# Patient Record
Sex: FEMALE | Race: BLACK OR AFRICAN AMERICAN | Employment: FULL TIME | URBAN - METROPOLITAN AREA
[De-identification: names, ages, dates, MRNs, and addresses within clinical notes are randomized per-mention and may not be internally consistent; named-entity substitution may affect disease eponyms.]

---

## 2017-01-12 RX ORDER — CODEINE PHOSPHATE AND GUAIFENESIN 10; 100 MG/5ML; MG/5ML
5 SOLUTION ORAL
Qty: 180 ML | Refills: 0 | Status: SHIPPED | OUTPATIENT
Start: 2017-01-12 | End: 2017-02-14 | Stop reason: SDUPTHER

## 2017-02-14 ENCOUNTER — OFFICE VISIT (OUTPATIENT)
Dept: INTERNAL MEDICINE CLINIC | Age: 67
End: 2017-02-14

## 2017-02-14 VITALS
HEIGHT: 59 IN | DIASTOLIC BLOOD PRESSURE: 69 MMHG | HEART RATE: 68 BPM | TEMPERATURE: 97.7 F | OXYGEN SATURATION: 95 % | RESPIRATION RATE: 16 BRPM | SYSTOLIC BLOOD PRESSURE: 155 MMHG | WEIGHT: 162.4 LBS | BODY MASS INDEX: 32.74 KG/M2

## 2017-02-14 DIAGNOSIS — E11.9 TYPE 2 DIABETES MELLITUS WITHOUT COMPLICATION, WITHOUT LONG-TERM CURRENT USE OF INSULIN (HCC): Primary | ICD-10-CM

## 2017-02-14 DIAGNOSIS — I10 ESSENTIAL HYPERTENSION: ICD-10-CM

## 2017-02-14 DIAGNOSIS — E78.00 HYPERCHOLESTEROLEMIA: ICD-10-CM

## 2017-02-14 PROBLEM — R05.9 COUGH: Status: ACTIVE | Noted: 2017-02-14

## 2017-02-14 RX ORDER — MOMETASONE FUROATE 50 UG/1
2 SPRAY, METERED NASAL DAILY
Qty: 1 CONTAINER | Refills: 11 | Status: SHIPPED | OUTPATIENT
Start: 2017-02-14 | End: 2019-01-08 | Stop reason: ALTCHOICE

## 2017-02-14 RX ORDER — CETIRIZINE HCL 10 MG
10 TABLET ORAL DAILY
Qty: 30 TAB | Refills: 11 | Status: SHIPPED | OUTPATIENT
Start: 2017-02-14 | End: 2019-01-08 | Stop reason: ALTCHOICE

## 2017-02-14 RX ORDER — CODEINE PHOSPHATE AND GUAIFENESIN 10; 100 MG/5ML; MG/5ML
5 SOLUTION ORAL
Qty: 180 ML | Refills: 2 | Status: SHIPPED | OUTPATIENT
Start: 2017-02-14 | End: 2017-03-28 | Stop reason: ALTCHOICE

## 2017-02-14 NOTE — PROGRESS NOTES
SPORTS MEDICINE AND PRIMARY CARE  Kraig Zamora MD, [address] [address]  Phone:  [phone]  Fax: [phone]      Chief Complaint   Patient presents with    Follow-up     Cough          SUBECTIVE:    Gaurav Mondragon is a 74 y.o. female Patient returns today ambulatory, alert and appropriate and has the capacity to give an accurate history. She has a known history of diabetes mellitus type II, dyslipidemia, primary hypertension, and obesity. Patient returns today with a persistent cough since October. Sometimes it is productive of a white coffee material.  It started back in October. She woke up and had a clump in her throat. She could not get it to come out. I told her not to go to [facility] so she came over to see [facility]. She also had an earache and a sore throat. He told her to take some Tylenol. She returned home and still has the cough now. She is currently not taking anything for the cough and is seen for evaluation. Current Outpatient Prescriptions   Medication Sig Dispense Refill    Diphth, Pertus,Acell,, Tetanus (BOOSTRIX TDAP) 2.5-8-5 Lf-mcg-Lf/0.5mL syrg 0.5 mL by IntraMUSCular route once for 1 dose. 0.5 mL 0    guaiFENesin-codeine (ROBITUSSIN AC) 100-10 mg/5 mL solution Take 5 mL by mouth four (4) times daily as needed for Cough. Max Daily Amount: 20 mL. 180 mL 2    cetirizine (ZYRTEC) 10 mg tablet Take 1 Tab by mouth daily. 30 Tab 11    mometasone (NASONEX) 50 mcg/actuation nasal spray 2 Sprays by Both Nostrils route daily. 1 Container 11    amLODIPine (NORVASC) 5 mg tablet Take 1 Tab by mouth daily. 30 Tab 11    carvedilol (COREG) 12.5 mg tablet Take 1 Tab by mouth two (2) times daily (with meals).  60 Tab 11    ASCENSIA CONTOUR strip USE TO TEST BLOOD GLUCOSE ONCE DAILY AS DIRECTED 50 strip 11    glucose blood VI test strips (CONTOUR NEXT STRIPS) strip Test blood sugar once daily 50 strip 11    metFORMIN ER (GLUCOPHAGE XR) 500 mg tablet TAKE ONE TABLET BY MOUTH ONCE DAILY BEFORE SUPPER 30 Tab 11     Past Medical History   Diagnosis Date    Baker's cyst 3-3-16     r    Bereavement 11/15     61yo  sister     Contusion of knee, right     Cough 2017    Depression with anxiety     Diabetes ([facility]     Diverticulosis 2-6-15     colonoscopy    DM2 (diabetes mellitus, type 2) (HCC)     Edema     Fatigue     Hypercholesterolemia     Hypertension     Liver disease      hepatitis 30 years ago    Obesity     Onychomycosis     S/P LD (total abdominal hysterectomy)      Past Surgical History   Procedure Laterality Date    Hx gyn       partial hysterectomy     Allergies   Allergen Reactions    Aspirin Nausea and Vomiting    Lipitor [Atorvastatin] Myalgia    Metformin Cough    Pravastatin Myalgia       REVIEW OF SYSTEMS:   No hemoptysis or epistaxis. Social History     Social History    Marital status:      Spouse name: N/A    Number of children: N/A    Years of education: N/A     Social History Main Topics    Smoking status: Never Smoker    Smokeless tobacco: Never Used    Alcohol use No    Drug use: No    Sexual activity: Not Asked     Other Topics Concern    None     Social History Narrative   r  Family History   Problem Relation Age of Onset    No Known Problems Father        OBJECTIVE:  Visit Vitals    /69 (BP 1 Location: Left arm, BP Patient Position: Sitting)    Pulse 68    Temp 97.7 °F (36.5 °C) (Oral)    Resp 16    Ht 4' 11\" (1.499 m)    Wt 162 lb 6.4 oz (73.7 kg)    SpO2 95%    BMI 32.8 kg/m2     ENT: perrla,  eom intact  NECK: supple. Thyroid normal  CHEST: clear to ascultation and percussion   HEART: regular rate and rhythm  ABD: soft, bowel sounds active  EXTREMITIES: no edema, pulse 1+     No visits with results within 3 Month(s) from this visit.   Latest known visit with results is:    Office Visit on 2016   Component Date Value Ref Range Status    Glucose POC 11/14/2016 177  mg/dL Final          ASSESSMENT:  1. Type 2 diabetes mellitus without complication, without long-term current use of insulin ([address]    2. Essential hypertension    3. Hypercholesterolemia      Patient has a persistent cough. She stopped the Metformin because it seemed to be aggravated and her throat was closing up. When she started it up again she had the same symptoms again and therefore she thought she was allergic to it and did not take it anymore. She has posterior cobblestoning which would suggest that sinus is the origin of the cough. We will give her Robitussin AC but in addition we will give her a nasal spray and Zyrtec and see if we can resolve the cough. We will check a hemoglobin A1C to determine if she needs something for the diabetes. If the hemoglobin A1C is around 6.5 or 6.6 we will ask her to stick with the diet. If it is higher than that we will place her on a DDP4 inhibiter to control her diabetes. She will return to the office in about four months, sooner if she has any problems. For the discomfort in the knee we will use Naproxen. PLAN:  .  Orders Placed This Encounter    MICROALBUMIN, UR, RAND W/ MICROALBUMIN/CREA RATIO    LIPID PANEL    HEMOGLOBIN A1C WITH EAG    CBC WITH AUTOMATED DIFF    METABOLIC PANEL, COMPREHENSIVE    TSH 3RD GENERATION    URINALYSIS W/ RFLX MICROSCOPIC    REFERRAL TO OPHTHALMOLOGY    Diphth, Pertus,Acell,, Tetanus (BOOSTRIX TDAP) 2.5-8-5 Lf-mcg-Lf/0.5mL syrg    guaiFENesin-codeine (ROBITUSSIN AC) 100-10 mg/5 mL solution    cetirizine (ZYRTEC) 10 mg tablet    mometasone (NASONEX) 50 mcg/actuation nasal spray       Follow-up Disposition:  Return in about 6 weeks (around 3/28/2017). ATTENTION:   This medical record was transcribed using an electronic medical records system. Although proofread, it may and can contain electronic and spelling errors. Other human spelling and other errors may be present.   Corrections may be executed at a later time. Please feel free to contact us for any clarifications as needed.

## 2017-02-14 NOTE — MR AVS SNAPSHOT
Visit Information Date & Time Provider Department Dept. Phone Encounter #  
 2/14/2017 10:30 AM [facility] [place] [address] [id number] Follow-up Instructions Return in about 6 weeks (around 3/28/2017). Follow-up and Disposition History Upcoming Health Maintenance Date Due DTaP/Tdap/Td series (1 - Tdap) 9/24/1969 EYE EXAM RETINAL OR DILATED Q1 10/14/2015 MEDICARE YEARLY EXAM 3/13/2016 GLAUCOMA SCREENING Q2Y 8/13/2016 MICROALBUMIN Q1 10/1/2016 HEMOGLOBIN A1C Q6M 12/7/2016 BREAST CANCER SCRN MAMMOGRAM 1/6/2017 FOOT EXAM Q1 1/7/2017 LIPID PANEL Q1 1/7/2017 FOBT Q 1 YEAR AGE 50-75 3/2/2017 Pneumococcal 65+ Low/Medium Risk (2 of 2 - PPSV23) 11/14/2017 Allergies as of 2/14/2017  Review Complete On: 2/14/2017 By: Karen Spicer MD  
  
 Severity Noted Reaction Type Reactions Aspirin  12/12/2014    Nausea and Vomiting Lipitor [Atorvastatin]  10/01/2015    Myalgia Metformin  02/14/2017    Cough Pravastatin  06/19/2015    Myalgia Current Immunizations  Never Reviewed No immunizations on file. Not reviewed this visit You Were Diagnosed With   
  
 Codes Comments Type 2 diabetes mellitus without complication, without long-term current use of insulin (HCC)    -  Primary ICD-10-CM: E11.9 ICD-9-CM: 250.00 Essential hypertension     ICD-10-CM: I10 
ICD-9-CM: 401.9 Hypercholesterolemia     ICD-10-CM: E78.00 ICD-9-CM: 272.0 Vitals BP Pulse Temp Resp Height(growth percentile) Weight(growth percentile) 155/69 (BP 1 Location: Left arm, BP Patient Position: Sitting) 68 97.7 °F (36.5 °C) (Oral) 16 4' 11\" (1.499 m) 162 lb 6.4 oz (73.7 kg) SpO2 BMI OB Status Smoking Status 95% 32.8 kg/m2 Unknown Never Smoker Vitals History BMI and BSA Data Body Mass Index Body Surface Area  
 32.8 kg/m 2 1.75 m 2 Preferred Pharmacy Pharmacy Name Phone [id number] [facility] [phone] Your Updated Medication List  
  
   
This list is accurate as of: 2/14/17 12:24 PM.  Always use your most recent med list. amLODIPine 5 mg tablet Commonly known as:  Parvez Brigitte Take 1 Tab by mouth daily. * Ascensia CONTOUR strip Generic drug:  glucose blood VI test strips USE TO TEST BLOOD GLUCOSE ONCE DAILY AS DIRECTED * glucose blood VI test strips strip Commonly known as:  CONTOUR NEXT STRIPS Test blood sugar once daily  
  
 carvedilol 12.5 mg tablet Commonly known as:  Wileen Smalls Take 1 Tab by mouth two (2) times daily (with meals). cetirizine 10 mg tablet Commonly known as:  ZYRTEC Take 1 Tab by mouth daily. Diphth, Pertus(Acell), Tetanus 2.5-8-5 Lf-mcg-Lf/0.5mL Syrg Commonly known as:  BOOSTRIX TDAP  
0.5 mL by IntraMUSCular route once for 1 dose. guaiFENesin-codeine 100-10 mg/5 mL solution Commonly known as:  ROBITUSSIN AC Take 5 mL by mouth four (4) times daily as needed for Cough. Max Daily Amount: 20 mL. metFORMIN  mg tablet Commonly known as:  GLUCOPHAGE XR  
TAKE ONE TABLET BY MOUTH ONCE DAILY BEFORE SUPPER  
  
 mometasone 50 mcg/actuation nasal spray Commonly known as:  NASONEX  
2 Sprays by Both Nostrils route daily. * Notice: This list has 2 medication(s) that are the same as other medications prescribed for you. Read the directions carefully, and ask your doctor or other care provider to review them with you. Prescriptions Printed Refills  
 guaiFENesin-codeine (ROBITUSSIN AC) 100-10 mg/5 mL solution 2 Sig: Take 5 mL by mouth four (4) times daily as needed for Cough. Max Daily Amount: 20 mL. Class: Print Route: Oral  
  
Prescriptions Sent to Pharmacy Refills  Jessica Carbajal,, Tetanus (BOOSTRIX TDAP) 2.5-8-5 Lf-mcg-Lf/0.5mL syrg 0  
 Si.5 mL by IntraMUSCular route once for 1 dose. Class: Normal  
 Pharmacy: [facility]/pharmacy #[id number] Ph #: [phone] Route: IntraMUSCular  
 cetirizine (ZYRTEC) 10 mg tablet 11 Sig: Take 1 Tab by mouth daily. Class: Normal  
 Pharmacy: [facility]/pharmacy #[id number] Ph #: [phone] Route: Oral  
 mometasone (NASONEX) 50 mcg/actuation nasal spray 11 Si Sprays by Both Nostrils route daily. Class: Normal  
 Pharmacy: [facility]/pharmacy #[id number] Ph #: [phone] Route: Both Nostrils We Performed the Following CBC WITH AUTOMATED DIFF [70107 CPT(R)] HEMOGLOBIN A1C WITH EAG [38638 CPT(R)]  DIABETES FOOT EXAM [HM7 Custom] LIPID PANEL [52808 CPT(R)] METABOLIC PANEL, COMPREHENSIVE [06195 CPT(R)] MICROALBUMIN, UR, RAND W/ MICROALBUMIN/CREA RATIO C2400172 CPT(R)] REFERRAL TO OPHTHALMOLOGY [REF57 Custom] TSH 3RD GENERATION [08817 CPT(R)] URINALYSIS W/ RFLX MICROSCOPIC [60684 CPT(R)] Follow-up Instructions Return in about 6 weeks (around 3/28/2017). Referral Information Referral ID Referred By Referred To  
  
 [id number] Lochelio Salazar TESSA Not Available Visits Status Start Date End Date 1 New Request 17 If your referral has a status of pending review or denied, additional information will be sent to support the outcome of this decision. Introducing \[facility] [place] [facility]\"" & HEALTH SERVICES! Rachel Goldman introduces [employer] patient portal. Now you can access parts of your medical record, email your doctor's office, and request medication refills online. 1. In your internet browser, go to [email or web address] 2. Click on the First Time User? Click Here link in the Sign In box. You will see the New Member Sign Up page. 3. Enter your [employer] Access Code exactly as it appears below. You will not need to use this code after youve completed the sign-up process. If you do not sign up before the expiration date, you must request a new code. · [employer] Access Code: [email or web address] Expires: 3/4/2017  2:12 PM 
 
4. Enter the last four digits of your Social Security Number (xxxx) and Date of Birth (mm/dd/yyyy) as indicated and click Submit. You will be taken to the next sign-up page. 5. Create a [employer] ID. This will be your [employer] login ID and cannot be changed, so think of one that is secure and easy to remember. 6. Create a [employer] password. You can change your password at any time. 7. Enter your Password Reset Question and Answer. This can be used at a later time if you forget your password. 8. Enter your e-mail address. You will receive e-mail notification when new information is available in [address] 9. Click Sign Up. You can now view and download portions of your medical record. 10. Click the Download Summary menu link to download a portable copy of your medical information. If you have questions, please visit the Frequently Asked Questions section of the [employer] website. Remember, [employer] is NOT to be used for urgent needs. For medical emergencies, dial 911. Now available from your iPhone and Android! Please provide this summary of care documentation to your next provider. Your primary care clinician is listed as Evangelina Velasco. If you have any questions after today's visit, please call [phone].

## 2017-03-28 ENCOUNTER — OFFICE VISIT (OUTPATIENT)
Dept: INTERNAL MEDICINE CLINIC | Age: 67
End: 2017-03-28

## 2017-03-28 VITALS
SYSTOLIC BLOOD PRESSURE: 133 MMHG | BODY MASS INDEX: 33.02 KG/M2 | DIASTOLIC BLOOD PRESSURE: 76 MMHG | HEIGHT: 59 IN | OXYGEN SATURATION: 98 % | HEART RATE: 64 BPM | WEIGHT: 163.8 LBS | TEMPERATURE: 97.1 F | RESPIRATION RATE: 17 BRPM

## 2017-03-28 DIAGNOSIS — Z00.00 ROUTINE GENERAL MEDICAL EXAMINATION AT A HEALTH CARE FACILITY: ICD-10-CM

## 2017-03-28 DIAGNOSIS — E78.00 HYPERCHOLESTEROLEMIA: ICD-10-CM

## 2017-03-28 DIAGNOSIS — Z13.39 SCREENING FOR ALCOHOLISM: ICD-10-CM

## 2017-03-28 DIAGNOSIS — E11.9 DIABETES MELLITUS TYPE 2, DIET-CONTROLLED (HCC): Primary | ICD-10-CM

## 2017-03-28 DIAGNOSIS — G56.03 BILATERAL CARPAL TUNNEL SYNDROME: ICD-10-CM

## 2017-03-28 DIAGNOSIS — I10 ESSENTIAL HYPERTENSION: ICD-10-CM

## 2017-03-28 LAB — GLUCOSE POC: 123 MG/DL

## 2017-03-28 NOTE — MR AVS SNAPSHOT
Visit Information Date & Time Provider Department Dept. Phone Encounter #  
 3/28/2017  4:15 PM Tricia Fagan MD SPORTS MED AND PRIMARY CARE - [facility] [phone] [id number] Follow-up Instructions Return in about 6 months (around 9/28/2017). Follow-up and Disposition History Your Appointments 9/26/2017  3:00 PM  
Any with Tricia Fagan MD  
[address] [facility] [address] ([address]) Appt Note: 6 month f/u  
 [address], [address] [facility]  
  
   
 [address], [place] [address] [place] [address] Upcoming Health Maintenance Date Due  
 EYE EXAM RETINAL OR DILATED Q1 10/14/2015 MEDICARE YEARLY EXAM 3/13/2016 GLAUCOMA SCREENING Q2Y 8/13/2016 MICROALBUMIN Q1 10/1/2016 HEMOGLOBIN A1C Q6M 12/7/2016 BREAST CANCER SCRN MAMMOGRAM 1/6/2017 LIPID PANEL Q1 1/7/2017 FOBT Q 1 YEAR AGE 50-75 3/2/2017 Pneumococcal 65+ Low/Medium Risk (2 of 2 - PPSV23) 11/14/2017 FOOT EXAM Q1 2/14/2018 DTaP/Tdap/Td series (2 - Td) 3/28/2027 Allergies as of 3/28/2017  Review Complete On: 3/28/2017 By: Tricia Fagan MD  
  
 Severity Noted Reaction Type Reactions Aspirin  12/12/2014    Nausea and Vomiting Lipitor [Atorvastatin]  10/01/2015    Myalgia Metformin  02/14/2017    Cough Pravastatin  06/19/2015    Myalgia Current Immunizations  Never Reviewed No immunizations on file. Not reviewed this visit You Were Diagnosed With   
  
 Codes Comments Diabetes mellitus type 2, diet-controlled ([facility]    -  Primary ICD-10-CM: E11.9 ICD-9-CM: 250.00 Essential hypertension     ICD-10-CM: I10 
ICD-9-CM: 401.9 Hypercholesterolemia     ICD-10-CM: E78.00 ICD-9-CM: 272.0 Routine general medical examination at a health care facility     ICD-10-CM: Z00.00 ICD-9-CM: V70.0 Screening for alcoholism     ICD-10-CM: Z13.89 ICD-9-CM: V79.1 Bilateral carpal tunnel syndrome     ICD-10-CM: G56.03 
ICD-9-CM: 354.0 Vitals BP Pulse Temp Resp Height(growth percentile) Weight(growth percentile) 133/76 (BP 1 Location: Right arm, BP Patient Position: Sitting) 64 97.1 °F (36.2 °C) (Oral) 17 4' 11\" (1.499 m) 163 lb 12.8 oz (74.3 kg) SpO2 BMI OB Status Smoking Status 98% 33.08 kg/m2 Unknown Never Smoker Vitals History BMI and BSA Data Body Mass Index Body Surface Area 33.08 kg/m 2 1.76 m 2 Preferred Pharmacy Pharmacy Name Phone [id number] [phone] [facility] [phone] Your Updated Medication List  
  
   
This list is accurate as of: 3/28/17  5:35 PM.  Always use your most recent med list. amLODIPine 5 mg tablet Commonly known as:  Ascencio Cater Take 1 Tab by mouth daily. * Ascensia CONTOUR strip Generic drug:  glucose blood VI test strips USE TO TEST BLOOD GLUCOSE ONCE DAILY AS DIRECTED * glucose blood VI test strips strip Commonly known as:  CONTOUR NEXT STRIPS Test blood sugar once daily  
  
 carvedilol 12.5 mg tablet Commonly known as:  Sumanth Pintos Take 1 Tab by mouth two (2) times daily (with meals). cetirizine 10 mg tablet Commonly known as:  ZYRTEC Take 1 Tab by mouth daily. metFORMIN  mg tablet Commonly known as:  GLUCOPHAGE XR  
TAKE ONE TABLET BY MOUTH ONCE DAILY BEFORE SUPPER  
  
 mometasone 50 mcg/actuation nasal spray Commonly known as:  NASONEX  
2 Sprays by Both Nostrils route daily. * Notice: This list has 2 medication(s) that are the same as other medications prescribed for you. Read the directions carefully, and ask your doctor or other care provider to review them with you. We Performed the Following AMB POC GLUCOSE BLOOD, BY GLUCOSE MONITORING DEVICE [96218 CPT(R)] CBC WITH AUTOMATED DIFF [97785 CPT(R)] HEMOGLOBIN A1C WITH EAG [16653 CPT(R)] LIPID PANEL [79740 CPT(R)] METABOLIC PANEL, COMPREHENSIVE [60692 CPT(R)] MN COLLECTION VENOUS BLOOD,VENIPUNCTURE U2669999 CPT(R)] TSH 3RD GENERATION [03877 CPT(R)] URINALYSIS W/ RFLX MICROSCOPIC [14646 CPT(R)] Follow-up Instructions Return in about 6 months (around 9/28/2017). To-Do List   
 03/28/2017 Neurology:  [facility] TWO EXTREMITIES UPPER   
  
 03/28/2017 Imaging:  [facility] BI SCREENING INCL CAD Introducing [facility] [place] & HEALTH SERVICES! [facility] introduces [employer] patient portal. Now you can access parts of your medical record, email your doctor's office, and request medication refills online. 1. In your internet browser, go to [email or web address] 2. Click on the First Time User? Click Here link in the Sign In box. You will see the New Member Sign Up page. 3. Enter your [employer] Access Code exactly as it appears below. You will not need to use this code after youve completed the sign-up process. If you do not sign up before the expiration date, you must request a new code. · [employer] Access Code: [email or web address] Expires: 6/26/2017  5:06 PM 
 
4. Enter the last four digits of your Social Security Number (xxxx) and Date of Birth (mm/dd/yyyy) as indicated and click Submit. You will be taken to the next sign-up page. 5. Create a [employer] ID. This will be your [employer] login ID and cannot be changed, so think of one that is secure and easy to remember. 6. Create a [employer] password. You can change your password at any time. 7. Enter your Password Reset Question and Answer. This can be used at a later time if you forget your password. 8. Enter your e-mail address. You will receive e-mail notification when new information is available in [address] 9. Click Sign Up. You can now view and download portions of your medical record.  
10. Click the Download Summary menu link to download a portable copy of your medical information. If you have questions, please visit the Frequently Asked Questions section of the [employer] website. Remember, [employer] is NOT to be used for urgent needs. For medical emergencies, dial 911. Now available from your iPhone and Android! Please provide this summary of care documentation to your next provider. Your primary care clinician is listed as Teofilo Collet. If you have any questions after today's visit, please call [phone].

## 2017-03-28 NOTE — PROGRESS NOTES
This is a Subsequent Medicare Annual Wellness Visit providing Personalized Prevention Plan Services (PPPS) (Performed 12 months after initial AWV and PPPS )    I have reviewed the patient's medical history in detail and updated the computerized patient record. HistoryPatient returns today ambulatory, alert and appropriate and has the capacity to give an accurate history. She is seen for evaluation. Patient complains of tingling in her forearms and fingers going down to the median distrubution on both hands. The left being worse than the right. Patient denies other specific complaints and is seen for evaluation. Past Medical History:   Diagnosis Date    Baker's cyst 3-3-16    r    Bereavement 11/15    61yo  sister     Contusion of knee, right     Cough 2017    Depression with anxiety     Diabetes ([facility] [facility]     Diverticulosis 2-6-15    colonoscopy    DM2 (diabetes mellitus, type 2) (HCC)     Edema     Fatigue     Hypercholesterolemia     Hypertension     Liver disease     hepatitis 30 years ago    Obesity     Onychomycosis     S/P LD (total abdominal hysterectomy)       Past Surgical History:   Procedure Laterality Date    HX GYN      partial hysterectomy     Current Outpatient Prescriptions   Medication Sig Dispense Refill    cetirizine (ZYRTEC) 10 mg tablet Take 1 Tab by mouth daily. 30 Tab 11    mometasone (NASONEX) 50 mcg/actuation nasal spray 2 Sprays by Both Nostrils route daily. 1 Container 11    amLODIPine (NORVASC) 5 mg tablet Take 1 Tab by mouth daily. 30 Tab 11    carvedilol (COREG) 12.5 mg tablet Take 1 Tab by mouth two (2) times daily (with meals).  60 Tab 11    ASCENSIA CONTOUR strip USE TO TEST BLOOD GLUCOSE ONCE DAILY AS DIRECTED 50 strip 11    glucose blood VI test strips (CONTOUR NEXT STRIPS) strip Test blood sugar once daily 50 strip 11    guaiFENesin-codeine (ROBITUSSIN AC) 100-10 mg/5 mL solution Take 5 mL by mouth four (4) times daily as needed for Cough. Max Daily Amount: 20 mL. 180 mL 2    metFORMIN ER (GLUCOPHAGE XR) 500 mg tablet TAKE ONE TABLET BY MOUTH ONCE DAILY BEFORE SUPPER 30 Tab 11     Allergies   Allergen Reactions    Aspirin Nausea and Vomiting    Lipitor [Atorvastatin] Myalgia    Metformin Cough    Pravastatin Myalgia     Family History   Problem Relation Age of Onset    No Known Problems Father      Social History   Substance Use Topics    Smoking status: Never Smoker    Smokeless tobacco: Never Used    Alcohol use No     Patient Active Problem List   Diagnosis Code    Hypertension I10    Diabetes ([facility] [address] E11.9    Hypercholesterolemia E78.00    Obesity E66.9    Onychomycosis B35.1    Depression with anxiety F41.8    Diverticulosis K57.90    S/P LD (total abdominal hysterectomy) Z90.710    Fatigue R53.83    Bereavement Z63.4    Edema R60.9    Contusion of knee, right S80. 01XA    ACP (advance care planning) Z71.89    Baker's cyst M71.20    DM2 (diabetes mellitus, type 2) ([facility] [address] E11.9    Cough R05       Depression Risk Factor Screening:     PHQ 2 / 9, over the last two weeks 12/12/2014   Little interest or pleasure in doing things Not at all   Feeling down, depressed or hopeless Not at all   Total Score PHQ 2 0     Alcohol Risk Factor Screening: On any occasion during the past 3 months, have you had more than 3 drinks containing alcohol? No    Do you average more than 7 drinks per week? No      Functional Ability and Level of Safety:     Hearing Loss   normal-to-mild    Activities of Daily Living   Self-care. Requires assistance with: no ADLs    Fall Risk     Fall Risk Assessment, last 12 mths 3/28/2017   Able to walk? No   Fall in past 12 months? -     Abuse Screen   Patient is not abused    Review of Systems   A comprehensive review of systems was negative except for that written in the HPI.     Physical Examination     Evaluation of Cognitive Function:  Mood/affect:  neutral  Appearance: age appropriate  Family member/caregiver input:     Visit Vitals    /76 (BP 1 Location: Right arm, BP Patient Position: Sitting)    Pulse 64    Temp 97.1 °F (36.2 °C) (Oral)    Resp 17    Ht 4' 11\" (1.499 m)    Wt 163 lb 12.8 oz (74.3 kg)    SpO2 98%    BMI 33.08 kg/m2     General:  Alert, cooperative, no distress, appears stated age. Head:  Normocephalic, without obvious abnormality, atraumatic. Eyes:  Conjunctivae/corneas clear. PERRL, EOMs intact. Fundi benign. Ears:  Normal TMs and external ear canals both ears. Nose: Nares normal. Septum midline. Mucosa normal. No drainage or sinus tenderness. Throat: Lips, mucosa, and tongue normal. Teeth and gums normal.   Neck: Supple, symmetrical, trachea midline, no adenopathy, thyroid: no enlargement/tenderness/nodules, no carotid bruit and no JVD. Back:   Symmetric, no curvature. ROM normal. No CVA tenderness. Lungs:   Clear to auscultation bilaterally. Chest wall:  No tenderness or deformity. Heart:  Regular rate and rhythm, S1, S2 normal, no murmur, click, rub or gallop. Breast Exam:  Na   Abdomen:   Soft, non-tender. Bowel sounds normal. No masses,  No organomegaly. Genitalia:  na   Rectal:     Extremities: Extremities normal, atraumatic, no cyanosis or edema. Pulses: 2+ and symmetric all extremities. Skin: Skin color, texture, turgor normal. No rashes or lesions. Lymph nodes: Cervical, supraclavicular, and axillary nodes normal.   Neurologic: CNII-XII intact. Normal strength, sensation and reflexes throughout. Patient Care Team:  Karen Spicer MD as PCP - General (Internal Medicine)    Advice/Referrals/Counseling   Education and counseling provided:  Are appropriate based on today's review and evaluation      Assessment/Plan       ICD-10-CM ICD-9-CM    1.  Diabetes mellitus type 2, diet-controlled (HCC) E11.9 250.00 AMB POC GLUCOSE BLOOD, BY GLUCOSE MONITORING DEVICE      URINALYSIS W/ RFLX MICROSCOPIC      CBC WITH AUTOMATED DIFF      METABOLIC PANEL, COMPREHENSIVE      LIPID PANEL      TSH 3RD GENERATION      [place] COLLECTION VENOUS BLOOD,VENIPUNCTURE      HEMOGLOBIN A1C WITH EAG   2. Essential hypertension I10 401.9    3. Hypercholesterolemia E78.00 272.0    . The patient's symptoms are compatible with carpal tunnel syndrome. We suggest beginning with splints to try to make her more comfortable. We will go ahead and request EMG and nerve conduction studies to confirm the diagnosis. Blood pressure control is excellent. BMI remains in the obese category unfortunately. Appropriate yearly studies are requested. She will return to the office in six months, sooner if she has any problems. We will send her the results as we get the reports.

## 2017-03-29 LAB
ALBUMIN SERPL-MCNC: 4.4 G/DL (ref 3.6–4.8)
ALBUMIN/GLOB SERPL: 1.3 {RATIO} (ref 1.2–2.2)
ALP SERPL-CCNC: 93 IU/L (ref 39–117)
ALT SERPL-CCNC: 8 IU/L (ref 0–32)
APPEARANCE UR: ABNORMAL
AST SERPL-CCNC: 16 IU/L (ref 0–40)
BASOPHILS # BLD AUTO: 0 X10E3/UL (ref 0–0.2)
BASOPHILS NFR BLD AUTO: 1 %
BILIRUB SERPL-MCNC: 0.5 MG/DL (ref 0–1.2)
BILIRUB UR QL STRIP: NEGATIVE
BUN SERPL-MCNC: 14 MG/DL (ref 8–27)
BUN/CREAT SERPL: 19 (ref 11–26)
CALCIUM SERPL-MCNC: 9 MG/DL (ref 8.7–10.3)
CHLORIDE SERPL-SCNC: 101 MMOL/L (ref 96–106)
CHOLEST SERPL-MCNC: 219 MG/DL (ref 100–199)
CO2 SERPL-SCNC: 24 MMOL/L (ref 18–29)
COLOR UR: YELLOW
CREAT SERPL-MCNC: 0.73 MG/DL (ref 0.57–1)
EOSINOPHIL # BLD AUTO: 0.3 X10E3/UL (ref 0–0.4)
EOSINOPHIL NFR BLD AUTO: 4 %
ERYTHROCYTE [DISTWIDTH] IN BLOOD BY AUTOMATED COUNT: 14.8 % (ref 12.3–15.4)
EST. AVERAGE GLUCOSE BLD GHB EST-MCNC: 146 MG/DL
GLOBULIN SER CALC-MCNC: 3.4 G/DL (ref 1.5–4.5)
GLUCOSE SERPL-MCNC: 109 MG/DL (ref 65–99)
GLUCOSE UR QL: NEGATIVE
HBA1C MFR BLD: 6.7 % (ref 4.8–5.6)
HCT VFR BLD AUTO: 40.5 % (ref 34–46.6)
HDLC SERPL-MCNC: 66 MG/DL
HGB BLD-MCNC: 13.3 G/DL (ref 11.1–15.9)
HGB UR QL STRIP: NEGATIVE
IMM GRANULOCYTES # BLD: 0 X10E3/UL (ref 0–0.1)
IMM GRANULOCYTES NFR BLD: 0 %
KETONES UR QL STRIP: NEGATIVE
LDLC SERPL CALC-MCNC: 139 MG/DL (ref 0–99)
LEUKOCYTE ESTERASE UR QL STRIP: NEGATIVE
LYMPHOCYTES # BLD AUTO: 2.3 X10E3/UL (ref 0.7–3.1)
LYMPHOCYTES NFR BLD AUTO: 34 %
MCH RBC QN AUTO: 28.9 PG (ref 26.6–33)
MCHC RBC AUTO-ENTMCNC: 32.8 G/DL (ref 31.5–35.7)
MCV RBC AUTO: 88 FL (ref 79–97)
MICRO URNS: ABNORMAL
MONOCYTES # BLD AUTO: 0.3 X10E3/UL (ref 0.1–0.9)
MONOCYTES NFR BLD AUTO: 5 %
NEUTROPHILS # BLD AUTO: 3.7 X10E3/UL (ref 1.4–7)
NEUTROPHILS NFR BLD AUTO: 56 %
NITRITE UR QL STRIP: NEGATIVE
PH UR STRIP: 6 [PH] (ref 5–7.5)
PLATELET # BLD AUTO: 130 X10E3/UL (ref 150–379)
POTASSIUM SERPL-SCNC: 4.1 MMOL/L (ref 3.5–5.2)
PROT SERPL-MCNC: 7.8 G/DL (ref 6–8.5)
PROT UR QL STRIP: NEGATIVE
RBC # BLD AUTO: 4.6 X10E6/UL (ref 3.77–5.28)
SODIUM SERPL-SCNC: 142 MMOL/L (ref 134–144)
SP GR UR: 1.02 (ref 1–1.03)
TRIGL SERPL-MCNC: 69 MG/DL (ref 0–149)
TSH SERPL DL<=0.005 MIU/L-ACNC: 1.38 UIU/ML (ref 0.45–4.5)
UROBILINOGEN UR STRIP-MCNC: 0.2 MG/DL (ref 0.2–1)
VLDLC SERPL CALC-MCNC: 14 MG/DL (ref 5–40)
WBC # BLD AUTO: 6.6 X10E3/UL (ref 3.4–10.8)

## 2017-04-07 ENCOUNTER — HOSPITAL ENCOUNTER (OUTPATIENT)
Dept: MAMMOGRAPHY | Age: 67
Discharge: HOME | End: 2017-04-07
Attending: INTERNAL MEDICINE
Payer: COMMERCIAL

## 2017-04-07 DIAGNOSIS — E11.9 DIABETES MELLITUS TYPE 2, DIET-CONTROLLED (HCC): ICD-10-CM

## 2017-04-07 PROCEDURE — 77067 SCR MAMMO BI INCL CAD: CPT

## 2017-04-28 DIAGNOSIS — G56.03 BILATERAL CARPAL TUNNEL SYNDROME: ICD-10-CM

## 2017-04-28 DIAGNOSIS — E11.9 DIABETES MELLITUS TYPE 2, DIET-CONTROLLED (HCC): ICD-10-CM

## 2017-08-01 ENCOUNTER — OFFICE VISIT (OUTPATIENT)
Dept: INTERNAL MEDICINE CLINIC | Age: 67
End: 2017-08-01

## 2017-08-01 VITALS
BODY MASS INDEX: 32.66 KG/M2 | HEIGHT: 59 IN | WEIGHT: 162 LBS | SYSTOLIC BLOOD PRESSURE: 146 MMHG | HEART RATE: 71 BPM | RESPIRATION RATE: 16 BRPM | DIASTOLIC BLOOD PRESSURE: 82 MMHG | TEMPERATURE: 97.4 F | OXYGEN SATURATION: 99 %

## 2017-08-01 DIAGNOSIS — D69.6 THROMBOCYTOPENIA (HCC): ICD-10-CM

## 2017-08-01 DIAGNOSIS — I10 ESSENTIAL HYPERTENSION: ICD-10-CM

## 2017-08-01 DIAGNOSIS — E11.9 TYPE 2 DIABETES MELLITUS WITHOUT COMPLICATION, WITHOUT LONG-TERM CURRENT USE OF INSULIN (HCC): Primary | ICD-10-CM

## 2017-08-01 RX ORDER — CLONIDINE HYDROCHLORIDE 0.2 MG/1
0.2 TABLET ORAL 2 TIMES DAILY
Qty: 60 TAB | Refills: 11 | Status: SHIPPED | OUTPATIENT
Start: 2017-08-01 | End: 2017-08-29 | Stop reason: SDUPTHER

## 2017-08-01 RX ORDER — FLUTICASONE PROPIONATE AND SALMETEROL 250; 50 UG/1; UG/1
1 POWDER RESPIRATORY (INHALATION) 2 TIMES DAILY
Qty: 1 INHALER | Refills: 11 | Status: SHIPPED | OUTPATIENT
Start: 2017-08-01 | End: 2019-01-08 | Stop reason: ALTCHOICE

## 2017-08-01 RX ORDER — ALBUTEROL SULFATE 90 UG/1
2 AEROSOL, METERED RESPIRATORY (INHALATION)
Qty: 1 INHALER | Refills: 11 | Status: SHIPPED | OUTPATIENT
Start: 2017-08-01 | End: 2019-01-08 | Stop reason: ALTCHOICE

## 2017-08-01 NOTE — PROGRESS NOTES
SPORTS MEDICINE AND PRIMARY CARE  Danton Koyanagi, MD, [address] [address] [facility],[address] [address]  Phone:  [phone]  Fax: [phone]       Chief Complaint   Patient presents with    Cough     Patient states that she belives that she is allergic to blood pressure medicine because she begins to cough more when she takes it   . SUBJECTIVE:    Bernice Morgan is a 74 y.o. female Patient returns today alert, appropriate, ambulatory and has the capacity to give an accurate history. She has a known history of diabetes with good blood sugar control on Metformin, depression and anxiety, fatigue, , thrombocytopenia, and is seen for evaluation. Patient returns today complaining of a cough. When we last saw her the cough was productive of mucopurulent sputum and we prescribed cough syrup, as well as antibiotic. She took her cough syrup and antibiotic, but the cough has never stopped. She continues to cough currently and actually came back today because \"the cough has gotten on my nerves\". Patient is seen for evaluation. Current Outpatient Prescriptions   Medication Sig Dispense Refill    cloNIDine HCl (CATAPRES) 0.2 mg tablet Take 1 Tab by mouth two (2) times a day. 60 Tab 11    albuterol (PROVENTIL HFA, VENTOLIN HFA, PROAIR HFA) 90 mcg/actuation inhaler Take 2 Puffs by inhalation every four (4) hours as needed for Wheezing. 1 Inhaler 11    fluticasone-salmeterol (ADVAIR) 250-50 mcg/dose diskus inhaler Take 1 Puff by inhalation two (2) times a day. 1 Inhaler 11    amLODIPine (NORVASC) 5 mg tablet Take 1 Tab by mouth daily. 30 Tab 11    ASCENSIA CONTOUR strip USE TO TEST BLOOD GLUCOSE ONCE DAILY AS DIRECTED 50 strip 11    cetirizine (ZYRTEC) 10 mg tablet Take 1 Tab by mouth daily. 30 Tab 11    mometasone (NASONEX) 50 mcg/actuation nasal spray 2 Sprays by Both Nostrils route daily.  1 Container 11    metFORMIN ER (GLUCOPHAGE XR) 500 mg tablet TAKE ONE TABLET BY MOUTH ONCE DAILY BEFORE SUPPER 30 Tab 11    glucose blood VI test strips (CONTOUR NEXT STRIPS) strip Test blood sugar once daily 50 strip 11     Past Medical History:   Diagnosis Date    Baker's cyst 3-3-16    r    Bereavement 11/15    61yo  sister     Contusion of knee, right     Cough 2017    Depression with anxiety     Diabetes ([facility]     Diverticulosis 15    colonoscopy    DM2 (diabetes mellitus, type 2) ([facility]     Edema     Fatigue     Hypercholesterolemia     Hypertension     Liver disease     hepatitis 30 years ago    Obesity     Onychomycosis     S/P LD (total abdominal hysterectomy) 1985    Thrombocytopenia ([facility] [address] 2014     Past Surgical History:   Procedure Laterality Date    HX GYN      partial hysterectomy     Allergies   Allergen Reactions    Aspirin Nausea and Vomiting    Lipitor [Atorvastatin] Myalgia    Metformin Cough    Pravastatin Myalgia         REVIEW OF SYSTEMS:  General: negative for - chills or fever  ENT: negative for - headaches, nasal congestion or tinnitus  Respiratory: negative for - cough, hemoptysis, shortness of breath or wheezing  Cardiovascular : negative for - chest pain, edema, palpitations or shortness of breath  Gastrointestinal: negative for - abdominal pain, blood in stools, heartburn or nausea/vomiting  Genito-Urinary: no dysuria, trouble voiding, or hematuria  Musculoskeletal: negative for - gait disturbance, joint pain, joint stiffness or joint swelling  Neurological: no TIA or stroke symptoms  Hematologic: no bruises, no bleeding, no swollen glands  Integument: no lumps, mole changes, nail changes or rash  Endocrine: no malaise/lethargy or unexpected weight changes      Social History     Social History    Marital status:      Spouse name: N/A    Number of children: N/A    Years of education: N/A     Social History Main Topics    Smoking status: Never Smoker    Smokeless tobacco: Never Used    Alcohol use No    Drug use: No    Sexual activity: Not Asked     Other Topics Concern    None     Social History Narrative    Medical History: HTNTMJ dysfunctionHAsCecal lipoma 01/04negative head CT 03/04August 14, 2013 CT head w ith contrast unremarkable    except progressive splenoid sinusitisAugust 14, 2013 CT chest unremarkable    Gyn History: Last mammogram date 01/13/2012. Surgical History: LD 1985colonoscopy 01/04    Hospitalization/Major Diagnostic Procedure: Denies Past Hospitalization    Family History: Mother: alive 71 yrs, HTNFather: unknow nSister(s): alive, DMBrother(s): aliveDaughter(s): aliveSon(s): aliveAunt: alive2    brother(s) , 2 sister(s) . 1 son(s) , 1 daughter(s) . Social History: Alcohol Use Patient does not use alcohol. Smoking Status Patient is a never smoker. Marital Status: . Lives w ith:    spouse. no Children. 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 Education/School: has highschool diploma. Yazdanism: Jehovah    W itness - no blood transfusions. Family History   Problem Relation Age of Onset    No Known Problems Father        OBJECTIVE:    Visit Vitals    /82 (BP 1 Location: Left arm, BP Patient Position: Sitting)    Pulse 71    Temp 97.4 °F (36.3 °C) (Oral)    Resp 16    Ht 4' 11\" (1.499 m)    Wt 162 lb (73.5 kg)    SpO2 99%    BMI 32.72 kg/m2     CONSTITUTIONAL: well , well nourished, appears age appropriate  EYES: perrla, eom intact  ENMT:moist mucous membranes, pharynx clear  NECK: supple. Thyroid normal  RESPIRATORY: Chest: clear bilaterally   CARDIOVASCULAR: Heart: regular rate and rhythm  GASTROINTESTINAL: Abdomen: soft, bowel sounds active  HEMATOLOGIC: no pathological lymph nodes palpated  MUSCULOSKELETAL: Extremities: no edema, pulse 1+   INTEGUMENT: No unusual rashes or suspicious skin lesions noted. Nails appear normal.  NEUROLOGIC: non-focal exam   MENTAL STATUS: alert and oriented, appropriate affect           ASSESSMENT:  1.  Type 2 diabetes mellitus without complication, without long-term current use of insulin ([address]    2. Thrombocytopenia ([address]    3. Essential hypertension      The cough is almost a continuous cough. There is certainly a question of cough variant asthma raised. On auscultation today there is no bronchospasm. We will therefore discontinue the Carvedilol and replace it with Clonidine that she will take twice a day. She will monitor her blood pressure. In addition, we will give her Advair Diskus if her insurance allows and Albuterol inhaler to see if we can calm the cough. She will come back in short term in about two weeks for a follow up appointment to determine if we were successful. Her blood pressure acceptable. Her BMI is reflective of a 1 pound weight loss. PLAN:  .  Orders Placed This Encounter    CBC WITH AUTOMATED DIFF    HEMOGLOBIN A1C WITH EAG    cloNIDine HCl (CATAPRES) 0.2 mg tablet    albuterol (PROVENTIL HFA, VENTOLIN HFA, PROAIR HFA) 90 mcg/actuation inhaler    fluticasone-salmeterol (ADVAIR) 250-50 mcg/dose diskus inhaler       Follow-up Disposition:  Return in about 2 weeks (around 8/15/2017). ATTENTION:   This medical record was transcribed using an electronic medical records system. Although proofread, it may and can contain electronic and spelling errors. Other human spelling and other errors may be present. Corrections may be executed at a later time. Please feel free to contact us for any clarifications as needed.

## 2017-08-01 NOTE — MR AVS SNAPSHOT
Visit Information Date & Time Provider Department Dept. Phone Encounter #  
 8/1/2017  3:45 PM Chad Stevenson MD SPORTS MED AND PRIMARY CARE - [facility] [phone] [id number] Follow-up Instructions Return in about 2 weeks (around 8/15/2017). Follow-up and Disposition History Your Appointments 9/26/2017  3:00 PM  
Any with Chad Stevenson MD  
[facility] ([facility]) Appt Note: 6 month f/u  
 [address], [place] [address] [facility] 98  
  
   
 [address], [place] [address] [facility] [place] [address] Upcoming Health Maintenance Date Due  
 EYE EXAM RETINAL OR DILATED Q1 10/14/2015 MEDICARE YEARLY EXAM 3/13/2016 GLAUCOMA SCREENING Q2Y 8/13/2016 MICROALBUMIN Q1 10/1/2016 FOBT Q 1 YEAR AGE 50-75 3/2/2017 INFLUENZA AGE 9 TO ADULT 8/1/2017 HEMOGLOBIN A1C Q6M 9/28/2017 Pneumococcal 65+ Low/Medium Risk (2 of 2 - PPSV23) 11/14/2017 FOOT EXAM Q1 2/14/2018 LIPID PANEL Q1 3/28/2018 BREAST CANCER SCRN MAMMOGRAM 4/7/2019 DTaP/Tdap/Td series (2 - Td) 3/28/2027 Allergies as of 8/1/2017  Review Complete On: 8/1/2017 By: Chad Stevenson MD  
  
 Severity Noted Reaction Type Reactions Aspirin  12/12/2014    Nausea and Vomiting Lipitor [Atorvastatin]  10/01/2015    Myalgia Metformin  02/14/2017    Cough Pravastatin  06/19/2015    Myalgia Current Immunizations  Never Reviewed No immunizations on file. Not reviewed this visit You Were Diagnosed With   
  
 Codes Comments Type 2 diabetes mellitus without complication, without long-term current use of insulin (HCC)    -  Primary ICD-10-CM: E11.9 ICD-9-CM: 250.00 Thrombocytopenia ([address]     ICD-10-CM: D69.6 ICD-9-CM: 287.5 Essential hypertension     ICD-10-CM: I10 
ICD-9-CM: 401.9 Vitals BP Pulse Temp Resp Height(growth percentile) Weight(growth percentile) 146/82 (BP 1 Location: Left arm, BP Patient Position: Sitting) 71 97.4 °F (36.3 °C) (Oral) 16 4' 11\" (1.499 m) 162 lb (73.5 kg) SpO2 BMI OB Status Smoking Status 99% 32.72 kg/m2 Unknown Never Smoker BMI and BSA Data Body Mass Index Body Surface Area 32.72 kg/m 2 1.75 m 2 Preferred Pharmacy Pharmacy Name Phone [facility][id number] [facility] [phone] Your Updated Medication List  
  
   
This list is accurate as of: 8/1/17  4:47 PM.  Always use your most recent med list.  
  
  
  
  
 albuterol 90 mcg/actuation inhaler Commonly known as:  PROVENTIL HFA, VENTOLIN HFA, PROAIR HFA Take 2 Puffs by inhalation every four (4) hours as needed for Wheezing. amLODIPine 5 mg tablet Commonly known as:  Tiffanie Di Take 1 Tab by mouth daily. * Ascensia CONTOUR strip Generic drug:  glucose blood VI test strips USE TO TEST BLOOD GLUCOSE ONCE DAILY AS DIRECTED * glucose blood VI test strips strip Commonly known as:  CONTOUR NEXT STRIPS Test blood sugar once daily  
  
 cetirizine 10 mg tablet Commonly known as:  ZYRTEC Take 1 Tab by mouth daily. cloNIDine HCl 0.2 mg tablet Commonly known as:  CATAPRES Take 1 Tab by mouth two (2) times a day. fluticasone-salmeterol 250-50 mcg/dose diskus inhaler Commonly known as:  ADVAIR Take 1 Puff by inhalation two (2) times a day. metFORMIN  mg tablet Commonly known as:  GLUCOPHAGE XR  
TAKE ONE TABLET BY MOUTH ONCE DAILY BEFORE SUPPER  
  
 mometasone 50 mcg/actuation nasal spray Commonly known as:  NASONEX  
2 Sprays by Both Nostrils route daily. * Notice: This list has 2 medication(s) that are the same as other medications prescribed for you. Read the directions carefully, and ask your doctor or other care provider to review them with you. Prescriptions Sent to Pharmacy Refills cloNIDine HCl (CATAPRES) 0.2 mg tablet 11 Sig: Take 1 Tab by mouth two (2) times a day. Class: Normal  
 Pharmacy: [facility]/pharmacy #[id number] Ph #: [phone] Route: Oral  
 albuterol (PROVENTIL HFA, VENTOLIN HFA, PROAIR HFA) 90 mcg/actuation inhaler 11 Sig: Take 2 Puffs by inhalation every four (4) hours as needed for Wheezing. Class: Normal  
 Pharmacy: [facility]/pharmacy #[id number] Ph #: [phone] Route: Inhalation  
 fluticasone-salmeterol (ADVAIR) 250-50 mcg/dose diskus inhaler 11 Sig: Take 1 Puff by inhalation two (2) times a day. Class: Normal  
 Pharmacy: [facility]/pharmacy #[id number] Ph #: [phone] Route: Inhalation We Performed the Following CBC WITH AUTOMATED DIFF [50044 CPT(R)] HEMOGLOBIN A1C WITH EAG [00437 CPT(R)] [place] COLLECTION VENOUS BLOOD,VENIPUNCTURE [id number] CPT(R)] Follow-up Instructions Return in about 2 weeks (around 8/15/2017). Introducing [facility]! Cara Neville introduces [email or web address] patient portal. Now you can access parts of your medical record, email your doctor's office, and request medication refills online. 1. In your internet browser, go to [email or web address] 2. Click on the First Time User? Click Here link in the Sign In box. You will see the New Member Sign Up page. 3. Enter your [email or web address] Access Code exactly as it appears below. You will not need to use this code after youve completed the sign-up process. If you do not sign up before the expiration date, you must request a new code. · [email or web address] Access Code: [email or web address] Expires: 10/30/2017  4:47 PM 
 
4. Enter the last four digits of your Social Security Number (xxxx) and Date of Birth (mm/dd/yyyy) as indicated and click Submit. You will be taken to the next sign-up page. 5. Create a [employer] ID. This will be your [employer] login ID and cannot be changed, so think of one that is secure and easy to remember. 6. Create a [employer] password. You can change your password at any time. 7. Enter your Password Reset Question and Answer. This can be used at a later time if you forget your password. 8. Enter your e-mail address. You will receive e-mail notification when new information is available in [address] 9. Click Sign Up. You can now view and download portions of your medical record. 10. Click the Download Summary menu link to download a portable copy of your medical information. If you have questions, please visit the Frequently Asked Questions section of the [employer] website. Remember, [employer] is NOT to be used for urgent needs. For medical emergencies, dial 911. Now available from your iPhone and Android! Please provide this summary of care documentation to your next provider. Your primary care clinician is listed as London Wylie. If you have any questions after today's visit, please call [phone].

## 2017-08-01 NOTE — PROGRESS NOTES
Chief Complaint   Patient presents with    Cough     Patient states that she belives that she is allergic to blood pressure medicine because she begins to cough more when she takes it     1. Have you been to the ER, urgent care clinic since your last visit? Hospitalized since your last visit? No    2. Have you seen or consulted any other health care providers outside of the [address] [place] [address] since your last visit? Include any pap smears or colon screening.  No

## 2017-08-02 LAB
BASOPHILS # BLD AUTO: 0 X10E3/UL (ref 0–0.2)
BASOPHILS NFR BLD AUTO: 1 %
EOSINOPHIL # BLD AUTO: 0.5 X10E3/UL (ref 0–0.4)
EOSINOPHIL NFR BLD AUTO: 7 %
ERYTHROCYTE [DISTWIDTH] IN BLOOD BY AUTOMATED COUNT: 14.5 % (ref 12.3–15.4)
EST. AVERAGE GLUCOSE BLD GHB EST-MCNC: 131 MG/DL
HBA1C MFR BLD: 6.2 % (ref 4.8–5.6)
HCT VFR BLD AUTO: 38.3 % (ref 34–46.6)
HGB BLD-MCNC: 12.5 G/DL (ref 11.1–15.9)
IMM GRANULOCYTES # BLD: 0 X10E3/UL (ref 0–0.1)
IMM GRANULOCYTES NFR BLD: 0 %
LYMPHOCYTES # BLD AUTO: 2.2 X10E3/UL (ref 0.7–3.1)
LYMPHOCYTES NFR BLD AUTO: 35 %
MCH RBC QN AUTO: 28.8 PG (ref 26.6–33)
MCHC RBC AUTO-ENTMCNC: 32.6 G/DL (ref 31.5–35.7)
MCV RBC AUTO: 88 FL (ref 79–97)
MONOCYTES # BLD AUTO: 0.3 X10E3/UL (ref 0.1–0.9)
MONOCYTES NFR BLD AUTO: 5 %
NEUTROPHILS # BLD AUTO: 3.3 X10E3/UL (ref 1.4–7)
NEUTROPHILS NFR BLD AUTO: 52 %
PLATELET # BLD AUTO: 126 X10E3/UL (ref 150–379)
RBC # BLD AUTO: 4.34 X10E6/UL (ref 3.77–5.28)
WBC # BLD AUTO: 6.4 X10E3/UL (ref 3.4–10.8)

## 2017-08-29 RX ORDER — CLONIDINE HYDROCHLORIDE 0.2 MG/1
0.2 TABLET ORAL 2 TIMES DAILY
Qty: 180 TAB | Refills: 3 | Status: SHIPPED | OUTPATIENT
Start: 2017-08-29 | End: 2018-09-15 | Stop reason: SDUPTHER

## 2017-09-07 ENCOUNTER — OFFICE VISIT (OUTPATIENT)
Dept: INTERNAL MEDICINE CLINIC | Age: 67
End: 2017-09-07

## 2017-09-07 VITALS
HEART RATE: 60 BPM | WEIGHT: 163 LBS | OXYGEN SATURATION: 97 % | SYSTOLIC BLOOD PRESSURE: 132 MMHG | BODY MASS INDEX: 32.86 KG/M2 | HEIGHT: 59 IN | TEMPERATURE: 97.5 F | DIASTOLIC BLOOD PRESSURE: 71 MMHG | RESPIRATION RATE: 20 BRPM

## 2017-09-07 DIAGNOSIS — D22.9 ATYPICAL MOLE: ICD-10-CM

## 2017-09-07 DIAGNOSIS — E66.09 NON MORBID OBESITY DUE TO EXCESS CALORIES: ICD-10-CM

## 2017-09-07 DIAGNOSIS — R21 RASH OF UNKNOWN CAUSE: ICD-10-CM

## 2017-09-07 DIAGNOSIS — R05.9 COUGH: ICD-10-CM

## 2017-09-07 DIAGNOSIS — E11.9 TYPE 2 DIABETES MELLITUS WITHOUT COMPLICATION, WITHOUT LONG-TERM CURRENT USE OF INSULIN (HCC): Primary | ICD-10-CM

## 2017-09-07 NOTE — PROGRESS NOTES
.1. Have you been to the ER, urgent care clinic since your last visit? Hospitalized since your last visit? No    2. Have you seen or consulted any other health care providers outside of the [address] [place] [address] since your last visit? Include any pap smears or colon screening.  No

## 2017-09-07 NOTE — MR AVS SNAPSHOT
Visit Information Date & Time Provider Department Dept. Phone Encounter #  
 9/7/2017  4:00 PM Rebeka Reynoso MD SPORTS MED [facility] PRIMARY CARE  [facility] [place] [phone] [id number] Follow-up Instructions Return in about 3 months (around 12/7/2017). Follow-up and Disposition History Upcoming Health Maintenance Date Due  
 EYE EXAM RETINAL OR DILATED Q1 10/14/2015 FOBT Q 1 YEAR AGE 50-75 3/2/2017 Pneumococcal 65+ Low/Medium Risk (2 of 2 - PPSV23) 11/14/2017 HEMOGLOBIN A1C Q6M 2/1/2018 FOOT EXAM Q1 2/14/2018 LIPID PANEL Q1 3/28/2018 MICROALBUMIN Q1 9/7/2018 MEDICARE YEARLY EXAM 9/8/2018 BREAST CANCER SCRN MAMMOGRAM 4/7/2019 GLAUCOMA SCREENING Q2Y 9/7/2019 DTaP/Tdap/Td series (2 - Td) 3/28/2027 Allergies as of 9/7/2017  Review Complete On: 9/7/2017 By: Rebeka Reynoso MD  
  
 Severity Noted Reaction Type Reactions Aspirin  12/12/2014    Nausea and Vomiting Lipitor [Atorvastatin]  10/01/2015    Myalgia Metformin  02/14/2017    Cough Pravastatin  06/19/2015    Myalgia Current Immunizations  Never Reviewed No immunizations on file. Not reviewed this visit You Were Diagnosed With   
  
 Codes Comments Type 2 diabetes mellitus without complication, without long-term current use of insulin (HCC)    -  Primary ICD-10-CM: E11.9 ICD-9-CM: 250.00 Cough     ICD-10-CM: R05 ICD-9-CM: 786.2 Non morbid obesity due to excess calories     ICD-10-CM: E66.09 
ICD-9-CM: 278.00 Atypical mole     ICD-10-CM: D22.9 ICD-9-CM: 216.9 Rash of unknown cause     ICD-10-CM: R21 
ICD-9-CM: 782.1 Vitals BP Pulse Temp Resp Height(growth percentile) Weight(growth percentile) 132/71 (BP 1 Location: Right arm, BP Patient Position: Sitting) 60 97.5 °F (36.4 °C) (Oral) 20 4' 11\" (1.499 m) 163 lb (73.9 kg) SpO2 BMI OB Status Smoking Status 97% 32.92 kg/m2 Unknown Never Smoker BMI and BSA Data Body Mass Index Body Surface Area  
 32.92 kg/m 2 1.75 m 2 Preferred Pharmacy Pharmacy Name Phone [id number] [phone] Your Updated Medication List  
  
   
This list is accurate as of: 9/7/17  4:26 PM.  Always use your most recent med list.  
  
  
  
  
 albuterol 90 mcg/actuation inhaler Commonly known as:  PROVENTIL HFA, VENTOLIN HFA, PROAIR HFA Take 2 Puffs by inhalation every four (4) hours as needed for Wheezing. amLODIPine 5 mg tablet Commonly known as:  Rishi Imelda Take 1 Tab by mouth daily. * [employer] CONTOUR strip Generic drug:  glucose blood VI test strips USE TO TEST BLOOD GLUCOSE ONCE DAILY AS DIRECTED * glucose blood VI test strips strip Commonly known as:  CONTOUR NEXT STRIPS Test blood sugar once daily  
  
 cetirizine 10 mg tablet Commonly known as:  ZYRTEC Take 1 Tab by mouth daily. cloNIDine HCl 0.2 mg tablet Commonly known as:  CATAPRES Take 1 Tab by mouth two (2) times a day. fluticasone-salmeterol 250-50 mcg/dose diskus inhaler Commonly known as:  ADVAIR Take 1 Puff by inhalation two (2) times a day. metFORMIN  mg tablet Commonly known as:  GLUCOPHAGE XR  
TAKE ONE TABLET BY MOUTH ONCE DAILY BEFORE SUPPER  
  
 mometasone 50 mcg/actuation nasal spray Commonly known as:  NASONEX  
2 Sprays by Both Nostrils route daily. * Notice: This list has 2 medication(s) that are the same as other medications prescribed for you. Read the directions carefully, and ask your doctor or other care provider to review them with you. We Performed the Following REFERRAL TO DERMATOLOGY [REF19 Custom] Comments:  
 Please evaluate patient for mole,rash. Follow-up Instructions Return in about 3 months (around 12/7/2017). Referral Information Referral ID Referred By Referred To [id number] MD Fabian Severino [address] [facility], [address] Phone: [phone] Fax: [phone] Visits Status Start Date End Date 1 New Request 9/7/17 9/7/18 If your referral has a status of pending review or denied, additional information will be sent to support the outcome of this decision. Introducing [facility]! Gia Herrera introduces [employer] patient portal. Now you can access parts of your medical record, email your doctor's office, and request medication refills online. 1. In your internet browser, go to [email or web address] 2. Click on the First Time User? Click Here link in the Sign In box. You will see the New Member Sign Up page. 3. Enter your [employer] Access Code exactly as it appears below. You will not need to use this code after youve completed the sign-up process. If you do not sign up before the expiration date, you must request a new code. · [employer] Access Code: [email or web address] Expires: 10/30/2017  4:47 PM 
 
4. Enter the last four digits of your Social Security Number (xxxx) and Date of Birth (mm/dd/yyyy) as indicated and click Submit. You will be taken to the next sign-up page. 5. Create a [employer] ID. This will be your [employer] login ID and cannot be changed, so think of one that is secure and easy to remember. 6. Create a [employer] password. You can change your password at any time. 7. Enter your Password Reset Question and Answer. This can be used at a later time if you forget your password. 8. Enter your e-mail address. You will receive e-mail notification when new information is available in [facility]. 9. Click Sign Up. You can now view and download portions of your medical record. 10. Click the Download Summary menu link to download a portable copy of your medical information.  
 
If you have questions, please visit the Frequently Asked Questions section of the [employer]. Remember, [employer] is NOT to be used for urgent needs. For medical emergencies, dial 911. Now available from your iPhone and Android! Please provide this summary of care documentation to your next provider. Your primary care clinician is listed as Dawson Melendez. If you have any questions after today's visit, please call [phone].

## 2017-09-07 NOTE — PROGRESS NOTES
SPORTS MEDICINE AND PRIMARY CARE  Kulwinder Capps MD, [address] [address]  Phone:  [phone]  Fax: [phone]      Chief Complaint   Patient presents with    Medication Evaluation         SUBECTIVE:    Adriel Neville is a 74 y.o. female Patient returns today alert, ambulatory and has the capacity to give an accurate history. She has a known history of cough, diabetes, dyslipidemia, primary hypertension. The cough has subsided with the medication treatment on the last visit, however she was disgusted because she thought she had to eat more to take the medication to avoid nausea and vomiting. She also complains of a mole on her right thigh laterally that she keeps irritating when she pulls her pants up. She also complains of loss of pigment of the axillae bilaterally and attributes it to her deodorant. Patient is seen for evaluation. Current Outpatient Prescriptions   Medication Sig Dispense Refill    cloNIDine HCl (CATAPRES) 0.2 mg tablet Take 1 Tab by mouth two (2) times a day. 180 Tab 3    albuterol (PROVENTIL HFA, VENTOLIN HFA, PROAIR HFA) 90 mcg/actuation inhaler Take 2 Puffs by inhalation every four (4) hours as needed for Wheezing. 1 Inhaler 11    fluticasone-salmeterol (ADVAIR) 250-50 mcg/dose diskus inhaler Take 1 Puff by inhalation two (2) times a day. 1 Inhaler 11    mometasone (NASONEX) 50 mcg/actuation nasal spray 2 Sprays by Both Nostrils route daily. 1 Container 11    amLODIPine (NORVASC) 5 mg tablet Take 1 Tab by mouth daily. 30 Tab 11    ASCENSIA CONTOUR strip USE TO TEST BLOOD GLUCOSE ONCE DAILY AS DIRECTED 50 strip 11    glucose blood VI test strips (CONTOUR NEXT STRIPS) strip Test blood sugar once daily 50 strip 11    cetirizine (ZYRTEC) 10 mg tablet Take 1 Tab by mouth daily.  30 Tab 11    metFORMIN ER (GLUCOPHAGE XR) 500 mg tablet TAKE ONE TABLET BY MOUTH ONCE DAILY BEFORE SUPPER 30 Tab 11     Past Medical History:   Diagnosis Date    Baker's cyst 3-3-16    r    Bereavement 11/15    61yo  sister     Contusion of knee, right     Cough 2017    Depression with anxiety     Diabetes ([facility]     Diverticulosis 2-6-15    colonoscopy    DM2 (diabetes mellitus, type 2) ([facility]     Edema     Fatigue     Hypercholesterolemia     Hypertension     Liver disease     hepatitis 30 years ago    Obesity     Onychomycosis     S/P LD (total abdominal hysterectomy) 1985    Thrombocytopenia ([facility] 2014     Past Surgical History:   Procedure Laterality Date    HX GYN      partial hysterectomy     Allergies   Allergen Reactions    Aspirin Nausea and Vomiting    Lipitor [Atorvastatin] Myalgia    Metformin Cough    Pravastatin Myalgia       REVIEW OF SYSTEMS:   No chest pain, no shortness of breath. Social History     Social History    Marital status:      Spouse name: N/A    Number of children: N/A    Years of education: N/A     Social History Main Topics    Smoking status: Never Smoker    Smokeless tobacco: Never Used    Alcohol use No    Drug use: No    Sexual activity: Not Asked     Other Topics Concern    None     Social History Narrative    Medical History: HTNTMJ dysfunctionHAsCecal lipoma negative head CT August 2013 CT head w ith contrast unremarkable    except progressive splenoid sinusitisAugust 2013 CT chest unremarkable    Gyn History: Last mammogram date 2012. Surgical History: LD colonoscopy     Hospitalization/Major Diagnostic Procedure: Denies Past Hospitalization    Family History: Mother: alive 71 yrs, HTNFather: unknow nSister(s): alive, DMBrother(s): aliveDaughter(s): aliveSon(s): aliveAunt: alive2    brother(s) , 2 sister(s) . 1 son(s) , 1 daughter(s) . Social History: Alcohol Use Patient does not use alcohol. Smoking Status Patient is a never smoker. Marital Status: . Lives w ith:    spouse. no Children.  Occupation/W ork: employed full time - [employer]. Education/School: has highschool diploma. Sikh: Jehovah    W itness - no blood transfusions. r  Family History   Problem Relation Age of Onset    No Known Problems Father        OBJECTIVE:  Visit Vitals    /71 (BP 1 Location: Right arm, BP Patient Position: Sitting)    Pulse 60    Temp 97.5 °F (36.4 °C) (Oral)    Resp 20    Ht 4' 11\" (1.499 m)    Wt 163 lb (73.9 kg)    SpO2 97%    BMI 32.92 kg/m2     ENT: perrla,  eom intact  NECK: supple. Thyroid normal  CHEST: clear to ascultation and percussion   HEART: regular rate and rhythm  ABD: soft, bowel sounds active  EXTREMITIES: no edema, pulse 1+     Office Visit on 08/01/2017   Component Date Value Ref Range Status    WBC 08/01/2017 6.4  3.4 - 10.8 x10E3/uL Final    RBC 08/01/2017 4.34  3.77 - 5.28 x10E6/uL Final    HGB 08/01/2017 12.5  11.1 - 15.9 g/dL Final    HCT 08/01/2017 38.3  34.0 - 46.6 % Final    MCV 08/01/2017 88  79 - 97 fL Final    MCH 08/01/2017 28.8  26.6 - 33.0 pg Final    MCHC 08/01/2017 32.6  31.5 - 35.7 g/dL Final    RDW 08/01/2017 14.5  12.3 - 15.4 % Final    PLATELET 56/17/4197 019* 150 - 379 x10E3/uL Final    NEUTROPHILS 08/01/2017 52  % Final    Lymphocytes 08/01/2017 35  % Final    MONOCYTES 08/01/2017 5  % Final    EOSINOPHILS 08/01/2017 7  % Final    BASOPHILS 08/01/2017 1  % Final    ABS. NEUTROPHILS 08/01/2017 3.3  1.4 - 7.0 x10E3/uL Final    Abs Lymphocytes 08/01/2017 2.2  0.7 - 3.1 x10E3/uL Final    ABS. MONOCYTES 08/01/2017 0.3  0.1 - 0.9 x10E3/uL Final    ABS. EOSINOPHILS 08/01/2017 0.5* 0.0 - 0.4 x10E3/uL Final    ABS. BASOPHILS 08/01/2017 0.0  0.0 - 0.2 x10E3/uL Final    IMMATURE GRANULOCYTES 08/01/2017 0  % Final    ABS. IMM.  GRANS. 08/01/2017 0.0  0.0 - 0.1 x10E3/uL Final    Hemoglobin A1c 08/01/2017 6.2* 4.8 - 5.6 % Final    Comment:          Pre-diabetes: 5.7 - 6.4           Diabetes: >6.4           Glycemic control for adults with diabetes: <7.0      Estimated average glucose 08/01/2017 131  mg/dL Final          ASSESSMENT:  1. Type 2 diabetes mellitus without complication, without long-term current use of insulin ([address]    2. Cough    3. Non morbid obesity due to excess calories    4. Atypical mole    5. Rash of unknown cause      The bronchospastic flare has subsided. I am concerned about the mole and I think we should probably have a dermatologist take a look at it. She is very concerned about the loss of pigment in her axillae and the dermatologist may help us in that area likewise. Blood pressure control is adequate. BMI reflects a weight gain reflected by the fact that she thought she had to eat more. She will return to the office in 3-4 months, sooner if she needs to. PLAN:  .  Orders Placed This Encounter    REFERRAL TO DERMATOLOGY       Follow-up Disposition:  Return in about 3 months (around 12/7/2017). ATTENTION:   This medical record was transcribed using an electronic medical records system. Although proofread, it may and can contain electronic and spelling errors. Other human spelling and other errors may be present. Corrections may be executed at a later time. Please feel free to contact us for any clarifications as needed.

## 2017-11-14 ENCOUNTER — OFFICE VISIT (OUTPATIENT)
Dept: INTERNAL MEDICINE CLINIC | Age: 67
End: 2017-11-14

## 2017-11-14 VITALS
HEART RATE: 64 BPM | TEMPERATURE: 97.5 F | OXYGEN SATURATION: 99 % | BODY MASS INDEX: 33.02 KG/M2 | RESPIRATION RATE: 18 BRPM | DIASTOLIC BLOOD PRESSURE: 75 MMHG | HEIGHT: 59 IN | WEIGHT: 163.8 LBS | SYSTOLIC BLOOD PRESSURE: 143 MMHG

## 2017-11-14 DIAGNOSIS — S80.11XD CONTUSION OF KNEE AND LOWER LEG, RIGHT, SUBSEQUENT ENCOUNTER: Primary | ICD-10-CM

## 2017-11-14 DIAGNOSIS — I10 ESSENTIAL HYPERTENSION: ICD-10-CM

## 2017-11-14 DIAGNOSIS — S80.01XD CONTUSION OF KNEE AND LOWER LEG, RIGHT, SUBSEQUENT ENCOUNTER: Primary | ICD-10-CM

## 2017-11-14 DIAGNOSIS — Z02.6 ENCOUNTER RELATED TO WORKER'S COMPENSATION CLAIM: ICD-10-CM

## 2017-11-14 RX ORDER — NAPROXEN 500 MG/1
500 TABLET ORAL 2 TIMES DAILY WITH MEALS
Qty: 60 TAB | Refills: 3 | Status: SHIPPED | OUTPATIENT
Start: 2017-11-14 | End: 2019-01-08 | Stop reason: ALTCHOICE

## 2017-11-14 NOTE — PROGRESS NOTES
1. Have you been to the ER, urgent care clinic since your last visit? Hospitalized since your last visit? No    2. Have you seen or consulted any other health care providers outside of the [address] [place] [address] since your last visit? Include any pap smears or colon screening.  No

## 2017-11-14 NOTE — PROGRESS NOTES
SPORTS MEDICINE AND PRIMARY CARE  Yarelis Calero MD, [address] [address]  Phone:  [phone]  Fax: [phone]      Chief Complaint   Patient presents with    Knee Pain     right knee         SUBECTIVE:    Jazmin Cuba is a 69 y.o. female Patient returns today with a history of primary hypertension, diabetes mellitus type 2, dyslipidemia and is seen for evaluation. Since we last saw her, on November 10th at 6:49 in the morning she was at work going to get a cart and tripped and fell on the cement floor, striking her knee. She noted immediate onset of pain, but continued to try to work. She reported the incident to her supervisor on Friday when the occurrence happened. On Monday she called in to work and said she just couldn't work because of the severity of the pain. They referred her to their company doctor, who stated she should be in a sit down job or not work, however he did not give her a written note to that effect. Patient comes in today because she continues to have severe pain, wanted to know if she could have an injection for the discomfort, and just needed some assistance in helping her with the severity of the pain. Patient is seen for evaluation. Current Outpatient Prescriptions   Medication Sig Dispense Refill    naproxen (NAPROSYN) 500 mg tablet Take 1 Tab by mouth two (2) times daily (with meals). 60 Tab 3    amLODIPine (NORVASC) 5 mg tablet TAKE 1 TABLET BY MOUTH EVERY DAY 30 Tab 11    cloNIDine HCl (CATAPRES) 0.2 mg tablet Take 1 Tab by mouth two (2) times a day. 180 Tab 3    ASCENSIA CONTOUR strip USE TO TEST BLOOD GLUCOSE ONCE DAILY AS DIRECTED 50 strip 11    glucose blood VI test strips (CONTOUR NEXT STRIPS) strip Test blood sugar once daily 50 strip 11    albuterol (PROVENTIL HFA, VENTOLIN HFA, PROAIR HFA) 90 mcg/actuation inhaler Take 2 Puffs by inhalation every four (4) hours as needed for Wheezing.  1 Inhaler 11    fluticasone-salmeterol (ADVAIR) 250-50 mcg/dose diskus inhaler Take 1 Puff by inhalation two (2) times a day. 1 Inhaler 11    cetirizine (ZYRTEC) 10 mg tablet Take 1 Tab by mouth daily. 30 Tab 11    mometasone (NASONEX) 50 mcg/actuation nasal spray 2 Sprays by Both Nostrils route daily. 1 Container 11    metFORMIN ER (GLUCOPHAGE XR) 500 mg tablet TAKE ONE TABLET BY MOUTH ONCE DAILY BEFORE SUPPER 30 Tab 11     Past Medical History:   Diagnosis Date    Baker's cyst 3-3-16    r    Bereavement 11/15    59yo  sister     Contusion of knee and lower leg, right, subsequent encounter 11/10/2017    Contusion of knee, right     Cough 2017    Depression with anxiety     [facility]     Diverticulosis 2-6-15    colonoscopy    DM2 (diabetes mellitus, type 2) [facility]     Edema     Encounter related to worker's compensation claim 11/10/2017    fall t work    Fatigue     Hypercholesterolemia     Hypertension     Liver disease     hepatitis 30 years ago    Obesity     Onychomycosis     S/P LD (total abdominal hysterectomy) 1985    Thrombocytopenia ([address] 2014     Past Surgical History:   Procedure Laterality Date    HX GYN      partial hysterectomy     Allergies   Allergen Reactions    Aspirin Nausea and Vomiting    Lipitor [Atorvastatin] Myalgia    Metformin Cough    Pravastatin Myalgia       REVIEW OF SYSTEMS:   No syncope and she's following protocol per her company.         Social History     Social History    Marital status:      Spouse name: N/A    Number of children: N/A    Years of education: N/A     Social History Main Topics    Smoking status: Never Smoker    Smokeless tobacco: Never Used    Alcohol use No    Drug use: No    Sexual activity: Not Asked     Other Topics Concern    None     Social History Narrative    Medical History: HTNTMJ dysfunctionHAsCecal lipoma negative head CT August 2013 CT head w ith contrast unremarkable    except progressive splenoid sinusitisAugust 14, 2013 CT chest unremarkable    Gyn History: Last mammogram date 01/13/2012. Surgical History: LD 1985colonoscopy 01/04    Hospitalization/Major Diagnostic Procedure: Denies Past Hospitalization    Family History: Mother: alive 71 yrs, HTNFather: unknow nSister(s): alive, DMBrother(s): aliveDaughter(s): aliveSon(s): aliveAunt: alive2    brother(s) , 2 sister(s) . 1 son(s) , 1 daughter(s) . Social History: Alcohol Use Patient does not use alcohol. Smoking Status Patient is a never smoker. Marital Status: . Lives w ith:    spouse. no Children. 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 Education/School: has highschool diploma. Christianity: Jehovah    W itness - no blood transfusions. r  Family History   Problem Relation Age of Onset    No Known Problems Father        OBJECTIVE:  Visit Vitals    /75 (BP 1 Location: Right arm, BP Patient Position: Sitting)    Pulse 64    Temp 97.5 °F (36.4 °C) (Oral)    Resp 18    Ht 4' 11\" (1.499 m)    Wt 163 lb 12.8 oz (74.3 kg)    SpO2 99%    BMI 33.08 kg/m2     ENT: perrla,  eom intact  NECK: supple. Thyroid normal  CHEST: clear to ascultation and percussion   HEART: regular rate and rhythm  ABD: soft, bowel sounds active  EXTREMITIES: no edema, pulse 1+     No visits with results within 3 Month(s) from this visit.   Latest known visit with results is:    Office Visit on 08/01/2017   Component Date Value Ref Range Status    WBC 08/01/2017 6.4  3.4 - 10.8 x10E3/uL Final    RBC 08/01/2017 4.34  3.77 - 5.28 x10E6/uL Final    HGB 08/01/2017 12.5  11.1 - 15.9 g/dL Final    HCT 08/01/2017 38.3  34.0 - 46.6 % Final    MCV 08/01/2017 88  79 - 97 fL Final    MCH 08/01/2017 28.8  26.6 - 33.0 pg Final    MCHC 08/01/2017 32.6  31.5 - 35.7 g/dL Final    RDW 08/01/2017 14.5  12.3 - 15.4 % Final    PLATELET 81/48/5476 893* 150 - 379 x10E3/uL Final    NEUTROPHILS 08/01/2017 52  % Final    Lymphocytes 08/01/2017 35  % Final    MONOCYTES 08/01/2017 5  % Final    EOSINOPHILS 08/01/2017 7  % Final    BASOPHILS 08/01/2017 1  % Final    ABS. NEUTROPHILS 08/01/2017 3.3  1.4 - 7.0 x10E3/uL Final    Abs Lymphocytes 08/01/2017 2.2  0.7 - 3.1 x10E3/uL Final    ABS. MONOCYTES 08/01/2017 0.3  0.1 - 0.9 x10E3/uL Final    ABS. EOSINOPHILS 08/01/2017 0.5* 0.0 - 0.4 x10E3/uL Final    ABS. BASOPHILS 08/01/2017 0.0  0.0 - 0.2 x10E3/uL Final    IMMATURE GRANULOCYTES 08/01/2017 0  % Final    ABS. IMM. GRANS. 08/01/2017 0.0  0.0 - 0.1 x10E3/uL Final    Hemoglobin A1c 08/01/2017 6.2* 4.8 - 5.6 % Final    Comment:          Pre-diabetes: 5.7 - 6.4           Diabetes: >6.4           Glycemic control for adults with diabetes: <7.0      Estimated average glucose 08/01/2017 131  mg/dL Final          ASSESSMENT:  1. Contusion of knee and lower leg, right, subsequent encounter    2. Encounter related to worker's compensation claim    3. Essential hypertension      Stress of the knee does not confirm internal derangement. She has a contusion to the knee, for which I suggest either a sit down job or not working at all. She prefers to return to work with a sit down job. To make her more comfortable we will use Naproxen twice a day. Reminded her she needs to take it after a meal and not on an empty stomach. Blood pressure, as would be expected because of pain, is at the upper limits of normal.    BMI has changed little. She'll return to our office in two weeks if her workman's compensation physician has not attended to the needs necessary. PLAN:  .  Orders Placed This Encounter    naproxen (NAPROSYN) 500 mg tablet       Follow-up Disposition:  Return in about 2 weeks (around 11/28/2017). ATTENTION:   This medical record was transcribed using an electronic medical records system. Although proofread, it may and can contain electronic and spelling errors. Other human spelling and other errors may be present. Corrections may be executed at a later time. Please feel free to contact us for any clarifications as needed.

## 2017-11-14 NOTE — MR AVS SNAPSHOT
Visit Information Date & Time Provider Department Dept. Phone Encounter #  
 11/14/2017  4:00 PM Mars Meier MD SPORTS MED AND PRIMARY CARE - [facility] [phone] [id number] Follow-up Instructions Return in about 2 weeks (around 11/28/2017). Follow-up and Disposition History Your Appointments 1/4/2018  2:30 PM  
Any with Mars Meier MD  
[facility] Appt Note: 4 month f/u DM  
 [address], [place] [address] [facility] 98  
  
   
 [address], [place] [address] [place] [address] Upcoming Health Maintenance Date Due  
 EYE EXAM RETINAL OR DILATED Q1 10/14/2015 FOBT Q 1 YEAR AGE 50-75 3/2/2017 HEMOGLOBIN A1C Q6M 2/1/2018 FOOT EXAM Q1 2/14/2018 LIPID PANEL Q1 3/28/2018 MICROALBUMIN Q1 9/7/2018 MEDICARE YEARLY EXAM 9/8/2018 BREAST CANCER SCRN MAMMOGRAM 4/7/2019 GLAUCOMA SCREENING Q2Y 9/7/2019 DTaP/Tdap/Td series (2 - Td) 3/28/2027 Allergies as of 11/14/2017  Review Complete On: 11/14/2017 By: Mars Meier MD  
  
 Severity Noted Reaction Type Reactions Aspirin  12/12/2014    Nausea and Vomiting Lipitor [Atorvastatin]  10/01/2015    Myalgia Metformin  02/14/2017    Cough Pravastatin  06/19/2015    Myalgia Current Immunizations  Never Reviewed No immunizations on file. Not reviewed this visit You Were Diagnosed With   
  
 Codes Comments Contusion of knee and lower leg, right, subsequent encounter    -  Primary ICD-10-CM: S80.01XD, T68.55YL ICD-9-CM: V58.89, 924.10 Encounter related to worker's compensation claim     ICD-10-CM: Z02.6 ICD-9-CM: V70.3 Essential hypertension     ICD-10-CM: I10 
ICD-9-CM: 401.9 Vitals BP Pulse Temp Resp Height(growth percentile) Weight(growth percentile)  143/75 (BP 1 Location: Right arm, BP Patient Position: Sitting) 64 97.5 °F (36.4 °C) (Oral) 18 4' 11\" (1.499 m) 163 lb 12.8 oz (74.3 kg) SpO2 BMI OB Status Smoking Status 99% 33.08 kg/m2 Unknown Never Smoker BMI and BSA Data Body Mass Index Body Surface Area 33.08 kg/m 2 1.76 m 2 Preferred Pharmacy Pharmacy Name Phone [phone] [phone] Your Updated Medication List  
  
   
This list is accurate as of: 11/14/17  4:58 PM.  Always use your most recent med list.  
  
  
  
  
 albuterol 90 mcg/actuation inhaler Commonly known as:  PROVENTIL HFA, VENTOLIN HFA, PROAIR HFA Take 2 Puffs by inhalation every four (4) hours as needed for Wheezing. amLODIPine 5 mg tablet Commonly known as:  Parvez Brigitte TAKE 1 TABLET BY MOUTH EVERY DAY  
  
 * [employer] CONTOUR strip Generic drug:  glucose blood VI test strips USE TO TEST BLOOD GLUCOSE ONCE DAILY AS DIRECTED * glucose blood VI test strips strip Commonly known as:  CONTOUR NEXT STRIPS Test blood sugar once daily  
  
 cetirizine 10 mg tablet Commonly known as:  ZYRTEC Take 1 Tab by mouth daily. cloNIDine HCl 0.2 mg tablet Commonly known as:  CATAPRES Take 1 Tab by mouth two (2) times a day. fluticasone-salmeterol 250-50 mcg/dose diskus inhaler Commonly known as:  ADVAIR Take 1 Puff by inhalation two (2) times a day. metFORMIN  mg tablet Commonly known as:  GLUCOPHAGE XR  
TAKE ONE TABLET BY MOUTH ONCE DAILY BEFORE SUPPER  
  
 mometasone 50 mcg/actuation nasal spray Commonly known as:  NASONEX  
2 Sprays by Both Nostrils route daily. naproxen 500 mg tablet Commonly known as:  NAPROSYN Take 1 Tab by mouth two (2) times daily (with meals). * Notice: This list has 2 medication(s) that are the same as other medications prescribed for you. Read the directions carefully, and ask your doctor or other care provider to review them with you. Prescriptions Sent to Pharmacy Refills  
 naproxen (NAPROSYN) 500 mg tablet 3 Sig: Take 1 Tab by mouth two (2) times daily (with meals). Class: Normal  
 Pharmacy: [facility]/pharmacy #[id number] [place] [address] #: [phone] Route: Oral  
  
Follow-up Instructions Return in about 2 weeks (around 11/28/2017). Introducing [address]! [facility] introduces [employer] patient portal. Now you can access parts of your medical record, email your doctor's office, and request medication refills online. 1. In your internet browser, go to [email or web address] 2. Click on the First Time User? Click Here link in the Sign In box. You will see the New Member Sign Up page. 3. Enter your [employer] Access Code exactly as it appears below. You will not need to use this code after youve completed the sign-up process. If you do not sign up before the expiration date, you must request a new code. · [employer] Access Code: [email or web address] Expires: 2/12/2018  4:58 PM 
 
4. Enter the last four digits of your Social Security Number (xxxx) and Date of Birth (mm/dd/yyyy) as indicated and click Submit. You will be taken to the next sign-up page. 5. Create a [employer] ID. This will be your [employer] login ID and cannot be changed, so think of one that is secure and easy to remember. 6. Create a [employer] password. You can change your password at any time. 7. Enter your Password Reset Question and Answer. This can be used at a later time if you forget your password. 8. Enter your e-mail address. You will receive e-mail notification when new information is available in [address] 9. Click Sign Up. You can now view and download portions of your medical record. 10. Click the Download Summary menu link to download a portable copy of your medical information.  
 
If you have questions, please visit the Frequently Asked Questions section of the [employer]. Remember, [employer] is NOT to be used for urgent needs. For medical emergencies, dial 911. Now available from your iPhone and Android! Please provide this summary of care documentation to your next provider. Your primary care clinician is listed as Sonya Fregoso. If you have any questions after today's visit, please call [phone].

## 2017-11-28 ENCOUNTER — OFFICE VISIT (OUTPATIENT)
Dept: INTERNAL MEDICINE CLINIC | Age: 67
End: 2017-11-28

## 2017-11-28 VITALS
HEIGHT: 59 IN | RESPIRATION RATE: 18 BRPM | WEIGHT: 164.2 LBS | HEART RATE: 75 BPM | OXYGEN SATURATION: 99 % | SYSTOLIC BLOOD PRESSURE: 125 MMHG | DIASTOLIC BLOOD PRESSURE: 64 MMHG | BODY MASS INDEX: 33.1 KG/M2 | TEMPERATURE: 96.5 F

## 2017-11-28 DIAGNOSIS — I10 ESSENTIAL HYPERTENSION: Primary | ICD-10-CM

## 2017-11-28 DIAGNOSIS — E78.00 HYPERCHOLESTEROLEMIA: ICD-10-CM

## 2017-11-28 DIAGNOSIS — E11.9 TYPE 2 DIABETES MELLITUS WITHOUT COMPLICATION, WITHOUT LONG-TERM CURRENT USE OF INSULIN (HCC): ICD-10-CM

## 2017-11-28 RX ORDER — AMLODIPINE BESYLATE 5 MG/1
5 TABLET ORAL DAILY
Qty: 90 TAB | Refills: 11 | Status: SHIPPED | OUTPATIENT
Start: 2017-11-28 | End: 2018-11-23 | Stop reason: SDUPTHER

## 2017-11-28 NOTE — MR AVS SNAPSHOT
Visit Information Date & Time Provider Department Dept. Phone Encounter #  
 11/28/2017  2:30 PM Sol Bond MD SPORTS MED AND PRIMARY CARE - [facility] [phone] [id number] Follow-up Instructions Return in about 3 months (around 2/28/2018). Follow-up and Disposition History Your Appointments 1/4/2018  2:30 PM  
Any with Sol Bond MD  
[address] [facility] Appt Note: 4 month f/u DM  
 [address], [place] [address] [facility]  
  
   
 [address], [place] [address] [place] [address] Upcoming Health Maintenance Date Due  
 EYE EXAM RETINAL OR DILATED Q1 10/14/2015 FOBT Q 1 YEAR AGE 50-75 3/2/2017 HEMOGLOBIN A1C Q6M 2/1/2018 FOOT EXAM Q1 2/14/2018 LIPID PANEL Q1 3/28/2018 MICROALBUMIN Q1 9/7/2018 MEDICARE YEARLY EXAM 9/8/2018 BREAST CANCER SCRN MAMMOGRAM 4/7/2019 GLAUCOMA SCREENING Q2Y 9/7/2019 DTaP/Tdap/Td series (2 - Td) 3/28/2027 Allergies as of 11/28/2017  Review Complete On: 11/28/2017 By: Sol Bond MD  
  
 Severity Noted Reaction Type Reactions Aspirin  12/12/2014    Nausea and Vomiting Lipitor [Atorvastatin]  10/01/2015    Myalgia Metformin  02/14/2017    Cough Pravastatin  06/19/2015    Myalgia Current Immunizations  Never Reviewed No immunizations on file. Not reviewed this visit You Were Diagnosed With   
  
 Codes Comments Essential hypertension    -  Primary ICD-10-CM: I10 
ICD-9-CM: 401.9 Type 2 diabetes mellitus without complication, without long-term current use of insulin (HCC)     ICD-10-CM: E11.9 ICD-9-CM: 250.00 Hypercholesterolemia     ICD-10-CM: E78.00 ICD-9-CM: 272.0 Vitals BP Pulse Temp Resp Height(growth percentile) Weight(growth percentile)  125/64 (BP 1 Location: Right arm, BP Patient Position: Sitting) 75 96.5 °F (35.8 °C) (Oral) 18 4' 11\" (1.499 m) 164 lb 3.2 oz (74.5 kg) SpO2 BMI OB Status Smoking Status 99% 33.16 kg/m2 Unknown Never Smoker BMI and BSA Data Body Mass Index Body Surface Area  
 33.16 kg/m 2 1.76 m 2 Preferred Pharmacy Pharmacy Name Phone [phone] [phone] Your Updated Medication List  
  
   
This list is accurate as of: 11/28/17  3:52 PM.  Always use your most recent med list.  
  
  
  
  
 albuterol 90 mcg/actuation inhaler Commonly known as:  PROVENTIL HFA, VENTOLIN HFA, PROAIR HFA Take 2 Puffs by inhalation every four (4) hours as needed for Wheezing. amLODIPine 5 mg tablet Commonly known as:  Lennis Eagles Take 1 Tab by mouth daily. * [employer] CONTOUR strip Generic drug:  glucose blood VI test strips USE TO TEST BLOOD GLUCOSE ONCE DAILY AS DIRECTED * glucose blood VI test strips strip Commonly known as:  CONTOUR NEXT STRIPS Test blood sugar once daily  
  
 cetirizine 10 mg tablet Commonly known as:  ZYRTEC Take 1 Tab by mouth daily. cloNIDine HCl 0.2 mg tablet Commonly known as:  CATAPRES Take 1 Tab by mouth two (2) times a day. fluticasone-salmeterol 250-50 mcg/dose diskus inhaler Commonly known as:  ADVAIR Take 1 Puff by inhalation two (2) times a day. metFORMIN  mg tablet Commonly known as:  GLUCOPHAGE XR  
TAKE ONE TABLET BY MOUTH ONCE DAILY BEFORE SUPPER  
  
 mometasone 50 mcg/actuation nasal spray Commonly known as:  NASONEX  
2 Sprays by Both Nostrils route daily. naproxen 500 mg tablet Commonly known as:  NAPROSYN Take 1 Tab by mouth two (2) times daily (with meals). * Notice: This list has 2 medication(s) that are the same as other medications prescribed for you. Read the directions carefully, and ask your doctor or other care provider to review them with you. Prescriptions Sent to Pharmacy Refills  
 amLODIPine (NORVASC) 5 mg tablet 11 Sig: Take 1 Tab by mouth daily. Class: Normal  
 Pharmacy: [facility]/pharmacy #[address] [place] [address] #: [phone] Route: Oral  
  
We Performed the Following OCCULT BLOOD, IMMUNOASSAY (FIT) [id number] CPT(R)] Follow-up Instructions Return in about 3 months (around 2/28/2018). Introducing [facility]! Kareem Abdalla introduces [employer] patient portal. Now you can access parts of your medical record, email your doctor's office, and request medication refills online. 1. In your internet browser, go to [email or web address] 2. Click on the First Time User? Click Here link in the Sign In box. You will see the New Member Sign Up page. 3. Enter your [employer] Access Code exactly as it appears below. You will not need to use this code after youve completed the sign-up process. If you do not sign up before the expiration date, you must request a new code. · [employer] Access Code: [email or web address] Expires: 2/12/2018  4:58 PM 
 
4. Enter the last four digits of your Social Security Number (xxxx) and Date of Birth (mm/dd/yyyy) as indicated and click Submit. You will be taken to the next sign-up page. 5. Create a [employer] ID. This will be your [employer] login ID and cannot be changed, so think of one that is secure and easy to remember. 6. Create a [employer] password. You can change your password at any time. 7. Enter your Password Reset Question and Answer. This can be used at a later time if you forget your password. 8. Enter your e-mail address. You will receive e-mail notification when new information is available in [address] 9. Click Sign Up. You can now view and download portions of your medical record. 10. Click the Download Summary menu link to download a portable copy of your medical information. If you have questions, please visit the Frequently Asked Questions section of the [employer] website. Remember, [employer] is NOT to be used for urgent needs. For medical emergencies, dial 911. Now available from your iPhone and Android! Please provide this summary of care documentation to your next provider. Your primary care clinician is listed as Sonia Painting. If you have any questions after today's visit, please call [phone].

## 2017-11-28 NOTE — PROGRESS NOTES
1. Have you been to the ER, urgent care clinic since your last visit? Hospitalized since your last visit? No    2. Have you seen or consulted any other health care providers outside of the [address] [place] [address] since your last visit? Include any pap smears or colon screening.  No Home

## 2017-11-28 NOTE — PROGRESS NOTES
SPORTS MEDICINE AND PRIMARY CARE  Ulises Mcdonough MD, [address] [address]  Phone:  [phone]  Fax: [phone]      Chief Complaint   Patient presents with    Hypertension    Diabetes         SUBECTIVE:    Manuel Mendoza is a 69 y.o. female     Patient returns today with known history of primary hypertension, , obesity and fall at work with resulting contusion to the knee . Since we last saw her her workman's comp physician released her to return to regular duty, which she is now currently duty. She is working with some discomfort and is using Biofreeze to get by throughout the day. She's having problems getting Amlodipine refilled. We will refill that today. The patient is seen for evaluation. Current Outpatient Prescriptions   Medication Sig Dispense Refill    amLODIPine (NORVASC) 5 mg tablet Take 1 Tab by mouth daily. 90 Tab 11    cloNIDine HCl (CATAPRES) 0.2 mg tablet Take 1 Tab by mouth two (2) times a day. 180 Tab 3    ASCENSIA CONTOUR strip USE TO TEST BLOOD GLUCOSE ONCE DAILY AS DIRECTED 50 strip 11    glucose blood VI test strips (CONTOUR NEXT STRIPS) strip Test blood sugar once daily 50 strip 11    naproxen (NAPROSYN) 500 mg tablet Take 1 Tab by mouth two (2) times daily (with meals). 60 Tab 3    albuterol (PROVENTIL HFA, VENTOLIN HFA, PROAIR HFA) 90 mcg/actuation inhaler Take 2 Puffs by inhalation every four (4) hours as needed for Wheezing. 1 Inhaler 11    fluticasone-salmeterol (ADVAIR) 250-50 mcg/dose diskus inhaler Take 1 Puff by inhalation two (2) times a day. 1 Inhaler 11    cetirizine (ZYRTEC) 10 mg tablet Take 1 Tab by mouth daily. 30 Tab 11    mometasone (NASONEX) 50 mcg/actuation nasal spray 2 Sprays by Both Nostrils route daily.  1 Container 11    metFORMIN ER (GLUCOPHAGE XR) 500 mg tablet TAKE ONE TABLET BY MOUTH ONCE DAILY BEFORE SUPPER 30 Tab 11     Past Medical History:   Diagnosis Date    Baker's cyst 3-3-16    r    Bereavement 11/15    61yo  sister     Contusion of knee and lower leg, right, subsequent encounter 11/10/2017    Contusion of knee, right     Cough 2017    Depression with anxiety     Diabetes ([facility]     Diverticulosis 2-15    colonoscopy    DM2 (diabetes mellitus, type 2) ([facility]     Edema     Encounter related to worker's compensation claim 11/10/2017    fall t work    Fatigue     Hypercholesterolemia     Hypertension     Liver disease     hepatitis 30 years ago    Obesity     Onychomycosis     S/P DL (total abdominal hysterectomy) 1985    Thrombocytopenia ([facility] 2014     Past Surgical History:   Procedure Laterality Date    HX GYN      partial hysterectomy     Allergies   Allergen Reactions    Aspirin Nausea and Vomiting    Lipitor [Atorvastatin] Myalgia    Metformin Cough    Pravastatin Myalgia       REVIEW OF SYSTEMS:   No further falls. Social History     Social History    Marital status:      Spouse name: N/A    Number of children: N/A    Years of education: N/A     Social History Main Topics    Smoking status: Never Smoker    Smokeless tobacco: Never Used    Alcohol use No    Drug use: No    Sexual activity: Not Asked     Other Topics Concern    None     Social History Narrative    Medical History: HTNTMJ dysfunctionHAsCecal lipoma negative head CT August 2013 CT head w ith contrast unremarkable    except progressive splenoid sinusitisAugust 2013 CT chest unremarkable    Gyn History: Last mammogram date 2012. Surgical History: LD 1985colonoscopy     Hospitalization/Major Diagnostic Procedure: Denies Past Hospitalization    Family History: Mother: alive 71 yrs, HTNFather: unknow nSister(s): alive, DMBrother(s): aliveDaughter(s): aliveSon(s): aliveAunt: alive2    brother(s) , 2 sister(s) . 1 son(s) , 1 daughter(s) . Social History: Alcohol Use Patient does not use alcohol. Smoking Status Patient is a never smoker.  Marital Status: . Lives w ith:    spouse. no Children. 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 Education/School: has highschool diploma. Mandaeism: Jehovah    W itness - no blood transfusions. r  Family History   Problem Relation Age of Onset    No Known Problems Father        OBJECTIVE:  Visit Vitals    /64 (BP 1 Location: Right arm, BP Patient Position: Sitting)    Pulse 75    Temp 96.5 °F (35.8 °C) (Oral)    Resp 18    Ht 4' 11\" (1.499 m)    Wt 164 lb 3.2 oz (74.5 kg)    SpO2 99%    BMI 33.16 kg/m2     ENT: perrla,  eom intact  NECK: supple. Thyroid normal  CHEST: clear to ascultation and percussion   HEART: regular rate and rhythm  ABD: soft, bowel sounds active  EXTREMITIES: no edema, pulse 1+     No visits with results within 3 Month(s) from this visit. Latest known visit with results is:    Office Visit on 08/01/2017   Component Date Value Ref Range Status    WBC 08/01/2017 6.4  3.4 - 10.8 x10E3/uL Final    RBC 08/01/2017 4.34  3.77 - 5.28 x10E6/uL Final    HGB 08/01/2017 12.5  11.1 - 15.9 g/dL Final    HCT 08/01/2017 38.3  34.0 - 46.6 % Final    MCV 08/01/2017 88  79 - 97 fL Final    MCH 08/01/2017 28.8  26.6 - 33.0 pg Final    MCHC 08/01/2017 32.6  31.5 - 35.7 g/dL Final    RDW 08/01/2017 14.5  12.3 - 15.4 % Final    PLATELET 14/12/8704 083* 150 - 379 x10E3/uL Final    NEUTROPHILS 08/01/2017 52  % Final    Lymphocytes 08/01/2017 35  % Final    MONOCYTES 08/01/2017 5  % Final    EOSINOPHILS 08/01/2017 7  % Final    BASOPHILS 08/01/2017 1  % Final    ABS. NEUTROPHILS 08/01/2017 3.3  1.4 - 7.0 x10E3/uL Final    Abs Lymphocytes 08/01/2017 2.2  0.7 - 3.1 x10E3/uL Final    ABS. MONOCYTES 08/01/2017 0.3  0.1 - 0.9 x10E3/uL Final    ABS. EOSINOPHILS 08/01/2017 0.5* 0.0 - 0.4 x10E3/uL Final    ABS. BASOPHILS 08/01/2017 0.0  0.0 - 0.2 x10E3/uL Final    IMMATURE GRANULOCYTES 08/01/2017 0  % Final    ABS. IMM.  GRANS. 08/01/2017 0.0  0.0 - 0.1 x10E3/uL Final    Hemoglobin A1c 08/01/2017 6.2* 4.8 - 5.6 % Final    Comment:          Pre-diabetes: 5.7 - 6.4           Diabetes: >6.4           Glycemic control for adults with diabetes: <7.0      Estimated average glucose 08/01/2017 131  mg/dL Final          ASSESSMENT:  1. Essential hypertension    2. Type 2 diabetes mellitus without complication, without long-term current use of insulin ([facility]    3. Hypercholesterolemia      Patient has a known history of asymptomatic Baker's cyst. We don't suggest removal.    With the Biofreeze she is at least able to work . We offer days off, which she declines at this time. Her blood pressure control is at goal.    BMI has changed little. She'll return to the office in 3-4 months, sooner if she needs to. Medications are refilled. PLAN:  .  Orders Placed This Encounter    OCCULT BLOOD, IMMUNOASSAY (FIT)    amLODIPine (NORVASC) 5 mg tablet       Follow-up Disposition:  Return in about 3 months (around 2/28/2018). ATTENTION:   This medical record was transcribed using an electronic medical records system. Although proofread, it may and can contain electronic and spelling errors. Other human spelling and other errors may be present. Corrections may be executed at a later time. Please feel free to contact us for any clarifications as needed.

## 2018-04-10 ENCOUNTER — OFFICE VISIT (OUTPATIENT)
Dept: INTERNAL MEDICINE CLINIC | Age: 68
End: 2018-04-10

## 2018-04-10 VITALS
BODY MASS INDEX: 33.26 KG/M2 | WEIGHT: 165 LBS | RESPIRATION RATE: 16 BRPM | HEART RATE: 60 BPM | SYSTOLIC BLOOD PRESSURE: 121 MMHG | DIASTOLIC BLOOD PRESSURE: 70 MMHG | HEIGHT: 59 IN | TEMPERATURE: 97.3 F | OXYGEN SATURATION: 98 %

## 2018-04-10 DIAGNOSIS — E78.00 HYPERCHOLESTEROLEMIA: ICD-10-CM

## 2018-04-10 DIAGNOSIS — E11.9 TYPE 2 DIABETES MELLITUS WITHOUT COMPLICATION, WITHOUT LONG-TERM CURRENT USE OF INSULIN (HCC): Primary | ICD-10-CM

## 2018-04-10 DIAGNOSIS — M71.21 SYNOVIAL CYST OF RIGHT POPLITEAL SPACE: ICD-10-CM

## 2018-04-10 DIAGNOSIS — D69.6 THROMBOCYTOPENIA (HCC): ICD-10-CM

## 2018-04-10 DIAGNOSIS — I10 ESSENTIAL HYPERTENSION: ICD-10-CM

## 2018-04-10 NOTE — PROGRESS NOTES
1. Have you been to the ER, urgent care clinic since your last visit? Hospitalized since your last visit? No    2. Have you seen or consulted any other health care providers outside of the [address] [place] [address] since your last visit? Include any pap smears or colon screening.  No

## 2018-04-10 NOTE — ASSESSMENT & PLAN NOTE
Stable, based on history, physical exam and review of pertinent labs, studies and medications; meds reconciled; continue current treatment plan., This condition is managed by Specialist.  Lab Results   Component Value Date/Time    WBC 6.4 08/01/2017 04:44 PM    HGB 12.5 08/01/2017 04:44 PM    HCT 38.3 08/01/2017 04:44 PM    PLATELET 537 65/37/8537 04:44 PM    Creatinine 0.73 03/28/2017 05:27 PM    BUN 14 03/28/2017 05:27 PM    Potassium 4.1 03/28/2017 05:27 PM

## 2018-04-10 NOTE — ASSESSMENT & PLAN NOTE
Stable, based on history, physical exam and review of pertinent labs, studies and medications; meds reconciled; continue current treatment plan. Key Antihyperglycemic Medications             metFORMIN ER (GLUCOPHAGE XR) 500 mg tablet  (Taking) TAKE ONE TABLET BY MOUTH ONCE DAILY BEFORE SUPPER        Other Key Diabetic Medications     Patient is on no other key diabetic meds.         Lab Results   Component Value Date/Time    Hemoglobin A1c 6.2 08/01/2017 04:44 PM    Glucose 109 03/28/2017 05:27 PM    Creatinine 0.73 03/28/2017 05:27 PM    Cholesterol, total 219 03/28/2017 05:27 PM    HDL Cholesterol 66 03/28/2017 05:27 PM    LDL, calculated 139 03/28/2017 05:27 PM    Triglyceride 69 03/28/2017 05:27 PM     Diabetic Foot and Eye Exam HM Status   Topic Date Due    Eye Exam  10/14/2015    Diabetic Foot Care  04/10/2019

## 2018-04-10 NOTE — PATIENT INSTRUCTIONS
Body Mass Index: Care Instructions  Your Care Instructions    Body mass index (BMI) can help you see if your weight is raising your risk for health problems. It uses a formula to compare how much you weigh with how tall you are. · A BMI lower than 18.5 is considered underweight. · A BMI between 18.5 and 24.9 is considered healthy. · A BMI between 25 and 29.9 is considered overweight. A BMI of 30 or higher is considered obese. If your BMI is in the normal range, it means that you have a lower risk for weight-related health problems. If your BMI is in the overweight or obese range, you may be at increased risk for weight-related health problems, such as high blood pressure, heart disease, stroke, arthritis or joint pain, and diabetes. If your BMI is in the underweight range, you may be at increased risk for health problems such as fatigue, lower protection (immunity) against illness, muscle loss, bone loss, hair loss, and hormone problems. BMI is just one measure of your risk for weight-related health problems. You may be at higher risk for health problems if you are not active, you eat an unhealthy diet, or you drink too much alcohol or use tobacco products. Follow-up care is a key part of your treatment and safety. Be sure to make and go to all appointments, and call your doctor if you are having problems. It's also a good idea to know your test results and keep a list of the medicines you take. How can you care for yourself at home? · Practice healthy eating habits. This includes eating plenty of fruits, vegetables, whole grains, lean protein, and low-fat dairy. · If your doctor recommends it, get more exercise. Walking is a good choice. Bit by bit, increase the amount you walk every day. Try for at least 30 minutes on most days of the week. · Do not smoke. Smoking can increase your risk for health problems. If you need help quitting, talk to your doctor about stop-smoking programs and medicines. These can increase your chances of quitting for good. · Limit alcohol to 2 drinks a day for men and 1 drink a day for women. Too much alcohol can cause health problems. If you have a BMI higher than 25  · Your doctor may do other tests to check your risk for weight-related health problems. This may include measuring the distance around your waist. A waist measurement of more than 40 inches in men or 35 inches in women can increase the risk of weight-related health problems. · Talk with your doctor about steps you can take to stay healthy or improve your health. You may need to make lifestyle changes to lose weight and stay healthy, such as changing your diet and getting regular exercise. If you have a BMI lower than 18.5  · Your doctor may do other tests to check your risk for health problems. · Talk with your doctor about steps you can take to stay healthy or improve your health. You may need to make lifestyle changes to gain or maintain weight and stay healthy, such as getting more healthy foods in your diet and doing exercises to build muscle. Where can you learn more? Go to http://jaret-brittany.info/. Enter S176 in the search box to learn more about \"Body Mass Index: Care Instructions. \"  Current as of: October 13, 2016  Content Version: 11.4  © 0581-0540 Healthwise, Incorporated. Care instructions adapted under license by [employer] (which disclaims liability or warranty for this information). If you have questions about a medical condition or this instruction, always ask your healthcare professional. [address] any warranty or liability for your use of this information.

## 2018-04-10 NOTE — PROGRESS NOTES
Diagnoses and all orders for this visit:    1. Type 2 diabetes mellitus without complication, without long-term current use of insulin ([facility])  -     LIPID PANEL  -     HEMOGLOBIN A1C WITH EAG  -     REFERRAL TO OPHTHALMOLOGY  -      DIABETES FOOT EXAM  -     URINALYSIS W/ RFLX MICROSCOPIC  -     CBC WITH AUTOMATED DIFF  -     METABOLIC PANEL, COMPREHENSIVE  -     TSH 3RD GENERATION  -     COLLECTION VENOUS BLOOD,VENIPUNCTURE    2. Thrombocytopenia ([address]  Assessment & Plan:  Stable, based on history, physical exam and review of pertinent labs, studies and medications; meds reconciled; continue current treatment plan., This condition is managed by Specialist.  Lab Results   Component Value Date/Time    WBC 6.4 08/01/2017 04:44 PM    HGB 12.5 08/01/2017 04:44 PM    HCT 38.3 08/01/2017 04:44 PM    PLATELET 313 87/75/0869 04:44 PM    Creatinine 0.73 03/28/2017 05:27 PM    BUN 14 03/28/2017 05:27 PM    Potassium 4.1 03/28/2017 05:27 PM         3. Essential hypertension    4. Hypercholesterolemia    5. Synovial cyst of right popliteal space    [facility]ARE  Deshaun Rojo MD, [address], [address] [id number]  Phone:  [phone]  Fax: [phone]       Chief Complaint   Patient presents with    Hypertension     f/u   . SUBJECTIVE:    Jus Perkins is a 69 y.o. female Patient returns today with a known history of primary hypertension, obesity, diabetes, edema, and thrombocytopenia. She complains of vulvar swelling, no particular reason, and is otherwise asymptomatic. Other new complaints denied and patient is seen for evaluation. Current Outpatient Prescriptions   Medication Sig Dispense Refill    amLODIPine (NORVASC) 5 mg tablet Take 1 Tab by mouth daily. 90 Tab 11    naproxen (NAPROSYN) 500 mg tablet Take 1 Tab by mouth two (2) times daily (with meals). 60 Tab 3    cloNIDine HCl (CATAPRES) 0.2 mg tablet Take 1 Tab by mouth two (2) times a day.  180 Tab 3    albuterol (PROVENTIL HFA, VENTOLIN HFA, PROAIR HFA) 90 mcg/actuation inhaler Take 2 Puffs by inhalation every four (4) hours as needed for Wheezing. 1 Inhaler 11    fluticasone-salmeterol (ADVAIR) 250-50 mcg/dose diskus inhaler Take 1 Puff by inhalation two (2) times a day. 1 Inhaler 11    cetirizine (ZYRTEC) 10 mg tablet Take 1 Tab by mouth daily. 30 Tab 11    mometasone (NASONEX) 50 mcg/actuation nasal spray 2 Sprays by Both Nostrils route daily.  1 Container 11    metFORMIN ER (GLUCOPHAGE XR) 500 mg tablet TAKE ONE TABLET BY MOUTH ONCE DAILY BEFORE SUPPER 30 Tab 11    ASCENSIA CONTOUR strip USE TO TEST BLOOD GLUCOSE ONCE DAILY AS DIRECTED 50 strip 11    glucose blood VI test strips (CONTOUR NEXT STRIPS) strip Test blood sugar once daily 50 strip 11     Past Medical History:   Diagnosis Date    Baker's cyst 3-3-16    r    Bereavement 11/15    61yo  sister     Contusion of knee and lower leg, right, subsequent encounter 11/10/2017    Contusion of knee, right     Cough 2017    Depression with anxiety     Diabetes ([address]     Diverticulosis 26-15    colonoscopy    DM2 (diabetes mellitus, type 2) ([address]     Edema     Encounter related to worker's compensation claim 11/10/2017    fall t work    Fatigue     Hypercholesterolemia     Hypertension     Liver disease     hepatitis 30 years ago    Obesity     Onychomycosis     S/P LD (total abdominal hysterectomy) 1985    Thrombocytopenia ([address] 2014     Past Surgical History:   Procedure Laterality Date    HX GYN      partial hysterectomy     Allergies   Allergen Reactions    Aspirin Nausea and Vomiting    Lipitor [Atorvastatin] Myalgia    Metformin Cough    Pravastatin Myalgia         REVIEW OF SYSTEMS:  General: negative for - chills or fever  ENT: negative for - headaches, nasal congestion or tinnitus  Respiratory: negative for - cough, hemoptysis, shortness of breath or wheezing  Cardiovascular : negative for - chest pain, edema, palpitations or shortness of breath  Gastrointestinal: negative for - abdominal pain, blood in stools, heartburn or nausea/vomiting  Genito-Urinary: no dysuria, trouble voiding, or hematuria  Musculoskeletal: negative for - gait disturbance, joint pain, joint stiffness or joint swelling  Neurological: no TIA or stroke symptoms  Hematologic: no bruises, no bleeding, no swollen glands  Integument: no lumps, mole changes, nail changes or rash  Endocrine: no malaise/lethargy or unexpected weight changes      Social History     Social History    Marital status:      Spouse name: N/A    Number of children: N/A    Years of education: N/A     Social History Main Topics    Smoking status: Never Smoker    Smokeless tobacco: Never Used    Alcohol use No    Drug use: No    Sexual activity: Not Asked     Other Topics Concern    None     Social History Narrative    Medical History: HTNTMJ dysfunctionHAsCecal lipoma 01/04negative head CT 03/04August 14, 2013 CT head w ith contrast unremarkable    except progressive splenoid sinusitisAugust 14, 2013 CT chest unremarkable    Gyn History: Last mammogram date 01/13/2012. Surgical History: [facility] 1985colonoscopy 01/04    Hospitalization/Major Diagnostic Procedure: Denies Past Hospitalization    Family History: Mother: alive 71 yrs, HTNFather: unknow nSister(s): alive, DMBrother(s): aliveDaughter(s): aliveSon(s): aliveAunt: alive2    brother(s) , 2 sister(s) . 1 son(s) , 1 daughter(s) . Social History: Alcohol Use Patient does not use alcohol. Smoking Status Patient is a never smoker. Marital Status: . Lives w ith:    spouse. no Children. 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 Education/School: has highschool diploma. Pentecostalism: Je[employer]ah    W itness - no blood transfusions.      Family History   Problem Relation Age of Onset    No Known Problems Father        OBJECTIVE:    Visit Vitals    /70    Pulse 60    Temp 97.3 °F (36.3 °C) (Oral)    Resp 16    Ht 4' 11\" (1.499 m)    Wt 165 lb (74.8 kg)    SpO2 98%    BMI 33.33 kg/m2     CONSTITUTIONAL: well , well nourished, appears age appropriate  EYES: perrla, eom intact  ENMT:moist mucous membranes, pharynx clear  NECK: supple. Thyroid normal  RESPIRATORY: Chest: clear bilaterally   CARDIOVASCULAR: Heart: regular rate and rhythm  GASTROINTESTINAL: Abdomen: soft, bowel sounds active  HEMATOLOGIC: no pathological lymph nodes palpated  MUSCULOSKELETAL: Extremities: no edema, pulse 1+   INTEGUMENT: No unusual rashes or suspicious skin lesions noted. Nails appear normal.  NEUROLOGIC: non-focal exam   MENTAL STATUS: alert and oriented, appropriate affect           ASSESSMENT:  1. Type 2 diabetes mellitus without complication, without long-term current use of insulin ([address]    2. Thrombocytopenia ([address]    3. Essential hypertension    4. Hypercholesterolemia    5. Synovial cyst of right popliteal space      She feels her vagina is swelling. We will refer her to GYN for evaluation. She has chronic thrombocytopenia with last platelet count in August of 126. We will check it again today. We have been following her over the years. She is asymptomatic. If it further declines hematology consult will be obtained. Will assess dyslipidemia with a lipid panel. We encourage exercise as noted below. She'll return to the office in four months. Discussed the patient's BMI with her.   The BMI follow up plan is as follows:     dietary management education, guidance, and counseling  encourage exercise  monitor weight  prescribed dietary intake    An After Visit Summary was printed and given to the patient    PLAN:  .  Orders Placed This Encounter    LIPID PANEL    HEMOGLOBIN A1C WITH EAG    URINALYSIS W/ RFLX MICROSCOPIC    CBC WITH AUTOMATED DIFF    METABOLIC PANEL, COMPREHENSIVE    TSH 3RD GENERATION    REFERRAL TO OPHTHALMOLOGY       Follow-up Disposition:  Return in about 4 months (around 8/10/2018). ATTENTION:   This medical record was transcribed using an electronic medical records system. Although proofread, it may and can contain electronic and spelling errors. Other human spelling and other errors may be present. Corrections may be executed at a later time. Please feel free to contact us for any clarifications as needed. Annamary Ripa

## 2018-04-10 NOTE — ACP (ADVANCE CARE PLANNING)

## 2018-04-10 NOTE — MR AVS SNAPSHOT
[address] [place] [address] 
[phone] Patient: sEpinoza Art MRN: [id number] CKT:[id number] Visit Information Date & Time Provider Department Dept. Phone Encounter #  
 4/10/2018  2:45 PM Iker Andre MD [facility] - [facility] [phone] [id number] Follow-up Instructions Return in about 4 months (around 8/10/2018). Follow-up and Disposition History Upcoming Health Maintenance Date Due  
 EYE EXAM RETINAL OR DILATED Q1 10/14/2015 MICROALBUMIN Q1 9/7/2018 HEMOGLOBIN A1C Q6M 10/10/2018 BREAST CANCER SCRN MAMMOGRAM 4/7/2019 FOOT EXAM Q1 4/10/2019 LIPID PANEL Q1 4/10/2019 GLAUCOMA SCREENING Q2Y 9/7/2019 COLONOSCOPY 2/6/2025 DTaP/Tdap/Td series (2 - Td) 3/28/2027 Allergies as of 4/10/2018  Review Complete On: 4/10/2018 By: Iker Andre MD  
  
 Severity Noted Reaction Type Reactions Aspirin  12/12/2014    Nausea and Vomiting Lipitor [Atorvastatin]  10/01/2015    Myalgia Metformin  02/14/2017    Cough Pravastatin  06/19/2015    Myalgia Current Immunizations  Never Reviewed No immunizations on file. Not reviewed this visit You Were Diagnosed With   
  
 Codes Comments Type 2 diabetes mellitus without complication, without long-term current use of insulin ([facility])    -  Primary ICD-10-CM: E11.9 ICD-9-CM: 250.00 Thrombocytopenia ([facility]     ICD-10-CM: D69.6 ICD-9-CM: 287.5 Essential hypertension     ICD-10-CM: I10 
ICD-9-CM: 401.9 Hypercholesterolemia     ICD-10-CM: E78.00 ICD-9-CM: 272.0 Synovial cyst of right popliteal space     ICD-10-CM: M71.21 
ICD-9-CM: 727.51 Vitals BP Pulse Temp Resp Height(growth percentile) Weight(growth percentile) 121/70 60 97.3 °F (36.3 °C) (Oral) 16 4' 11\" (1.499 m) 165 lb (74.8 kg) SpO2 BMI OB Status Smoking Status 98% 33.33 kg/m2 Unknown Never Smoker BMI and BSA Data Body Mass Index Body Surface Area  
 33.33 kg/m 2 1.76 m 2 Preferred Pharmacy Pharmacy Name Phone [id number] [facility] [phone] Your Updated Medication List  
  
   
This list is accurate as of 4/10/18  6:05 PM.  Always use your most recent med list.  
  
  
  
  
 albuterol 90 mcg/actuation inhaler Commonly known as:  PROVENTIL HFA, VENTOLIN HFA, PROAIR HFA Take 2 Puffs by inhalation every four (4) hours as needed for Wheezing. amLODIPine 5 mg tablet Commonly known as:  Chebanse Oralia Take 1 Tab by mouth daily. * Ascensia CONTOUR strip Generic drug:  glucose blood VI test strips USE TO TEST BLOOD GLUCOSE ONCE DAILY AS DIRECTED * glucose blood VI test strips strip Commonly known as:  CONTOUR NEXT STRIPS Test blood sugar once daily  
  
 cetirizine 10 mg tablet Commonly known as:  ZYRTEC Take 1 Tab by mouth daily. cloNIDine HCl 0.2 mg tablet Commonly known as:  CATAPRES Take 1 Tab by mouth two (2) times a day. fluticasone-salmeterol 250-50 mcg/dose diskus inhaler Commonly known as:  ADVAIR Take 1 Puff by inhalation two (2) times a day. metFORMIN  mg tablet Commonly known as:  GLUCOPHAGE XR  
TAKE ONE TABLET BY MOUTH ONCE DAILY BEFORE SUPPER  
  
 mometasone 50 mcg/actuation nasal spray Commonly known as:  NASONEX  
2 Sprays by Both Nostrils route daily. naproxen 500 mg tablet Commonly known as:  NAPROSYN Take 1 Tab by mouth two (2) times daily (with meals). * Notice: This list has 2 medication(s) that are the same as other medications prescribed for you. Read the directions carefully, and ask your doctor or other care provider to review them with you. We Performed the Following CBC WITH AUTOMATED DIFF [85478 CPT(R)] COLLECTION VENOUS BLOOD,VENIPUNCTURE [id number] CPT(R)] HEMOGLOBIN A1C WITH EAG [87078 CPT(R)]  DIABETES FOOT EXAM [HM7 Custom] LIPID PANEL [62743 CPT(R)] METABOLIC PANEL, COMPREHENSIVE [65459 CPT(R)] REFERRAL TO OPHTHALMOLOGY [REF57 Custom] TSH 3RD GENERATION [18113 CPT(R)] URINALYSIS W/ RFLX MICROSCOPIC [59914 CPT(R)] Follow-up Instructions Return in about 4 months (around 8/10/2018). Referral Information Referral ID Referred By Referred To  
  
 [id number] Radha Lentz JUSTINO Not Available Visits Status Start Date End Date 1 New Request 4/10/18 4/10/19 If your referral has a status of pending review or denied, additional information will be sent to support the outcome of this decision. Patient Instructions Body Mass Index: Care Instructions Your Care Instructions Body mass index (BMI) can help you see if your weight is raising your risk for health problems. It uses a formula to compare how much you weigh with how tall you are. · A BMI lower than 18.5 is considered underweight. · A BMI between 18.5 and 24.9 is considered healthy. · A BMI between 25 and 29.9 is considered overweight. A BMI of 30 or higher is considered obese. If your BMI is in the normal range, it means that you have a lower risk for weight-related health problems. If your BMI is in the overweight or obese range, you may be at increased risk for weight-related health problems, such as high blood pressure, heart disease, stroke, arthritis or joint pain, and diabetes. If your BMI is in the underweight range, you may be at increased risk for health problems such as fatigue, lower protection (immunity) against illness, muscle loss, bone loss, hair loss, and hormone problems. BMI is just one measure of your risk for weight-related health problems. You may be at higher risk for health problems if you are not active, you eat an unhealthy diet, or you drink too much alcohol or use tobacco products. Follow-up care is a key part of your treatment and safety. Be sure to make and go to all appointments, and call your doctor if you are having problems. It's also a good idea to know your test results and keep a list of the medicines you take. How can you care for yourself at home? · Practice healthy eating habits. This includes eating plenty of fruits, vegetables, whole grains, lean protein, and low-fat dairy. · If your doctor recommends it, get more exercise. Walking is a good choice. Bit by bit, increase the amount you walk every day. Try for at least 30 minutes on most days of the week. · Do not smoke. Smoking can increase your risk for health problems. If you need help quitting, talk to your doctor about stop-smoking programs and medicines. These can increase your chances of quitting for good. · Limit alcohol to 2 drinks a day for men and 1 drink a day for women. Too much alcohol can cause health problems. If you have a BMI higher than 25 · Your doctor may do other tests to check your risk for weight-related health problems. This may include measuring the distance around your waist. A waist measurement of more than 40 inches in men or 35 inches in women can increase the risk of weight-related health problems. · Talk with your doctor about steps you can take to stay healthy or improve your health. You may need to make lifestyle changes to lose weight and stay healthy, such as changing your diet and getting regular exercise. If you have a BMI lower than 18.5 · Your doctor may do other tests to check your risk for health problems. · Talk with your doctor about steps you can take to stay healthy or improve your health. You may need to make lifestyle changes to gain or maintain weight and stay healthy, such as getting more healthy foods in your diet and doing exercises to build muscle. Where can you learn more? Go to http://jaret-brittany.info/. Enter S176 in the search box to learn more about \"Body Mass Index: Care Instructions. \" Current as of: October 13, 2016 Content Version: 11.4 © 1368-1448 Healthwise, [employer]. Care instructions adapted under license by [employer] (which disclaims liability or warranty for this information). If you have questions about a medical condition or this instruction, always ask your healthcare professional. [address] any warranty or liability for your use of this information. Introducing [facility]! Pilar Atkins introduces Zibby patient portal. Now you can access parts of your medical record, email your doctor's office, and request medication refills online. 1. In your internet browser, go to [email or web address] 2. Click on the First Time User? Click Here link in the Sign In box. You will see the New Member Sign Up page. 3. Enter your Zibby Access Code exactly as it appears below. You will not need to use this code after youve completed the sign-up process. If you do not sign up before the expiration date, you must request a new code. · Zibby Access Code: [email or web address] Expires: 7/9/2018  6:04 PM 
 
4. Enter the last four digits of your Social Security Number (xxxx) and Date of Birth (mm/dd/yyyy) as indicated and click Submit. You will be taken to the next sign-up page. 5. Create a Zibby ID. This will be your Zibby login ID and cannot be changed, so think of one that is secure and easy to remember. 6. Create a Zibby password. You can change your password at any time. 7. Enter your Password Reset Question and Answer. This can be used at a later time if you forget your password. 8. Enter your e-mail address. You will receive e-mail notification when new information is available in [address] 9. Click Sign Up. You can now view and download portions of your medical record. 10. Click the Download Summary menu link to download a portable copy of your medical information. If you have questions, please visit the Frequently Asked Questions section of the [employer] website. Remember, [employer] App is NOT to be used for urgent needs. For medical emergencies, dial 911. Now available from your iPhone and Android! Please provide this summary of care documentation to your next provider. Your primary care clinician is listed as Ivan Boston. If you have any questions after today's visit, please call [phone].

## 2018-04-11 DIAGNOSIS — D69.6 THROMBOCYTOPENIA (HCC): Primary | ICD-10-CM

## 2018-04-11 LAB
ALBUMIN SERPL-MCNC: 3.7 G/DL (ref 3.6–4.8)
ALBUMIN/GLOB SERPL: 1.2 {RATIO} (ref 1.2–2.2)
ALP SERPL-CCNC: 80 IU/L (ref 39–117)
ALT SERPL-CCNC: 12 IU/L (ref 0–32)
APPEARANCE UR: ABNORMAL
AST SERPL-CCNC: 16 IU/L (ref 0–40)
BACTERIA #/AREA URNS HPF: ABNORMAL /[HPF]
BASOPHILS # BLD AUTO: 0 X10E3/UL (ref 0–0.2)
BASOPHILS NFR BLD AUTO: 1 %
BILIRUB SERPL-MCNC: 0.5 MG/DL (ref 0–1.2)
BILIRUB UR QL STRIP: NEGATIVE
BUN SERPL-MCNC: 14 MG/DL (ref 8–27)
BUN/CREAT SERPL: 16 (ref 12–28)
CALCIUM SERPL-MCNC: 8.9 MG/DL (ref 8.7–10.3)
CASTS URNS QL MICRO: ABNORMAL /LPF
CHLORIDE SERPL-SCNC: 103 MMOL/L (ref 96–106)
CHOLEST SERPL-MCNC: 196 MG/DL (ref 100–199)
CO2 SERPL-SCNC: 28 MMOL/L (ref 18–29)
COLOR UR: YELLOW
CREAT SERPL-MCNC: 0.85 MG/DL (ref 0.57–1)
EOSINOPHIL # BLD AUTO: 0.2 X10E3/UL (ref 0–0.4)
EOSINOPHIL NFR BLD AUTO: 3 %
EPI CELLS #/AREA URNS HPF: >10 /HPF
ERYTHROCYTE [DISTWIDTH] IN BLOOD BY AUTOMATED COUNT: 14.4 % (ref 12.3–15.4)
EST. AVERAGE GLUCOSE BLD GHB EST-MCNC: 146 MG/DL
GFR SERPLBLD CREATININE-BSD FMLA CKD-EPI: 70 ML/MIN/1.73
GFR SERPLBLD CREATININE-BSD FMLA CKD-EPI: 81 ML/MIN/1.73
GLOBULIN SER CALC-MCNC: 3.2 G/DL (ref 1.5–4.5)
GLUCOSE SERPL-MCNC: 117 MG/DL (ref 65–99)
GLUCOSE UR QL: NEGATIVE
HBA1C MFR BLD: 6.7 % (ref 4.8–5.6)
HCT VFR BLD AUTO: 37.7 % (ref 34–46.6)
HDLC SERPL-MCNC: 57 MG/DL
HGB BLD-MCNC: 12.2 G/DL (ref 11.1–15.9)
HGB UR QL STRIP: NEGATIVE
IMM GRANULOCYTES # BLD: 0 X10E3/UL (ref 0–0.1)
IMM GRANULOCYTES NFR BLD: 0 %
KETONES UR QL STRIP: NEGATIVE
LDLC SERPL CALC-MCNC: 128 MG/DL (ref 0–99)
LEUKOCYTE ESTERASE UR QL STRIP: ABNORMAL
LYMPHOCYTES # BLD AUTO: 2.5 X10E3/UL (ref 0.7–3.1)
LYMPHOCYTES NFR BLD AUTO: 34 %
MCH RBC QN AUTO: 28.6 PG (ref 26.6–33)
MCHC RBC AUTO-ENTMCNC: 32.4 G/DL (ref 31.5–35.7)
MCV RBC AUTO: 88 FL (ref 79–97)
MICRO URNS: ABNORMAL
MONOCYTES # BLD AUTO: 0.4 X10E3/UL (ref 0.1–0.9)
MONOCYTES NFR BLD AUTO: 5 %
MUCOUS THREADS URNS QL MICRO: PRESENT
NEUTROPHILS # BLD AUTO: 4.2 X10E3/UL (ref 1.4–7)
NEUTROPHILS NFR BLD AUTO: 57 %
NITRITE UR QL STRIP: NEGATIVE
PH UR STRIP: 5 [PH] (ref 5–7.5)
PLATELET # BLD AUTO: 114 X10E3/UL (ref 150–379)
POTASSIUM SERPL-SCNC: 4.4 MMOL/L (ref 3.5–5.2)
PROT SERPL-MCNC: 6.9 G/DL (ref 6–8.5)
PROT UR QL STRIP: NEGATIVE
RBC # BLD AUTO: 4.27 X10E6/UL (ref 3.77–5.28)
RBC #/AREA URNS HPF: ABNORMAL /HPF
SODIUM SERPL-SCNC: 141 MMOL/L (ref 134–144)
SP GR UR: 1.02 (ref 1–1.03)
TRIGL SERPL-MCNC: 56 MG/DL (ref 0–149)
TSH SERPL DL<=0.005 MIU/L-ACNC: 1.22 UIU/ML (ref 0.45–4.5)
UROBILINOGEN UR STRIP-MCNC: 0.2 MG/DL (ref 0.2–1)
VLDLC SERPL CALC-MCNC: 11 MG/DL (ref 5–40)
WBC # BLD AUTO: 7.3 X10E3/UL (ref 3.4–10.8)
WBC #/AREA URNS HPF: ABNORMAL /HPF

## 2018-05-16 ENCOUNTER — HOSPITAL ENCOUNTER (OUTPATIENT)
Dept: MAMMOGRAPHY | Age: 68
Discharge: HOME | End: 2018-05-16
Attending: INTERNAL MEDICINE
Payer: COMMERCIAL

## 2018-05-16 ENCOUNTER — OFFICE VISIT (OUTPATIENT)
Dept: ONCOLOGY | Age: 68
End: 2018-05-16

## 2018-05-16 VITALS
WEIGHT: 161.6 LBS | DIASTOLIC BLOOD PRESSURE: 77 MMHG | SYSTOLIC BLOOD PRESSURE: 117 MMHG | HEART RATE: 76 BPM | OXYGEN SATURATION: 98 % | TEMPERATURE: 97.8 F | BODY MASS INDEX: 32.58 KG/M2 | HEIGHT: 59 IN | RESPIRATION RATE: 16 BRPM

## 2018-05-16 DIAGNOSIS — E11.9 TYPE 2 DIABETES MELLITUS WITHOUT COMPLICATION, WITHOUT LONG-TERM CURRENT USE OF INSULIN (HCC): ICD-10-CM

## 2018-05-16 DIAGNOSIS — Z12.31 VISIT FOR SCREENING MAMMOGRAM: ICD-10-CM

## 2018-05-16 DIAGNOSIS — D69.6 THROMBOCYTOPENIA (HCC): Primary | ICD-10-CM

## 2018-05-16 DIAGNOSIS — D69.6 THROMBOCYTOPENIA (HCC): ICD-10-CM

## 2018-05-16 DIAGNOSIS — E66.09 CLASS 1 OBESITY DUE TO EXCESS CALORIES WITH SERIOUS COMORBIDITY AND BODY MASS INDEX (BMI) OF 32.0 TO 32.9 IN ADULT: ICD-10-CM

## 2018-05-16 PROCEDURE — 77067 SCR MAMMO BI INCL CAD: CPT

## 2018-05-16 NOTE — MR AVS SNAPSHOT
[address] [facility] 209 1400 [address] [place] [address] 
[phone] Patient: Adriel Neville MRN: [id number] Visit Information Date & Time Provider Department Dept. Phone Encounter #  
 5/16/2018  9:00 AM King Maria MD [facility] Oncology at [facility] [phone] Your Appointments 8/14/2018  3:30 PM  
New Patient with Lacy Tillman MD  
SPORTS MED [facility] ([address]) Appt Note: 4 month follow up [address], [place] [address] [facility] 98  
  
   
 [address], [place] [address] [place] 57 Upcoming Health Maintenance Date Due  
 EYE EXAM RETINAL OR DILATED Q1 10/14/2015 Influenza Age 5 to Adult 8/1/2018 MICROALBUMIN Q1 9/7/2018 MEDICARE YEARLY EXAM 9/8/2018 HEMOGLOBIN A1C Q6M 10/10/2018 BREAST CANCER SCRN MAMMOGRAM 4/7/2019 FOOT EXAM Q1 4/10/2019 LIPID PANEL Q1 4/10/2019 GLAUCOMA SCREENING Q2Y 9/7/2019 COLONOSCOPY 2/6/2025 DTaP/Tdap/Td series (2 - Td) 3/28/2027 Allergies as of 5/16/2018  Review Complete On: 5/16/2018 By: Shelley Abraham Severity Noted Reaction Type Reactions Aspirin  12/12/2014    Nausea and Vomiting Lipitor [Atorvastatin]  10/01/2015    Myalgia Metformin  02/14/2017    Cough Pravastatin  06/19/2015    Myalgia Current Immunizations  Never Reviewed No immunizations on file. Not reviewed this visit You Were Diagnosed With   
  
 Codes Comments Thrombocytopenia ([facility]    -  Primary ICD-10-CM: D69.6 ICD-9-CM: 287.5 Vitals BP Pulse Temp Resp Height(growth percentile) Weight(growth percentile) 117/77 76 97.8 °F (36.6 °C) 16 4' 11\" (1.499 m) 161 lb 9.6 oz (73.3 kg) SpO2 BMI OB Status Smoking Status 98% 32.64 kg/m2 Unknown Never Smoker Vitals History BMI and BSA Data  Body Mass Index Body Surface Area  
 32.64 kg/m 2 1.75 m 2  
  
 Preferred Pharmacy Pharmacy Name Phone [facility][id number] On license of [facility] [phone] Your Updated Medication List  
  
   
This list is accurate as of 5/16/18  9:41 AM.  Always use your most recent med list.  
  
  
  
  
 albuterol 90 mcg/actuation inhaler Commonly known as:  PROVENTIL HFA, VENTOLIN HFA, PROAIR HFA Take 2 Puffs by inhalation every four (4) hours as needed for Wheezing. amLODIPine 5 mg tablet Commonly known as:  Benson Lute Take 1 Tab by mouth daily. * Ascensia CONTOUR strip Generic drug:  glucose blood VI test strips USE TO TEST BLOOD GLUCOSE ONCE DAILY AS DIRECTED * glucose blood VI test strips strip Commonly known as:  CONTOUR NEXT TEST STRIPS Test blood sugar once daily  
  
 cetirizine 10 mg tablet Commonly known as:  ZYRTEC Take 1 Tab by mouth daily. cloNIDine HCl 0.2 mg tablet Commonly known as:  CATAPRES Take 1 Tab by mouth two (2) times a day. fluticasone-salmeterol 250-50 mcg/dose diskus inhaler Commonly known as:  ADVAIR Take 1 Puff by inhalation two (2) times a day. metFORMIN  mg tablet Commonly known as:  GLUCOPHAGE XR  
TAKE ONE TABLET BY MOUTH ONCE DAILY BEFORE SUPPER  
  
 mometasone 50 mcg/actuation nasal spray Commonly known as:  NASONEX  
2 Sprays by Both Nostrils route daily. naproxen 500 mg tablet Commonly known as:  NAPROSYN Take 1 Tab by mouth two (2) times daily (with meals). * Notice: This list has 2 medication(s) that are the same as other medications prescribed for you. Read the directions carefully, and ask your doctor or other care provider to review them with you. To-Do List   
 05/16/2018 Lab:  CBC WITH AUTOMATED DIFF   
  
 05/16/2018 Lab:  FERRITIN   
  
 05/16/2018 Lab:  GAMMOPATHY EVAL, SPEP/DEEP, IG QT/FLC   
  
 05/16/2018 Lab:  HEP B SURFACE AG   
  
 05/16/2018 Lab:  HEPATITIS C AB   
  
 05/16/2018 Lab:  HIV 1/2 AG/AB, 4TH GENERATION,W RFLX CONFIRM   
  
 05/16/2018 Lab:  IRON PROFILE   
  
 05/16/2018 Lab:  LD   
  
 05/16/2018 Lab:  METABOLIC PANEL, COMPREHENSIVE   
  
 05/16/2018 Pathology:  PATHOLOGIST REVIEW SMEARS   
  
 05/16/2018 Lab:  TSH 3RD GENERATION   
  
 05/16/2018 Imaging:  US ABD COMP   
  
 05/16/2018 Lab:  VITAMIN B12   
  
 05/16/2018 11:00 AM  
  Appointment with [facility] 6 at [address] [place] [address] ([phone]) Shower or bathe using soap and water. Do not use deodorant, powder, perfumes, or lotion the day of your exam.  If your prior mammograms were not performed at [place] [address] please bring films with you or forward prior images 2 days before your procedure. Check in at registration 15min before your appointment time unless you were instructed to do otherwise. A script is not necessary, but if you have one, please bring it on the day of the mammogram or have it faxed to the department. [facility] [phone] [facility]  [phone] [facility] [address] [place]  [phone] [phone] [place] [address]-[facility]9 Introducing [facility] & HE[facility]! [employer] introduces [employer] patient portal. Now you can access parts of your medical record, email your doctor's office, and request medication refills online. 1. In your internet browser, go to [email or web address] 2. Click on the First Time User? Click Here link in the Sign In box. You will see the New Member Sign Up page. 3. Enter your [employer] Access Code exactly as it appears below. You will not need to use this code after youve completed the sign-up process. If you do not sign up before the expiration date, you must request a new code. · PR Slides Access Code: [email or web address] Expires: 7/9/2018  6:04 PM 
 
4.  Enter the last four digits of your Social Security Number (xxxx) and Date of Birth (mm/dd/yyyy) as indicated and click Submit. You will be taken to the next sign-up page. 5. Create a [employer] ID. This will be your [employer] login ID and cannot be changed, so think of one that is secure and easy to remember. 6. Create a [employer] password. You can change your password at any time. 7. Enter your Password Reset Question and Answer. This can be used at a later time if you forget your password. 8. Enter your e-mail address. You will receive e-mail notification when new information is available in [address] 9. Click Sign Up. You can now view and download portions of your medical record. 10. Click the Download Summary menu link to download a portable copy of your medical information. If you have questions, please visit the Frequently Asked Questions section of the [employer] website. Remember, [employer] is NOT to be used for urgent needs. For medical emergencies, dial 911. Now available from your iPhone and Android! Please provide this summary of care documentation to your next provider. Your primary care clinician is listed as Eric Evans. If you have any questions after today's visit, please call [phone].

## 2018-05-16 NOTE — PROGRESS NOTES
Hematology/Oncology Consult    REASON FOR CONSULT: Thrombocytopenia  REQUESTED BY: Dr. Omar Paez: Ms. Vipin Scherer is a 69 y.o. female with hypertension, hypercholesterolemia, obesity who presents for evaluation of thrombocytopenia    Upon review of EMR she is noted to have  fluctuating levels of mild thrombocytopenia since . Her platelets have ranged from 114 K2 150 9K. Most recent platelet count in  was 114 K. No other CBC abnormalities were noted. No cough. No SOB. No palpitations. No itching. No rash. No numbness or tingling. No swelling. No headache. No urinary pain. No urinary frequency. No incontinence. No rectal bleeding. Normal appetite. No bleeding. No constipation. No diarrhea. No fatigue. No hair loss. No fever. No chills. No nausea. No vomiting. No hot flashes. No insomnia. No anxiety, agitation, or depression. No pain. No mouth sores.       Oncologic history reviewed and summarized below:    Past Medical History:   Diagnosis Date    Baker's cyst 3-3-16    r    Bereavement 11/15    59yo  sister     Contusion of knee and lower leg, right, subsequent encounter 11/10/2017    Contusion of knee, right     Cough 2017    Depression with anxiety     Diabetes ([address]     Diverticulosis 15    colonoscopy    DM2 (diabetes mellitus, type 2) ([address]     Edema     Encounter related to worker's compensation claim 11/10/2017    fall t work    Fatigue     Hypercholesterolemia     Hypertension     Liver disease     hepatitis 30 years ago    Obesity     Onychomycosis     S/P LD (total abdominal hysterectomy) 1985    Thrombocytopenia ([address] 2014       Past Surgical History:   Procedure Laterality Date    HX GYN      partial hysterectomy       Allergies   Allergen Reactions    Aspirin Nausea and Vomiting    Lipitor [Atorvastatin] Myalgia    Metformin Cough    Pravastatin Myalgia       Current Outpatient Prescriptions Medication Sig Dispense Refill    amLODIPine (NORVASC) 5 mg tablet Take 1 Tab by mouth daily. 90 Tab 11    cloNIDine HCl (CATAPRES) 0.2 mg tablet Take 1 Tab by mouth two (2) times a day. 180 Tab 3    ASCENSIA CONTOUR strip USE TO TEST BLOOD GLUCOSE ONCE DAILY AS DIRECTED 50 strip 11    glucose blood VI test strips (CONTOUR NEXT STRIPS) strip Test blood sugar once daily 50 strip 11    naproxen (NAPROSYN) 500 mg tablet Take 1 Tab by mouth two (2) times daily (with meals). 60 Tab 3    albuterol (PROVENTIL HFA, VENTOLIN HFA, PROAIR HFA) 90 mcg/actuation inhaler Take 2 Puffs by inhalation every four (4) hours as needed for Wheezing. 1 Inhaler 11    fluticasone-salmeterol (ADVAIR) 250-50 mcg/dose diskus inhaler Take 1 Puff by inhalation two (2) times a day. 1 Inhaler 11    cetirizine (ZYRTEC) 10 mg tablet Take 1 Tab by mouth daily. 30 Tab 11    mometasone (NASONEX) 50 mcg/actuation nasal spray 2 Sprays by Both Nostrils route daily. 1 Container 11    metFORMIN ER (GLUCOPHAGE XR) 500 mg tablet TAKE ONE TABLET BY MOUTH ONCE DAILY BEFORE SUPPER 30 Tab 11       Social History     Social History    Marital status:      Spouse name: N/A    Number of children: N/A    Years of education: N/A     Social History Main Topics    Smoking status: Never Smoker    Smokeless tobacco: Never Used    Alcohol use No    Drug use: No    Sexual activity: Not Asked     Other Topics Concern    None     Social History Narrative    Medical History: HTNTMJ dysfunctionHAsCecal lipoma 01/04negative head CT 03/04August 14, 2013 CT head w ith contrast unremarkable    except progressive splenoid sinusitisAugust 14, 2013 CT chest unremarkable    Gyn History: Last mammogram date 01/13/2012. Surgical History: LD 1985colonoscopy 01/04    Hospitalization/Major Diagnostic Procedure: Denies Past Hospitalization    Family History:  Mother: alive 71 yrs, HTNFather: unknow nSister(s): alive, DMBrother(s): aliveDaughter(s): aliveSon(s): aliveAunt: alive2    brother(s) , 2 sister(s) . 1 son(s) , 1 daughter(s) . Social History: Alcohol Use Patient does not use alcohol. Smoking Status Patient is a never smoker. Marital Status: . Lives w ith:    spouse. no Children. 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 Education/School: has highschool diploma. Yazidism: Jehovah    W itness - no blood transfusions. Family History   Problem Relation Age of Onset    No Known Problems Father        ROS  A 12 point review of systems was obtained and is negative except as listed in HPI.   ECOG PS is 1  Emotional well being addressed and patient is coping well     Physical Examination:   Visit Vitals    /77    Pulse 76    Temp 97.8 °F (36.6 °C)    Resp 16    Ht 4' 11\" (1.499 m)    Wt 161 lb 9.6 oz (73.3 kg)    SpO2 98%    BMI 32.64 kg/m2     General appearance - alert, well appearing, and in no distress  Mental status - oriented to person, place, and time  Mouth - mucous membranes moist, pharynx normal without lesions  Neck - supple, no significant adenopathy  Lymphatics - no palpable lymphadenopathy, no hepatosplenomegaly  Chest - clear to auscultation, no wheezes, rales or rhonchi, symmetric air entry  Heart - normal rate, regular rhythm, normal S1, S2, no murmurs, rubs, clicks or gallops  Abdomen - soft, nontender, nondistended, no masses or organomegaly, bowel sounds present  Back exam - full range of motion, no tenderness, palpable spasm or pain on motion  Neurological - normal speech, no focal findings or movement disorder noted  Musculoskeletal - no joint tenderness, deformity or swelling  Extremities - peripheral pulses normal, no pedal edema, no clubbing or cyanosis  Skin - normal coloration and turgor, no rashes, no suspicious skin lesions noted    LABS  Lab Results   Component Value Date/Time    WBC 7.3 04/10/2018 05:57 PM    HGB 12.2 04/10/2018 05:57 PM    HCT 37.7 04/10/2018 05:57 PM    PLATELET 441 (L) 69/39/1776 05:57 PM    MCV 88 04/10/2018 05:57 PM    ABS. NEUTROPHILS 4.2 04/10/2018 05:57 PM     Lab Results   Component Value Date/Time    Sodium 141 04/10/2018 05:57 PM    Potassium 4.4 04/10/2018 05:57 PM    Chloride 103 04/10/2018 05:57 PM    CO2 28 04/10/2018 05:57 PM    Glucose 117 (H) 04/10/2018 05:57 PM    BUN 14 04/10/2018 05:57 PM    Creatinine 0.85 04/10/2018 05:57 PM    GFR est AA 81 04/10/2018 05:57 PM    GFR est non-AA 70 04/10/2018 05:57 PM    Calcium 8.9 04/10/2018 05:57 PM     Lab Results   Component Value Date/Time    AST (SGOT) 16 04/10/2018 05:57 PM    Alk. phosphatase 80 04/10/2018 05:57 PM    Protein, total 6.9 04/10/2018 05:57 PM    Albumin 3.7 04/10/2018 05:57 PM    A-G Ratio 1.2 04/10/2018 05:57 PM       Lab Results   Component Value Date/Time    Sed rate (ESR) 15 06/19/2015 12:36 PM    C-Reactive Protein, Cardiac 3.16 (H) 12/12/2014 02:44 PM    TSH 1.220 04/10/2018 05:57 PM     No results found for: INR, APTT, DDIMSQ, DDIME, [id number], Bonnie Pinna, FDPLT, Roanne Baton, [address] [facility], [address], [id number], [id number], [id number], [id number], [id number], [id number]    ASSESSMENT   Ms. Keeley Driscoll is a 69 y.o. female with mild chronic thrombocytopenia as an isolated CBC abnormality    PLAN    Thrombocytopenia  Mild  No other cbc abnormalities, no B symptoms, medications are non contributory  Discussed possible etiologies    Will obtain a cbc, smear, LD, Hep Panel, HIV, TSH, B12, Gammopathy eval and splenic ultrasound    Obesity  USG to r/o LABOY/ Cirrhosis    DM  Diet controlled    HTN  Managed by Dr. Juan Pablo Reinoso    RTC 3 weeks    King Maria MD    Ms. Keeley Driscoll has a reminder for a \"due or due soon\" health maintenance. I have asked that she contact her primary care provider for follow-up on this health maintenance.

## 2018-05-27 LAB
ALBUMIN SERPL ELPH-MCNC: 3.8 G/DL (ref 2.9–4.4)
ALBUMIN SERPL-MCNC: 4.1 G/DL (ref 3.6–4.8)
ALBUMIN/GLOB SERPL: 1.1 {RATIO} (ref 0.7–1.7)
ALBUMIN/GLOB SERPL: 1.3 {RATIO} (ref 1.2–2.2)
ALP SERPL-CCNC: 90 IU/L (ref 39–117)
ALPHA1 GLOB SERPL ELPH-MCNC: 0.2 G/DL (ref 0–0.4)
ALPHA2 GLOB SERPL ELPH-MCNC: 0.7 G/DL (ref 0.4–1)
ALT SERPL-CCNC: 9 IU/L (ref 0–32)
AST SERPL-CCNC: 16 IU/L (ref 0–40)
B-GLOBULIN SERPL ELPH-MCNC: 1 G/DL (ref 0.7–1.3)
BASOPHILS # BLD AUTO: 0 X10E3/UL (ref 0–0.2)
BASOPHILS NFR BLD AUTO: 1 %
BILIRUB SERPL-MCNC: 0.5 MG/DL (ref 0–1.2)
BUN SERPL-MCNC: 9 MG/DL (ref 8–27)
BUN/CREAT SERPL: 9 (ref 12–28)
CALCIUM SERPL-MCNC: 8.7 MG/DL (ref 8.7–10.3)
CHLORIDE SERPL-SCNC: 104 MMOL/L (ref 96–106)
CO2 SERPL-SCNC: 25 MMOL/L (ref 18–29)
CREAT SERPL-MCNC: 0.95 MG/DL (ref 0.57–1)
EOSINOPHIL # BLD AUTO: 0.3 X10E3/UL (ref 0–0.4)
EOSINOPHIL NFR BLD AUTO: 4 %
ERYTHROCYTE [DISTWIDTH] IN BLOOD BY AUTOMATED COUNT: 14.6 % (ref 12.3–15.4)
FERRITIN SERPL-MCNC: 144 NG/ML (ref 15–150)
GAMMA GLOB SERPL ELPH-MCNC: 1.5 G/DL (ref 0.4–1.8)
GFR SERPLBLD CREATININE-BSD FMLA CKD-EPI: 61 ML/MIN/1.73
GFR SERPLBLD CREATININE-BSD FMLA CKD-EPI: 71 ML/MIN/1.73
GLOBULIN SER CALC-MCNC: 3.2 G/DL (ref 1.5–4.5)
GLOBULIN SER-MCNC: 3.5 G/DL (ref 2.2–3.9)
GLUCOSE SERPL-MCNC: 128 MG/DL (ref 65–99)
HBV SURFACE AG SERPL QL IA: NEGATIVE
HCT VFR BLD AUTO: 39.9 % (ref 34–46.6)
HCV AB S/CO SERPL IA: <0.1 S/CO RATIO (ref 0–0.9)
HGB BLD-MCNC: 12.9 G/DL (ref 11.1–15.9)
HIV 1+2 AB+HIV1 P24 AG SERPL QL IA: NON REACTIVE
IGA SERPL-MCNC: 232 MG/DL (ref 87–352)
IGG SERPL-MCNC: 1490 MG/DL (ref 700–1600)
IGM SERPL-MCNC: 48 MG/DL (ref 26–217)
IMM GRANULOCYTES # BLD: 0 X10E3/UL (ref 0–0.1)
IMM GRANULOCYTES NFR BLD: 0 %
INTERPRETATION SERPL IEP-IMP: NORMAL
IRON SATN MFR SERPL: 20 % (ref 15–55)
IRON SERPL-MCNC: 56 UG/DL (ref 27–139)
KAPPA LC FREE SER-MCNC: 15 MG/L (ref 3.3–19.4)
KAPPA LC FREE/LAMBDA FREE SER: 1.34 {RATIO} (ref 0.26–1.65)
LAMBDA LC FREE SERPL-MCNC: 11.2 MG/L (ref 5.7–26.3)
LDH SERPL-CCNC: 245 IU/L (ref 119–226)
LYMPHOCYTES # BLD AUTO: 2.4 X10E3/UL (ref 0.7–3.1)
LYMPHOCYTES NFR BLD AUTO: 37 %
M PROTEIN SERPL ELPH-MCNC: NORMAL G/DL
MCH RBC QN AUTO: 28.2 PG (ref 26.6–33)
MCHC RBC AUTO-ENTMCNC: 32.3 G/DL (ref 31.5–35.7)
MCV RBC AUTO: 87 FL (ref 79–97)
MONOCYTES # BLD AUTO: 0.3 X10E3/UL (ref 0.1–0.9)
MONOCYTES NFR BLD AUTO: 5 %
NEUTROPHILS # BLD AUTO: 3.6 X10E3/UL (ref 1.4–7)
NEUTROPHILS NFR BLD AUTO: 53 %
PATH REV BLD -IMP: ABNORMAL
PATH REV BLD -IMP: NORMAL
PATH REV BLD -IMP: NORMAL
PATHOLOGIST NAME: ABNORMAL
PLATELET # BLD AUTO: 104 X10E3/UL (ref 150–379)
PLEASE NOTE:, 149534: NORMAL
POTASSIUM SERPL-SCNC: 4.5 MMOL/L (ref 3.5–5.2)
PROT SERPL-MCNC: 7.3 G/DL (ref 6–8.5)
RBC # BLD AUTO: 4.58 X10E6/UL (ref 3.77–5.28)
SODIUM SERPL-SCNC: 142 MMOL/L (ref 134–144)
TIBC SERPL-MCNC: 282 UG/DL (ref 250–450)
TSH SERPL DL<=0.005 MIU/L-ACNC: 1.27 UIU/ML (ref 0.45–4.5)
UIBC SERPL-MCNC: 226 UG/DL (ref 118–369)
VIT B12 SERPL-MCNC: 812 PG/ML (ref 232–1245)
WBC # BLD AUTO: 6.7 X10E3/UL (ref 3.4–10.8)

## 2018-05-30 ENCOUNTER — HOSPITAL ENCOUNTER (OUTPATIENT)
Dept: ULTRASOUND IMAGING | Age: 68
Discharge: HOME | End: 2018-05-30
Attending: INTERNAL MEDICINE
Payer: COMMERCIAL

## 2018-05-30 DIAGNOSIS — D69.6 THROMBOCYTOPENIA (HCC): ICD-10-CM

## 2018-05-30 PROCEDURE — 76700 US EXAM ABDOM COMPLETE: CPT

## 2018-06-07 ENCOUNTER — OFFICE VISIT (OUTPATIENT)
Dept: ONCOLOGY | Age: 68
End: 2018-06-07

## 2018-06-07 VITALS
HEIGHT: 59 IN | TEMPERATURE: 97.9 F | RESPIRATION RATE: 16 BRPM | SYSTOLIC BLOOD PRESSURE: 129 MMHG | BODY MASS INDEX: 32.09 KG/M2 | HEART RATE: 56 BPM | OXYGEN SATURATION: 97 % | DIASTOLIC BLOOD PRESSURE: 80 MMHG | WEIGHT: 159.2 LBS

## 2018-06-07 DIAGNOSIS — R16.0 LIVER MASS: ICD-10-CM

## 2018-06-07 DIAGNOSIS — E11.9 TYPE 2 DIABETES MELLITUS WITHOUT COMPLICATION, WITHOUT LONG-TERM CURRENT USE OF INSULIN (HCC): ICD-10-CM

## 2018-06-07 DIAGNOSIS — D69.6 THROMBOCYTOPENIA (HCC): Primary | ICD-10-CM

## 2018-06-07 NOTE — PROGRESS NOTES
Hematology/Oncology follow up    REASON FOR VISIT: Thrombocytopenia  REQUESTED BY: Dr. Zahraa Gupta: Ms. Gene Sutherland is a 69 y.o. female with hypertension, hypercholesterolemia, obesity who presents for follow up of thrombocytopenia    Upon review of EMR she is noted to have  fluctuating levels of mild thrombocytopenia since . Her platelets have ranged from 114 K2 150 9K. Most recent platelet count in  was 114 K. No other CBC abnormalities were noted. Returns today to discuss further workup. No cough. No SOB. No palpitations. No itching. No rash. No numbness or tingling. No swelling. No headache. No urinary pain. No urinary frequency. No incontinence. No rectal bleeding. Normal appetite. No bleeding. No constipation. No diarrhea. No fatigue. No hair loss. No fever. No chills. No nausea. No vomiting. No hot flashes. No insomnia. No anxiety, agitation, or depression. No pain. No mouth sores.       Past Medical History:   Diagnosis Date    Baker's cyst 3-3-16    r    Bereavement 11/15    59yo  sister     Contusion of knee and lower leg, right, subsequent encounter 11/10/2017    Contusion of knee, right     Cough 2017    Depression with anxiety     Diabetes ([address]     Diverticulosis 15    colonoscopy    DM2 (diabetes mellitus, type 2) ([address]     Edema     Encounter related to worker's compensation claim 11/10/2017    fall t work    Fatigue     Hypercholesterolemia     Hypertension     Liver disease     hepatitis 30 years ago    Obesity     Onychomycosis     S/P LD (total abdominal hysterectomy) 1985    Thrombocytopenia ([address] 2014       Past Surgical History:   Procedure Laterality Date    HX GYN      partial hysterectomy       Allergies   Allergen Reactions    Aspirin Nausea and Vomiting    Lipitor [Atorvastatin] Myalgia    Metformin Cough    Pravastatin Myalgia       Current Outpatient Prescriptions   Medication Sig Dispense Refill    amLODIPine (NORVASC) 5 mg tablet Take 1 Tab by mouth daily. 90 Tab 11    cloNIDine HCl (CATAPRES) 0.2 mg tablet Take 1 Tab by mouth two (2) times a day. 180 Tab 3    ASCENSIA CONTOUR strip USE TO TEST BLOOD GLUCOSE ONCE DAILY AS DIRECTED 50 strip 11    glucose blood VI test strips (CONTOUR NEXT STRIPS) strip Test blood sugar once daily 50 strip 11    naproxen (NAPROSYN) 500 mg tablet Take 1 Tab by mouth two (2) times daily (with meals). 60 Tab 3    albuterol (PROVENTIL HFA, VENTOLIN HFA, PROAIR HFA) 90 mcg/actuation inhaler Take 2 Puffs by inhalation every four (4) hours as needed for Wheezing. 1 Inhaler 11    fluticasone-salmeterol (ADVAIR) 250-50 mcg/dose diskus inhaler Take 1 Puff by inhalation two (2) times a day. 1 Inhaler 11    cetirizine (ZYRTEC) 10 mg tablet Take 1 Tab by mouth daily. 30 Tab 11    mometasone (NASONEX) 50 mcg/actuation nasal spray 2 Sprays by Both Nostrils route daily. 1 Container 11    metFORMIN ER (GLUCOPHAGE XR) 500 mg tablet TAKE ONE TABLET BY MOUTH ONCE DAILY BEFORE SUPPER 30 Tab 11       Social History     Social History    Marital status:      Spouse name: N/A    Number of children: N/A    Years of education: N/A     Social History Main Topics    Smoking status: Never Smoker    Smokeless tobacco: Never Used    Alcohol use No    Drug use: No    Sexual activity: Not Asked     Other Topics Concern    None     Social History Narrative    Medical History: HTNTMJ dysfunctionHAsCecal lipoma 01/04negative head CT 03/04August 14, 2013 CT head w ith contrast unremarkable    except progressive splenoid sinusitisAugust 14, 2013 CT chest unremarkable    Gyn History: Last mammogram date 01/13/2012. Surgical History: [facility] 1985colonoscopy 01/04    Hospitalization/Major Diagnostic Procedure: Denies Past Hospitalization    Family History:  Mother: alive 71 yrs, HTNFather: unknow nSister(s): alive, DMBrother(s): aliveDaughter(s): aliveSon(s): aliveAunt: alive2    brother(s) , 2 sister(s) . 1 son(s) , 1 daughter(s) . Social History: Alcohol Use Patient does not use alcohol. Smoking Status Patient is a never smoker. Marital Status: . Lives w ith:    spouse. no Children. 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 Education/School: has highschool diploma. Adventist: Jehovah    W itness - no blood transfusions. Family History   Problem Relation Age of Onset    No Known Problems Father        ROS  A review of systems was obtained and is negative except as listed in HPI. ECOG PS is 1  Emotional well being addressed and patient is coping well     Physical Examination:   Visit Vitals    /80    Pulse (!) 56    Temp 97.9 °F (36.6 °C)    Resp 16    Ht 4' 11\" (1.499 m)    Wt 159 lb 3.2 oz (72.2 kg)    SpO2 97%    BMI 32.15 kg/m2     General appearance - alert, well appearing, and in no distress  Mental status - oriented to person, place, and time  Mouth - mucous membranes moist, pharynx normal without lesions  Neck - supple, no significant adenopathy  Lymphatics - no palpable lymphadenopathy, no hepatosplenomegaly  Abdomen - soft, nontender, nondistended, no masses or organomegaly, bowel sounds present  Neurological - normal speech, no focal findings or movement disorder noted  Musculoskeletal - no joint tenderness, deformity or swelling  Extremities - peripheral pulses normal, no pedal edema, no clubbing or cyanosis  Skin - normal coloration and turgor, no rashes, no suspicious skin lesions noted    LABS  Lab Results   Component Value Date/Time    WBC 6.7 05/16/2018 10:27 AM    HGB 12.9 05/16/2018 10:27 AM    HCT 39.9 05/16/2018 10:27 AM    PLATELET 871 (L) 51/38/0240 10:27 AM    MCV 87 05/16/2018 10:27 AM    ABS.  NEUTROPHILS 3.6 05/16/2018 10:27 AM     Lab Results   Component Value Date/Time    Sodium 142 05/16/2018 10:27 AM    Potassium 4.5 05/16/2018 10:27 AM    Chloride 104 05/16/2018 10:27 AM    CO2 25 05/16/2018 10:27 AM Glucose 128 (H) 05/16/2018 10:27 AM    BUN 9 05/16/2018 10:27 AM    Creatinine 0.95 05/16/2018 10:27 AM    GFR est AA 71 05/16/2018 10:27 AM    GFR est non-AA 61 05/16/2018 10:27 AM    Calcium 8.7 05/16/2018 10:27 AM     Lab Results   Component Value Date/Time    AST (SGOT) 16 05/16/2018 10:27 AM    Alk. phosphatase 90 05/16/2018 10:27 AM    Protein, total 7.3 05/16/2018 10:27 AM    Albumin 4.1 05/16/2018 10:27 AM    A-G Ratio 1.3 05/16/2018 10:27 AM       Lab Results   Component Value Date/Time    Iron % saturation 20 05/16/2018 10:27 AM    TIBC 282 05/16/2018 10:27 AM    Ferritin 144 05/16/2018 10:27 AM    Vitamin B12 812 05/16/2018 10:27 AM     (H) 05/16/2018 10:27 AM    Sed rate (ESR) 15 06/19/2015 12:36 PM    C-Reactive Protein, Cardiac 3.16 (H) 12/12/2014 02:44 PM    TSH 1.270 05/16/2018 10:27 AM    M-Brandon Not Observed 05/16/2018 10:27 AM    Hep C Virus Ab <0.1 05/16/2018 10:27 AM    HIV SCREEN 4TH GENERATION WRFX Non Reactive 05/16/2018 10:27 AM     USG Abdomen 5/30  Possible 2.8 cm mass in the right hepatic lobe; further  evaluation with hepatic CT or MRI is recommended. Diffusely increased hepatic  echogenicity suggests hepatic parenchymal disease. Large right renal cyst    ASSESSMENT   Ms. Jarrod Lau is a 69 y.o. female with mild chronic thrombocytopenia as an isolated CBC abnormality    PLAN    Thrombocytopenia  Mild, confirmed on CA smear review. Workup thus far shows no evidence of HIV, hepatitis, nutritional deficiencies. Thrombocytopenia is likely due to undiagnosed underlying liver disease. She does not seem to have any hypersplenism at this point. No particular intervention needed. Abnormal ultrasound  USG ultrasound of the abdomen was obtained for evaluation of thrombocytopenia. This was notable for hepatic parenchymal disease. She also has a 2.8 cm indeterminate mass in the liver. I am going to go ahead and get an MRI of her abdomen for further delineation.   I will also refer her to Dr. Huy Mosqueda with hepatology for further evaluation. DM  Diet controlled    HTN  Managed by Dr. Nay Fagan    RTC 4 weeks    Darylene Pike, MD    Ms. Bushra Robertson has a reminder for a \"due or due soon\" health maintenance. I have asked that she contact her primary care provider for follow-up on this health maintenance.

## 2018-06-14 ENCOUNTER — OFFICE VISIT (OUTPATIENT)
Dept: HEMATOLOGY | Age: 68
End: 2018-06-14

## 2018-06-14 VITALS
HEART RATE: 71 BPM | OXYGEN SATURATION: 96 % | WEIGHT: 159.2 LBS | SYSTOLIC BLOOD PRESSURE: 150 MMHG | HEIGHT: 59 IN | DIASTOLIC BLOOD PRESSURE: 90 MMHG | BODY MASS INDEX: 32.09 KG/M2 | TEMPERATURE: 97.4 F

## 2018-06-14 DIAGNOSIS — K74.60 CIRRHOSIS OF LIVER WITHOUT ASCITES, UNSPECIFIED HEPATIC CIRRHOSIS TYPE (HCC): Primary | ICD-10-CM

## 2018-06-14 PROBLEM — K74.69 OTHER CIRRHOSIS OF LIVER (HCC): Status: ACTIVE | Noted: 2018-06-14

## 2018-06-14 PROBLEM — R05.9 COUGH: Status: RESOLVED | Noted: 2017-02-14 | Resolved: 2018-06-14

## 2018-06-14 NOTE — PROGRESS NOTES
70 Jaquan Mahmood MD, [address], [facility], [place]       Tk Mcdowell, NP    Brooke Goodell, PA-C Lea Shutter, LAITHP-BC   Lita Burks, POLINA Tavares, POLINA Patel [address] [place] [address]    at [place] [id number] [facility], 81 [facility], [facility] 22.    [phone]    FAX: [address] [place] [address] Avenue    [address], [address], [address]    [phone]    [address] [place] [address] Street: [phone]         Patient Care Team:  Toñito Pruett MD as PCP - General (Internal Medicine)      Problem List  Date Reviewed: 6/14/2018          Codes Class Noted    Other cirrhosis of liver [facility]) ICD-10-CM: K74.69  ICD-9-CM: 571.5  6/14/2018        Liver mass ICD-10-CM: R16.0  ICD-9-CM: 573.9  6/7/2018        Encounter related to worker's compensation claim ICD-10-CM: Z02.6  ICD-9-CM: V70.3  11/10/2017    Overview Signed 11/14/2017  4:50 PM by Toñito Pruett MD     fall t work             Contusion of knee and lower leg, right, subsequent encounter ICD-10-CM: S80.01XD, S80.11XD  ICD-9-CM: V58.89, 924.10  11/10/2017        Type 2 diabetes mellitus without complication, without long-term current use of insulin ([facility] ICD-10-CM: E11.9  ICD-9-CM: 250.00  Unknown        Baker's cyst ICD-10-CM: M71.20  ICD-9-CM: 727.51  3/3/2016    Overview Signed 3/5/2016  2:53 PM by Toñito Pruett MD     r             ACP (advance care planning) ICD-10-CM: Z71.89  ICD-9-CM: V65.49  3/2/2016        S/P LD (total abdominal hysterectomy) ICD-10-CM: Z90.710  ICD-9-CM: V88.01  Unknown        Fatigue ICD-10-CM: R53.83  ICD-9-CM: 780.79  Unknown        Diverticulosis ICD-10-CM: K57.90  ICD-9-CM: 562.10  2/6/2015        Hypertension ICD-10-CM: I10  ICD-9-CM: 401.9  Unknown        Hypercholesterolemia ICD-10-CM: E78.00  ICD-9-CM: 272.0  Unknown        Class 1 obesity due to excess calories with serious comorbidity and body mass index (BMI) of 32.0 to 32.9 in adult ICD-10-CM: E66.09, Z68.32  ICD-9-CM: 278.00, V85.32  Unknown        Depression with anxiety ICD-10-CM: F41.8  ICD-9-CM: 300.4  Unknown        Thrombocytopenia ([address] ICD-10-CM: D69.6  ICD-9-CM: 287.5  12/12/2014                The clinicians listed above have asked me to see Chelsey Lorenz in consultation regarding management of cirrhosis of undefined cause and a liver mass. All medical records sent by the referring physicians were reviewed including imaging studies     The patient is a 69 y.o. Black female who was found to have chronic liver disease and cirrhosis in 5/2018 when she underwent an ultraosound because of long standing thrombocytopenia. An assessment of liver fibrosis with biopsy or elastography has not been performed. The most recent imaging of the liver was Ultrasound performed in 5/2018. Results suggest cirrhosis. A mass is present in the right lobe, measuring 2.8 x 2.6 cm. The patient has not developed any of the major complications of cirrhosis to date. The patient notes fatigue,     The patient has not experienced pain in the right side over the liver,     The patient completes all daily activities without any functional limitations. All of the issues listed in the Assessment and Plan were discussed with the patient. All questions were answered. The patient expressed a clear understanding of the above. [facility] 87 in 4 weeks for [address] and to review all data and determine the treatment plan. ASSESSMENT AND PLAN:  Cirrhosis  The etiology for this is not yet defined. Have performed laboratory testing to monitor liver function and degree of liver injury. This included BMP, hepatic panel, CBC with platelet count, INR.     Liver transaminases are normal.  Alkaline phosphatase is normal.  Liver function is normal.  The platelet count is depressed. The patient has never developed any complications of cirrhosis to date. The CTP is 5. Child class A. The MELD score is 9. The patient appears to be a good candidate for liver transplantation if the mass is found to be [address] Screening for Esophageal varices   The patient has not had an EGD to screen for varices. Will schedule for EGD to assess for varices and need for banding. Hepatic encephalopathy   This has not developed to date. There is no need for treatment with lactulose and/or Xifaxan at this time. No need to restrict dietary protein at this time. Treatment of other medical problems in patients with chronic liver disease  The patient was directed to continue all current medications at the current dosages. There are no contraindications for the patient to take any medications that are necessary for treatment of other medical issues. The patient can take oral diabetic agents for treatment of diabetes and statins for treatment of hypercholesterolemia. This will not impact the patient's current liver disease. The patient does not consume alcohol daily. Normal doses of acetaminophen can be used for pain as needed. Normal doses of acetaminophen as recommended on the label are not hepatotoxic, even in patients with cirrhosis. The patient has cirrhosis. Patients with cirrhosis should not use NSAIDs if possible as this is associated with a higher rate of SHARAN. Counseling for alcohol in patients with chronic liver disease  The patient has cirrhosis and was advised to be abstinent from all alcohol including non-alcoholic beer which does contain some alcohol. The patient does not consume any significant amount of alcohol. Thrombocytopenia   This is secondary to cirrhosis. There is no evidence of overt bleeding. No treatment is required. Osteoporosis  The risk of osteoporosis is increased in patients with cirrhosis. DEXA bone density to assess for osteoporosis has not been performed. This will be scheduled as part of liver tranasplant evaluation. Vaccinations   Vaccination for viral hepatitis A is recommended since the patient has no serologic evidence of previous exposure or vaccination with immunity. Vaccination for viral hepatitis B is not needed. The patient has serologic evidence of prior exposure or vaccination with immunity. Routine vaccinations against other bacterial and viral agents can be performed as indicated. Annual flu vaccination should be administered if indicated. Screening for Hepatocellular Carcinoma  HCC screening demonstrates a liver mass in the right lobe. Will perform dynamic MRI to further characterize the lesion and help determine if this is HCC. ALLERGIES  Allergies   Allergen Reactions    Aspirin Nausea and Vomiting    Lipitor [Atorvastatin] Myalgia    Metformin Cough    Pravastatin Myalgia       MEDICATIONS  Current Outpatient Prescriptions   Medication Sig    amLODIPine (NORVASC) 5 mg tablet Take 1 Tab by mouth daily.  cloNIDine HCl (CATAPRES) 0.2 mg tablet Take 1 Tab by mouth two (2) times a day.  naproxen (NAPROSYN) 500 mg tablet Take 1 Tab by mouth two (2) times daily (with meals).  albuterol (PROVENTIL HFA, VENTOLIN HFA, PROAIR HFA) 90 mcg/actuation inhaler Take 2 Puffs by inhalation every four (4) hours as needed for Wheezing.  fluticasone-salmeterol (ADVAIR) 250-50 mcg/dose diskus inhaler Take 1 Puff by inhalation two (2) times a day.  cetirizine (ZYRTEC) 10 mg tablet Take 1 Tab by mouth daily.  mometasone (NASONEX) 50 mcg/actuation nasal spray 2 Sprays by Both Nostrils route daily.     metFORMIN ER (GLUCOPHAGE XR) 500 mg tablet TAKE ONE TABLET BY MOUTH ONCE DAILY BEFORE SUPPER    ASCENSIA CONTOUR strip USE TO TEST BLOOD GLUCOSE ONCE DAILY AS DIRECTED    glucose blood VI test strips (CONTOUR NEXT STRIPS) strip Test blood sugar once daily     No current facility-administered medications for this visit. SYSTEM REVIEW NOT RELATED TO LIVER DISEASE OR REVIEWED ABOVE:  Constitution systems: Negative for fever, chills, weight gain, weight loss. Eyes: Negative for visual changes. ENT: Negative for sore throat, painful swallowing. Respiratory: Negative for cough, hemoptysis, SOB. Cardiology: Negative for chest pain, palpitations. GI:  Negative for constipation or diarrhea. : Negative for urinary frequency, dysuria, hematuria, nocturia. Skin: Negative for rash. Hematology: Negative for easy bruising, blood clots. Musculo-skelatal: Negative for back pain, muscle pain, weakness. Neurologic: Negative for headaches, dizziness, vertigo, memory problems not related to HE. Psychology: Negative for anxiety, depression. FAMILY HISTORY:  The patient has no knowledge of the father's medical condition. The mother is Has the following chronic diseases: dementia. There is no family history of liver disease. SOCIAL HISTORY:  The patient is . The patient has 2 children, and 5 grandchildren. The patient has never used tobacco products. The patient has never consumed significant amounts of alcohol. The patient currently works full time as a . PHYSICAL EXAMINATION:  Visit Vitals    /90 (BP 1 Location: Left arm, BP Patient Position: Sitting)    Pulse 71    Temp 97.4 °F (36.3 °C) (Tympanic)    Ht 4' 11\" (1.499 m)    Wt 159 lb 3.2 oz (72.2 kg)    SpO2 96%    BMI 32.15 kg/m2     General: No acute distress. Eyes: Sclera anicteric. ENT: No oral lesions. Thyroid normal.  Nodes: No adenopathy. Skin: No spider angiomata. No jaundice. No palmar erythema. Respiratory: Lungs clear to auscultation. Cardiovascular: Regular heart rate. No murmurs. No JVD. Abdomen: Soft non-tender. Liver size normal to percussion/palpation. Spleen not palpable. No obvious ascites. Extremities: No edema.   No muscle wasting. No gross arthritic changes. Neurologic: Alert and oriented. Cranial nerves grossly intact. No asterixis. LABORATORY STUDIES:  Liver [place] of [address] Units 5/16/2018 4/10/2018   WBC 3.4 - 10.8 x10E3/uL 6.7 7.3   ANC 1.4 - 7.0 x10E3/uL 3.6 4.2   HGB 11.1 - 15.9 g/dL 12.9 12.2    - 379 x10E3/uL 104 (L) 114 (L)   PLT  Comment (A)    AST 0 - 40 IU/L 16 16   ALT 0 - 32 IU/L 9 12   Alk Phos 39 - 117 IU/L 90 80   Bili, Total 0.0 - 1.2 mg/dL 0.5 0.5   Albumin 3.6 - 4.8 g/dL 4.1 3.7   BUN 8 - 27 mg/dL 9 14   Creat 0.57 - 1.00 mg/dL 0.95 0.85   Na 134 - 144 mmol/L 142 141   K 3.5 - 5.2 mmol/L 4.5 4.4   Cl 96 - 106 mmol/L 104 103   CO2 18 - 29 mmol/L 25 28   Glucose 65 - 99 mg/dL 128 (H) 117 (H)     SEROLOGIES:  Serologies Latest Ref Rng & Units 6/14/2018   Hep A Ab, Total Negative Negative   Hep B Surface Ag Negative Negative   Hep B Core Ab, Total Negative Positive (A)   Hep B Surface AB QL  Non Reactive   Hep C Ab 0.0 - 0.9 s/co ratio <0.1   Ferritin 15 - 150 ng/mL 153 (H)   Iron % Saturation 15 - 55 % 27   ASMCA 0 - 19 Units 15   Alpha-1 antitrypsin level 90 - 200 mg/dL 122     LIVER HISTOLOGY:  Not available or performed    ENDOSCOPIC PROCEDURES:  Not available or performed    RADIOLOGY:  5/2018. Ultrasound of liver. Echogenic consistent with cirrhosis. Liver mass lesion in the right lobe measuring 2.8 x2.6 cm. No ascites.     OTHER TESTING:  Not available or performed      MD Melody Arechiga [address] of [address] of [address] [address] Consuelo Suarez [address] [place] [address] 22. [phone]

## 2018-06-14 NOTE — MR AVS SNAPSHOT
[address] [facility] [phone] [address] [place] [address] 
[phone] Patient: Darrell Hector MRN: [id number] VAQ:[id number] Visit Information Date & Time Provider Department Dept. Phone Encounter #  
 6/14/2018  9:45 AM Adonis Negron Victorina of [facility] 219 [id number] Follow-up Instructions Return in about 2 weeks (around 6/28/2018) for MLS with FS. Your Appointments 8/14/2018  3:30 PM  
New Patient with Colette Iqbal MD  
SPORTS MED [facility] ([address]) Appt Note: 4 month follow up [address], [place] [address] [facility] 98  
  
   
 [address], [place] [address] [place] [address] Upcoming Health Maintenance Date Due  
 EYE EXAM RETINAL OR DILATED Q1 10/14/2015 Influenza Age 5 to Adult 8/1/2018 MICROALBUMIN Q1 9/7/2018 MEDICARE YEARLY EXAM 9/8/2018 HEMOGLOBIN A1C Q6M 10/10/2018 FOOT EXAM Q1 4/10/2019 LIPID PANEL Q1 4/10/2019 GLAUCOMA SCREENING Q2Y 9/7/2019 BREAST CANCER SCRN MAMMOGRAM 5/16/2020 COLONOSCOPY 2/6/2025 DTaP/Tdap/Td series (2 - Td) 3/28/2027 Allergies as of 6/14/2018  Review Complete On: 6/14/2018 By: Monique Mcneil LPN Severity Noted Reaction Type Reactions Aspirin  12/12/2014    Nausea and Vomiting Lipitor [Atorvastatin]  10/01/2015    Myalgia Metformin  02/14/2017    Cough Pravastatin  06/19/2015    Myalgia Current Immunizations  Never Reviewed No immunizations on file. Not reviewed this visit You Were Diagnosed With   
  
 Codes Comments Cirrhosis of liver without ascites, unspecified hepatic cirrhosis type ([facility]    -  Primary ICD-10-CM: K74.60 ICD-9-CM: 571.5 Vitals BP Pulse Temp Height(growth percentile) Weight(growth percentile)  150/90 (BP 1 Location: Left arm, BP Patient Position: Sitting) 71 97.4 °F (36.3 °C) (Tympanic) 4' 11\" (1.499 m) 159 lb 3.2 oz (72.2 kg) SpO2 BMI OB Status Smoking Status 96% 32.15 kg/m2 Postmenopausal Never Smoker Vitals History BMI and BSA Data Body Mass Index Body Surface Area  
 32.15 kg/m 2 1.73 m 2 Preferred Pharmacy Pharmacy Name Phone [phone] [phone] Your Updated Medication List  
  
   
This list is accurate as of 6/14/18 10:35 AM.  Always use your most recent med list.  
  
  
  
  
 albuterol 90 mcg/actuation inhaler Commonly known as:  PROVENTIL HFA, VENTOLIN HFA, PROAIR HFA Take 2 Puffs by inhalation every four (4) hours as needed for Wheezing. amLODIPine 5 mg tablet Commonly known as:  Rishi Imelda Take 1 Tab by mouth daily. * Ascensia CONTOUR strip Generic drug:  glucose blood VI test strips USE TO TEST BLOOD GLUCOSE ONCE DAILY AS DIRECTED * glucose blood VI test strips strip Commonly known as:  CONTOUR NEXT TEST STRIPS Test blood sugar once daily  
  
 cetirizine 10 mg tablet Commonly known as:  ZYRTEC Take 1 Tab by mouth daily. cloNIDine HCl 0.2 mg tablet Commonly known as:  CATAPRES Take 1 Tab by mouth two (2) times a day. fluticasone-salmeterol 250-50 mcg/dose diskus inhaler Commonly known as:  ADVAIR Take 1 Puff by inhalation two (2) times a day. metFORMIN  mg tablet Commonly known as:  GLUCOPHAGE XR  
TAKE ONE TABLET BY MOUTH ONCE DAILY BEFORE SUPPER  
  
 mometasone 50 mcg/actuation nasal spray Commonly known as:  NASONEX  
2 Sprays by Both Nostrils route daily. naproxen 500 mg tablet Commonly known as:  NAPROSYN Take 1 Tab by mouth two (2) times daily (with meals). * Notice: This list has 2 medication(s) that are the same as other medications prescribed for you.  Read the directions carefully, and ask your doctor or other care provider to review them with you. We Performed the Following ACTIN (SMOOTH MUSCLE) ANTIBODY H6292955 CPT(R)] ALPHA-1-ANTITRYPSIN, TOTAL [31372 CPT(R)] ANTINUCLEAR ANTIBODIES, IFA J5352222 CPT(R)] FERRITIN [45966 CPT(R)] HCV AB W/REFLEX VERIFICATION [[id number] Custom] HEP A AB, TOTAL F6593475 CPT(R)] HEP B SURFACE AB F1846604 CPT(R)] HEP B SURFACE AG [id number] CPT(R)] HEPATITIS B CORE AB, TOTAL D7367802 CPT(R)] IRON PROFILE [id number] CPT(R)] Follow-up Instructions Return in about 2 weeks (around 6/28/2018) for MLS with FS. To-Do List   
 06/20/2018 9:00 AM  
(Arrive by 8:45 AM) Appointment with ROSANNA MRI 1 at [facility] ([phone]) **NPO Nothing to eat or drink 4-6 hours prior to your exam.**  1. Please bring a list or a bag of your current medications to your appointment 2. Please be sure to remove ALL hair clips, pins, extensions, etc., prior to arriving for your MRI procedure. 3. If you have any medical implants or devices, please bring associated medical card with you. 4. Bring any non [facility] films or CDs pertaining to the area being imaged with you on the day of appointment. 5. A written order with a valid diagnosis and Physicians signature is required for all scheduled tests. 6. Check in at registration 30min before your appointment time unless you were instructed to do otherwise. Please arrive 15 minutes prior to appointment to register. Introducing [facility]! Crystal [employer] introduces [employer] patient portal. Now you can access parts of your medical record, email your doctor's office, and request medication refills online. 1. In your internet browser, go to [email or web address] 2. Click on the First Time User? Click Here link in the Sign In box. You will see the New Member Sign Up page. 3. Enter your [employer] Access Code exactly as it appears below.  You will not need to use this code after youve completed the sign-up process. If you do not sign up before the expiration date, you must request a new code. · [employer] Access Code: [email or web address] Expires: 7/9/2018  6:04 PM 
 
4. Enter the last four digits of your Social Security Number (xxxx) and Date of Birth (mm/dd/yyyy) as indicated and click Submit. You will be taken to the next sign-up page. 5. Create a [employer] ID. This will be your [employer] login ID and cannot be changed, so think of one that is secure and easy to remember. 6. Create a [employer] password. You can change your password at any time. 7. Enter your Password Reset Question and Answer. This can be used at a later time if you forget your password. 8. Enter your e-mail address. You will receive e-mail notification when new information is available in [address] 9. Click Sign Up. You can now view and download portions of your medical record. 10. Click the Download Summary menu link to download a portable copy of your medical information. If you have questions, please visit the Frequently Asked Questions section of the [employer] website. Remember, [employer] is NOT to be used for urgent needs. For medical emergencies, dial 911. Now available from your iPhone and Android! Please provide this summary of care documentation to your next provider. Your primary care clinician is listed as Lashae Prieto. If you have any questions after today's visit, please call [phone].

## 2018-06-14 NOTE — PROGRESS NOTES
Visit Vitals    Pulse 71    Temp 97.4 °F (36.3 °C) (Tympanic)    Ht 4' 11\" (1.499 m)    Wt 159 lb 3.2 oz (72.2 kg)    SpO2 96%    BMI 32.15 kg/m2     Patient stated she did not take her blood pressure medication. She will take as soon as she gets home. Patient was not sure if she should be NPO. Abuse Screening Questionnaire 6/14/2018   Do you ever feel afraid of your partner? N   Are you in a relationship with someone who physically or mentally threatens you? N   Is it safe for you to go home?  Y     PHQ over the last two weeks 12/12/2014   PHQ Not Done Patient Decline   Little interest or pleasure in doing things Not at all   Feeling down, depressed or hopeless Not at all   Total Score PHQ 2 0

## 2018-06-15 LAB
A1AT SERPL-MCNC: 122 MG/DL (ref 90–200)
ACTIN IGG SERPL-ACNC: 15 UNITS (ref 0–19)
COMMENT, 144067: NORMAL
FERRITIN SERPL-MCNC: 153 NG/ML (ref 15–150)
HAV AB SER QL IA: NEGATIVE
HBV CORE AB SERPL QL IA: POSITIVE
HBV SURFACE AB SER QL: NON REACTIVE
HBV SURFACE AG SERPL QL IA: NEGATIVE
HCV AB S/CO SERPL IA: <0.1 S/CO RATIO (ref 0–0.9)
IRON SATN MFR SERPL: 27 % (ref 15–55)
IRON SERPL-MCNC: 81 UG/DL (ref 27–139)
TIBC SERPL-MCNC: 296 UG/DL (ref 250–450)
UIBC SERPL-MCNC: 215 UG/DL (ref 118–369)

## 2018-06-18 LAB — ANA TITR SER IF: NEGATIVE {TITER}

## 2018-06-20 ENCOUNTER — HOSPITAL ENCOUNTER (OUTPATIENT)
Dept: MRI IMAGING | Age: 68
Discharge: HOME | End: 2018-06-20
Attending: INTERNAL MEDICINE
Payer: COMMERCIAL

## 2018-06-20 VITALS — WEIGHT: 159 LBS | BODY MASS INDEX: 32.11 KG/M2

## 2018-06-20 DIAGNOSIS — R16.0 LIVER MASS: ICD-10-CM

## 2018-06-20 PROCEDURE — 74183 MRI ABD W/O CNTR FLWD CNTR: CPT

## 2018-06-20 PROCEDURE — A9585 GADOBUTROL INJECTION: HCPCS | Performed by: INTERNAL MEDICINE

## 2018-06-20 PROCEDURE — 74011250636 HC RX REV CODE- 250/636: Performed by: INTERNAL MEDICINE

## 2018-06-20 RX ADMIN — GADOBUTROL 7 ML: 604.72 INJECTION INTRAVENOUS at 09:45

## 2018-06-21 NOTE — PROGRESS NOTES
MRI shows no cancer in the liver.  This is good news    I still need her to see hepatology for possible chronic liver disease

## 2018-07-05 ENCOUNTER — OFFICE VISIT (OUTPATIENT)
Dept: HEMATOLOGY | Age: 68
End: 2018-07-05

## 2018-07-05 VITALS
DIASTOLIC BLOOD PRESSURE: 71 MMHG | WEIGHT: 161 LBS | TEMPERATURE: 96.9 F | HEIGHT: 59 IN | BODY MASS INDEX: 32.46 KG/M2 | SYSTOLIC BLOOD PRESSURE: 121 MMHG | HEART RATE: 91 BPM | OXYGEN SATURATION: 97 %

## 2018-07-05 DIAGNOSIS — K76.0 NAFL (NONALCOHOLIC FATTY LIVER): Primary | ICD-10-CM

## 2018-07-05 PROBLEM — S80.01XD CONTUSION OF KNEE AND LOWER LEG, RIGHT, SUBSEQUENT ENCOUNTER: Status: RESOLVED | Noted: 2017-11-10 | Resolved: 2018-07-05

## 2018-07-05 PROBLEM — K74.69 OTHER CIRRHOSIS OF LIVER (HCC): Status: RESOLVED | Noted: 2018-06-14 | Resolved: 2018-07-05

## 2018-07-05 PROBLEM — Z02.6 ENCOUNTER RELATED TO WORKER'S COMPENSATION CLAIM: Status: RESOLVED | Noted: 2017-11-10 | Resolved: 2018-07-05

## 2018-07-05 PROBLEM — D18.03 HEMANGIOMA OF LIVER: Status: ACTIVE | Noted: 2018-06-07

## 2018-07-05 PROBLEM — S80.11XD CONTUSION OF KNEE AND LOWER LEG, RIGHT, SUBSEQUENT ENCOUNTER: Status: RESOLVED | Noted: 2017-11-10 | Resolved: 2018-07-05

## 2018-07-05 NOTE — PROGRESS NOTES
Chief Complaint   Patient presents with    Follow-up     fibroscan     Visit Vitals    /71 (BP 1 Location: Right arm, BP Patient Position: Sitting)    Pulse 91    Temp 96.9 °F (36.1 °C) (Tympanic)    Ht 4' 11\" (1.499 m)    Wt 161 lb (73 kg)    SpO2 97%    BMI 32.52 kg/m2     PHQ over the last two weeks 12/12/2014   PHQ Not Done Patient Decline   Little interest or pleasure in doing things Not at all   Feeling down, depressed or hopeless Not at all   Total Score PHQ 2 0

## 2018-07-05 NOTE — PROGRESS NOTES
70 Jaquan Mahmood MD, FACP, [place], FAASLD       April Renetta Donis, NP    Isela Ferguson, GINGER Rocha, ACNP-BC   Marina Dominguez, POLINA Jacobo DepNew Mexico [facility] 136    at [address]    [address] [place] [address], [address] [place] [address], [facility] 22.    [phone]    FAX: [address] [place] [address] Avenue    [address], [address] [facility]    [phone]    [address] [place] [address] Street: [phone]         Patient Care Team:  Julia Cardona MD as PCP - General (Internal Medicine)      Problem List  Date Reviewed: 7/5/2018          Codes Class Noted    NAFL (nonalcoholic fatty liver) [id number]: K76.0  ICD-9-CM: 571.8  7/5/2018        Hemangioma of liver ICD-10-CM: D18.03  ICD-9-CM: 228.04  6/7/2018        Type 2 diabetes mellitus without complication, without long-term current use of insulin ([facility] ICD-10-CM: E11.9  ICD-9-CM: 250.00  Unknown        Baker's cyst ICD-10-CM: M71.20  ICD-9-CM: 727.51  3/3/2016    Overview Signed 3/5/2016  2:53 PM by Julia Cardona MD     r             S/P LD (total abdominal hysterectomy) ICD-10-CM: Z90.710  ICD-9-CM: V88.01  Unknown        Fatigue ICD-10-CM: R53.83  ICD-9-CM: 780.79  Unknown        Diverticulosis ICD-10-CM: K57.90  ICD-9-CM: 562.10  2/6/2015        Hypertension ICD-10-CM: I10  ICD-9-CM: 401.9  Unknown        Hypercholesterolemia ICD-10-CM: E78.00  ICD-9-CM: 272.0  Unknown        Class 1 obesity due to excess calories with serious comorbidity and body mass index (BMI) of 32.0 to 32.9 in adult ICD-10-CM: E66.09, P29.95  ICD-9-CM: 278.00, V85.32  Unknown        Depression with anxiety ICD-10-CM: F41.8  ICD-9-CM: 300.4  Unknown        Thrombocytopenia ([facility] ICD-10-CM: D69.6  ICD-9-CM: 287.5  12/12/2014              Chela Purchase returns to the [facility] [place] for management of non-alcoholic fatty liver (NAFL). The active problem list, all pertinent past medical history, medications, radiologic findings and laboratory findings related to the liver disorder were reviewed with the patient. The patient is a 69 y.o. Black female who was found to have imaging suggesting cirrhosis in 5/2018 when she underwent an ultraosound because of long standing thrombocytopenia. Assessment of liver fibrosis was performed in the office today with Fibroscan. The result was 3.9 kPa which correlates with no fibrosis. An Ultrasound performed in 5/2018. Results suggest cirrhosis. A mass is present in the right lobe, measuring 2.8 x 2.6 cm. The most recent imaging of the liver was MRI performed in 6/2018. Results suggest that the liver is normal.  Changes of cirrhosis were not seen. No fatty liver was seen. An mass is present in the right lobe, segment 5, measuring 3.2 cm with enhancing characteristics consistent with hemangioma. The patient notes fatigue,     The patient has not experienced pain in the right side over the liver,     The patient completes all daily activities without any functional limitations. All of the issues listed in the Assessment and Plan were discussed with the patient. All questions were answered. The patient expressed a clear understanding of the above. [facility] 87 in 6 months. ASSESSMENT AND PLAN:  Cirrhosis? The patient does not appear to have cirrhosis. Although an ultrasound suggested cirrhosis and there is thrombocytopenia the MRI does not suggest cirrhosis and Fibroscan scan demonstrates normal liver stiffness consistent with no fibrosis. I would recommend monitoring at 6 month intervals and a repeat Fibroscan annually. It would repeat the ultrasound in 6 months. If continues to suggest cirrhosis I would repeat the ultrasound every 6 months.   At some point a liver biopsy may be needed to determine If has has cirrhosis or not. NAFL  The Fibroscan suggests she has benign steatosis or NAFL. The CAP value is elevated suggesting steatosis with no fibrosis. Only a liver biopsy can be certain that this is benign steatosis and not LABOY. This may be required in the future. The patient was counseled regarding diet and exercise to achieve weight loss. The best diet for patients with fatty liver is one very low in carbohydrates and enriched with protein such as an Fredrick's program.    The patient was told to consume any food products and drinks containing fructose as this enhances hepatic fat synthesis. If she looses weight I would suspect the Fibroscan and ultrasound scan to reflect this and improve. Screening for Esophageal varices   The patient has not had an EGD to screen for varices. Given the fibroscan findings I would hold off on EGD for now. Treatment of other medical problems in patients with chronic liver disease  The patient was directed to continue all current medications at the current dosages. There are no contraindications for the patient to take any medications that are necessary for treatment of other medical issues. The patient can take oral diabetic agents for treatment of diabetes and statins for treatment of hypercholesterolemia. This will not impact the patient's current liver disease. The patient does not consume alcohol daily. Normal doses of acetaminophen can be used for pain as needed. Normal doses of acetaminophen as recommended on the label are not hepatotoxic, even in patients with cirrhosis. The patient has cirrhosis. Patients with cirrhosis should not use NSAIDs if possible as this is associated with a higher rate of SHARAN.         Counseling for alcohol in patients with chronic liver disease  The patient has cirrhosis and was advised to be abstinent from all alcohol including non-alcoholic beer which does contain some alcohol. The patient does not consume any significant amount of alcohol. Thrombocytopenia   This is secondary touncertain etiology. There is no evidence of overt bleeding. No treatment is required. Vaccinations   Vaccination for viral hepatitis A is recommended since the patient has no serologic evidence of previous exposure or vaccination with immunity. Vaccination for viral hepatitis B is not needed. The patient has serologic evidence of prior exposure or vaccination with immunity. Routine vaccinations against other bacterial and viral agents can be performed as indicated. Annual flu vaccination should be administered if indicated. Screening for Hepatocellular Carcinoma  HCC screening is not necessary if the the patient does not have cirrhosis. The data is currently conflicting. Will continue to image the liver with ultrasound The next liver imaging study will be performed in 12/2018. ALLERGIES  Allergies   Allergen Reactions    Aspirin Nausea and Vomiting    Lipitor [Atorvastatin] Myalgia    Metformin Cough    Pravastatin Myalgia       MEDICATIONS  Current Outpatient Prescriptions   Medication Sig    amLODIPine (NORVASC) 5 mg tablet Take 1 Tab by mouth daily.  cloNIDine HCl (CATAPRES) 0.2 mg tablet Take 1 Tab by mouth two (2) times a day.  naproxen (NAPROSYN) 500 mg tablet Take 1 Tab by mouth two (2) times daily (with meals).  albuterol (PROVENTIL HFA, VENTOLIN HFA, PROAIR HFA) 90 mcg/actuation inhaler Take 2 Puffs by inhalation every four (4) hours as needed for Wheezing.  fluticasone-salmeterol (ADVAIR) 250-50 mcg/dose diskus inhaler Take 1 Puff by inhalation two (2) times a day.  cetirizine (ZYRTEC) 10 mg tablet Take 1 Tab by mouth daily.  mometasone (NASONEX) 50 mcg/actuation nasal spray 2 Sprays by Both Nostrils route daily.     metFORMIN ER (GLUCOPHAGE XR) 500 mg tablet TAKE ONE TABLET BY MOUTH ONCE DAILY BEFORE SUPPER    ASCENSIA CONTOUR strip USE TO TEST BLOOD GLUCOSE ONCE DAILY AS DIRECTED    glucose blood VI test strips (CONTOUR NEXT STRIPS) strip Test blood sugar once daily     No current facility-administered medications for this visit. SYSTEM REVIEW NOT RELATED TO LIVER DISEASE OR REVIEWED ABOVE:  Constitution systems: Negative for fever, chills, weight gain, weight loss. Eyes: Negative for visual changes. ENT: Negative for sore throat, painful swallowing. Respiratory: Negative for cough, hemoptysis, SOB. Cardiology: Negative for chest pain, palpitations. GI:  Negative for constipation or diarrhea. : Negative for urinary frequency, dysuria, hematuria, nocturia. Skin: Negative for rash. Hematology: Negative for easy bruising, blood clots. Musculo-skelatal: Negative for back pain, muscle pain, weakness. Neurologic: Negative for headaches, dizziness, vertigo, memory problems not related to HE. Psychology: Negative for anxiety, depression. FAMILY HISTORY:  The patient has no knowledge of the father's medical condition. The mother is Has the following chronic diseases: dementia. There is no family history of liver disease. SOCIAL HISTORY:  The patient is . The patient has 2 children, and 5 grandchildren. The patient has never used tobacco products. The patient has never consumed significant amounts of alcohol. The patient currently works full time as a . PHYSICAL EXAMINATION:  Visit Vitals    /71 (BP 1 Location: Right arm, BP Patient Position: Sitting)    Pulse 91    Temp 96.9 °F (36.1 °C) (Tympanic)    Ht 4' 11\" (1.499 m)    Wt 161 lb (73 kg)    SpO2 97%    BMI 32.52 kg/m2     General: No acute distress. Eyes: Sclera anicteric. ENT: No oral lesions. Thyroid normal.  Nodes: No adenopathy. Skin: No spider angiomata. No jaundice. No palmar erythema. Respiratory: Lungs clear to auscultation. Cardiovascular: Regular heart rate. No murmurs. No JVD.   Abdomen: Soft non-tender. Liver size normal to percussion/palpation. Spleen not palpable. No obvious ascites. Extremities: No edema. No muscle wasting. No gross arthritic changes. Neurologic: Alert and oriented. Cranial nerves grossly intact. No asterixis. LABORATORY STUDIES:  Liver [place] of [id number] & Units 5/16/2018 4/10/2018   WBC 3.4 - 10.8 x10E3/uL 6.7 7.3   ANC 1.4 - 7.0 x10E3/uL 3.6 4.2   HGB 11.1 - 15.9 g/dL 12.9 12.2    - 379 x10E3/uL 104 (L) 114 (L)   PLT  Comment (A)    AST 0 - 40 IU/L 16 16   ALT 0 - 32 IU/L 9 12   Alk Phos 39 - 117 IU/L 90 80   Bili, Total 0.0 - 1.2 mg/dL 0.5 0.5   Albumin 3.6 - 4.8 g/dL 4.1 3.7   BUN 8 - 27 mg/dL 9 14   Creat 0.57 - 1.00 mg/dL 0.95 0.85   Na 134 - 144 mmol/L 142 141   K 3.5 - 5.2 mmol/L 4.5 4.4   Cl 96 - 106 mmol/L 104 103   CO2 18 - 29 mmol/L 25 28   Glucose 65 - 99 mg/dL 128 (H) 117 (H)     SEROLOGIES:  Serologies Latest Ref Rng & Units 6/14/2018   Hep A Ab, Total Negative Negative   Hep B Surface Ag Negative Negative   Hep B Core Ab, Total Negative Positive (A)   Hep B Surface AB QL  Non Reactive   Hep C Ab 0.0 - 0.9 s/co ratio <0.1   Ferritin 15 - 150 ng/mL 153 (H)   Iron % Saturation 15 - 55 % 27   ASMCA 0 - 19 Units 15   Alpha-1 antitrypsin level 90 - 200 mg/dL 122     LIVER HISTOLOGY:  7/2018. FibroScan performed at [address] EkPa was 3.9. IQR/med 8%. . The results suggest fatty liver and a fibrosis level of F0. ENDOSCOPIC PROCEDURES:  Not available or performed    RADIOLOGY:  5/2018. Ultrasound of liver. Echogenic consistent with cirrhosis. Liver mass lesion in the right lobe measuring 2.8 x2.6 cm. No ascites. 6/2018. Dynamic MRI of liver. Normal appearing liver. Liver mass consistent with hemangioma in the right lobe, segment 5 measuring 3.2 cm.     OTHER TESTING:  Not available or performed      Kirit Kline MD  [address] [place] [address] of [address] of [address] [facility], [address]los[address]  22.  [phone]

## 2018-07-05 NOTE — MR AVS SNAPSHOT
2700 [facility] [place] [phone] 1400 [address] [place] [address] 
[phone] Patient: Macrina Sandhu MRN: [id number] HWE:6/77/2428 Visit Information Date & Time Provider Department Dept. Phone Encounter #  
 7/5/2018 10:15 AM Adonis Garcianeal Prajapati [facility] 219 [id number] Follow-up Instructions Return in about 6 months (around 1/5/2019) for NP. Your Appointments 8/14/2018  3:30 PM  
New Patient with Acacia Aguirre MD  
SPORTS MED [facility] ([address]) Appt Note: 4 month follow up [address], [place] [address] [facility] [address]  
[phone]  
  
   
 [facility] [phone]  
  
    
 1/9/2019  8:00 AM  
Follow Up with GURPREET Quick 75 ([address]) Appt Note: Follow up 200 [facility] [place] [phone] [facility] [address]  
59 [facility] [place] [address] Upcoming Health Maintenance Date Due  
 EYE EXAM RETINAL OR DILATED Q1 10/14/2015 Influenza Age 5 to Adult 8/1/2018 MICROALBUMIN Q1 9/7/2018 MEDICARE YEARLY EXAM 9/8/2018 HEMOGLOBIN A1C Q6M 10/10/2018 FOOT EXAM Q1 4/10/2019 LIPID PANEL Q1 4/10/2019 GLAUCOMA SCREENING Q2Y 9/7/2019 BREAST CANCER SCRN MAMMOGRAM 5/16/2020 COLONOSCOPY 2/6/2025 DTaP/Tdap/Td series (2 - Td) 3/28/2027 Allergies as of 7/5/2018  Review Complete On: 7/5/2018 By: Kassy Munoz LPN Severity Noted Reaction Type Reactions Aspirin  12/12/2014    Nausea and Vomiting Lipitor [Atorvastatin]  10/01/2015    Myalgia Metformin  02/14/2017    Cough Pravastatin  06/19/2015    Myalgia Current Immunizations  Never Reviewed No immunizations on file. Not reviewed this visit Vitals BP Pulse Temp Height(growth percentile) Weight(growth percentile) 121/71 (BP 1 Location: Right arm, BP Patient Position: Sitting) 91 96.9 °F (36.1 °C) (Tympanic) 4' 11\" (1.499 m) 161 lb (73 kg) SpO2 BMI OB Status Smoking Status 97% 32.52 kg/m2 Postmenopausal Never Smoker Vitals History BMI and BSA Data Body Mass Index Body Surface Area 32.52 kg/m 2 1.74 m 2 Preferred Pharmacy Pharmacy Name Phone [facility][id number]- [phone] [phone] Your Updated Medication List  
  
   
This list is accurate as of 7/5/18 11:35 AM.  Always use your most recent med list.  
  
  
  
  
 albuterol 90 mcg/actuation inhaler Commonly known as:  PROVENTIL HFA, VENTOLIN HFA, PROAIR HFA Take 2 Puffs by inhalation every four (4) hours as needed for Wheezing. amLODIPine 5 mg tablet Commonly known as:  Bernie Pace Take 1 Tab by mouth daily. * Ascensia CONTOUR strip Generic drug:  glucose blood VI test strips USE TO TEST BLOOD GLUCOSE ONCE DAILY AS DIRECTED * glucose blood VI test strips strip Commonly known as:  CONTOUR NEXT TEST STRIPS Test blood sugar once daily  
  
 cetirizine 10 mg tablet Commonly known as:  ZYRTEC Take 1 Tab by mouth daily. cloNIDine HCl 0.2 mg tablet Commonly known as:  CATAPRES Take 1 Tab by mouth two (2) times a day. fluticasone-salmeterol 250-50 mcg/dose diskus inhaler Commonly known as:  ADVAIR Take 1 Puff by inhalation two (2) times a day. metFORMIN  mg tablet Commonly known as:  GLUCOPHAGE XR  
TAKE ONE TABLET BY MOUTH ONCE DAILY BEFORE SUPPER  
  
 mometasone 50 mcg/actuation nasal spray Commonly known as:  NASONEX  
2 Sprays by Both Nostrils route daily. naproxen 500 mg tablet Commonly known as:  NAPROSYN Take 1 Tab by mouth two (2) times daily (with meals). * Notice:   This list has 2 medication(s) that are the same as other medications prescribed for you. Read the directions carefully, and ask your doctor or other care provider to review them with you. Follow-up Instructions Return in about 6 months (around 1/5/2019) for NP. Introducing \[facility] [place] [facility]\"" SERVICES! [facility] introduces [employer] patient portal. Now you can access parts of your medical record, email your doctor's office, and request medication refills online. 1. In your internet browser, go to [email or web address]! 2. Click on the First Time User? Click Here link in the Sign In box. You will see the New Member Sign Up page. 3. Enter your [employer] Access Code exactly as it appears below. You will not need to use this code after youve completed the sign-up process. If you do not sign up before the expiration date, you must request a new code. · [employer] Access Code: [email or web address] Expires: 7/9/2018  6:04 PM 
 
4. Enter the last four digits of your Social Security Number (xxxx) and Date of Birth (mm/dd/yyyy) as indicated and click Submit. You will be taken to the next sign-up page. 5. Create a [employer] ID. This will be your [employer] login ID and cannot be changed, so think of one that is secure and easy to remember. 6. Create a [employer] password. You can change your password at any time. 7. Enter your Password Reset Question and Answer. This can be used at a later time if you forget your password. 8. Enter your e-mail address. You will receive e-mail notification when new information is available in [address] 9. Click Sign Up. You can now view and download portions of your medical record. 10. Click the Download Summary menu link to download a portable copy of your medical information. If you have questions, please visit the Frequently Asked Questions section of the [employer] website. Remember, [employer] is NOT to be used for urgent needs. For medical emergencies, dial 911. Now available from your iPhone and Android! Please provide this summary of care documentation to your next provider. Your primary care clinician is listed as Ángel Rios. If you have any questions after today's visit, please call [phone].

## 2018-08-14 ENCOUNTER — OFFICE VISIT (OUTPATIENT)
Dept: INTERNAL MEDICINE CLINIC | Age: 68
End: 2018-08-14

## 2018-08-14 VITALS
HEART RATE: 59 BPM | TEMPERATURE: 96.9 F | WEIGHT: 159.4 LBS | HEIGHT: 59 IN | DIASTOLIC BLOOD PRESSURE: 85 MMHG | OXYGEN SATURATION: 99 % | RESPIRATION RATE: 16 BRPM | SYSTOLIC BLOOD PRESSURE: 160 MMHG | BODY MASS INDEX: 32.13 KG/M2

## 2018-08-14 DIAGNOSIS — K76.0 NAFL (NONALCOHOLIC FATTY LIVER): ICD-10-CM

## 2018-08-14 DIAGNOSIS — I10 ESSENTIAL HYPERTENSION: ICD-10-CM

## 2018-08-14 DIAGNOSIS — E11.9 TYPE 2 DIABETES MELLITUS WITHOUT COMPLICATION, WITHOUT LONG-TERM CURRENT USE OF INSULIN (HCC): Primary | ICD-10-CM

## 2018-08-14 DIAGNOSIS — D69.6 THROMBOCYTOPENIA (HCC): ICD-10-CM

## 2018-08-14 NOTE — PROGRESS NOTES
SPORTS MEDICINE AND PRIMARY CARE  Nilda Isidro MD, [place] [address] [address]  Phone:  [phone]  Fax: [phone]       Chief Complaint   Patient presents with    Hypertension     f/u   . SUBJECTIVE:    Johanny Polanco is a 69 y.o. female  Dictation on: 2018  4:47 PM by: RentJu[employer] Model [[id number]]            Current Outpatient Prescriptions   Medication Sig Dispense Refill    amLODIPine (NORVASC) 5 mg tablet Take 1 Tab by mouth daily. 90 Tab 11    cloNIDine HCl (CATAPRES) 0.2 mg tablet Take 1 Tab by mouth two (2) times a day. 180 Tab 3    albuterol (PROVENTIL HFA, VENTOLIN HFA, PROAIR HFA) 90 mcg/actuation inhaler Take 2 Puffs by inhalation every four (4) hours as needed for Wheezing. 1 Inhaler 11    fluticasone-salmeterol (ADVAIR) 250-50 mcg/dose diskus inhaler Take 1 Puff by inhalation two (2) times a day. 1 Inhaler 11    ASCENSIA CONTOUR strip USE TO TEST BLOOD GLUCOSE ONCE DAILY AS DIRECTED 50 strip 11    glucose blood VI test strips (CONTOUR NEXT STRIPS) strip Test blood sugar once daily 50 strip 11    naproxen (NAPROSYN) 500 mg tablet Take 1 Tab by mouth two (2) times daily (with meals). 60 Tab 3    cetirizine (ZYRTEC) 10 mg tablet Take 1 Tab by mouth daily. 30 Tab 11    mometasone (NASONEX) 50 mcg/actuation nasal spray 2 Sprays by Both Nostrils route daily.  1 Container 11    metFORMIN ER (GLUCOPHAGE XR) 500 mg tablet TAKE ONE TABLET BY MOUTH ONCE DAILY BEFORE SUPPER 30 Tab 11     Past Medical History:   Diagnosis Date    Baker's cyst 3-3-16    r    Bereavement 11/15    61yo  sister     Contusion of knee and lower leg, right, subsequent encounter 11/10/2017    Contusion of knee, right     Cough 2017    Depression with anxiety     [facility]     Diverticulosis 2-6-15    colonoscopy    DM2 (diabetes mellitus, type 2) ([facility]     Edema     Encounter related to worker's compensation claim 11/10/2017    fall t work    Fatigue     Hemangioma of liver     Hypercholesterolemia     Hypertension     Liver disease     hepatitis 30 years ago - Liver mass consistent with hemangioma in the right lobe    NAFL (nonalcoholic fatty liver)     Obesity     Onychomycosis     S/P LD (total abdominal hysterectomy) 1985    Thrombocytopenia ([facility] 12/12/2014     Past Surgical History:   Procedure Laterality Date    HX GYN      partial hysterectomy     Allergies   Allergen Reactions    Aspirin Nausea and Vomiting    Lipitor [Atorvastatin] Myalgia    Metformin Cough    Pravastatin Myalgia         REVIEW OF SYSTEMS:  General: negative for - chills or fever  ENT: negative for - headaches, nasal congestion or tinnitus  Respiratory: negative for - cough, hemoptysis, shortness of breath or wheezing  Cardiovascular : negative for - chest pain, edema, palpitations or shortness of breath  Gastrointestinal: negative for - abdominal pain, blood in stools, heartburn or nausea/vomiting  Genito-Urinary: no dysuria, trouble voiding, or hematuria  Musculoskeletal: negative for - gait disturbance, joint pain, joint stiffness or joint swelling  Neurological: no TIA or stroke symptoms  Hematologic: no bruises, no bleeding, no swollen glands  Integument: no lumps, mole changes, nail changes or rash  Endocrine: no malaise/lethargy or unexpected weight changes      Social History     Social History    Marital status:      Spouse name: N/A    Number of children: N/A    Years of education: N/A     Social History Main Topics    Smoking status: Never Smoker    Smokeless tobacco: Never Used    Alcohol use No    Drug use: No    Sexual activity: Not Currently     Other Topics Concern    None     Social History Narrative    Medical History: HTNTMJ dysfunctionHAsCecal lipoma 01/04negative head CT 03/04August 14, 2013 CT head w ith contrast unremarkable    except progressive splenoid sinusitisAugust 14, 2013 CT chest unremarkable    Gyn History: Last mammogram date 01/13/2012. Surgical History: LD 1985colonoscopy 01/04    Hospitalization/Major Diagnostic Procedure: Denies Past Hospitalization    Family History: Mother: alive 71 yrs, HTNFather: unknow nSister(s): alive, DMBrother(s): aliveDaughter(s): aliveSon(s): aliveAunt: alive2    brother(s) , 2 sister(s) . 1 son(s) , 1 daughter(s) . Social History: Alcohol Use Patient does not use alcohol. Smoking Status Patient is a never smoker. Marital Status: . Lives w ith:    spouse. no Children. 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 Education/School: has highschool diploma. Adventism: Jehovah    W itness - no blood transfusions. Family History   Problem Relation Age of Onset    No Known Problems Father        OBJECTIVE:    Visit Vitals    /85    Pulse (!) 59    Temp 96.9 °F (36.1 °C) (Oral)    Resp 16    Ht 4' 11\" (1.499 m)    Wt 159 lb 6.4 oz (72.3 kg)    SpO2 99%    PF (!) 10138 L/min    BMI 32.19 kg/m2     CONSTITUTIONAL: well , well nourished, appears age appropriate  EYES: perrla, eom intact  ENMT:moist mucous membranes, pharynx clear  NECK: supple. Thyroid normal  RESPIRATORY: Chest: clear bilaterally   CARDIOVASCULAR: Heart: regular rate and rhythm  GASTROINTESTINAL: Abdomen: soft, bowel sounds active  HEMATOLOGIC: no pathological lymph nodes palpated  MUSCULOSKELETAL: Extremities: no edema, pulse 1+   INTEGUMENT: No unusual rashes or suspicious skin lesions noted. Nails appear normal.  NEUROLOGIC: non-focal exam   MENTAL STATUS: alert and oriented, appropriate affect           ASSESSMENT:  1. Type 2 diabetes mellitus without complication, without long-term current use of insulin ([address]    2. Thrombocytopenia ([address]    3. NAFL (nonalcoholic fatty liver)    4. Essential hypertension       Dictation on: 08/14/2018  4:56 PM by: Amalia Mcfarlane       I have discussed the diagnosis with the patient and the intended plan as seen in the  orders above.   The patient understands and agees with the plan. The patient has   received an after visit summary and questions were answered concerning  future plans  Patient labs and/or xrays were reviewed  Past records were reviewed. PLAN:  .  Orders Placed This Encounter    CBC WITH AUTOMATED DIFF    METABOLIC PANEL, BASIC    HEMOGLOBIN A1C WITH EAG       Follow-up Disposition:  Return in about 3 months (around 11/14/2018). ATTENTION:   This medical record was transcribed using an electronic medical records system. Although proofread, it may and can contain electronic and spelling errors. Other human spelling and other errors may be present. Corrections may be executed at a later time. Please feel free to contact us for any clarifications as needed.

## 2018-08-14 NOTE — PROGRESS NOTES
1. Have you been to the ER, urgent care clinic since your last visit? Hospitalized since your last visit? No    2. Have you seen or consulted any other health care providers outside of the [address] [place] [address] since your last visit? Include any pap smears or colon screening.  No

## 2018-08-14 NOTE — MR AVS SNAPSHOT
[address] , [place] [address] 
[phone] Patient: Benigno Carmen MRN: [id number] BZL:[id number] Visit Information Date & Time Provider Department Dept. Phone Encounter #  
 8/14/2018  3:30 PM Maicol Jara MD [facility] - [facility] [phone] [id number] Follow-up Instructions Return in about 3 months (around 11/14/2018). Your Appointments 1/9/2019  8:00 AM  
Follow Up with GURPREET Aly [address] ([address]) Appt Note: Follow up [address] [place] [phone] [facility] [address]  
[address] [facility] [place] [address] Upcoming Health Maintenance Date Due  
 EYE EXAM RETINAL OR DILATED Q1 10/14/2015 MICROALBUMIN Q1 9/7/2018 Influenza Age 5 to Adult 11/14/2018* MEDICARE YEARLY EXAM 9/8/2018 HEMOGLOBIN A1C Q6M 10/10/2018 FOOT EXAM Q1 4/10/2019 LIPID PANEL Q1 4/10/2019 GLAUCOMA SCREENING Q2Y 9/7/2019 BREAST CANCER SCRN MAMMOGRAM 5/16/2020 COLONOSCOPY 2/6/2025 DTaP/Tdap/Td series (2 - Td) 3/28/2027 *Topic was postponed. The date shown is not the original due date. Allergies as of 8/14/2018  Review Complete On: 8/14/2018 By: Smooth Aburto LPN Severity Noted Reaction Type Reactions Aspirin  12/12/2014    Nausea and Vomiting Lipitor [Atorvastatin]  10/01/2015    Myalgia Metformin  02/14/2017    Cough Pravastatin  06/19/2015    Myalgia Current Immunizations  Never Reviewed No immunizations on file. Not reviewed this visit You Were Diagnosed With   
  
 Codes Comments Type 2 diabetes mellitus without complication, without long-term current use of insulin (HCC)    -  Primary ICD-10-CM: E11.9 ICD-9-CM: 250.00 Thrombocytopenia ([address]     ICD-10-CM: D69.6 ICD-9-CM: 287.5 NAFL (nonalcoholic fatty liver)     ICD-10-CM: K76.0 ICD-9-CM: 571.8 Essential hypertension     ICD-10-CM: I10 
ICD-9-CM: 401.9 Vitals BP Pulse Temp Resp Height(growth percentile) Weight(growth percentile) 160/85 (!) 59 96.9 °F (36.1 °C) (Oral) 16 4' 11\" (1.499 m) 159 lb 6.4 oz (72.3 kg) SpO2 PF BMI OB Status Smoking Status 99% (!) 99290 L/min 32.19 kg/m2 Postmenopausal Never Smoker Vitals History BMI and BSA Data Body Mass Index Body Surface Area  
 32.19 kg/m 2 1.73 m 2 Preferred Pharmacy Pharmacy Name Phone [phone] [phone] Your Updated Medication List  
  
   
This list is accurate as of 8/14/18  5:00 PM.  Always use your most recent med list.  
  
  
  
  
 albuterol 90 mcg/actuation inhaler Commonly known as:  PROVENTIL HFA, VENTOLIN HFA, PROAIR HFA Take 2 Puffs by inhalation every four (4) hours as needed for Wheezing. amLODIPine 5 mg tablet Commonly known as:  Horris Bills Take 1 Tab by mouth daily. * [employer] CONTOUR strip Generic drug:  glucose blood VI test strips USE TO TEST BLOOD GLUCOSE ONCE DAILY AS DIRECTED * glucose blood VI test strips strip Commonly known as:  CONTOUR NEXT TEST STRIPS Test blood sugar once daily  
  
 cetirizine 10 mg tablet Commonly known as:  ZYRTEC Take 1 Tab by mouth daily. cloNIDine HCl 0.2 mg tablet Commonly known as:  CATAPRES Take 1 Tab by mouth two (2) times a day. fluticasone-salmeterol 250-50 mcg/dose diskus inhaler Commonly known as:  ADVAIR Take 1 Puff by inhalation two (2) times a day. metFORMIN  mg tablet Commonly known as:  GLUCOPHAGE XR  
TAKE ONE TABLET BY MOUTH ONCE DAILY BEFORE SUPPER  
  
 mometasone 50 mcg/actuation nasal spray Commonly known as:  NASONEX  
2 Sprays by Both Nostrils route daily. naproxen 500 mg tablet Commonly known as:  NAPROSYN Take 1 Tab by mouth two (2) times daily (with meals). * Notice: This list has 2 medication(s) that are the same as other medications prescribed for you. Read the directions carefully, and ask your doctor or other care provider to review them with you. We Performed the Following CBC WITH AUTOMATED DIFF [05179 CPT(R)] COLLECTION VENOUS BLOOD,VENIPUNCTURE [id number] CPT(R)] HEMOGLOBIN A1C WITH EAG [42021 CPT(R)] METABOLIC PANEL, BASIC [15810 CPT(R)] Follow-up Instructions Return in about 3 months (around 11/14/2018). Introducing [facility]! Kareem Abdalla introduces BuzzVote patient portal. Now you can access parts of your medical record, email your doctor's office, and request medication refills online. 1. In your internet browser, go to [email or web address] 2. Click on the First Time User? Click Here link in the Sign In box. You will see the New Member Sign Up page. 3. Enter your BuzzVote Access Code exactly as it appears below. You will not need to use this code after youve completed the sign-up process. If you do not sign up before the expiration date, you must request a new code. · BuzzVote Access Code: [email or web address] Expires: 10/6/2018  5:20 AM 
 
4. Enter the last four digits of your Social Security Number (xxxx) and Date of Birth (mm/dd/yyyy) as indicated and click Submit. You will be taken to the next sign-up page. 5. Create a BuzzVote ID. This will be your BuzzVote login ID and cannot be changed, so think of one that is secure and easy to remember. 6. Create a BuzzVote password. You can change your password at any time. 7. Enter your Password Reset Question and Answer. This can be used at a later time if you forget your password. 8. Enter your e-mail address. You will receive e-mail notification when new information is available in [address] 9. Click Sign Up. You can now view and download portions of your medical record. 10. Click the Download Summary menu link to download a portable copy of your medical information. If you have questions, please visit the Frequently Asked Questions section of the [employer] website. Remember, [employer]Club is NOT to be used for urgent needs. For medical emergencies, dial 911. Now available from your iPhone and Android! Please provide this summary of care documentation to your next provider. Your primary care clinician is listed as Reed Torres. If you have any questions after today's visit, please call [phone].

## 2018-08-17 NOTE — PROGRESS NOTES
We discussed at length the consultants remarks and the findings. The bottom line is she is relieved that she has no mass in the  liver. Concern about thrombocytopenia remained and she is followed by  Hematology. The fatty liver will be followed by the hepatologist.  We have a  lengthy discussion regarding the findings. She will return to  the office in a year, sooner if she has any problems. Will  assess blood sugar control with hemoglobin A1c.

## 2018-08-19 NOTE — PROGRESS NOTES
The patient returns today after a visit with Dr. Felicia Hendrickson, who  found no evidence of a mass of the liver but instead hemangioma  of the liver. Dr. Shalom Ibarra, who found nonalcoholic liver  disease and is following her for the liver disease with a known  history of thrombocytopenia, diabetes, primary hypertension. He  is seen for evaluation.

## 2018-09-13 ENCOUNTER — OFFICE VISIT (OUTPATIENT)
Dept: INTERNAL MEDICINE CLINIC | Age: 68
End: 2018-09-13

## 2018-09-13 VITALS
DIASTOLIC BLOOD PRESSURE: 73 MMHG | HEIGHT: 59 IN | SYSTOLIC BLOOD PRESSURE: 127 MMHG | RESPIRATION RATE: 16 BRPM | HEART RATE: 59 BPM | WEIGHT: 157.2 LBS | OXYGEN SATURATION: 98 % | TEMPERATURE: 98.1 F | BODY MASS INDEX: 31.69 KG/M2

## 2018-09-13 DIAGNOSIS — M71.21 SYNOVIAL CYST OF RIGHT POPLITEAL SPACE: ICD-10-CM

## 2018-09-13 DIAGNOSIS — Z13.31 SCREENING FOR DEPRESSION: ICD-10-CM

## 2018-09-13 DIAGNOSIS — F33.9 RECURRENT DEPRESSION (HCC): ICD-10-CM

## 2018-09-13 DIAGNOSIS — Z13.39 SCREENING FOR ALCOHOLISM: ICD-10-CM

## 2018-09-13 DIAGNOSIS — E78.00 HYPERCHOLESTEROLEMIA: ICD-10-CM

## 2018-09-13 DIAGNOSIS — I10 ESSENTIAL HYPERTENSION: ICD-10-CM

## 2018-09-13 DIAGNOSIS — E11.9 TYPE 2 DIABETES MELLITUS WITHOUT COMPLICATION, WITHOUT LONG-TERM CURRENT USE OF INSULIN (HCC): ICD-10-CM

## 2018-09-13 DIAGNOSIS — D69.6 THROMBOCYTOPENIA (HCC): ICD-10-CM

## 2018-09-13 DIAGNOSIS — K57.31 DIVERTICULOSIS OF LARGE INTESTINE WITH HEMORRHAGE: ICD-10-CM

## 2018-09-13 DIAGNOSIS — D18.03 HEMANGIOMA OF LIVER: ICD-10-CM

## 2018-09-13 DIAGNOSIS — Z00.00 MEDICARE ANNUAL WELLNESS VISIT, SUBSEQUENT: Primary | ICD-10-CM

## 2018-09-13 RX ORDER — HYDROCORTISONE ACETATE 25 MG/1
25 SUPPOSITORY RECTAL EVERY 12 HOURS
Qty: 12 EACH | Refills: 11 | Status: SHIPPED | OUTPATIENT
Start: 2018-09-13 | End: 2019-01-08 | Stop reason: ALTCHOICE

## 2018-09-13 NOTE — PATIENT INSTRUCTIONS
Medicare Wellness Visit, Female The best way to live healthy is to have a lifestyle where you eat a well-balanced diet, exercise regularly, limit alcohol use, and quit all forms of tobacco/nicotine, if applicable. Regular preventive services are another way to keep healthy. Preventive services (vaccines, screening tests, monitoring & exams) can help personalize your care plan, which helps you manage your own care. Screening tests can find health problems at the earliest stages, when they are easiest to treat. [facility] follows the current, evidence-based guidelines published by the [facility] Is[facility] Liza ([facility]) when recommending preventive services for our patients. Because we follow these guidelines, sometimes recommendations change over time as research supports it. (For example, mammograms used to be recommended annually. Even though Medicare will still pay for an annual mammogram, the newer guidelines recommend a mammogram every two years for women of average risk.) Of course, you and your doctor may decide to screen more often for some diseases, based on your risk and your health status. Preventive services for you include: - Medicare offers their members a free annual wellness visit, which is time for you and your primary care provider to discuss and plan for your preventive service needs. Take advantage of this benefit every year! 
-All adults over the age of 72 should receive the recommended pneumonia vaccines. Current USPSTF guidelines recommend a series of two vaccines for the best pneumonia protection.  
-All adults should have a flu vaccine yearly and a tetanus vaccine every 10 years. All adults age 61 and older should receive a shingles vaccine once in their lifetime.   
-A bone mass density test is recommended when a woman turns 65 to screen for osteoporosis. This test is only recommended one time, as a screening. Some providers will use this same test as a disease monitoring tool if you already have osteoporosis. -All adults age 38-68 who are overweight should have a diabetes screening test once every three years.  
-Other screening tests and preventive services for persons with diabetes include: an eye exam to screen for diabetic retinopathy, a kidney function test, a foot exam, and stricter control over your cholesterol.  
-Cardiovascular screening for adults with routine risk involves an electrocardiogram (ECG) at intervals determined by your doctor.  
-Colorectal cancer screenings should be done for adults age 54-65 with no increased risk factors for colorectal cancer. There are a number of acceptable methods of screening for this type of cancer. Each test has its own benefits and drawbacks. Discuss with your doctor what is most appropriate for you during your annual wellness visit. The different tests include: colonoscopy (considered the best screening method), a fecal occult blood test, a fecal DNA test, and sigmoidoscopy. -Breast cancer screenings are recommended every other year for women of normal risk, age 54-69. 
-Cervical cancer screenings for women over age 72 are only recommended with certain risk factors.  
-All adults born between [facility] should be screened once for Hepatitis C. Here is a list of your current Health Maintenance items (your personalized list of preventive services) with a due date: 
Health Maintenance Due Topic Date Due  Eye Exam  10/14/2015  Albumin Urine Test  09/07/2018 Leonie Guajardo Annual Well Visit  09/08/2018  Hemoglobin A1C    10/10/2018 Diverticulosis: Care Instructions Your Care Instructions In diverticulosis, pouches called diverticula form in the wall of the large intestine (colon). The pouches do not cause any pain or other symptoms. Most people who have diverticulosis do not know they have it. But the pouches sometimes bleed, and if they become infected, they can cause pain and other symptoms. When this happens, it is called diverticulitis. Diverticula form when pressure pushes the wall of the colon outward at certain weak points. A diet that is too low in fiber can cause diverticula. Follow-up care is a key part of your treatment and safety. Be sure to make and go to all appointments, and call your doctor if you are having problems. It's also a good idea to know your test results and keep a list of the medicines you take. How can you care for yourself at home? · Include fruits, leafy green vegetables, beans, and whole grains in your diet each day. These foods are high in fiber. · Take a fiber supplement, such as Citrucel or Metamucil, every day if needed. Read and follow all instructions on the label. · Drink plenty of fluids, enough so that your urine is light yellow or clear like water. If you have kidney, heart, or liver disease and have to limit fluids, talk with your doctor before you increase the amount of fluids you drink. · Get at least 30 minutes of exercise on most days of the week. Walking is a good choice. You also may want to do other activities, such as running, swimming, cycling, or playing tennis or team sports. · Cut out foods that cause gas, pain, or other symptoms. When should you call for help? Call your doctor now or seek immediate medical care if: 
  · You have belly pain.  
  · You pass maroon or very bloody stools.  
  · You have a fever.  
  · You have nausea and vomiting.  
  · You have unusual changes in your bowel movements or abdominal swelling.  
  · You have burning pain when you urinate.  
  · You have abnormal vaginal discharge.  
  · You have shoulder pain.  
  · You have cramping pain that does not get better when you have a bowel movement or pass gas.  
  · You pass gas or stool from your urethra while urinating.  Watch closely for changes in your health, and be sure to contact your doctor if you have any problems. Where can you learn more? Go to http://jaret-brittany.info/. Enter O010 in the search box to learn more about \"Diverticulosis: Care Instructions. \" Current as of: May 12, 2017 Content Version: 11.7 © 1106-3946 [employer]. Care instructions adapted under license by [employer] (which disclaims liability or warranty for this information). If you have questions about a medical condition or this instruction, always ask your healthcare professional. [place] [address] any warranty or liability for your use of this information. Learning About Diverticulosis and Diverticulitis What are diverticulosis and diverticulitis? In diverticulosis and diverticulitis, pouches called diverticula form in the wall of the large intestine, or colon. · In diverticulosis, the pouches do not cause any pain or other symptoms. · In diverticulitis, the pouches get inflamed or infected and cause symptoms. Doctors aren't sure what causes these pouches in the colon. But they think that a low-fiber diet may play a role. Without fiber to add bulk to the stool, the colon has to work harder than normal to push the stool forward. The pressure from this may cause pouches to form in weak spots along the colon. Some people with diverticulosis get diverticulitis. But experts don't know why this happens. What are the symptoms? · In diverticulosis, most people don't have symptoms. But pouches sometimes bleed. · In diverticulitis, symptoms may last from a few hours to a week or more. They include: ¨ Belly pain. This is usually in the lower left side. It is sometimes worse when you move. This is the most common symptom. ¨ Fever and chills. ¨ Bloating and gas. ¨ Diarrhea or constipation. ¨ Nausea and sometimes vomiting. ¨ Not feeling like eating. How can you prevent these problems? You may be able to lower your chance of getting diverticulitis. You can do this by taking steps to prevent constipation. · Eat fruits, vegetables, beans, and whole grains every day. These foods are high in fiber. · Drink plenty of fluids (enough so that your urine is light yellow or clear like water). If you have kidney, heart, or liver disease and have to limit fluids, talk with your doctor before you increase the amount of fluids you drink. · Get at least 30 minutes of exercise on most days of the week. Walking is a good choice. You also may want to do other activities, such as running, swimming, cycling, or playing tennis or team sports. · Take a fiber supplement, such as Citrucel or Metamucil, every day if needed. Read and follow all instructions on the label. · Schedule time each day for a bowel movement. Having a daily routine may help. Take your time and do not strain when having a bowel movement. Some people avoid nuts, seeds, berries, and popcorn. They believe that these foods might get trapped in the diverticula and cause pain. But there is no proof that these foods cause diverticulitis or make it worse. How are these problems treated? · The best way to treat diverticulosis is to avoid constipation. (See the tips above.) · Treatment for diverticulitis includes antibiotics and often a change in your diet. You may need only liquids at first. Your doctor may suggest pain medicines for pain or belly cramps. In some cases, surgery may be needed. Follow-up care is a key part of your treatment and safety. Be sure to make and go to all appointments, and call your doctor if you are having problems. It's also a good idea to know your test results and keep a list of the medicines you take. Where can you learn more? Go to http://jaret-brittany.info/. Enter Z661 in the search box to learn more about \"Learning About Diverticulosis and Diverticulitis. \" 
 Current as of: May 12, 2017 Content Version: 11.7 © 7193-6660 [employer]. Care instructions adapted under license by [employer] (which disclaims liability or warranty for this information). If you have questions about a medical condition or this instruction, always ask your healthcare professional. [address] any warranty or liability for your use of this information. Lower Digestive Tract: Anatomy Sketch Current as of: May 12, 2017 Content Version: 11.7 © 2006-2018 [employer]. Care instructions adapted under license by [employer] (which disclaims liability or warranty for this information). If you have questions about a medical condition or this instruction, always ask your healthcare professional. [address] any warranty or liability for your use of this information.

## 2018-09-13 NOTE — PROGRESS NOTES
1. Have you been to the ER, urgent care clinic since your last visit? Hospitalized since your last visit? No 
 
2. Have you seen or consulted any other health care providers outside of the [address] [place] [address] since your last visit? Include any pap smears or colon screening. No  
 
Complaints of possible blood in stool and vaginal swelling TYou do not drink alcohol or very rarely. his is the Subsequent Medicare Annual Wellness Exam, performed 12 months or more after the Initial AWV or the last Subsequent AWV I have reviewed the patient's medical history in detail and updated the computerized patient record. History Past Medical History:  
Diagnosis Date  Baker's cyst 3-3-16  
 r  Bereavement 11/15 61yo  sister   Contusion of knee and lower leg, right, subsequent encounter 11/10/2017  Contusion of knee, right  Cough 2017  Depression with anxiety  Diabetes ([address]  Diverticulosis 2-6-15  
 colonoscopy  DM2 (diabetes mellitus, type 2) ([facility]  Edema  Encounter related to worker's compensation claim 11/10/2017  
 fall t work  Fatigue  Hemangioma of liver  Hypercholesterolemia  Hypertension  Liver disease   
 hepatitis 30 years ago - Liver mass consistent with hemangioma in the right lobe  NAFL (nonalcoholic fatty liver)  Obesity  Onychomycosis  S/P LD (total abdominal hysterectomy)   Thrombocytopenia ([address] 2014 Past Surgical History:  
Procedure Laterality Date  HX GYN    
 partial hysterectomy Current Outpatient Prescriptions Medication Sig Dispense Refill  witch hazel-glycerin (TUCKS) 12.5-50 % pad 1 Pad by PeriANAL route as needed for Pain. 100 Pad 11  
 hydrocortisone (ANUSOL-HC) 25 mg supp Insert 1 Suppository into rectum every twelve (12) hours. 12 Each 11  
 amLODIPine (NORVASC) 5 mg tablet Take 1 Tab by mouth daily.  90 Tab 11  
  cloNIDine HCl (CATAPRES) 0.2 mg tablet Take 1 Tab by mouth two (2) times a day. 180 Tab 3  
 naproxen (NAPROSYN) 500 mg tablet Take 1 Tab by mouth two (2) times daily (with meals). 60 Tab 3  
 albuterol (PROVENTIL HFA, VENTOLIN HFA, PROAIR HFA) 90 mcg/actuation inhaler Take 2 Puffs by inhalation every four (4) hours as needed for Wheezing. 1 Inhaler 11  
 fluticasone-salmeterol (ADVAIR) 250-50 mcg/dose diskus inhaler Take 1 Puff by inhalation two (2) times a day. 1 Inhaler 11  
 cetirizine (ZYRTEC) 10 mg tablet Take 1 Tab by mouth daily. 30 Tab 11  
 mometasone (NASONEX) 50 mcg/actuation nasal spray 2 Sprays by Both Nostrils route daily. 1 Container 11  
 metFORMIN ER (GLUCOPHAGE XR) 500 mg tablet TAKE ONE TABLET BY MOUTH ONCE DAILY BEFORE SUPPER 30 Tab 11  
 ASCENSIA CONTOUR strip USE TO TEST BLOOD GLUCOSE ONCE DAILY AS DIRECTED 50 strip 11  
 glucose blood VI test strips (CONTOUR NEXT STRIPS) strip Test blood sugar once daily 50 strip 11 Allergies Allergen Reactions  Aspirin Nausea and Vomiting  Lipitor [Atorvastatin] Myalgia  Metformin Cough  Pravastatin Myalgia Family History Problem Relation Age of Onset  No Known Problems Father Social History Substance Use Topics  Smoking status: Never Smoker  Smokeless tobacco: Never Used  Alcohol use No  
 
Patient Active Problem List  
Diagnosis Code  Hypertension I10  
 Hypercholesterolemia E78.00  Class 1 obesity due to excess calories with serious comorbidity and body mass index (BMI) of 32.0 to 32.9 in adult E66.09, Z68.32  
 Depression with anxiety F41.8  Diverticulosis K57.90  
 S/P LD (total abdominal hysterectomy) Z90.710  Fatigue R53.83  Baker's cyst M71.20  Type 2 diabetes mellitus without complication, without long-term current use of insulin (HCC) E11.9  Thrombocytopenia ([address] D69.6  Hemangioma of liver D18.03  
 NAFL (nonalcoholic fatty liver) G21.1  Recurrent depression ([address] F33.9 Depression Risk Factor Screening: PHQ over the last two weeks 12/12/2014 PHQ Not Done Patient Decline Little interest or pleasure in doing things Not at all Feeling down, depressed, irritable, or hopeless Not at all Total Score PHQ 2 0 Alcohol Risk Factor Screening: You do not drink alcohol or very rarely. Functional Ability and Level of Safety:  
Hearing Loss Hearing is good. Activities of Daily Living The home contains: no safety equipment. Patient does total self care Fall Risk Fall Risk Assessment, last 12 mths 9/13/2018 Able to walk? Yes Fall in past 12 months? No  
Fall with injury? -  
Number of falls in past 12 months - Fall Risk Score -  
 
 
Abuse Screen Patient is not abused Cognitive Screening Evaluation of Cognitive Function: 
Has your family/caregiver stated any concerns about your memory: no 
Normal 
 
Patient Care Team  
Patient Care Team: 
Leticia Jenkins MD as PCP - General (Internal Medicine) Assessment/Plan Education and counseling provided: 
Are appropriate based on today's review and evaluation Diagnoses and all orders for this visit: 
 
1. Medicare annual wellness visit, subsequent 2. Screening for alcoholism -     Annual  Alcohol Screen 15 min () 3. Screening for depression -     Depression Screen Annual 
 
4. Type 2 diabetes mellitus without complication, without long-term current use of insulin ([address] -     MICROALBUMIN, UR, RAND W/ MICROALB/CREAT RATIO 
-     CBC WITH AUTOMATED DIFF 
-     HEMOGLOBIN A1C WITH EAG 
-     COLLECTION VENOUS BLOOD,VENIPUNCTURE 5. Recurrent depression ([address] 6. Thrombocytopenia ([address] 7. Hemangioma of liver 8. Synovial cyst of right popliteal space 9. Essential hypertension 10. Hypercholesterolemia 11. Diverticulosis of large intestine with hemorrhage Other orders -     witch hazel-glycerin (TUCKS) 12.5-50 % pad; 1 Pad by PeriANAL route as needed for Pain. 
-     hydrocortisone (ANUSOL-HC) 25 mg supp; Insert 1 Suppository into rectum every twelve (12) hours. Health Maintenance Due Topic Date Due  
 EYE EXAM RETINAL OR DILATED Q1  10/14/2015  MICROALBUMIN Q1  09/07/2018  MEDICARE YEARLY EXAM  09/08/2018  HEMOGLOBIN A1C Q6M  10/10/2018 [facility] Jaycob Li MD, [address] [id number] Phone:  [phone]  Fax: [phone] Chief Complaint Patient presents with [address] [place] [address] Annual Wellness Visit  Melena SUBECTIVE: 
 
Xavier Quinn is a 69 y.o. female Patient returns today with known history of hemorrhoids, diverticulosis, diabetes mellitus type 2, hemangioma of liver, thrombocytopenia and is seen for evaluation. This morning she had an episode of rectal bleeding. She noted some bright red blood in the stool. She had a colonoscopy on 02/06/15 that was remarkable for diverticulosis and hemorrhoids. Patient is seen for evaluation. Current Outpatient Prescriptions Medication Sig Dispense Refill  witch hazel-glycerin (TUCKS) 12.5-50 % pad 1 Pad by PeriANAL route as needed for Pain. 100 Pad 11  
 hydrocortisone (ANUSOL-HC) 25 mg supp Insert 1 Suppository into rectum every twelve (12) hours. 12 Each 11  
 amLODIPine (NORVASC) 5 mg tablet Take 1 Tab by mouth daily. 90 Tab 11  
 cloNIDine HCl (CATAPRES) 0.2 mg tablet Take 1 Tab by mouth two (2) times a day. 180 Tab 3  
 naproxen (NAPROSYN) 500 mg tablet Take 1 Tab by mouth two (2) times daily (with meals). 60 Tab 3  
 albuterol (PROVENTIL HFA, VENTOLIN HFA, PROAIR HFA) 90 mcg/actuation inhaler Take 2 Puffs by inhalation every four (4) hours as needed for Wheezing. 1 Inhaler 11  
 fluticasone-salmeterol (ADVAIR) 250-50 mcg/dose diskus inhaler Take 1 Puff by inhalation two (2) times a day.  1 Inhaler 11  
  cetirizine (ZYRTEC) 10 mg tablet Take 1 Tab by mouth daily. 30 Tab 11  
 mometasone (NASONEX) 50 mcg/actuation nasal spray 2 Sprays by Both Nostrils route daily. 1 Container 11  
 metFORMIN ER (GLUCOPHAGE XR) 500 mg tablet TAKE ONE TABLET BY MOUTH ONCE DAILY BEFORE SUPPER 30 Tab 11  
 ASCENSIA CONTOUR strip USE TO TEST BLOOD GLUCOSE ONCE DAILY AS DIRECTED 50 strip 11  
 glucose blood VI test strips (CONTOUR NEXT STRIPS) strip Test blood sugar once daily 50 strip 11 Past Medical History:  
Diagnosis Date  Baker's cyst 3-3-16  
 r  Bereavement 11/15 63yo  sister   Contusion of knee and lower leg, right, subsequent encounter 11/10/2017  Contusion of knee, right  Cough 2017  Depression with anxiety  Diabetes ([address]  Diverticulosis 2-6-15  
 colonoscopy  DM2 (diabetes mellitus, type 2) ([address]  Edema  Encounter related to worker's compensation claim 11/10/2017  
 fall t work  Fatigue  Hemangioma of liver  Hypercholesterolemia  Hypertension  Liver disease   
 hepatitis 30 years ago - Liver mass consistent with hemangioma in the right lobe  NAFL (nonalcoholic fatty liver)  Obesity  Onychomycosis  S/P LD (total abdominal hysterectomy)   Thrombocytopenia ([address] 2014 Past Surgical History:  
Procedure Laterality Date  HX GYN    
 partial hysterectomy Allergies Allergen Reactions  Aspirin Nausea and Vomiting  Lipitor [Atorvastatin] Myalgia  Metformin Cough  Pravastatin Myalgia REVIEW OF SYSTEMS: 
 No dizziness, no clots. Social History Social History  Marital status:  Spouse name: N/A  
 Number of children: N/A  
 Years of education: N/A Social History Main Topics  Smoking status: Never Smoker  Smokeless tobacco: Never Used  Alcohol use No  
 Drug use: No  
 Sexual activity: Not Currently Other Topics Concern  None Social History Narrative Medical History: HTNTMJ dysfunctionHAsCecal lipoma 01/04negative head CT 03/04August 14, 2013 CT head w ith contrast unremarkable  
 except progressive splenoid sinusitisAugust 14, 2013 CT chest unremarkable Gyn History: Last mammogram date 01/13/2012. Surgical History: LD 1985colonoscopy 01/04 Hospitalization/Major Diagnostic Procedure: Denies Past Hospitalization Family History: Mother: alive 71 yrs, HTNFather: unknow nSister(s): alive, DMBrother(s): aliveDaughter(s): aliveSon(s): aliveAunt: alive2  
 brother(s) , 2 sister(s) . 1 son(s) , 1 daughter(s) . Social History: Alcohol Use Patient does not use alcohol. Smoking Status Patient is a never smoker. Marital Status: . Lives w ith:  
 spouse. no Children. 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 Education/School: has highschool diploma. Jehovah's witness: Jehovah W itness - no blood transfusions. r 
Family History Problem Relation Age of Onset  No Known Problems Father OBJECTIVE: 
Visit Vitals  /73  Pulse (!) 59  Temp 98.1 °F (36.7 °C) (Oral)  Resp 16  
 Ht 4' 11\" (1.499 m)  Wt 157 lb 3.2 oz (71.3 kg)  SpO2 98%  BMI 31.75 kg/m2 ENT: perrla,  eom intact NECK: supple. Thyroid normal 
CHEST: clear to ascultation and percussion HEART: regular rate and rhythm ABD: soft, bowel sounds active EXTREMITIES: no edema, pulse 1+ Office Visit on 06/14/2018 Component Date Value Ref Range Status  HCV Ab 06/14/2018 <0.1  0.0 - 0.9 s/co ratio Final  
 Hep A Ab, total 06/14/2018 Negative  Negative Final  
 Hep B surface Ag screen 06/14/2018 Negative  Negative Final  
 Hep B Core Ab, total 06/14/2018 Positive* Negative Final  
 HEP B SURFACE AB, QUAL 06/14/2018 Non Reactive   Final  
 Comment:               Non Reactive: Inconsistent with immunity, 
                            less than 10 mIU/mL               Reactive:     Consistent with immunity, 
 greater than 9.9 mIU/mL  Ferritin 06/14/2018 153* 15 - 150 ng/mL Final  
 TIBC 06/14/2018 296  250 - 450 ug/dL Final  
 UIBC 06/14/2018 215  118 - 369 ug/dL Final  
 Iron 06/14/2018 81  27 - 139 ug/dL Final  
 Iron % saturation 06/14/2018 27  15 - 55 % Final  
 Alpha-1 Antitrypsin 06/14/2018 122  90 - 200 mg/dL Final  
 Antinuclear Abs, IFA 06/14/2018 Negative   Final  
 Comment:                                      Negative   <1:80 Borderline  1:80 Positive   >1:80 
  
 Actin (Smooth Muscle) Ab 06/14/2018 15  0 - 19 Units Final  
 Comment:                  Negative                     0 - 19 Weak positive               20 - 30 Moderate to strong positive     >30 Actin Antibodies are found in 52-85% of patients with 
 autoimmune hepatitis or chronic active hepatitis and 
 in 22% of patients with primary biliary cirrhosis.  Comment 06/14/2018 Comment   Final  
 Comment: Non reactive HCV antibody screen is consistent with no HCV infection, 
unless recent infection is suspected or other evidence exists to 
indicate HCV infection. ASSESSMENT: 
1. Medicare annual wellness visit, subsequent 2. Screening for alcoholism 3. Screening for depression 4. Type 2 diabetes mellitus without complication, without long-term current use of insulin ([address] 5. Recurrent depression ([address] 6. Thrombocytopenia ([address] 7. Hemangioma of liver 8. Synovial cyst of right popliteal space 9. Essential hypertension 10. Hypercholesterolemia 11. Diverticulosis of large intestine with hemorrhage With the history of hemorrhoids this may have been a hemorrhoidal bleed, but on rectal exam she has good sphincter tone, stool is brown. Unable to do guaiac. There is no blood on exam glove.   We will use suppositories and Tucks in case this is bleeding from internal hemorrhoid. We advise her if it is a diverticular bleed that she'll have more bleeding and that if she has large amounts of bleeding and dizziness she should go to the ER immediately, at which point she will be evaluated by the ER doctor and decision will be made whether she needs to go to the hospital because of diverticula. Today we'll check a CBC to check on platelets and also to confirm stability of H&H. Will ask her to come back in two weeks to see how she's doing. BP control is at goal, which is appropriate, and BMI remains elevated. I have discussed the diagnosis with the patient and the intended plan as seen in the 
orders above. The patient understands and agees with the plan. The patient has  
received an after visit summary and questions were answered concerning 
future plans Patient labs and/or xrays were reviewed Past records were reviewed. PLAN: 
. Orders Placed This Encounter  Depression Screen Annual  
 MICROALBUMIN, UR, RAND W/ MICROALB/CREAT RATIO  CBC WITH AUTOMATED DIFF  
 HEMOGLOBIN A1C WITH EAG  
 witch hazel-glycerin (TUCKS) 12.5-50 % pad  hydrocortisone (ANUSOL-HC) 25 mg supp Follow-up Disposition: 
Return in about 2 weeks (around 9/27/2018). ATTENTION:  
This medical record was transcribed using an electronic medical records system. Although proofread, it may and can contain electronic and spelling errors. Other human spelling and other errors may be present. Corrections may be executed at a later time. Please feel free to contact us for any clarifications as needed.

## 2018-09-13 NOTE — MR AVS SNAPSHOT
[address] [facility]. [address] [address] 
[phone] Patient: Joanie Delgado MRN: [id number] TIK:[id number] Visit Information Date & Time Provider Department Dept. Phone Encounter #  
 9/13/2018 10:00 AM Pallavi Kellogg 80 [facility] [phone] [id number] Follow-up Instructions Return in about 2 weeks (around 9/27/2018). Follow-up and Disposition History Your Appointments 9/27/2018  2:45 PM  
Any with Paula Wells MD  
[facility] - [address] ([address]) Appt Note: 2 week follow up [address], [place] [address] [address] [place] [address]  
[phone]  
  
   
 1 [facility] [address]  
  
    
 11/13/2018  3:30 PM  
Any with Paula Wells MD  
59 [facility] ([address]) Appt Note: 3 month follow up [address], [place] [address] [address] [place] [address]  
[phone]  
  
    
 1/9/2019  8:00 AM  
Follow Up with GURRPEET Larose [address] ([address]) Appt Note: Follow up 200 [facility] [phone] [facility] [id number]  
59 [facility] [address] Upcoming Health Maintenance Date Due  
 EYE EXAM RETINAL OR DILATED Q1 10/14/2015 MICROALBUMIN Q1 9/7/2018 MEDICARE YEARLY EXAM 9/8/2018 HEMOGLOBIN A1C Q6M 10/10/2018 Influenza Age 5 to Adult 11/14/2018* FOOT EXAM Q1 4/10/2019 LIPID PANEL Q1 4/10/2019 GLAUCOMA SCREENING Q2Y 9/7/2019 BREAST CANCER SCRN MAMMOGRAM 5/16/2020 COLONOSCOPY 2/6/2025 DTaP/Tdap/Td series (2 - Td) 3/28/2027 *Topic was postponed. The date shown is not the original due date. Allergies as of 9/13/2018  Review Complete On: 9/13/2018 By: Cristal Zamora LPN Severity Noted Reaction Type Reactions Aspirin  12/12/2014    Nausea and Vomiting Lipitor [Atorvastatin]  10/01/2015    Myalgia Metformin  02/14/2017    Cough Pravastatin  06/19/2015    Myalgia Current Immunizations  Never Reviewed No immunizations on file. Not reviewed this visit You Were Diagnosed With   
  
 Codes Comments Medicare annual wellness visit, subsequent    -  Primary ICD-10-CM: Z00.00 ICD-9-CM: V70.0 Screening for alcoholism     ICD-10-CM: Z13.89 ICD-9-CM: V79.1 Screening for depression     ICD-10-CM: Z13.89 ICD-9-CM: V79.0 Type 2 diabetes mellitus without complication, without long-term current use of insulin (HCC)     ICD-10-CM: E11.9 ICD-9-CM: 250.00 Recurrent depression ([facility]     ICD-10-CM: F33.9 ICD-9-CM: 296.30 Thrombocytopenia ([facility].)     ICD-10-CM: D69.6 ICD-9-CM: 287.5 Hemangioma of liver     ICD-10-CM: D18.03 
ICD-9-CM: 228.04 Synovial cyst of right popliteal space     ICD-10-CM: M71.21 
ICD-9-CM: 727.51 Essential hypertension     ICD-10-CM: I10 
ICD-9-CM: 401.9 Hypercholesterolemia     ICD-10-CM: E78.00 ICD-9-CM: 272.0 Diverticulosis of large intestine with hemorrhage     ICD-10-CM: K57.31 
ICD-9-CM: 562.12 Vitals BP Pulse Temp Resp Height(growth percentile) Weight(growth percentile) 127/73 (!) 59 98.1 °F (36.7 °C) (Oral) 16 4' 11\" (1.499 m) 157 lb 3.2 oz (71.3 kg) SpO2 BMI OB Status Smoking Status 98% 31.75 kg/m2 Postmenopausal Never Smoker Vitals History BMI and BSA Data Body Mass Index Body Surface Area 31.75 kg/m 2 1.72 m 2 Preferred Pharmacy Pharmacy Name Phone [phone] [phone] Your Updated Medication List  
  
   
This list is accurate as of 9/13/18  2:24 PM.  Always use your most recent med list.  
  
  
  
  
 albuterol 90 mcg/actuation inhaler Commonly known as:  PROVENTIL HFA, VENTOLIN HFA, PROAIR HFA  
 Take 2 Puffs by inhalation every four (4) hours as needed for Wheezing. amLODIPine 5 mg tablet Commonly known as:  Gely Croon Take 1 Tab by mouth daily. * [employer] CONTOUR strip Generic drug:  glucose blood VI test strips USE TO TEST BLOOD GLUCOSE ONCE DAILY AS DIRECTED * glucose blood VI test strips strip Commonly known as:  CONTOUR NEXT TEST STRIPS Test blood sugar once daily  
  
 cetirizine 10 mg tablet Commonly known as:  ZYRTEC Take 1 Tab by mouth daily. cloNIDine HCl 0.2 mg tablet Commonly known as:  CATAPRES Take 1 Tab by mouth two (2) times a day. fluticasone-salmeterol 250-50 mcg/dose diskus inhaler Commonly known as:  ADVAIR Take 1 Puff by inhalation two (2) times a day. hydrocortisone 25 mg Supp Commonly known as:  ANUSOL-HC Insert 1 Suppository into rectum every twelve (12) hours. metFORMIN  mg tablet Commonly known as:  GLUCOPHAGE XR  
TAKE ONE TABLET BY MOUTH ONCE DAILY BEFORE SUPPER  
  
 mometasone 50 mcg/actuation nasal spray Commonly known as:  NASONEX  
2 Sprays by Both Nostrils route daily. naproxen 500 mg tablet Commonly known as:  NAPROSYN Take 1 Tab by mouth two (2) times daily (with meals). witch hazel-glycerin 12.5-50 % pad Commonly known as:  TUCKS  
1 Pad by PeriANAL route as needed for Pain. * Notice: This list has 2 medication(s) that are the same as other medications prescribed for you. Read the directions carefully, and ask your doctor or other care provider to review them with you. Prescriptions Sent to Pharmacy Refills  
 witch hazel-glycerin (TUCKS) 12.5-50 % pad 11 Si Pad by PeriANAL route as needed for Pain. Class: Normal  
 Pharmacy: [facility] #[id number] Ph #: [phone]  Route: PeriANAL  
 hydrocortisone (ANUSOL-HC) 25 mg supp 11  
 Sig: Insert 1 Suppository into rectum every twelve (12) hours. Class: Normal  
 Pharmacy: [facility]/pharmacy #[id number] [place] [address] #: [phone] Route: Rectal  
  
We Performed the Following CBC WITH AUTOMATED DIFF [62958 CPT(R)] COLLECTION VENOUS BLOOD,VENIPUNCTURE [id number] CPT(R)] Toni 68 [[id number] HCPCS] HEMOGLOBIN A1C WITH EAG [96001 CPT(R)] MICROALBUMIN, UR, RAND W/ MICROALB/CREAT RATIO [id number] CPT(R)] GA ANNUAL ALCOHOL SCREEN 15 MIN [id number] HCPCS] Follow-up Instructions Return in about 2 weeks (around 9/27/2018). Patient Instructions Medicare Wellness Visit, Female The best way to live healthy is to have a lifestyle where you eat a well-balanced diet, exercise regularly, limit alcohol use, and quit all forms of tobacco/nicotine, if applicable. Regular preventive services are another way to keep healthy. Preventive services (vaccines, screening tests, monitoring & exams) can help personalize your care plan, which helps you manage your own care. Screening tests can find health problems at the earliest stages, when they are easiest to treat. [facility] follows the current, evidence-based guidelines published by the [place] (USPSTF) when recommending preventive services for our patients. Because we follow these guidelines, sometimes recommendations change over time as research supports it. (For example, mammograms used to be recommended annually. Even though Medicare will still pay for an annual mammogram, the newer guidelines recommend a mammogram every two years for women of average risk.) Of course, you and your doctor may decide to screen more often for some diseases, based on your risk and your health status. Preventive services for you include: - Medicare offers their members a free annual wellness visit, which is time for you and your primary care provider to discuss and plan for your preventive service needs. Take advantage of this benefit every year! 
-All adults over the age of 72 should receive the recommended pneumonia vaccines. Current USPSTF guidelines recommend a series of two vaccines for the best pneumonia protection.  
-All adults should have a flu vaccine yearly and a tetanus vaccine every 10 years. All adults age 61 and older should receive a shingles vaccine once in their lifetime.   
-A bone mass density test is recommended when a woman turns 65 to screen for osteoporosis. This test is only recommended one time, as a screening. Some providers will use this same test as a disease monitoring tool if you already have osteoporosis. -All adults age 38-68 who are overweight should have a diabetes screening test once every three years.  
-Other screening tests and preventive services for persons with diabetes include: an eye exam to screen for diabetic retinopathy, a kidney function test, a foot exam, and stricter control over your cholesterol.  
-Cardiovascular screening for adults with routine risk involves an electrocardiogram (ECG) at intervals determined by your doctor.  
-Colorectal cancer screenings should be done for adults age 54-65 with no increased risk factors for colorectal cancer. There are a number of acceptable methods of screening for this type of cancer. Each test has its own benefits and drawbacks. Discuss with your doctor what is most appropriate for you during your annual wellness visit. The different tests include: colonoscopy (considered the best screening method), a fecal occult blood test, a fecal DNA test, and sigmoidoscopy.  
-Breast cancer screenings are recommended every other year for women of normal risk, age 54-69. 
-Cervical cancer screenings for women over age 72 are only recommended with certain risk factors.  
-All adults born between [birth year] and 1967 should be screened once for Hepatitis C. Here is a list of your current Health Maintenance items (your personalized list of preventive services) with a due date: 
Health Maintenance Due Topic Date Due  Eye Exam  10/14/2015  Albumin Urine Test  09/07/2018 Erick Ramírez Annual Well Visit  09/08/2018  Hemoglobin A1C    10/10/2018 Diverticulosis: Care Instructions Your Care Instructions In diverticulosis, pouches called diverticula form in the wall of the large intestine (colon). The pouches do not cause any pain or other symptoms. Most people who have diverticulosis do not know they have it. But the pouches sometimes bleed, and if they become infected, they can cause pain and other symptoms. When this happens, it is called diverticulitis. Diverticula form when pressure pushes the wall of the colon outward at certain weak points. A diet that is too low in fiber can cause diverticula. Follow-up care is a key part of your treatment and safety. Be sure to make and go to all appointments, and call your doctor if you are having problems. It's also a good idea to know your test results and keep a list of the medicines you take. How can you care for yourself at home? · Include fruits, leafy green vegetables, beans, and whole grains in your diet each day. These foods are high in fiber. · Take a fiber supplement, such as Citrucel or Metamucil, every day if needed. Read and follow all instructions on the label. · Drink plenty of fluids, enough so that your urine is light yellow or clear like water. If you have kidney, heart, or liver disease and have to limit fluids, talk with your doctor before you increase the amount of fluids you drink. · Get at least 30 minutes of exercise on most days of the week. Walking is a good choice. You also may want to do other activities, such as running, swimming, cycling, or playing tennis or team sports. · Cut out foods that cause gas, pain, or other symptoms. When should you call for help? Call your doctor now or seek immediate medical care if: 
  · You have belly pain.  
  · You pass maroon or very bloody stools.  
  · You have a fever.  
  · You have nausea and vomiting.  
  · You have unusual changes in your bowel movements or abdominal swelling.  
  · You have burning pain when you urinate.  
  · You have abnormal vaginal discharge.  
  · You have shoulder pain.  
  · You have cramping pain that does not get better when you have a bowel movement or pass gas.  
  · You pass gas or stool from your urethra while urinating.  
 Watch closely for changes in your health, and be sure to contact your doctor if you have any problems. Where can you learn more? Go to http://jaret-brittany.info/. Enter E717 in the search box to learn more about \"Diverticulosis: Care Instructions. \" Current as of: May 12, 2017 Content Version: 11.7 © 5192-4019 [employer]. Care instructions adapted under license by [employer] (which disclaims liability or warranty for this information). If you have questions about a medical condition or this instruction, always ask your healthcare professional. [address] any warranty or liability for your use of this information. Learning About Diverticulosis and Diverticulitis What are diverticulosis and diverticulitis? In diverticulosis and diverticulitis, pouches called diverticula form in the wall of the large intestine, or colon. · In diverticulosis, the pouches do not cause any pain or other symptoms. · In diverticulitis, the pouches get inflamed or infected and cause symptoms. Doctors aren't sure what causes these pouches in the colon. But they think that a low-fiber diet may play a role. Without fiber to add bulk to the stool, the colon has to work harder than normal to push the stool forward. The pressure from this may cause pouches to form in weak spots along the colon. Some people with diverticulosis get diverticulitis. But experts don't know why this happens. What are the symptoms? · In diverticulosis, most people don't have symptoms. But pouches sometimes bleed. · In diverticulitis, symptoms may last from a few hours to a week or more. They include: ¨ Belly pain. This is usually in the lower left side. It is sometimes worse when you move. This is the most common symptom. ¨ Fever and chills. ¨ Bloating and gas. ¨ Diarrhea or constipation. ¨ Nausea and sometimes vomiting. ¨ Not feeling like eating. How can you prevent these problems? You may be able to lower your chance of getting diverticulitis. You can do this by taking steps to prevent constipation. · Eat fruits, vegetables, beans, and whole grains every day. These foods are high in fiber. · Drink plenty of fluids (enough so that your urine is light yellow or clear like water). If you have kidney, heart, or liver disease and have to limit fluids, talk with your doctor before you increase the amount of fluids you drink. · Get at least 30 minutes of exercise on most days of the week. Walking is a good choice. You also may want to do other activities, such as running, swimming, cycling, or playing tennis or team sports. · Take a fiber supplement, such as Citrucel or Metamucil, every day if needed. Read and follow all instructions on the label. · Schedule time each day for a bowel movement. Having a daily routine may help. Take your time and do not strain when having a bowel movement. Some people avoid nuts, seeds, berries, and popcorn. They believe that these foods might get trapped in the diverticula and cause pain. But there is no proof that these foods cause diverticulitis or make it worse. How are these problems treated? · The best way to treat diverticulosis is to avoid constipation. (See the tips above.) · Treatment for diverticulitis includes antibiotics and often a change in your diet. You may need only liquids at first. Your doctor may suggest pain medicines for pain or belly cramps. In some cases, surgery may be needed. Follow-up care is a key part of your treatment and safety. Be sure to make and go to all appointments, and call your doctor if you are having problems. It's also a good idea to know your test results and keep a list of the medicines you take. Where can you learn more? Go to http://jaret-brittany.info/. Enter M369 in the search box to learn more about \"Learning About Diverticulosis and Diverticulitis. \" Current as of: May 12, 2017 Content Version: 11.7 © 7745-9664 [employer]. Care instructions adapted under license by [employer] (which disclaims liability or warranty for this information). If you have questions about a medical condition or this instruction, always ask your healthcare professional. Jerzy Vasquez any warranty or liability for your use of this information. Lower Digestive Tract: Anatomy Sketch Current as of: May 12, 2017 Content Version: 11.7 © 5983-0835 [employer]. Care instructions adapted under license by [employer] (which disclaims liability or warranty for this information). If you have questions about a medical condition or this instruction, always ask your healthcare professional. Jerzy Chrisgh any warranty or liability for your use of this information. Patient Instructions History Introducing [facility]! [employer] introduces Ring[employer] patient portal. Now you can access parts of your medical record, email your doctor's office, and request medication refills online. 1. In your internet browser, go to [email or web address] 2. Click on the First Time User? Click Here link in the Sign In box. You will see the New Member Sign Up page. 3. Enter your TouchLocal Access Code exactly as it appears below. You will not need to use this code after youve completed the sign-up process. If you do not sign up before the expiration date, you must request a new code. · TouchLocal Access Code: [email or web address] Expires: 10/6/2018  5:20 AM 
 
4. Enter the last four digits of your Social Security Number (xxxx) and Date of Birth (mm/dd/yyyy) as indicated and click Submit. You will be taken to the next sign-up page. 5. Create a TouchLocal ID. This will be your TouchLocal login ID and cannot be changed, so think of one that is secure and easy to remember. 6. Create a TouchLocal password. You can change your password at any time. 7. Enter your Password Reset Question and Answer. This can be used at a later time if you forget your password. 8. Enter your e-mail address. You will receive e-mail notification when new information is available in [address]. 9. Click Sign Up. You can now view and download portions of your medical record. 10. Click the Download Summary menu link to download a portable copy of your medical information. If you have questions, please visit the Frequently Asked Questions section of the TouchLocal website. Remember, TouchLocal is NOT to be used for urgent needs. For medical emergencies, dial 911. Now available from your iPhone and Android! Please provide this summary of care documentation to your next provider. Your primary care clinician is listed as Solomon Wiley. If you have any questions after today's visit, please call [phone].

## 2018-09-14 LAB
ALBUMIN/CREAT UR: <6.5 MG/G CREAT (ref 0–30)
BASOPHILS # BLD AUTO: 0 X10E3/UL (ref 0–0.2)
BASOPHILS NFR BLD AUTO: 1 %
CREAT UR-MCNC: 46.5 MG/DL
EOSINOPHIL # BLD AUTO: 0.2 X10E3/UL (ref 0–0.4)
EOSINOPHIL NFR BLD AUTO: 3 %
ERYTHROCYTE [DISTWIDTH] IN BLOOD BY AUTOMATED COUNT: 14.6 % (ref 12.3–15.4)
EST. AVERAGE GLUCOSE BLD GHB EST-MCNC: 143 MG/DL
HBA1C MFR BLD: 6.6 % (ref 4.8–5.6)
HCT VFR BLD AUTO: 38.6 % (ref 34–46.6)
HGB BLD-MCNC: 12.6 G/DL (ref 11.1–15.9)
IMM GRANULOCYTES # BLD: 0 X10E3/UL (ref 0–0.1)
IMM GRANULOCYTES NFR BLD: 0 %
LYMPHOCYTES # BLD AUTO: 2.1 X10E3/UL (ref 0.7–3.1)
LYMPHOCYTES NFR BLD AUTO: 30 %
MCH RBC QN AUTO: 29.1 PG (ref 26.6–33)
MCHC RBC AUTO-ENTMCNC: 32.6 G/DL (ref 31.5–35.7)
MCV RBC AUTO: 89 FL (ref 79–97)
MICROALBUMIN UR-MCNC: <3 UG/ML
MONOCYTES # BLD AUTO: 0.4 X10E3/UL (ref 0.1–0.9)
MONOCYTES NFR BLD AUTO: 6 %
NEUTROPHILS # BLD AUTO: 4.3 X10E3/UL (ref 1.4–7)
NEUTROPHILS NFR BLD AUTO: 60 %
PLATELET # BLD AUTO: 101 X10E3/UL (ref 150–379)
RBC # BLD AUTO: 4.33 X10E6/UL (ref 3.77–5.28)
WBC # BLD AUTO: 7.2 X10E3/UL (ref 3.4–10.8)

## 2018-09-15 RX ORDER — CLONIDINE HYDROCHLORIDE 0.2 MG/1
TABLET ORAL
Qty: 180 TAB | Refills: 3 | Status: SHIPPED | OUTPATIENT
Start: 2018-09-15 | End: 2019-09-22 | Stop reason: SDUPTHER

## 2018-09-27 ENCOUNTER — OFFICE VISIT (OUTPATIENT)
Dept: INTERNAL MEDICINE CLINIC | Age: 68
End: 2018-09-27

## 2018-09-27 VITALS
BODY MASS INDEX: 31.89 KG/M2 | OXYGEN SATURATION: 97 % | DIASTOLIC BLOOD PRESSURE: 71 MMHG | TEMPERATURE: 97.3 F | RESPIRATION RATE: 18 BRPM | HEART RATE: 71 BPM | HEIGHT: 59 IN | SYSTOLIC BLOOD PRESSURE: 120 MMHG | WEIGHT: 158.2 LBS

## 2018-09-27 DIAGNOSIS — E11.9 TYPE 2 DIABETES MELLITUS WITHOUT COMPLICATION, WITHOUT LONG-TERM CURRENT USE OF INSULIN (HCC): ICD-10-CM

## 2018-09-27 DIAGNOSIS — D69.6 THROMBOCYTOPENIA (HCC): Primary | ICD-10-CM

## 2018-09-27 DIAGNOSIS — D18.03 HEMANGIOMA OF LIVER: ICD-10-CM

## 2018-09-27 DIAGNOSIS — I10 ESSENTIAL HYPERTENSION: ICD-10-CM

## 2018-09-27 NOTE — PROGRESS NOTES
1. Have you been to the ER, urgent care clinic since your last visit? Hospitalized since your last visit? No 
 
2. Have you seen or consulted any other health care providers outside of the [address] [place] [address] since your last visit? Include any pap smears or colon screening. No  
 
Wants to discuss medication. Follow up

## 2018-09-27 NOTE — PROGRESS NOTES
SPORTS MEDICINE AND PRIMARY CARE Alexy Martines MD, [place] [id number] Phone:  [phone]  Fax: [phone] Chief Complaint Patient presents with  Diabetes f/u SUBECTIVE: 
 
Carloz Hanna is a 77 y.o. female Patient returns today with known history of primary hypertension, bronchial asthma, diabetes with excellent control with Hgb A1c of 6.6, depression with anxiety, dyslipidemia, thrombocytopenia, and is seen for evaluation. Patient's had no further rectal bleeding since we saw him the other day. The insurance company did not want to pay for the suppositories, but would contact us. We've not heard from them. Patient denies other new complaints and is seen for evaluation. Current Outpatient Prescriptions Medication Sig Dispense Refill  cloNIDine HCl (CATAPRES) 0.2 mg tablet TAKE 1 TAB BY MOUTH TWO (2) TIMES A DAY. 180 Tab 3  
 witch hazel-glycerin (TUCKS) 12.5-50 % pad 1 Pad by PeriANAL route as needed for Pain. 100 Pad 11  
 hydrocortisone (ANUSOL-HC) 25 mg supp Insert 1 Suppository into rectum every twelve (12) hours. 12 Each 11  
 amLODIPine (NORVASC) 5 mg tablet Take 1 Tab by mouth daily. 90 Tab 11  
 albuterol (PROVENTIL HFA, VENTOLIN HFA, PROAIR HFA) 90 mcg/actuation inhaler Take 2 Puffs by inhalation every four (4) hours as needed for Wheezing. 1 Inhaler 11  
 fluticasone-salmeterol (ADVAIR) 250-50 mcg/dose diskus inhaler Take 1 Puff by inhalation two (2) times a day. 1 Inhaler 11  
 ASCENSIA CONTOUR strip USE TO TEST BLOOD GLUCOSE ONCE DAILY AS DIRECTED 50 strip 11  
 glucose blood VI test strips (CONTOUR NEXT STRIPS) strip Test blood sugar once daily 50 strip 11  
 naproxen (NAPROSYN) 500 mg tablet Take 1 Tab by mouth two (2) times daily (with meals). 60 Tab 3  cetirizine (ZYRTEC) 10 mg tablet Take 1 Tab by mouth daily.  30 Tab 11  
 mometasone (NASONEX) 50 mcg/actuation nasal spray 2 Sprays by Both Nostrils route daily. 1 Container 11  
 metFORMIN ER (GLUCOPHAGE XR) 500 mg tablet TAKE ONE TABLET BY MOUTH ONCE DAILY BEFORE SUPPER 30 Tab 11 Past Medical History:  
Diagnosis Date  Baker's cyst 3-3-16  
 r  Bereavement 11/15 63yo  sister   Contusion of knee and lower leg, right, subsequent encounter 11/10/2017  Contusion of knee, right  Cough 2017  Depression with anxiety  Diabetes ([facility]  Diverticulosis 2-15  
 colonoscopy  DM2 (diabetes mellitus, type 2) ([facility]  Edema  Encounter related to worker's compensation claim 11/10/2017  
 fall t work  Fatigue  Hemangioma of liver  Hypercholesterolemia  Hypertension  Liver disease   
 hepatitis 30 years ago - Liver mass consistent with hemangioma in the right lobe  NAFL (nonalcoholic fatty liver)  Obesity  Onychomycosis  S/P LD (total abdominal hysterectomy)   Thrombocytopenia ([facility] 2014 Past Surgical History:  
Procedure Laterality Date  HX GYN    
 partial hysterectomy Allergies Allergen Reactions  Aspirin Nausea and Vomiting  Lipitor [Atorvastatin] Myalgia  Metformin Cough  Pravastatin Myalgia REVIEW OF SYSTEMS: 
 No dizziness, no lightheadedness. Social History Social History  Marital status:  Spouse name: N/A  
 Number of children: N/A  
 Years of education: N/A Social History Main Topics  Smoking status: Never Smoker  Smokeless tobacco: Never Used  Alcohol use No  
 Drug use: No  
 Sexual activity: Not Currently Other Topics Concern  None Social History Narrative Medical History: HTNTMJ dysfunctionHAsCecal lipoma negative head CT August 2013 CT head w ith contrast unremarkable  
 except progressive splenoid sinusitisAugust 2013 CT chest unremarkable Gyn History: Last mammogram date 2012. Surgical History: LD 1985colonoscopy  Hospitalization/Major Diagnostic Procedure: Denies Past Hospitalization Family History: Mother: alive 71 yrs, HTNFather: unknow nSister(s): alive, DMBrother(s): aliveDaughter(s): aliveSon(s): aliveAunt: alive2  
 brother(s) , 2 sister(s) . 1 son(s) , 1 daughter(s) . Social History: Alcohol Use Patient does not use alcohol. Smoking Status Patient is a never smoker. Marital Status: . Lives w ith:  
 spouse. no Children. 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 Education/School: has highschool diploma. [employer]: [employer] - no blood transfusions. r 
Family History Problem Relation Age of Onset  No Known Problems Father OBJECTIVE: 
Visit Vitals  /71  Pulse 71  Temp 97.3 °F (36.3 °C) (Oral)  Resp 18  Ht 4' 11\" (1.499 m)  Wt 158 lb 3.2 oz (71.8 kg)  SpO2 97%  BMI 31.95 kg/m2 ENT: perrla,  eom intact NECK: supple. Thyroid normal 
CHEST: clear to ascultation and percussion HEART: regular rate and rhythm ABD: soft, bowel sounds active EXTREMITIES: no edema, pulse 1+ Office Visit on 09/13/2018 Component Date Value Ref Range Status  Creatinine, urine 09/13/2018 46.5  Not Estab. mg/dL Final  
 Microalbumin, urine 09/13/2018 <3.0  Not Estab. ug/mL Final  
 **Verified by repeat analysis**  Microalb/Creat ratio (ug/mg creat.) 09/13/2018 <6.5  0.0 - 30.0 mg/g creat Final  
 WBC 09/13/2018 7.2  3.4 - 10.8 x10E3/uL Final  
 RBC 09/13/2018 4.33  3.77 - 5.28 x10E6/uL Final  
 HGB 09/13/2018 12.6  11.1 - 15.9 g/dL Final  
 HCT 09/13/2018 38.6  34.0 - 46.6 % Final  
 MCV 09/13/2018 89  79 - 97 fL Final  
 MCH 09/13/2018 29.1  26.6 - 33.0 pg Final  
 MCHC 09/13/2018 32.6  31.5 - 35.7 g/dL Final  
 RDW 09/13/2018 14.6  12.3 - 15.4 % Final  
 PLATELET 24/13/8678 508* 150 - 379 x10E3/uL Final  
 NEUTROPHILS 09/13/2018 60  Not Estab. % Final  
 Lymphocytes 09/13/2018 30  Not Estab. % Final  
  MONOCYTES 09/13/2018 6  Not Estab. % Final  
 EOSINOPHILS 09/13/2018 3  Not Estab. % Final  
 BASOPHILS 09/13/2018 1  Not Estab. % Final  
 ABS. NEUTROPHILS 09/13/2018 4.3  1.4 - 7.0 x10E3/uL Final  
 Abs Lymphocytes 09/13/2018 2.1  0.7 - 3.1 x10E3/uL Final  
 ABS. MONOCYTES 09/13/2018 0.4  0.1 - 0.9 x10E3/uL Final  
 ABS. EOSINOPHILS 09/13/2018 0.2  0.0 - 0.4 x10E3/uL Final  
 ABS. BASOPHILS 09/13/2018 0.0  0.0 - 0.2 x10E3/uL Final  
 IMMATURE GRANULOCYTES 09/13/2018 0  Not Estab. % Final  
 ABS. IMM. GRANS. 09/13/2018 0.0  0.0 - 0.1 x10E3/uL Final  
 Hemoglobin A1c 09/13/2018 6.6* 4.8 - 5.6 % Final  
 Comment:          Prediabetes: 5.7 - 6.4 Diabetes: >6.4 Glycemic control for adults with diabetes: <7.0  Estimated average glucose 09/13/2018 143  mg/dL Final  
 
  
 
ASSESSMENT: 
1. Thrombocytopenia ([address] 2. Hemangioma of liver 3. Essential hypertension 4. Type 2 diabetes mellitus without complication, without long-term current use of insulin ([address] Patient's medical status is stable. No further rectal bleeding suggesting hemorrhoidal bleeding. BMI remains elevated. We encouraged physical activity 30 minutes five days a week and a heart healthy, weight reducing diet. BP remains at goal. 
 
Platelets are acceptable at 101k on the last check. Reviewed Hgb A1c. She'll return to the office in three months, sooner if she has any problems. She's invited to walk in to see us any time she has an urgency and unable to get an appointment. I have discussed the diagnosis with the patient and the intended plan as seen in the 
orders above. The patient understands and agees with the plan. The patient has  
received an after visit summary and questions were answered concerning 
future plans Patient labs and/or xrays were reviewed Past records were reviewed. PLAN: 
. No orders of the defined types were placed in this encounter. Follow-up Disposition: Return in about 3 months (around 12/27/2018). ATTENTION:  
This medical record was transcribed using an electronic medical records system. Although proofread, it may and can contain electronic and spelling errors. Other human spelling and other errors may be present. Corrections may be executed at a later time. Please feel free to contact us for any clarifications as needed.

## 2018-11-23 RX ORDER — AMLODIPINE BESYLATE 5 MG/1
TABLET ORAL
Qty: 90 TAB | Refills: 3 | Status: SHIPPED | OUTPATIENT
Start: 2018-11-23 | End: 2019-03-25 | Stop reason: SDUPTHER

## 2019-01-08 ENCOUNTER — OFFICE VISIT (OUTPATIENT)
Dept: HEMATOLOGY | Age: 69
End: 2019-01-08

## 2019-01-08 VITALS
DIASTOLIC BLOOD PRESSURE: 81 MMHG | TEMPERATURE: 97.6 F | WEIGHT: 162 LBS | BODY MASS INDEX: 32.72 KG/M2 | SYSTOLIC BLOOD PRESSURE: 127 MMHG | HEART RATE: 79 BPM

## 2019-01-08 DIAGNOSIS — I10 ESSENTIAL HYPERTENSION: ICD-10-CM

## 2019-01-08 DIAGNOSIS — E78.00 HYPERCHOLESTEROLEMIA: ICD-10-CM

## 2019-01-08 DIAGNOSIS — D69.6 THROMBOCYTOPENIA (HCC): ICD-10-CM

## 2019-01-08 DIAGNOSIS — R53.82 CHRONIC FATIGUE: ICD-10-CM

## 2019-01-08 DIAGNOSIS — K76.0 NAFL (NONALCOHOLIC FATTY LIVER): Primary | ICD-10-CM

## 2019-01-08 DIAGNOSIS — D18.03 HEMANGIOMA OF LIVER: ICD-10-CM

## 2019-01-08 DIAGNOSIS — E11.9 TYPE 2 DIABETES MELLITUS WITHOUT COMPLICATION, WITHOUT LONG-TERM CURRENT USE OF INSULIN (HCC): ICD-10-CM

## 2019-01-08 NOTE — PROGRESS NOTES
[address] Jaquan Mahmood MD, [address], [facility], [place]       April G Blair Smith, NP    GINGER Chirinos, [facility]   Delfina Roberts, POLINA [facility] [place] [address]    at [address], [facility] Terrence Zuniga  22. [phone]    FAX: [address] [place] [address], [address]    [phone]    FAX: [phone]     Patient Care Team:  Gordon Larose MD as PCP - General (Internal Medicine)    Patient Active Problem List   Diagnosis Code    Hypertension I10    Hypercholesterolemia E78.00    Class 1 obesity due to excess calories with serious comorbidity and body mass index (BMI) of 32.0 to 32.9 in adult E66.09, Z68.32    Depression with anxiety F41.8    Diverticulosis K57.90    S/P LD (total abdominal hysterectomy) Z90.710    Fatigue R53.83    Baker's cyst M71.20    Type 2 diabetes mellitus without complication, without long-term current use of insulin (HCC) E11.9    Thrombocytopenia (HCC) D69.6    Hemangioma of liver D18.03    NAFL (nonalcoholic fatty liver) E30.3    Recurrent depression ([address] F33.9       Ferny Andino [facility] for management of non-alcoholic fatty liver disease (NAFLD). The active problem list, all pertinent past medical history, medications, radiologic findings and laboratory findings related to the liver disorder were reviewed with the patient. The patient is a 77 y.o.  female who was found to have imaging suggesting cirrhosis in 5/2018 when she underwent an ultrasound because of long standing thrombocytopenia. Assessment of liver fibrosis was performed in the office with Fibroscan.  The result was 3.9 kPa which correlates with no fibrosis. An ultrasound performed in 5/2018. Results suggest cirrhosis. A mass is present in the right lobe, measuring 2.8 x 2.6 cm. MRI was performed in 6/2018. Results suggest that the liver is normal. Changes of cirrhosis were not seen. No fatty liver was seen. An mass is present in the right lobe, segment 5, measuring 3.2 cm with enhancing characteristics consistent with hemangioma. The patient notes fatigue. Today, patient denies abdominal pain, change in bowel habits, dark urine, myalgias, arthralgias, pruritus and problems concentrating. The patient completes all daily activities without any functional limitations. ASSESSMENT AND PLAN:  Cirrhosis? The patient does not appear to have cirrhosis. Although an ultrasound suggested cirrhosis and there is thrombocytopenia, the MRI does not suggest cirrhosis and Fibroscan scan demonstrates normal liver stiffness consistent with no fibrosis. We will continue monitoring patient at 6 month intervals and repeat Fibroscans annually. Ultrasound will be repeated. At some point a liver biopsy may be needed to determine if she has cirrhosis or not. NAFLD  The Fibroscan suggests she has benign steatosis or NAFL. The CAP value is elevated and suggests steatosis with no fibrosis. Only a liver biopsy can be certain that this is benign steatosis and not LABOY. This may be required in the future. The patient was counseled regarding diet and exercise to achieve weight loss. The best diet for patients with fatty liver is one very low in carbohydrates and enriched with protein. The patient was told to consume any food products and drinks containing fructose as this enhances hepatic fat synthesis. If she loses weight, the Fibroscan and ultrasound scan should improve. Screening for Esophageal varices   The patient has not had an EGD to screen for varices.     Given the fibroscan findings will hold off on EGD for now.    Treatment of other medical problems in patients with chronic liver disease  The patient was directed to continue all current medications at the current dosages. There are no contraindications for the patient to take any medications that are necessary for treatment of other medical issues. The patient can take oral diabetic agents for treatment of diabetes and statins for treatment of hypercholesterolemia. This will not impact the patient's current liver disease. The patient does not consume alcohol daily. Normal doses of acetaminophen can be used for pain as needed. Normal doses of acetaminophen as recommended on the label are not hepatotoxic, even in patients with cirrhosis. The patient has cirrhosis. Patients with cirrhosis should not use NSAIDs if possible as this is associated with a higher rate of SHARAN. Counseling for alcohol in patients with chronic liver disease  The patient has cirrhosis and was advised to be abstinent from all alcohol including non-alcoholic beer which does contain some alcohol. The patient does not consume any significant amount of alcohol. Thrombocytopenia   This is secondary touncertain etiology. There is no evidence of overt bleeding. No treatment is required. Vaccinations   Vaccination for viral hepatitis A is recommended since the patient has no serologic evidence of previous exposure or vaccination with immunity. Vaccination for viral hepatitis B is not needed. The patient has serologic evidence of prior exposure or vaccination with immunity. Routine vaccinations against other bacterial and viral agents can be performed as indicated. Annual flu vaccination should be administered if indicated. Screening for Hepatocellular Carcinoma  HCC screening is not necessary if the the patient does not have cirrhosis. The data is currently conflicting.   Will continue to image the liver with ultrasound The next liver imaging study will be performed in 12/2018. ALLERGIES  Allergies   Allergen Reactions    Aspirin Nausea and Vomiting    Lipitor [Atorvastatin] Myalgia    Metformin Cough    Pravastatin Myalgia     MEDICATIONS  Current Outpatient Medications   Medication Sig    amLODIPine (NORVASC) 5 mg tablet TAKE 1 TABLET BY MOUTH EVERY DAY    cloNIDine HCl (CATAPRES) 0.2 mg tablet TAKE 1 TAB BY MOUTH TWO (2) TIMES A DAY.  glucose blood VI test strips (CONTOUR NEXT STRIPS) strip Test blood sugar once daily     No current facility-administered medications for this visit. SYSTEM REVIEW NOT RELATED TO LIVER DISEASE OR REVIEWED ABOVE:  Constitution systems: Negative for fever, chills, weight gain, weight loss. Eyes: Negative for visual changes. ENT: Negative for sore throat, painful swallowing. Respiratory: Negative for cough, hemoptysis, SOB. Cardiology: Negative for chest pain, palpitations. GI:  Negative for constipation or diarrhea. : Negative for urinary frequency, dysuria, hematuria, nocturia. Skin: Negative for rash. Hematology: Negative for easy bruising, blood clots. Musculo-skelatal: Negative for back pain, muscle pain, weakness. Neurologic: Negative for headaches, dizziness, vertigo, memory problems not related to HE. Psychology: Negative for anxiety, depression. FAMILY HISTORY:  The patient has no knowledge of the father's medical condition. The mother is Has the following chronic diseases: dementia. There is no family history of liver disease. SOCIAL HISTORY:  The patient is . The patient has 2 children and 5 grandchildren. The patient has never used tobacco products. The patient has never consumed significant amounts of alcohol. The patient currently works full time as a .       PHYSICAL EXAMINATION:  Visit Vitals  /81 (BP 1 Location: Left arm, BP Patient Position: Sitting)   Pulse 79   Temp 97.6 °F (36.4 °C) (Tympanic)   Wt 162 lb (73.5 kg)   BMI 32.72 kg/m² General: No acute distress. Eyes: Sclera anicteric. ENT: No oral lesions. Thyroid normal.  Nodes: No adenopathy. Skin: No spider angiomata. No jaundice. No palmar erythema. Respiratory: Lungs clear to auscultation. Cardiovascular: Regular heart rate. No murmurs. No JVD. Abdomen: Soft non-tender. Liver size normal to percussion/palpation. Spleen not palpable. No obvious ascites. Extremities: No edema. No muscle wasting. No gross arthritic changes. Neurologic: Alert and oriented. Cranial nerves grossly intact. No asterixis. LABORATORY STUDIES:  [facility] [place] of [address] Units 1/8/2019 9/13/2018   WBC 3.4 - 10.8 x10E3/uL 8.1 7.2   ANC 1.4 - 7.0 x10E3/uL  4.3   HGB 11.1 - 15.9 g/dL 12.5 12.6    - 379 x10E3/uL 132 (L) 101 (L)   PLT      AST 0 - 40 IU/L 15    ALT 0 - 32 IU/L 11    Alk Phos 39 - 117 IU/L 86    Bili, Total 0.0 - 1.2 mg/dL <0.2    Albumin 3.5 - 4.8 g/dL 4.2    BUN 8 - 27 mg/dL 18    Creat 0.57 - 1.00 mg/dL 0.77    Na 134 - 144 mmol/L 144    K 3.5 - 5.2 mmol/L 4.2    Cl 96 - 106 mmol/L 108 (H)    CO2 20 - 29 mmol/L 20    Glucose 65 - 99 mg/dL 107 (H)      SEROLOGIES:  Serologies Latest Ref Rng & Units 6/14/2018   Hep A Ab, Total Negative Negative   Hep B Surface Ag Negative Negative   Hep B Core Ab, Total Negative Positive (A)   Hep B Surface AB QL  Non Reactive   Hep C Ab 0.0 - 0.9 s/co ratio <0.1   Ferritin 15 - 150 ng/mL 153 (H)   Iron % Saturation 15 - 55 % 27   ASMCA 0 - 19 Units 15   Alpha-1 antitrypsin level 90 - 200 mg/dL 122     LIVER HISTOLOGY:  7/2018. [facility]. EkPa was 3.9. IQR/med 8%. . The results suggest fatty liver and a fibrosis level of F0. ENDOSCOPIC PROCEDURES:  Not available or performed    RADIOLOGY:  5/2018. Ultrasound of liver. Echogenic consistent with cirrhosis. Liver mass lesion in the right lobe measuring 2.8 x 2.6 cm. No ascites. 6/2018. Dynamic MRI of liver.  Normal appearing liver.  Liver mass consistent with hemangioma in the right lobe, segment 5 measuring 3.2 cm. OTHER TESTING:  Not available or performed    All of the issues listed in the Assessment and Plan were discussed with the patient. All questions were answered. The patient expressed a clear understanding of the above. [address] [address] in 6 months with FibroScan.       Amie Brody NP  [facility] [place] of [address] [place] [address] [facility], [address]  Ph: [phone]  Fax: [phone]

## 2019-01-08 NOTE — PROGRESS NOTES
1. Have you been to the ER, urgent care clinic since your last visit? Hospitalized since your last visit? No    2. Have you seen or consulted any other health care providers outside of the [address] [place] [address] since your last visit? Include any pap smears or colon screening. No   Chief Complaint   Patient presents with    Follow-up     Visit Vitals  /81 (BP 1 Location: Left arm, BP Patient Position: Sitting)   Pulse 79   Temp 97.6 °F (36.4 °C) (Tympanic)   Wt 162 lb (73.5 kg)   BMI 32.72 kg/m²     PHQ over the last two weeks 1/8/2019   PHQ Not Done -   Little interest or pleasure in doing things Not at all   Feeling down, depressed, irritable, or hopeless Not at all   Total Score PHQ 2 0     Learning Assessment 1/8/2019   PRIMARY LEARNER Patient   HIGHEST LEVEL OF EDUCATION - PRIMARY LEARNER  -   BARRIERS PRIMARY LEARNER NONE   CO-LEARNER CAREGIVER No   PRIMARY LANGUAGE ENGLISH   LEARNER PREFERENCE PRIMARY LISTENING     -   ANSWERED BY patient   RELATIONSHIP SELF     Abuse Screening Questionnaire 1/8/2019   Do you ever feel afraid of your partner? N   Are you in a relationship with someone who physically or mentally threatens you? N   Is it safe for you to go home? Y     Fall Risk Assessment, last 12 mths 1/8/2019   Able to walk? Yes   Fall in past 12 months?  No   Fall with injury? -   Number of falls in past 12 months -   Fall Risk Score -

## 2019-01-09 LAB
ALBUMIN SERPL-MCNC: 4.2 G/DL (ref 3.5–4.8)
ALBUMIN/GLOB SERPL: 1.5 {RATIO} (ref 1.2–2.2)
ALP SERPL-CCNC: 86 IU/L (ref 39–117)
ALT SERPL-CCNC: 11 IU/L (ref 0–32)
AST SERPL-CCNC: 15 IU/L (ref 0–40)
BILIRUB SERPL-MCNC: <0.2 MG/DL (ref 0–1.2)
BUN SERPL-MCNC: 18 MG/DL (ref 8–27)
BUN/CREAT SERPL: 23 (ref 12–28)
CALCIUM SERPL-MCNC: 9.2 MG/DL (ref 8.7–10.3)
CHLORIDE SERPL-SCNC: 108 MMOL/L (ref 96–106)
CO2 SERPL-SCNC: 20 MMOL/L (ref 20–29)
CREAT SERPL-MCNC: 0.77 MG/DL (ref 0.57–1)
ERYTHROCYTE [DISTWIDTH] IN BLOOD BY AUTOMATED COUNT: 14.6 % (ref 12.3–15.4)
GLOBULIN SER CALC-MCNC: 2.8 G/DL (ref 1.5–4.5)
GLUCOSE SERPL-MCNC: 107 MG/DL (ref 65–99)
HCT VFR BLD AUTO: 38.9 % (ref 34–46.6)
HGB BLD-MCNC: 12.5 G/DL (ref 11.1–15.9)
MCH RBC QN AUTO: 28.5 PG (ref 26.6–33)
MCHC RBC AUTO-ENTMCNC: 32.1 G/DL (ref 31.5–35.7)
MCV RBC AUTO: 89 FL (ref 79–97)
PLATELET # BLD AUTO: 132 X10E3/UL (ref 150–379)
POTASSIUM SERPL-SCNC: 4.2 MMOL/L (ref 3.5–5.2)
PROT SERPL-MCNC: 7 G/DL (ref 6–8.5)
RBC # BLD AUTO: 4.38 X10E6/UL (ref 3.77–5.28)
SODIUM SERPL-SCNC: 144 MMOL/L (ref 134–144)
WBC # BLD AUTO: 8.1 X10E3/UL (ref 3.4–10.8)

## 2019-01-10 ENCOUNTER — OFFICE VISIT (OUTPATIENT)
Dept: INTERNAL MEDICINE CLINIC | Age: 69
End: 2019-01-10

## 2019-01-10 VITALS
SYSTOLIC BLOOD PRESSURE: 143 MMHG | BODY MASS INDEX: 32.72 KG/M2 | OXYGEN SATURATION: 98 % | HEART RATE: 54 BPM | TEMPERATURE: 96.2 F | DIASTOLIC BLOOD PRESSURE: 64 MMHG | HEIGHT: 59 IN | RESPIRATION RATE: 18 BRPM

## 2019-01-10 DIAGNOSIS — E11.9 TYPE 2 DIABETES MELLITUS WITHOUT COMPLICATION, WITHOUT LONG-TERM CURRENT USE OF INSULIN (HCC): ICD-10-CM

## 2019-01-10 DIAGNOSIS — D69.6 THROMBOCYTOPENIA (HCC): ICD-10-CM

## 2019-01-10 DIAGNOSIS — F41.8 DEPRESSION WITH ANXIETY: ICD-10-CM

## 2019-01-10 DIAGNOSIS — K76.0 NAFL (NONALCOHOLIC FATTY LIVER): ICD-10-CM

## 2019-01-10 DIAGNOSIS — E78.00 HYPERCHOLESTEROLEMIA: ICD-10-CM

## 2019-01-10 DIAGNOSIS — N90.89: ICD-10-CM

## 2019-01-10 DIAGNOSIS — I10 ESSENTIAL HYPERTENSION: Primary | ICD-10-CM

## 2019-01-10 DIAGNOSIS — D18.03 HEMANGIOMA OF LIVER: ICD-10-CM

## 2019-01-10 NOTE — PROGRESS NOTES
1. Have you been to the ER, urgent care clinic since your last visit? Hospitalized since your last visit? No 
 
2. Have you seen or consulted any other health care providers outside of the [address] [place] [address] since your last visit? Include any pap smears or colon screening.  No

## 2019-01-10 NOTE — PROGRESS NOTES
[facility] MEDICINE [facility] PRIMARY [facility]ARE Bill Elizabeth MD, [place], [address] [address] Phone:  [phone]  Fax: [phone] Chief Complaint Patient presents with  Urinary Pain Artist Ciara SUBJECTIVE: 
  Ángel Lofton is a 77 y.o. female Patient returns today with known history of diabetes, whose control has been good, nonalcoholic fatty liver disease, followed by Dr. Meg Zheng, ultrasound consistent with cirrhosis, MRI of the liver revealed ____ liver mass consistent with hemangioma of the right lobe measuring 2 cm. Patient returns today complaining of vulvar swelling and uncomfortableness and actually the requirement of having to strain when she urinates because of the swelling. Current Outpatient Medications Medication Sig Dispense Refill  amLODIPine (NORVASC) 5 mg tablet TAKE 1 TABLET BY MOUTH EVERY DAY 90 Tab 3  cloNIDine HCl (CATAPRES) 0.2 mg tablet TAKE 1 TAB BY MOUTH TWO (2) TIMES A DAY. 180 Tab 3  
 glucose blood VI test strips (CONTOUR NEXT STRIPS) strip Test blood sugar once daily 50 strip 11 Past Medical History:  
Diagnosis Date  Baker's cyst 3-3-16  
 r  Bereavement 11/15 61yo  sister   Contusion of knee and lower leg, right, subsequent encounter 11/10/2017  Contusion of knee, right  Cough 2017  Depression with anxiety  Diabetes ([address]  Diverticulosis 15  
 colonoscopy  DM2 (diabetes mellitus, type 2) ([address]  Edema  Encounter related to worker's compensation claim 11/10/2017  
 fall t work  Fatigue  Hemangioma of liver 2018  
 mri  Hypercholesterolemia  Hypertension  Liver disease   
 hepatitis 30 years ago - Liver mass consistent with hemangioma in the right lobe  NAFL (nonalcoholic fatty liver)  Obesity  Onychomycosis  S/P LD (total abdominal hysterectomy) 1985  Thrombocytopenia ([address] 2014  Vulval edema Past Surgical History: Procedure Laterality Date  HX GYN    
 partial hysterectomy Allergies Allergen Reactions  Aspirin Nausea and Vomiting  Lipitor [Atorvastatin] Myalgia  Metformin Cough  Pravastatin Myalgia REVIEW OF SYSTEMS: 
General: negative for - chills or fever ENT: negative for - headaches, nasal congestion or tinnitus Respiratory: negative for - cough, hemoptysis, shortness of breath or wheezing Cardiovascular : negative for - chest pain, edema, palpitations or shortness of breath Gastrointestinal: negative for - abdominal pain, blood in stools, heartburn or nausea/vomiting Genito-Urinary: no dysuria, trouble voiding, or hematuria Musculoskeletal: negative for - gait disturbance, joint pain, joint stiffness or joint swelling Neurological: no TIA or stroke symptoms Hematologic: no bruises, no bleeding, no swollen glands Integument: no lumps, mole changes, nail changes or rash Endocrine: no malaise/lethargy or unexpected weight changes Social History Socioeconomic History  Marital status:  Spouse name: Not on file  Number of children: Not on file  Years of education: Not on file  Highest education level: Not on file Tobacco Use  Smoking status: Never Smoker  Smokeless tobacco: Never Used Substance and Sexual Activity  Alcohol use: No  
  Alcohol/week: 0.0 oz  Drug use: No  
 Sexual activity: Not Currently Social History Narrative Medical History: HTNTMJ dysfunctionHAsCecal lipoma 01/04negative head CT 03/04August 14, 2013 CT head w ith contrast unremarkable  
 except progressive splenoid sinusitisAugust 14, 2013 CT chest unremarkable Gyn History: Last mammogram date 01/13/2012. Surgical History: LD 1985colonoscopy 01/04 Hospitalization/Major Diagnostic Procedure: Denies Past Hospitalization Family History:  Mother: alive 71 yrs, HTNFather: unknow nSister(s): alive, DMBrother(s): aliveDaughter(s): aliveSon(s): aliveAunt: alive2  
 brother(s) , 2 sister(s) . 1 son(s) , 1 daughter(s) . Social History: Alcohol Use Patient does not use alcohol. Smoking Status Patient is a never smoker. Marital Status: . Lives w ith:  
 spouse. no Children. 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 Education/School: has highschool diploma. Faith: Jehovah W itness - no blood transfusions. Family History Problem Relation Age of Onset  No Known Problems Father OBJECTIVE: 
 
Visit Vitals /64 (BP 1 Location: Left arm, BP Patient Position: Sitting) Pulse (!) 54 Temp 96.2 °F (35.7 °C) (Oral) Resp 18 Ht 4' 11\" (1.499 m) SpO2 98% BMI 32.72 kg/m² CONSTITUTIONAL: well , well nourished, appears age appropriate EYES: perrla, eom intact ENMT:moist mucous membranes, pharynx clear NECK: supple. Thyroid normal 
RESPIRATORY: Chest: clear bilaterally CARDIOVASCULAR: Heart: regular rate and rhythm GASTROINTESTINAL: Abdomen: soft, bowel sounds active HEMATOLOGIC: no pathological lymph nodes palpated MUSCULOSKELETAL: Extremities: no edema, pulse 1+ INTEGUMENT: No unusual rashes or suspicious skin lesions noted. Nails appear normal. 
NEUROLOGIC: non-focal exam  
MENTAL STATUS: alert and oriented, appropriate affect ASSESSMENT: 
1. Essential hypertension 2. Hypercholesterolemia 3. Depression with anxiety 4. Type 2 diabetes mellitus without complication, without long-term current use of insulin ([address] 5. Thrombocytopenia ([address] 6. Hemangioma of liver 7. NAFL (nonalcoholic fatty liver) 8. Vulval edema Examination does not give a reason for her to have vulvar swelling. She has no ascites. She has trace edema peripherally and therefore I do not have a good etiology. Her albumin is acceptable and not unreasonably low.   Before we begin an extensive evaluation I think she should see a gynecologist for a local cause of the swelling and if it is not found then we will further evaluate. She agrees with the plan and referred to a gynecologist here in the building. Her blood pressure is a little higher than we would like to see it. I certainly would like to see it less than 130/80. Her blood sugar control has been excellent. It was checked in September. She just had blood work apparently done by the liver specialist so we will not do it again today, but she will need to have a hemoglobin A1c in the future. She will be back to see us in two or three months. I have discussed the diagnosis with the patient and the intended plan as seen in the 
orders above. The patient understands and agees with the plan. The patient has  
received an after visit summary and questions were answered concerning 
future plans Patient labs and/or xrays were reviewed Past records were reviewed. PLAN: 
. Orders Placed This Encounter  REFERRAL TO GYNECOLOGY Follow-up Disposition: 
Return in about 3 months (around 4/10/2019). ATTENTION:  
This medical record was transcribed using an electronic medical records system. Although proofread, it may and can contain electronic and spelling errors. Other human spelling and other errors may be present. Corrections may be executed at a later time. Please feel free to contact us for any clarifications as needed.

## 2019-03-25 RX ORDER — AMLODIPINE BESYLATE 5 MG/1
TABLET ORAL
Qty: 30 TAB | Refills: 2 | Status: SHIPPED | OUTPATIENT
Start: 2019-03-25 | End: 2019-06-26 | Stop reason: SDUPTHER

## 2019-04-18 ENCOUNTER — OFFICE VISIT (OUTPATIENT)
Dept: INTERNAL MEDICINE CLINIC | Age: 69
End: 2019-04-18

## 2019-04-18 VITALS
DIASTOLIC BLOOD PRESSURE: 65 MMHG | RESPIRATION RATE: 16 BRPM | BODY MASS INDEX: 34.8 KG/M2 | HEIGHT: 59 IN | SYSTOLIC BLOOD PRESSURE: 104 MMHG | WEIGHT: 172.6 LBS | TEMPERATURE: 97.9 F | HEART RATE: 65 BPM | OXYGEN SATURATION: 98 %

## 2019-04-18 DIAGNOSIS — F41.8 DEPRESSION WITH ANXIETY: ICD-10-CM

## 2019-04-18 DIAGNOSIS — N90.89: ICD-10-CM

## 2019-04-18 DIAGNOSIS — M71.21 SYNOVIAL CYST OF RIGHT POPLITEAL SPACE: ICD-10-CM

## 2019-04-18 DIAGNOSIS — I10 ESSENTIAL HYPERTENSION: ICD-10-CM

## 2019-04-18 DIAGNOSIS — D18.03 HEMANGIOMA OF LIVER: ICD-10-CM

## 2019-04-18 DIAGNOSIS — E78.00 HYPERCHOLESTEROLEMIA: ICD-10-CM

## 2019-04-18 DIAGNOSIS — D69.6 THROMBOCYTOPENIA (HCC): ICD-10-CM

## 2019-04-18 DIAGNOSIS — K76.0 NAFL (NONALCOHOLIC FATTY LIVER): ICD-10-CM

## 2019-04-18 DIAGNOSIS — E11.9 TYPE 2 DIABETES MELLITUS WITHOUT COMPLICATION, WITHOUT LONG-TERM CURRENT USE OF INSULIN (HCC): Primary | ICD-10-CM

## 2019-04-18 NOTE — PROGRESS NOTES
[facility] Kathie Nance MD, [address] [address] Phone:  [phone]  Fax: [phone] Chief Complaint Patient presents with  Hypertension  
  follow up Jenna Cohn SUBJECTIVE: 
  Chela Hernandez is a 77 y.o. female Patient returns today with known history of diabetes mellitus, type 2, ______________ edema, evaluated by gynecology, thrombocytopenia, nonalcoholic fatty liver disease, hypertension, dyslipidemia, hemangioma _______________, depression with anxiety, _______________, and is seen for evaluation. Patient states that she had vaginal prolapse as the etiology of the swelling. She saw the gynecologist, who recommended surgical procedure to correct it and it is scheduled for May 6th. Patient comes in for evaluation. She is going to have robotic surgery and apparently had lab studies done at [facility] as a preop. It will be important that they also check her PT INR in view of her liver history. Current Outpatient Medications Medication Sig Dispense Refill  amLODIPine (NORVASC) 5 mg tablet TAKE 1 TABLET BY MOUTH EVERY DAY 30 Tab 2  cloNIDine HCl (CATAPRES) 0.2 mg tablet TAKE 1 TAB BY MOUTH TWO (2) TIMES A DAY. 180 Tab 3  
 glucose blood VI test strips (CONTOUR NEXT STRIPS) strip Test blood sugar once daily 50 strip 11 Past Medical History:  
Diagnosis Date  Baker's cyst 3-3-16  
 r  Bereavement 11/15 61yo  sister   Contusion of knee and lower leg, right, subsequent encounter 11/10/2017  Contusion of knee, right  Cough 2017  Depression with anxiety  Diabetes ([address]  Diverticulosis 2-15  
 colonoscopy  DM2 (diabetes mellitus, type 2) ([address]  Edema  Encounter related to worker's compensation claim 11/10/2017  
 fall t work  Fatigue  Hemangioma of liver 2018  
 mri  Hypercholesterolemia  Hypertension  Liver disease hepatitis 30 years ago - Liver mass consistent with hemangioma in the right lobe  NAFL (nonalcoholic fatty liver)  Obesity  Onychomycosis  S/P LD (total abdominal hysterectomy) 1985  Thrombocyt[facility] [address] 12/12/2014  Vulval edema Past Surgical History:  
Procedure Laterality Date  HX GYN    
 partial hysterectomy Allergies Allergen Reactions  Aspirin Nausea and Vomiting  Lipitor [Atorvastatin] Myalgia  Metformin Cough  Pravastatin Myalgia REVIEW OF SYSTEMS: 
General: negative for - chills or fever ENT: negative for - headaches, nasal congestion or tinnitus Respiratory: negative for - cough, hemoptysis, shortness of breath or wheezing Cardiovascular : negative for - chest pain, edema, palpitations or shortness of breath Gastrointestinal: negative for - abdominal pain, blood in stools, heartburn or nausea/vomiting Genito-Urinary: no dysuria, trouble voiding, or hematuria Musculoskeletal: negative for - gait disturbance, joint pain, joint stiffness or joint swelling Neurological: no TIA or stroke symptoms Hematologic: no bruises, no bleeding, no swollen glands Integument: no lumps, mole changes, nail changes or rash Endocrine: no malaise/lethargy or unexpected weight changes Social History Socioeconomic History  Marital status:  Spouse name: Not on file  Number of children: Not on file  Years of education: Not on file  Highest education level: Not on file Tobacco Use  Smoking status: Never Smoker  Smokeless tobacco: Never Used Substance and Sexual Activity  Alcohol use: No  
  Alcohol/week: 0.0 oz  Drug use: No  
 Sexual activity: Not Currently Social History Narrative Medical History: HTNTMJ dysfunctionHAsCecal lipoma 01/04negative head CT 03/04August 14, 2013 CT head w ith contrast unremarkable  
 except progressive splenoid sinusitisAugust 14, 2013 CT chest unremarkable Gyn History: Last mammogram date 01/13/2012. Surgical History: LD 1985colonoscopy 01/04 Hospitalization/Major Diagnostic Procedure: Denies Past Hospitalization Family History: Mother: alive 71 yrs, HTNFather: unknow nSister(s): alive, DMBrother(s): aliveDaughter(s): aliveSon(s): aliveAunt: alive2  
 brother(s) , 2 sister(s) . 1 son(s) , 1 daughter(s) . Social History: Alcohol Use Patient does not use alcohol. Smoking Status Patient is a never smoker. Marital Status: . Lives w ith:  
 spouse. no Children. 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 Education/School: has highschool diploma. Uatsdin: Jehovah W itness - no blood transfusions. Family History Problem Relation Age of Onset  No Known Problems Father Patient has been screened _______________ and that she goes for preoperative evaluation prior to surgery as expected. From our medical perspective, patient's medical status is stable for the planned surgical procedure. We will assume that they will do EKG in preparation for the procedure. Our last EKG revealed nonspecific ST-T wave changes in 12/12/14. She had no cardiac history. BP control is at goal. 
 
Clinically patient's medical status is stable for this procedure and we will see her after this procedure is completed and review the note through [facility] system. OBJECTIVE: 
 
Visit Vitals /65 Pulse 65 Temp 97.9 °F (36.6 °C) (Oral) Resp 16 Ht 4' 11\" (1.499 m) Wt 172 lb 9.6 oz (78.3 kg) SpO2 98% BMI 34.86 kg/m² CONSTITUTIONAL: well , well nourished, appears age appropriate EYES: perrla, eom intact ENMT:moist mucous membranes, pharynx clear NECK: supple. Thyroid normal 
RESPIRATORY: Chest: clear bilaterally CARDIOVASCULAR: Heart: regular rate and rhythm GASTROINTESTINAL: Abdomen: soft, bowel sounds active HEMATOLOGIC: no pathological lymph nodes palpated MUSCULOSKELETAL: Extremities: no edema, pulse 1+ INTEGUMENT: No unusual rashes or suspicious skin lesions noted. Nails appear normal. 
NEUROLOGIC: non-focal exam  
MENTAL STATUS: alert and oriented, appropriate affect ASSESSMENT: 
1. Type 2 diabetes mellitus without complication, without long-term current use of insulin ([address] 2. Vulval edema 3. Thrombocytopenia ([address] 4. NAFL (nonalcoholic fatty liver) 5. Essential hypertension 6. Hypercholesterolemia 7. Hemangioma of liver 8. Depression with anxiety 9. Synovial cyst of right popliteal space ** [facility] and do not find any \"tests\" that were performed. We do see that Renay Schulte MD has scheduled on 05/06/19 for her to have a robotic sacrocolpopexy, enterocele, rectocele repair, and _______________ sling cysto with calibration. I have discussed the diagnosis with the patient and the intended plan as seen in the 
orders above. The patient understands and agees with the plan. The patient has  
received an after visit summary and questions were answered concerning 
future plans Patient labs and/or xrays were reviewed Past records were reviewed. PLAN: 
. Orders Placed This Encounter  HEMOGLOBIN A1C WITH EAG  
 CBC WITH AUTOMATED DIFF  
 METABOLIC PANEL, BASIC  
 PROTHROMBIN TIME + INR Follow-up and Dispositions · Return in about 4 months (around 8/18/2019). ATTENTION:  
This medical record was transcribed using an electronic medical records system. Although proofread, it may and can contain electronic and spelling errors. Other human spelling and other errors may be present. Corrections may be executed at a later time. Please feel free to contact us for any clarifications as needed. Discussed the patient's BMI with her. The BMI follow up plan is as follows:  
 
dietary management education, guidance, and counseling 
encourage exercise 
monitor weight 
prescribed dietary intake An After Visit Summary was printed and given to the patient.

## 2019-04-18 NOTE — PROGRESS NOTES
Chief Complaint Patient presents with  Hypertension  
  follow up 1. Have you been to the ER, urgent care clinic since your last visit? Hospitalized since your last visit? No 
 
2. Have you seen or consulted any other health care providers outside of the [address] [place] [address] since your last visit? Include any pap smears or colon screening.  No

## 2019-04-18 NOTE — PATIENT INSTRUCTIONS
Body Mass Index: Care Instructions Your Care Instructions Body mass index (BMI) can help you see if your weight is raising your risk for health problems. It uses a formula to compare how much you weigh with how tall you are. · A BMI lower than 18.5 is considered underweight. · A BMI between 18.5 and 24.9 is considered healthy. · A BMI between 25 and 29.9 is considered overweight. A BMI of 30 or higher is considered obese. If your BMI is in the normal range, it means that you have a lower risk for weight-related health problems. If your BMI is in the overweight or obese range, you may be at increased risk for weight-related health problems, such as high blood pressure, heart disease, stroke, arthritis or joint pain, and diabetes. If your BMI is in the underweight range, you may be at increased risk for health problems such as fatigue, lower protection (immunity) against illness, muscle loss, bone loss, hair loss, and hormone problems. BMI is just one measure of your risk for weight-related health problems. You may be at higher risk for health problems if you are not active, you eat an unhealthy diet, or you drink too much alcohol or use tobacco products. Follow-up care is a key part of your treatment and safety. Be sure to make and go to all appointments, and call your doctor if you are having problems. It's also a good idea to know your test results and keep a list of the medicines you take. How can you care for yourself at home? · Practice healthy eating habits. This includes eating plenty of fruits, vegetables, whole grains, lean protein, and low-fat dairy. · If your doctor recommends it, get more exercise. Walking is a good choice. Bit by bit, increase the amount you walk every day. Try for at least 30 minutes on most days of the week. · Do not smoke. Smoking can increase your risk for health problems.  If you need help quitting, talk to your doctor about stop-smoking programs and medicines. These can increase your chances of quitting for good. · Limit alcohol to 2 drinks a day for men and 1 drink a day for women. Too much alcohol can cause health problems. If you have a BMI higher than 25 · Your doctor may do other tests to check your risk for weight-related health problems. This may include measuring the distance around your waist. A waist measurement of more than 40 inches in men or 35 inches in women can increase the risk of weight-related health problems. · Talk with your doctor about steps you can take to stay healthy or improve your health. You may need to make lifestyle changes to lose weight and stay healthy, such as changing your diet and getting regular exercise. If you have a BMI lower than 18.5 · Your doctor may do other tests to check your risk for health problems. · Talk with your doctor about steps you can take to stay healthy or improve your health. You may need to make lifestyle changes to gain or maintain weight and stay healthy, such as getting more healthy foods in your diet and doing exercises to build muscle. Where can you learn more? Go to http://jaret-brittany.info/. Enter S176 in the search box to learn more about \"Body Mass Index: Care Instructions. \" Current as of: October 13, 2016 Content Version: 11.4 © 9012-1606 [employer]. Care instructions adapted under license by [employer] (which disclaims liability or warranty for this information). If you have questions about a medical condition or this instruction, always ask your healthcare professional. [address] any warranty or liability for your use of this information.

## 2019-04-19 LAB
BASOPHILS # BLD AUTO: 0.1 X10E3/UL (ref 0–0.2)
BASOPHILS NFR BLD AUTO: 1 %
BUN SERPL-MCNC: 10 MG/DL (ref 8–27)
BUN/CREAT SERPL: 13 (ref 12–28)
CALCIUM SERPL-MCNC: 8.7 MG/DL (ref 8.7–10.3)
CHLORIDE SERPL-SCNC: 103 MMOL/L (ref 96–106)
CO2 SERPL-SCNC: 24 MMOL/L (ref 20–29)
CREAT SERPL-MCNC: 0.78 MG/DL (ref 0.57–1)
EOSINOPHIL # BLD AUTO: 0.4 X10E3/UL (ref 0–0.4)
EOSINOPHIL NFR BLD AUTO: 5 %
ERYTHROCYTE [DISTWIDTH] IN BLOOD BY AUTOMATED COUNT: 14.1 % (ref 12.3–15.4)
EST. AVERAGE GLUCOSE BLD GHB EST-MCNC: 157 MG/DL
GLUCOSE SERPL-MCNC: 127 MG/DL (ref 65–99)
HBA1C MFR BLD: 7.1 % (ref 4.8–5.6)
HCT VFR BLD AUTO: 38.2 % (ref 34–46.6)
HGB BLD-MCNC: 12.6 G/DL (ref 11.1–15.9)
IMM GRANULOCYTES # BLD AUTO: 0 X10E3/UL (ref 0–0.1)
IMM GRANULOCYTES NFR BLD AUTO: 0 %
INR PPP: 1 (ref 0.8–1.2)
LYMPHOCYTES # BLD AUTO: 2.3 X10E3/UL (ref 0.7–3.1)
LYMPHOCYTES NFR BLD AUTO: 30 %
MCH RBC QN AUTO: 29.2 PG (ref 26.6–33)
MCHC RBC AUTO-ENTMCNC: 33 G/DL (ref 31.5–35.7)
MCV RBC AUTO: 88 FL (ref 79–97)
MONOCYTES # BLD AUTO: 0.4 X10E3/UL (ref 0.1–0.9)
MONOCYTES NFR BLD AUTO: 5 %
NEUTROPHILS # BLD AUTO: 4.6 X10E3/UL (ref 1.4–7)
NEUTROPHILS NFR BLD AUTO: 59 %
PLATELET # BLD AUTO: 128 X10E3/UL (ref 150–379)
POTASSIUM SERPL-SCNC: 4.1 MMOL/L (ref 3.5–5.2)
PROTHROMBIN TIME: 10.7 SEC (ref 9.1–12)
RBC # BLD AUTO: 4.32 X10E6/UL (ref 3.77–5.28)
SODIUM SERPL-SCNC: 140 MMOL/L (ref 134–144)
WBC # BLD AUTO: 7.7 X10E3/UL (ref 3.4–10.8)

## 2019-05-28 ENCOUNTER — HOSPITAL ENCOUNTER (OUTPATIENT)
Dept: MAMMOGRAPHY | Age: 69
Discharge: HOME | End: 2019-05-28
Attending: INTERNAL MEDICINE
Payer: MEDICARE

## 2019-05-28 DIAGNOSIS — Z12.39 SCREENING BREAST EXAMINATION: ICD-10-CM

## 2019-05-28 PROCEDURE — 77067 SCR MAMMO BI INCL CAD: CPT

## 2019-07-01 ENCOUNTER — OFFICE VISIT (OUTPATIENT)
Dept: INTERNAL MEDICINE CLINIC | Age: 69
End: 2019-07-01

## 2019-07-01 VITALS
DIASTOLIC BLOOD PRESSURE: 74 MMHG | OXYGEN SATURATION: 95 % | BODY MASS INDEX: 33.64 KG/M2 | RESPIRATION RATE: 18 BRPM | HEART RATE: 83 BPM | SYSTOLIC BLOOD PRESSURE: 115 MMHG | WEIGHT: 166.9 LBS | HEIGHT: 59 IN | TEMPERATURE: 98 F

## 2019-07-01 DIAGNOSIS — E11.9 TYPE 2 DIABETES MELLITUS WITHOUT COMPLICATION, WITHOUT LONG-TERM CURRENT USE OF INSULIN (HCC): Primary | ICD-10-CM

## 2019-07-01 DIAGNOSIS — D69.6 THROMBOCYTOPENIA (HCC): ICD-10-CM

## 2019-07-01 DIAGNOSIS — F41.8 DEPRESSION WITH ANXIETY: ICD-10-CM

## 2019-07-01 DIAGNOSIS — K76.0 NAFL (NONALCOHOLIC FATTY LIVER): ICD-10-CM

## 2019-07-01 DIAGNOSIS — M71.21 SYNOVIAL CYST OF RIGHT POPLITEAL SPACE: ICD-10-CM

## 2019-07-01 DIAGNOSIS — F33.9 RECURRENT DEPRESSION (HCC): ICD-10-CM

## 2019-07-01 DIAGNOSIS — E78.00 HYPERCHOLESTEROLEMIA: ICD-10-CM

## 2019-07-01 DIAGNOSIS — N81.9 VAGINAL VAULT PROLAPSE: ICD-10-CM

## 2019-07-01 DIAGNOSIS — I10 ESSENTIAL HYPERTENSION: ICD-10-CM

## 2019-07-01 DIAGNOSIS — D18.03 HEMANGIOMA OF LIVER: ICD-10-CM

## 2019-07-01 RX ORDER — AZITHROMYCIN 250 MG/1
250 TABLET, FILM COATED ORAL SEE ADMIN INSTRUCTIONS
Qty: 6 TAB | Refills: 1 | Status: SHIPPED | OUTPATIENT
Start: 2019-07-01 | End: 2019-07-06

## 2019-07-01 RX ORDER — FLUTICASONE PROPIONATE 50 MCG
2 SPRAY, SUSPENSION (ML) NASAL DAILY
Qty: 1 BOTTLE | Refills: 11 | Status: SHIPPED | OUTPATIENT
Start: 2019-07-01 | End: 2021-11-16 | Stop reason: ALTCHOICE

## 2019-07-01 NOTE — ASSESSMENT & PLAN NOTE
Stable, based on history, physical exam and review of pertinent labs, studies and medications; meds reconciled; continue current treatment plan.   Lab Results   Component Value Date/Time    WBC 7.7 04/18/2019 05:12 PM    HGB 12.6 04/18/2019 05:12 PM    HCT 38.2 04/18/2019 05:12 PM    PLATELET 379 84/60/6825 05:12 PM    Creatinine 0.78 04/18/2019 05:12 PM    BUN 10 04/18/2019 05:12 PM    Potassium 4.1 04/18/2019 05:12 PM    INR 1.0 04/18/2019 05:12 PM    Prothrombin time 10.7 04/18/2019 05:12 PM

## 2019-07-01 NOTE — PROGRESS NOTES
1. Have you been to the ER, urgent care clinic since your last visit? Hospitalized since your last visit? No    2. Have you seen or consulted any other health care providers outside of the [address] [place] [address] since your last visit? Include any pap smears or colon screening.  No      Cold symptoms

## 2019-07-01 NOTE — ASSESSMENT & PLAN NOTE
Stable, based on history, physical exam and review of pertinent labs, studies and medications; meds reconciled; continue current treatment plan. Key Antihyperglycemic Medications     Patient is on no antihyperglycemic meds. Other Key Diabetic Medications     Patient is on no other key diabetic meds.         Lab Results   Component Value Date/Time    Hemoglobin A1c 7.1 04/18/2019 05:12 PM    Glucose 127 04/18/2019 05:12 PM    Creatinine 0.78 04/18/2019 05:12 PM    Microalb/Creat ratio (ug/mg creat.) <6.5 09/13/2018 02:05 PM    Cholesterol, total 196 04/10/2018 05:57 PM    HDL Cholesterol 57 04/10/2018 05:57 PM    LDL, calculated 128 04/10/2018 05:57 PM    Triglyceride 56 04/10/2018 05:57 PM     Diabetic Foot and Eye Exam HM Status   Topic Date Due    Eye Exam  10/14/2016    Diabetic Foot Care  04/10/2019

## 2019-07-01 NOTE — ASSESSMENT & PLAN NOTE
Stable, based on history, physical exam and review of pertinent labs, studies and medications; meds reconciled; continue current treatment plan. Key Psychotherapeutic Meds     Patient is on no psychotherapeutic meds. Other [address] [place] [address]     The patient is on no other behavioral health meds.         Lab Results   Component Value Date/Time    Sodium 140 04/18/2019 05:12 PM    Creatinine 0.78 04/18/2019 05:12 PM    TSH 1.270 05/16/2018 10:27 AM    WBC 7.7 04/18/2019 05:12 PM    ALT (SGPT) 11 01/08/2019 12:00 AM    AST (SGOT) 15 01/08/2019 12:00 AM

## 2019-07-01 NOTE — PROGRESS NOTES
SPORTS MEDICINE AND PRIMARY CARE  Cassell Dakin, MD, [address]es[address] [address]  Phone:  [phone]  Fax: [phone]       Chief Complaint   Patient presents with    Cold Symptoms   . SUBJECTIVE:    Bard Berry is a 77 y.o. female Patient returns today, known history of diabetes mellitus type 2, thrombocytopenia, nonalcoholic fatty liver, primary hypertension, dyslipidemia, hemangioma of the liver, depression with anxiety, Baker's cyst.  Is seen for evaluation. The patient returns today complaining of a cough producing mucopurulent sputum now for the past couple of days. Only when she is in the air conditioning does she have chills and fever. Since we last saw her, she had the robotic surgery for the uterine prolapse. She also had a surgical procedure not known to her. We will attempt to get the surgical procedure so that we have a record of what surgery she had. It is not clear to her what she had. The patient is seen for evaluation. Current Outpatient Medications   Medication Sig Dispense Refill    azithromycin (ZITHROMAX) 250 mg tablet Take 1 Tab by mouth See Admin Instructions for 5 days. 6 Tab 1    fluticasone propionate (FLONASE) 50 mcg/actuation nasal spray 2 Sprays by Both Nostrils route daily. 1 Bottle 11    amLODIPine (NORVASC) 5 mg tablet TAKE 1 TABLET BY MOUTH EVERY DAY 90 Tab 3    cloNIDine HCl (CATAPRES) 0.2 mg tablet TAKE 1 TAB BY MOUTH TWO (2) TIMES A DAY.  180 Tab 3    glucose blood VI test strips (CONTOUR NEXT STRIPS) strip Test blood sugar once daily 50 strip 11     Past Medical History:   Diagnosis Date    Baker's cyst 3-3-16    r    Bereavement 11/15    59yo  sister     Contusion of knee and lower leg, right, subsequent encounter 11/10/2017    Contusion of knee, right     Cough 2017    Depression with anxiety     Diabetes ([address]     Diverticulosis 2-6-15    colonoscopy    DM2 (diabetes mellitus, type 2) ([address]     Edema     Encounter related to worker's compensation claim 11/10/2017    fall t work    Fatigue     Hemangioma of liver 06/2018    mri    Hypercholesterolemia     Hypertension     Liver disease     hepatitis 30 years ago - Liver mass consistent with hemangioma in the right lobe    NAFL (nonalcoholic fatty liver)     Obesity     Onychomycosis     S/P LD (total abdominal hysterectomy) 1985    [facility]rombocyt[facility] 12/12/2014    Vulval edema      Past Surgical History:   Procedure Laterality Date    HX GYN      partial hysterectomy     Allergies   Allergen Reactions    Aspirin Nausea and Vomiting    Lipitor [Atorvastatin] Myalgia    Metformin Cough    Pravastatin Myalgia         REVIEW OF SYSTEMS:  General: negative for - chills or fever  ENT: negative for - headaches, nasal congestion or tinnitus  Respiratory: negative for - cough, hemoptysis, shortness of breath or wheezing  Cardiovascular : negative for - chest pain, edema, palpitations or shortness of breath  Gastrointestinal: negative for - abdominal pain, blood in stools, heartburn or nausea/vomiting  Genito-Urinary: no dysuria, trouble voiding, or hematuria  Musculoskeletal: negative for - gait disturbance, joint pain, joint stiffness or joint swelling  Neurological: no TIA or stroke symptoms  Hematologic: no bruises, no bleeding, no swollen glands  Integument: no lumps, mole changes, nail changes or rash  Endocrine: no malaise/lethargy or unexpected weight changes      Social History     Socioeconomic History    Marital status:      Spouse name: Not on file    Number of children: Not on file    Years of education: Not on file    Highest education level: Not on file   Tobacco Use    Smoking status: Never Smoker    Smokeless tobacco: Never Used   Substance and Sexual Activity    Alcohol use: No     Alcohol/week: 0.0 oz    Drug use: No    Sexual activity: Not Currently   Social History Narrative    Medical History: HTNTMJ dysfunctionHAsCecal lipoma 01/04negative head CT 03/04August 14, 2013 CT head w ith contrast unremarkable    except progressive splenoid sinusitisAugust 14, 2013 CT chest unremarkable    Gyn History: Last mammogram date 01/13/2012. Surgical History: LD 1985colonoscopy 01/04    Hospitalization/Major Diagnostic Procedure: Denies Past Hospitalization    Family History: Mother: alive 71 yrs, HTNFather: unknow nSister(s): alive, DMBrother(s): aliveDaughter(s): aliveSon(s): aliveAunt: alive2    brother(s) , 2 sister(s) . 1 son(s) , 1 daughter(s) . Social History: Alcohol Use Patient does not use alcohol. Smoking Status Patient is a never smoker. Marital Status: . Lives w ith:    spouse. no Children. 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 Education/School: has highschool diploma. Hoahaoism: Jehovah    W itness - no blood transfusions. Family History   Problem Relation Age of Onset    No Known Problems Father        OBJECTIVE:    Visit Vitals  /74   Pulse 83   Temp 98 °F (36.7 °C) (Oral)   Resp 18   Ht 4' 11\" (1.499 m)   Wt 166 lb 14.4 oz (75.7 kg)   SpO2 95%   BMI 33.71 kg/m²     CONSTITUTIONAL: well , well nourished, appears age appropriate  EYES: perrla, eom intact  ENMT:moist mucous membranes, pharynx clear  NECK: supple. Thyroid normal  RESPIRATORY: Chest: clear bilaterally   CARDIOVASCULAR: Heart: regular rate and rhythm  GASTROINTESTINAL: Abdomen: soft, bowel sounds active  HEMATOLOGIC: no pathological lymph nodes palpated  MUSCULOSKELETAL: Extremities: no edema, pulse 1+ Her exam reveals no lesions, pulse is intact, find filament sensation is intact. INTEGUMENT: No unusual rashes or suspicious skin lesions noted. Nails appear normal.  NEUROLOGIC: non-focal exam   MENTAL STATUS: alert and oriented, appropriate affect           ASSESSMENT:  1. Type 2 diabetes mellitus without complication, without long-term current use of insulin ([address]    2. Thrombocytopenia ([address]    3. NAFL (nonalcoholic fatty liver)    4. Essential hypertension    5. Hypercholesterolemia    6. Hemangioma of liver    7. Depression with anxiety    8. Synovial cyst of right popliteal space      Patient has an acute bronchitis which will treat aggressively since she recently had a surgical procedure. I will place her on Z-Alvarez and suggest she use Robitussin DM. Blood sugar control is adequate but we would like it better. She is willing to take Metformin once a day with supper to improve blood sugar control, which would also improve her appetite by decreasing the appetite. It is time for us to check her lipid panel today likewise. It is a little bit too soon to check the hemoglobin A1c; therefore, we will do that on her next visit. Blood pressure control is at goal.    BMI represents a 6-pound weight loss and we congratulate her. She will be back to see us in about 3 months, sooner if she has any problems. she has intolerance to the Metformin manifested by a cough and therefore we will _______________(:4) at this time. I have discussed the diagnosis with the patient and the intended plan as seen in the  orders above. The patient understands and agees with the plan. The patient has   received an after visit summary and questions were answered concerning  future plans  Patient labs and/or xrays were reviewed  Past records were reviewed. PLAN:  .  Orders Placed This Encounter    LIPID PANEL    HEMOGLOBIN A1C WITH EAG    CBC WITH AUTOMATED DIFF    azithromycin (ZITHROMAX) 250 mg tablet    fluticasone propionate (FLONASE) 50 mcg/actuation nasal spray       Follow-up and Dispositions    · Return in about 3 months (around 10/1/2019). ATTENTION:   This medical record was transcribed using an electronic medical records system. Although proofread, it may and can contain electronic and spelling errors. Other human spelling and other errors may be present.   Corrections may be executed at a later time. Please feel free to contact us for any clarifications as needed.

## 2019-07-02 LAB
BASOPHILS # BLD AUTO: 0.1 X10E3/UL (ref 0–0.2)
BASOPHILS NFR BLD AUTO: 1 %
CHOLEST SERPL-MCNC: 176 MG/DL (ref 100–199)
EOSINOPHIL # BLD AUTO: 0.4 X10E3/UL (ref 0–0.4)
EOSINOPHIL NFR BLD AUTO: 5 %
ERYTHROCYTE [DISTWIDTH] IN BLOOD BY AUTOMATED COUNT: 14.2 % (ref 12.3–15.4)
EST. AVERAGE GLUCOSE BLD GHB EST-MCNC: 148 MG/DL
HBA1C MFR BLD: 6.8 % (ref 4.8–5.6)
HCT VFR BLD AUTO: 39.1 % (ref 34–46.6)
HDLC SERPL-MCNC: 49 MG/DL
HGB BLD-MCNC: 12.7 G/DL (ref 11.1–15.9)
IMM GRANULOCYTES # BLD AUTO: 0 X10E3/UL (ref 0–0.1)
IMM GRANULOCYTES NFR BLD AUTO: 0 %
LDLC SERPL CALC-MCNC: 114 MG/DL (ref 0–99)
LYMPHOCYTES # BLD AUTO: 2.6 X10E3/UL (ref 0.7–3.1)
LYMPHOCYTES NFR BLD AUTO: 27 %
MCH RBC QN AUTO: 28.6 PG (ref 26.6–33)
MCHC RBC AUTO-ENTMCNC: 32.5 G/DL (ref 31.5–35.7)
MCV RBC AUTO: 88 FL (ref 79–97)
MONOCYTES # BLD AUTO: 0.3 X10E3/UL (ref 0.1–0.9)
MONOCYTES NFR BLD AUTO: 3 %
MORPHOLOGY BLD-IMP: ABNORMAL
NEUTROPHILS # BLD AUTO: 6.3 X10E3/UL (ref 1.4–7)
NEUTROPHILS NFR BLD AUTO: 64 %
PLATELET # BLD AUTO: 135 X10E3/UL (ref 150–450)
RBC # BLD AUTO: 4.44 X10E6/UL (ref 3.77–5.28)
TRIGL SERPL-MCNC: 66 MG/DL (ref 0–149)
VLDLC SERPL CALC-MCNC: 13 MG/DL (ref 5–40)
WBC # BLD AUTO: 9.8 X10E3/UL (ref 3.4–10.8)

## 2019-08-27 ENCOUNTER — OFFICE VISIT (OUTPATIENT)
Dept: HEMATOLOGY | Age: 69
End: 2019-08-27

## 2019-08-27 VITALS
HEIGHT: 59 IN | DIASTOLIC BLOOD PRESSURE: 72 MMHG | RESPIRATION RATE: 16 BRPM | SYSTOLIC BLOOD PRESSURE: 123 MMHG | OXYGEN SATURATION: 99 % | WEIGHT: 162 LBS | HEART RATE: 76 BPM | BODY MASS INDEX: 32.66 KG/M2 | TEMPERATURE: 97 F

## 2019-08-27 DIAGNOSIS — K76.0 NAFL (NONALCOHOLIC FATTY LIVER): Primary | ICD-10-CM

## 2019-08-27 DIAGNOSIS — D18.03 HEMANGIOMA OF LIVER: ICD-10-CM

## 2019-08-27 DIAGNOSIS — D69.6 THROMBOCYTOPENIA (HCC): ICD-10-CM

## 2019-08-27 NOTE — PROGRESS NOTES
[facility], MD, MD Joseph Walsh PA-C Natha Carrow, [facility]     Caroline Murray, [facility]   Pooja Elmore OLIVIER-PATO Scott, [facility]       Chantel Patel [address]    at [address], Formerly named [facility] [address]    [phone]    FAX: [address] [place] [address], [address]    [phone]    FAX: [phone]       Patient Care Team:  Live Prater MD as PCP - General (Internal Medicine)    Patient Active Problem List   Diagnosis Code    Hypertension I10    Hypercholesterolemia E78.00    Class 1 obesity due to excess calories with serious comorbidity and body mass index (BMI) of 32.0 to 32.9 in adult E66.09, Z68.32    Depression with anxiety F41.8    Diverticulosis K57.90    S/P LD (total abdominal hysterectomy) Z90.710    Fatigue R53.83    Baker's cyst M71.20    Type 2 diabetes mellitus without complication, without long-term current use of insulin (HCC) E11.9    Thrombocytopenia (HCC) D69.6    Hemangioma of liver D18.03    NAFL (nonalcoholic fatty liver) O00.2    Recurrent depression ([address] F33.9    Vulval edema N90.89    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       uSma Calderon [facility] management of non-alcoholic fatty liver (NAFL). The active problem list, all pertinent past medical history, medications, radiologic findings and laboratory findings related to the liver disorder were reviewed with the patient. The patient is a 77 y.o.  female with a history of chronic thrombocytopenia. A routine ultrasound was ordered 5/2018 which demonstrated echogenicity with questionable cirrhosis.      Assessment of liver fibrosis was performed in the office with Fibroscan 7/2018. The result was 3.9 kPa which correlates with no fibrosis. An ultrasound performed 5/2018 suggested echogenicity consistent with cirrhosis. A mass was present in the right lobe, measuring 2.8 x 2.6 cm. Further imaging was performed with MRI 6/2018. Results suggest that the liver is normal. Changes of cirrhosis were not seen. No fatty liver was seen. An mass is present in the right lobe, segment 5, measuring 3.2 cm with enhancing characteristics consistent with hemangioma. The patient notes fatigue. Today, patient denies abdominal pain, change in bowel habits, dark urine, myalgias, arthralgias, pruritus and problems concentrating. The patient completes all daily activities without any functional limitations. ASSESSMENT AND PLAN:  Cirrhosis? The patient does not appear to have cirrhosis. Although an ultrasound suggested cirrhosis and there is thrombocytopenia, the MRI does not suggest cirrhosis and Fibroscan scan demonstrates normal liver stiffness consistent with no fibrosis. We will continue monitoring patient at 6 month intervals and repeat Fibroscans annually. The patient will return for repeat fibroscan. Ultrasound was scheduled to be done today. Consider a liver biopsy only if the repeat FibroScan demonstrates advanced liver disease. NAFL/Benign Steatosis  The Fibroscan suggests she has benign steatosis or NAFL. The CAP value is elevated and suggests steatosis with no fibrosis. The liver transaminases are normal. The ALP is normal. Liver function is normal. The platelet count is depressed. The patient was counseled regarding diet and exercise to achieve weight loss. The best diet for patients with fatty liver is one very low in carbohydrates and enriched with protein. The patient was told to consume any food products and drinks containing fructose as this enhances hepatic fat synthesis.     The patient has lost 10 lbs since the last appointment. A repeat Fibroscan will be performed at her next appointment. Thrombocytopenia   This is secondary to uncertain etiology. There is no evidence of overt bleeding. Anti-platelet antibody was ordered today. This may be due to an underlying diagnosis of ITP. If positive she will be referred to hematology. Screening for Esophageal varices   The patient has not had an EGD to screen for varices. Given the fibroscan findings will hold off on EGD for now. Treatment of other medical problems in patients with chronic liver disease  The patient was directed to continue all current medications at the current dosages. There are no contraindications for the patient to take any medications that are necessary for treatment of other medical issues. The patient can take oral diabetic agents for treatment of diabetes and statins for treatment of hypercholesterolemia. This will not impact the patient's current liver disease. The patient does not consume alcohol daily. Normal doses of acetaminophen can be used for pain as needed. Normal doses of acetaminophen as recommended on the label are not hepatotoxic, even in patients with cirrhosis. Counseling for alcohol in patients with chronic liver disease  The patient has cirrhosis and was advised to be abstinent from all alcohol including non-alcoholic beer which does contain some alcohol. The patient does not consume any significant amount of alcohol. Vaccinations   Vaccination for viral hepatitis A is recommended since the patient has no serologic evidence of previous exposure or vaccination with immunity. Vaccination for viral hepatitis B is not needed. The patient has serologic evidence of prior exposure or vaccination with immunity. Routine vaccinations against other bacterial and viral agents can be performed as indicated. Annual flu vaccination should be administered if indicated.     Screening for Hepatocellular Carcinoma  HCC screening is not necessary if the the patient does not have cirrhosis. The data is currently conflicting. Will continue to image the liver with ultrasound The next liver imaging study will be performed in 12/2018. ALLERGIES  Allergies   Allergen Reactions    Aspirin Nausea and Vomiting    Lipitor [Atorvastatin] Myalgia    Metformin Cough    Pravastatin Myalgia     MEDICATIONS  Current Outpatient Medications   Medication Sig    fluticasone propionate (FLONASE) 50 mcg/actuation nasal spray 2 Sprays by Both Nostrils route daily.  amLODIPine (NORVASC) 5 mg tablet TAKE 1 TABLET BY MOUTH EVERY DAY    cloNIDine HCl (CATAPRES) 0.2 mg tablet TAKE 1 TAB BY MOUTH TWO (2) TIMES A DAY.  glucose blood VI test strips (CONTOUR NEXT STRIPS) strip Test blood sugar once daily     No current facility-administered medications for this visit. SYSTEM REVIEW NOT RELATED TO LIVER DISEASE OR REVIEWED ABOVE:  Constitution systems: Negative for fever, chills, weight gain, weight loss. Eyes: Negative for visual changes. ENT: Negative for sore throat, painful swallowing. Respiratory: Negative for cough, hemoptysis, SOB. Cardiology: Negative for chest pain, palpitations. GI:  Negative for constipation or diarrhea. : Negative for urinary frequency, dysuria, hematuria, nocturia. Skin: Negative for rash. Hematology: Negative for easy bruising, blood clots. Musculo-skelatal: Negative for back pain, muscle pain, weakness. Neurologic: Negative for headaches, dizziness, vertigo, memory problems not related to HE. Psychology: Negative for anxiety, depression. FAMILY HISTORY:  The patient has no knowledge of the father's medical condition. The mother is Has the following chronic diseases: dementia. There is no family history of liver disease. SOCIAL HISTORY:  The patient is . The patient has 2 children and 5 grandchildren.     The patient has never used tobacco products. The patient has never consumed significant amounts of alcohol. The patient currently works full time as a . PHYSICAL EXAMINATION:  Visit Vitals  /72 (BP 1 Location: Left arm, BP Patient Position: Sitting)   Pulse 76   Temp 97 °F (36.1 °C) (Tympanic)   Resp 16   Ht 4' 11\" (1.499 m)   Wt 162 lb (73.5 kg)   SpO2 99%   BMI 32.72 kg/m²     General: No acute distress. Eyes: Sclera anicteric. ENT: No oral lesions. Thyroid normal.  Nodes: No adenopathy. Skin: No spider angiomata. No jaundice. No palmar erythema. Respiratory: Lungs clear to auscultation. Cardiovascular: Regular heart rate. No murmurs. No JVD. Abdomen: Soft non-tender. Liver size normal to percussion/palpation. Spleen not palpable. No obvious ascites. Extremities: No edema. No muscle wasting. No gross arthritic changes. Neurologic: Alert and oriented. Cranial nerves grossly intact. No asterixis.     LABORATORY STUDIES:  Liver [facility] [id number] Units 4/18/2019 1/8/2019 9/13/2018   WBC 3.4 - 10.8 x10E3/uL 7.7 8.1 7.2   ANC 1.4 - 7.0 x10E3/uL 4.6  4.3   HGB 11.1 - 15.9 g/dL 12.6 12.5 12.6    - 450 x10E3/uL 128 (L) 132 (L) 101 (L)   PLT       INR 0.8 - 1.2 1.0     AST 0 - 40 IU/L  15    ALT 0 - 32 IU/L  11    Alk Phos 39 - 117 IU/L  86    Bili, Total 0.0 - 1.2 mg/dL  <0.2    Albumin 3.5 - 4.8 g/dL  4.2    BUN 8 - 27 mg/dL 10 18    Creat 0.57 - 1.00 mg/dL 0.78 0.77    Na 134 - 144 mmol/L 140 144    K 3.5 - 5.2 mmol/L 4.1 4.2    Cl 96 - 106 mmol/L 103 108 (H)    CO2 20 - 29 mmol/L 24 20    Glucose 65 - 99 mg/dL 127 (H) 107 (H)      SEROLOGIES:  Serologies Latest Ref Rng & Units 6/14/2018   Hep A Ab, Total Negative Negative   Hep B Surface Ag Negative Negative   Hep B Core Ab, Total Negative Positive (A)   Hep B Surface AB QL  Non Reactive   Hep C Ab 0.0 - 0.9 s/co ratio <0.1   Ferritin 15 - 150 ng/mL 153 (H)   Iron % Saturation 15 - 55 % 27   ASMCA 0 - 19 Units 15   Alpha-1 antitrypsin level 90 - 200 mg/dL 122     LIVER HISTOLOGY:  7/2018. [facility]. EkPa was 3.9. IQR/med 8%. . The results suggest fatty liver and a fibrosis level of F0. ENDOSCOPIC PROCEDURES:  Not available or performed    RADIOLOGY:  5/2018. Ultrasound of liver. Echogenic consistent with cirrhosis. Liver mass lesion in the right lobe measuring 2.8 x 2.6 cm. No ascites. 6/2018. Dynamic MRI of liver. Normal appearing liver. Liver mass consistent with hemangioma in the right lobe, segment 5 measuring 3.2 cm. OTHER TESTING:  Not available or performed    All of the issues listed in the Assessment and Plan were discussed with the patient. All questions were answered. The patient expressed a clear understanding of the above. [facility] in 2 weeks with FibroScan. If the FibroScan is normal, along with blood work and ultrasound the patient will be considered as having benign fatty liver disease and will not require monitoring for complications of cirrhosis. Caroline Murray NP  [place] [id number] [address], [address] [facility].  [facility]

## 2019-08-27 NOTE — PROGRESS NOTES
1. Have you been to the ER, urgent care clinic since your last visit? Hospitalized since your last visit? No    2. Have you seen or consulted any other health care providers outside of the [address] [place] [address] since your last visit? Include any pap smears or colon screening.  No       Visit Vitals  /72 (BP 1 Location: Left arm, BP Patient Position: Sitting)   Pulse 76   Temp 97 °F (36.1 °C) (Tympanic)   Resp 16   Ht 4' 11\" (1.499 m)   Wt 162 lb (73.5 kg)   SpO2 99%   BMI 32.72 kg/m²

## 2019-08-28 LAB
AFP L3 MFR SERPL: NORMAL % (ref 0–9.9)
AFP SERPL-MCNC: 2.4 NG/ML (ref 0–8)
ALBUMIN SERPL-MCNC: 4.5 G/DL (ref 3.5–4.8)
ALP SERPL-CCNC: 86 IU/L (ref 39–117)
ALT SERPL-CCNC: 12 IU/L (ref 0–32)
AST SERPL-CCNC: 19 IU/L (ref 0–40)
BASOPHILS # BLD AUTO: 0.1 X10E3/UL (ref 0–0.2)
BASOPHILS NFR BLD AUTO: 1 %
BILIRUB DIRECT SERPL-MCNC: 0.09 MG/DL (ref 0–0.4)
BILIRUB SERPL-MCNC: 0.3 MG/DL (ref 0–1.2)
BUN SERPL-MCNC: 15 MG/DL (ref 8–27)
BUN/CREAT SERPL: 21 (ref 12–28)
CALCIUM SERPL-MCNC: 9 MG/DL (ref 8.7–10.3)
CHLORIDE SERPL-SCNC: 105 MMOL/L (ref 96–106)
CO2 SERPL-SCNC: 23 MMOL/L (ref 20–29)
CREAT SERPL-MCNC: 0.7 MG/DL (ref 0.57–1)
EOSINOPHIL # BLD AUTO: 0.2 X10E3/UL (ref 0–0.4)
EOSINOPHIL NFR BLD AUTO: 4 %
ERYTHROCYTE [DISTWIDTH] IN BLOOD BY AUTOMATED COUNT: 14 % (ref 12.3–15.4)
GLUCOSE SERPL-MCNC: 108 MG/DL (ref 65–99)
HCT VFR BLD AUTO: 40.3 % (ref 34–46.6)
HGB BLD-MCNC: 13 G/DL (ref 11.1–15.9)
IMM GRANULOCYTES # BLD AUTO: 0 X10E3/UL (ref 0–0.1)
IMM GRANULOCYTES NFR BLD AUTO: 1 %
LYMPHOCYTES # BLD AUTO: 2.6 X10E3/UL (ref 0.7–3.1)
LYMPHOCYTES NFR BLD AUTO: 40 %
MCH RBC QN AUTO: 28.3 PG (ref 26.6–33)
MCHC RBC AUTO-ENTMCNC: 32.3 G/DL (ref 31.5–35.7)
MCV RBC AUTO: 88 FL (ref 79–97)
MONOCYTES # BLD AUTO: 0.4 X10E3/UL (ref 0.1–0.9)
MONOCYTES NFR BLD AUTO: 6 %
NEUTROPHILS # BLD AUTO: 3.2 X10E3/UL (ref 1.4–7)
NEUTROPHILS NFR BLD AUTO: 48 %
PLATELET # BLD AUTO: 121 X10E3/UL (ref 150–450)
POTASSIUM SERPL-SCNC: 4.2 MMOL/L (ref 3.5–5.2)
PROT SERPL-MCNC: 7.4 G/DL (ref 6–8.5)
RBC # BLD AUTO: 4.59 X10E6/UL (ref 3.77–5.28)
SODIUM SERPL-SCNC: 143 MMOL/L (ref 134–144)
WBC # BLD AUTO: 6.5 X10E3/UL (ref 3.4–10.8)

## 2019-08-31 LAB
PLAT GP IA/IIA IGG BLD QL IA: NEGATIVE
PLAT GP IB/IX IGG BLD QL IA: NEGATIVE
PLAT GP IIB/IIIA IGG BLD QL IA: POSITIVE

## 2019-09-04 NOTE — PROGRESS NOTES
Called patient with blood work results which were normal. The antiplatelet antibody was positive. Thrombocytopenia may be related to ITP. If fibroscan is normal will refer the patient to hem/onc.

## 2019-09-05 ENCOUNTER — HOSPITAL ENCOUNTER (OUTPATIENT)
Dept: ULTRASOUND IMAGING | Age: 69
Discharge: HOME | End: 2019-09-05
Attending: NURSE PRACTITIONER
Payer: MEDICARE

## 2019-09-05 DIAGNOSIS — K76.0 NAFL (NONALCOHOLIC FATTY LIVER): ICD-10-CM

## 2019-09-05 PROCEDURE — 76700 US EXAM ABDOM COMPLETE: CPT

## 2019-09-20 ENCOUNTER — OFFICE VISIT (OUTPATIENT)
Dept: HEMATOLOGY | Age: 69
End: 2019-09-20

## 2019-09-20 NOTE — PROGRESS NOTES
Chief Complaint   Patient presents with    Other     Fibroscan     1. Have you been to the ER, urgent care clinic since your last visit? Hospitalized since your last visit? No    2. Have you seen or consulted any other health care providers outside of the [address] [place] [address] since your last visit? Include any pap smears or colon screening. No     3 most recent PHQ Screens 9/20/2019   PHQ Not Done -   Little interest or pleasure in doing things Not at all   Feeling down, depressed, irritable, or hopeless Not at all   Total Score PHQ 2 0     Abuse Screening Questionnaire 9/20/2019   Do you ever feel afraid of your partner? N   Are you in a relationship with someone who physically or mentally threatens you? N   Is it safe for you to go home?  Y     Learning Assessment 9/20/2019   PRIMARY LEARNER Patient   HIGHEST LEVEL OF EDUCATION - PRIMARY LEARNER  -   BARRIERS PRIMARY LEARNER NONE   CO-LEARNER CAREGIVER No   PRIMARY LANGUAGE ENGLISH   LEARNER PREFERENCE PRIMARY DEMONSTRATION     -   ANSWERED BY patient   RELATIONSHIP SELF

## 2019-09-22 RX ORDER — CLONIDINE HYDROCHLORIDE 0.2 MG/1
TABLET ORAL
Qty: 180 TAB | Refills: 3 | Status: SHIPPED | OUTPATIENT
Start: 2019-09-22 | End: 2019-09-23 | Stop reason: SDUPTHER

## 2019-09-23 RX ORDER — CLONIDINE HYDROCHLORIDE 0.2 MG/1
TABLET ORAL
Qty: 180 TAB | Refills: 3 | Status: SHIPPED | OUTPATIENT
Start: 2019-09-23 | End: 2020-09-23

## 2019-10-08 ENCOUNTER — OFFICE VISIT (OUTPATIENT)
Dept: INTERNAL MEDICINE CLINIC | Age: 69
End: 2019-10-08

## 2019-10-08 VITALS
SYSTOLIC BLOOD PRESSURE: 118 MMHG | WEIGHT: 161.4 LBS | HEIGHT: 59 IN | BODY MASS INDEX: 32.54 KG/M2 | DIASTOLIC BLOOD PRESSURE: 70 MMHG | HEART RATE: 64 BPM | RESPIRATION RATE: 18 BRPM | OXYGEN SATURATION: 97 % | TEMPERATURE: 96.3 F

## 2019-10-08 DIAGNOSIS — M79.3 PANNICULITIS: ICD-10-CM

## 2019-10-08 DIAGNOSIS — Z00.00 MEDICARE ANNUAL WELLNESS VISIT, SUBSEQUENT: Primary | ICD-10-CM

## 2019-10-08 DIAGNOSIS — E11.9 TYPE 2 DIABETES MELLITUS WITHOUT COMPLICATION, WITHOUT LONG-TERM CURRENT USE OF INSULIN (HCC): ICD-10-CM

## 2019-10-08 DIAGNOSIS — Z13.39 SCREENING FOR ALCOHOLISM: ICD-10-CM

## 2019-10-08 DIAGNOSIS — E78.00 HYPERCHOLESTEROLEMIA: ICD-10-CM

## 2019-10-08 DIAGNOSIS — D18.03 HEMANGIOMA OF LIVER: ICD-10-CM

## 2019-10-08 DIAGNOSIS — Z13.31 SCREENING FOR DEPRESSION: ICD-10-CM

## 2019-10-08 DIAGNOSIS — I10 ESSENTIAL HYPERTENSION: ICD-10-CM

## 2019-10-08 RX ORDER — CEPHALEXIN 500 MG/1
500 CAPSULE ORAL 4 TIMES DAILY
Qty: 40 CAP | Refills: 0 | Status: SHIPPED | OUTPATIENT
Start: 2019-10-08 | End: 2019-10-18

## 2019-10-08 RX ORDER — BACITRACIN ZINC 500 UNIT/G
OINTMENT (GRAM) TOPICAL 2 TIMES DAILY
Qty: 15 G | Refills: 11 | Status: SHIPPED | OUTPATIENT
Start: 2019-10-08 | End: 2020-11-11

## 2019-10-08 NOTE — PROGRESS NOTES
SPORTS MEDICINE AND PRIMARY CARE  Russell Mcguire MD, [address] [address]  Phone:  [phone]  Fax: [phone]       Chief Complaint   Patient presents with   [facility] Wellness Visit   . SUBJECTIVE:    Paulette Mcgovern is a 68 y.o. female Patient returns today with known history of type 2 diabetes, diet controlled with hemoglobin A1c of 6.8 on 19. Since we last saw her she was seen at [facility] and also had a FibroScan, was noted to have thrombocytopenia and known history of hemangioma of the liver, dyslipidemia, primary hypertension, and is seen for evaluation. We received clarity after the procedure she had performed at [facility] by Gabriela Lipscomb MD, which was a robotic abdominal sacrocolpopexy, enterocele repair, right ureterolysis and extensive lysis of adhesions. Patient is seen for evaluation. Patient noted the onset of soreness of the abdominal apron on the right on Saturday and by Monday it was moist.  She came in to see us for its evaluation. She has had no chills or fever. Current Outpatient Medications   Medication Sig Dispense Refill    cephALEXin (KEFLEX) 500 mg capsule Take 1 Cap by mouth four (4) times daily for 10 days. 40 Cap 0    bacitracin zinc (BACITRACIN) ointment Apply  to affected area two (2) times a day. 15 g 11    cloNIDine HCl (CATAPRES) 0.2 mg tablet TAKE 1 TAB BY MOUTH TWO (2) TIMES A DAY. 180 Tab 3    fluticasone propionate (FLONASE) 50 mcg/actuation nasal spray 2 Sprays by Both Nostrils route daily.  1 Bottle 11    amLODIPine (NORVASC) 5 mg tablet TAKE 1 TABLET BY MOUTH EVERY DAY 90 Tab 3    glucose blood VI test strips (CONTOUR NEXT STRIPS) strip Test blood sugar once daily 50 strip 11     Past Medical History:   Diagnosis Date    Baker's cyst 3-3-16    r    Bereavement 11/15    61yo  sister     Contusion of knee and lower leg, right, subsequent encounter 11/10/2017    Contusion of knee, right     Cough 02/14/2017    Depression with anxiety     Diverticulosis 2-6-15    colonoscopy    DM2 (diabetes mellitus, type 2) ([facility] [address]     Edema     Encounter related to worker's compensation claim 11/10/2017    fall t work    Fatigue     Hemangioma of liver 06/2018    mri    Hemangioma of liver 09/05/2019    Hypercholesterolemia     Hypertension     Liver disease     hepatitis 30 years ago - Liver mass consistent with hemangioma in the right lobe    NAFL (nonalcoholic fatty liver)     Obesity     Onychomycosis     Panniculitis 10/08/2019    S/P LD (total abdominal hysterectomy) 1985    Thrombocytopenia ([facility] [address] 12/12/2014    Vaginal vault prolapse 05/06/2019    Rubia hull md - robotic abdominal sacrolpopexy,enterocele repain,r ureterolysis,extensive lysis of adhesions,transobturator suburethral sling,retocele repair,cystoscopy with calibration     Past Surgical History:   Procedure Laterality Date    HX GYN      partial hysterectomy     Allergies   Allergen Reactions    Aspirin Nausea and Vomiting    Lipitor [Atorvastatin] Myalgia    Metformin Cough    Pravastatin Myalgia         REVIEW OF SYSTEMS:  General: negative for - chills or fever  ENT: negative for - headaches, nasal congestion or tinnitus  Respiratory: negative for - cough, hemoptysis, shortness of breath or wheezing  Cardiovascular : negative for - chest pain, edema, palpitations or shortness of breath  Gastrointestinal: negative for - abdominal pain, blood in stools, heartburn or nausea/vomiting  Genito-Urinary: no dysuria, trouble voiding, or hematuria  Musculoskeletal: negative for - gait disturbance, joint pain, joint stiffness or joint swelling  Neurological: no TIA or stroke symptoms  Hematologic: no bruises, no bleeding, no swollen glands  Integument: no lumps, mole changes, nail changes or rash  Endocrine: no malaise/lethargy or unexpected weight changes      Social History     Socioeconomic History    Marital status:      Spouse name: Not on file    Number of children: Not on file    Years of education: Not on file    Highest education level: Not on file   Tobacco Use    Smoking status: Never Smoker    Smokeless tobacco: Never Used   Substance and Sexual Activity    Alcohol use: No     Alcohol/week: 0.0 standard drinks    Drug use: No    Sexual activity: Not Currently   Social History Narrative    Medical History: HTNTMJ dysfunctionHAsCecal lipoma 01/04negative head CT 03/04August 14, 2013 CT head w ith contrast unremarkable    except progressive splenoid sinusitisAugust 14, 2013 CT chest unremarkable    Gyn History: Last mammogram date 01/13/2012. Surgical History: [facility] 1985colonoscopy 01/04    Hospitalization/Major Diagnostic Procedure: Denies Past Hospitalization    Family History: Mother: alive 71 yrs, HTNFather: unknow nSister(s): alive, DMBrother(s): aliveDaughter(s): aliveSon(s): aliveAunt: alive2    brother(s) , 2 sister(s) . 1 son(s) , 1 daughter(s) . Social History: Alcohol Use Patient does not use alcohol. Smoking Status Patient is a never smoker. Marital Status: . Lives w ith:    spouse. no Children. 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 Education/School: has highschool diploma. [employer]: [employer]    W itness - no blood transfusions. Family History   Problem Relation Age of Onset    No Known Problems Father        OBJECTIVE:    Visit Vitals  /70 (BP 1 Location: Left arm, BP Patient Position: Sitting)   Pulse 64   Temp 96.3 °F (35.7 °C) (Oral)   Resp 18   Ht 4' 11\" (1.499 m)   Wt 161 lb 6.4 oz (73.2 kg)   SpO2 97%   BMI 32.60 kg/m²     CONSTITUTIONAL: well , well nourished, appears age appropriate  EYES: perrla, eom intact  ENMT:moist mucous membranes, pharynx clear  NECK: supple.  Thyroid normal  RESPIRATORY: Chest: clear bilaterally   CARDIOVASCULAR: Heart: regular rate and rhythm  GASTROINTESTINAL: Abdomen: soft, bowel sounds active  HEMATOLOGIC: no pathological lymph nodes palpated  MUSCULOSKELETAL: Extremities: no edema, pulse 1+   INTEGUMENT: No unusual rashes or suspicious skin lesions noted. Nails appear normal.  NEUROLOGIC: non-focal exam   MENTAL STATUS: alert and oriented, appropriate affect           ASSESSMENT:  1. Medicare annual wellness visit, subsequent    2. Screening for alcoholism    3. Screening for depression    4. Type 2 diabetes mellitus without complication, without long-term current use of insulin ([address]    5. Hemangioma of liver    6. Hypercholesterolemia    7. Essential hypertension    8. Panniculitis      Will assess blood sugar control with hemoglobin A1c, particularly in view of the abdominal wall infection that we see. The hemangioma of the liver is followed by the [address]. Dyslipidemia is controlled. Blood pressure control is at goal.  The area in the right lower quadrant is compatible with a panniculitis. But the cellulitis also involves the upper thigh. She is using an antibiotic ointment, as well as systemic antibiotics, and will come back in a week to be sure that it is in the healing process. Will also suggest she use a warm washcloth to both the areas three or four times a day to facilitate healing. Will check the blood count today likewise. She will be back to see us in one week. I have discussed the diagnosis with the patient and the intended plan as seen in the  orders above. The patient understands and agees with the plan. The patient has   received an after visit summary and questions were answered concerning  future plans  Patient labs and/or xrays were reviewed  Past records were reviewed. PLAN:  .  Orders Placed This Encounter    Depression Screen Annual    CBC WITH AUTOMATED DIFF    HEMOGLOBIN A1C WITH EAG    cephALEXin (KEFLEX) 500 mg capsule    bacitracin zinc (BACITRACIN) ointment       Follow-up and Dispositions    · Return in about 1 week (around 10/15/2019). ATTENTION:   This medical record was transcribed using an electronic medical records system. Although proofread, it may and can contain electronic and spelling errors. Other human spelling and other errors may be present. Corrections may be executed at a later time. Please feel free to contact us for any clarifications as needed.

## 2019-10-08 NOTE — PATIENT INSTRUCTIONS
Medicare Wellness Visit, Female The best way to live healthy is to have a lifestyle where you eat a well-balanced diet, exercise regularly, limit alcohol use, and quit all forms of tobacco/nicotine, if applicable. Regular preventive services are another way to keep healthy. Preventive services (vaccines, screening tests, monitoring & exams) can help personalize your care plan, which helps you manage your own care. Screening tests can find health problems at the earliest stages, when they are easiest to treat. [facility] follows the current, evidence-based guidelines published by the [facility] ([facility]) when recommending preventive services for our patients. Because we follow these guidelines, sometimes recommendations change over time as research supports it. (For example, mammograms used to be recommended annually. Even though Medicare will still pay for an annual mammogram, the newer guidelines recommend a mammogram every two years for women of average risk.) Of course, you and your doctor may decide to screen more often for some diseases, based on your risk and your health status. Preventive services for you include: - Medicare offers their members a free annual wellness visit, which is time for you and your primary care provider to discuss and plan for your preventive service needs. Take advantage of this benefit every year! 
-All adults over the age of 72 should receive the recommended pneumonia vaccines. Current USPSTF guidelines recommend a series of two vaccines for the best pneumonia protection.  
-All adults should have a flu vaccine yearly and a tetanus vaccine every 10 years. All adults age 61 and older should receive a shingles vaccine once in their lifetime.   
-A bone mass density test is recommended when a woman turns 65 to screen for osteoporosis. This test is only recommended one time, as a screening. Some providers will use this same test as a disease monitoring tool if you already have osteoporosis. -All adults age 38-68 who are overweight should have a diabetes screening test once every three years.  
-Other screening tests and preventive services for persons with diabetes include: an eye exam to screen for diabetic retinopathy, a kidney function test, a foot exam, and stricter control over your cholesterol.  
-Cardiovascular screening for adults with routine risk involves an electrocardiogram (ECG) at intervals determined by your doctor.  
-Colorectal cancer screenings should be done for adults age 54-65 with no increased risk factors for colorectal cancer. There are a number of acceptable methods of screening for this type of cancer. Each test has its own benefits and drawbacks. Discuss with your doctor what is most appropriate for you during your annual wellness visit. The different tests include: colonoscopy (considered the best screening method), a fecal occult blood test, a fecal DNA test, and sigmoidoscopy. -Breast cancer screenings are recommended every other year for women of normal risk, age 54-69. 
-Cervical cancer screenings for women over age 72 are only recommended with certain risk factors.  
-All adults born between [facility] should be screened once for Hepatitis C. Here is a list of your current Health Maintenance items (your personalized list of preventive services) with a due date: 
Health Maintenance Due Topic Date Due  Shingles Vaccine (1 of 2) 09/24/1998  Eye Exam  10/14/2016  Flu Vaccine  08/01/2019  Glaucoma Screening   09/07/2019  Albumin Urine Test  09/13/2019 [address] [place] [address] Annual Well Visit  09/14/2019

## 2019-10-08 NOTE — PROGRESS NOTES
1. Have you been to the ER, urgent care clinic since your last visit? Hospitalized since your last visit? No    2. Have you seen or consulted any other health care providers outside of the [address] [place] [address] since your last visit? Include any pap smears or colon screening. No  This is the Subsequent Medicare Annual Wellness Exam, performed 12 months or more after the Initial AWV or the last Subsequent AWV    I have reviewed the patient's medical history in detail and updated the computerized patient record. History     Past Medical History:   Diagnosis Date    Baker's cyst 3-3-16    r    Bereavement 11/15    59yo  sister     Contusion of knee and lower leg, right, subsequent encounter 11/10/2017    Contusion of knee, right     Cough 2017    Depression with anxiety     Diverticulosis 2-6-15    colonoscopy    DM2 (diabetes mellitus, type 2) ([facility] [address]     Edema     Encounter related to worker's compensation claim 11/10/2017    fall t work    Fatigue     Hemangioma of liver 2018    mri    Hypercholesterolemia     Hypertension     Liver disease     hepatitis 30 years ago - Liver mass consistent with hemangioma in the right lobe    NAFL (nonalcoholic fatty liver)     Obesity     Onychomycosis     S/P LD (total abdominal hysterectomy) 1985    Thrombocytopenia ([facility] [address] 2014    Vaginal vault prolapse 2019    Rubia hull md - robotic abdominal sacrolpopexy,enterocele repain,r ureterolysis,extensive lysis of adhesions,transobturator suburethral sling,retocele repair,cystoscopy with calibration      Past Surgical History:   Procedure Laterality Date    HX GYN      partial hysterectomy     Current Outpatient Medications   Medication Sig Dispense Refill    cloNIDine HCl (CATAPRES) 0.2 mg tablet TAKE 1 TAB BY MOUTH TWO (2) TIMES A DAY. 180 Tab 3    fluticasone propionate (FLONASE) 50 mcg/actuation nasal spray 2 Sprays by Both Nostrils route daily.  1 Bottle 11    amLODIPine (NORVASC) 5 mg tablet TAKE 1 TABLET BY MOUTH EVERY DAY 90 Tab 3    glucose blood VI test strips (CONTOUR NEXT STRIPS) strip Test blood sugar once daily 50 strip 11     Allergies   Allergen Reactions    Aspirin Nausea and Vomiting    Lipitor [Atorvastatin] Myalgia    Metformin Cough    Pravastatin Myalgia     Family History   Problem Relation Age of Onset    No Known Problems Father      Social History     Tobacco Use    Smoking status: Never Smoker    Smokeless tobacco: Never Used   Substance Use Topics    Alcohol use: No     Alcohol/week: 0.0 standard drinks     Patient Active Problem List   Diagnosis Code    Hypertension I10    Hypercholesterolemia E78.00    Class 1 obesity due to excess calories with serious comorbidity and body mass index (BMI) of 32.0 to 32.9 in adult E66.09, Z68.32    Depression with anxiety F41.8    Diverticulosis K57.90    S/P LD (total abdominal hysterectomy) Z90.710    Fatigue R53.83    Baker's cyst M71.20    Type 2 diabetes mellitus without complication, without long-term current use of insulin (HCC) E11.9    Thrombocytopenia (HCC) D69.6    Hemangioma of liver D18.03    NAFL (nonalcoholic fatty liver) S05.9    Recurrent depression (HCC) F33.9    Vulval edema N90.89    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       Depression Risk Factor Screening:     3 most recent PHQ Screens 9/20/2019   PHQ Not Done -   Little interest or pleasure in doing things Not at all   Feeling down, depressed, irritable, or hopeless Not at all   Total Score PHQ 2 0     Alcohol Risk Factor Screening: You do not drink alcohol or very rarely. Functional Ability and Level of Safety:   Hearing Loss  Hearing is good. Activities of Daily Living  The home contains: no safety equipment. Patient does total self care    Fall Risk  Fall Risk Assessment, last 12 mths 10/8/2019   Able to walk? Yes   Fall in past 12 months?  No   Fall with injury? -   Number of falls in past 12 months -   Fall Risk Score -       Abuse Screen  Patient is not abused    Cognitive Screening   Evaluation of Cognitive Function:  Has your family/caregiver stated any concerns about your memory: no  Normal    Patient Care Team   Patient Care Team:  Michael Ledesma MD as PCP - General (Internal Medicine)    Assessment/Plan   Education and counseling provided:        Health Maintenance Due   Topic Date Due    Shingrix Vaccine Age 50> (1 of 2) 09/24/1998    EYE EXAM RETINAL OR DILATED  10/14/2016    Influenza Age 5 to Adult  08/01/2019    GLAUCOMA SCREENING Q2Y  09/07/2019    MICROALBUMIN Q1  09/13/2019    MEDICARE YEARLY EXAM  09/14/2019

## 2019-10-10 LAB
BASOPHILS # BLD AUTO: 0.1 X10E3/UL (ref 0–0.2)
BASOPHILS NFR BLD AUTO: 1 %
EOSINOPHIL # BLD AUTO: 0.2 X10E3/UL (ref 0–0.4)
EOSINOPHIL NFR BLD AUTO: 4 %
ERYTHROCYTE [DISTWIDTH] IN BLOOD BY AUTOMATED COUNT: 14.1 % (ref 12.3–15.4)
EST. AVERAGE GLUCOSE BLD GHB EST-MCNC: 157 MG/DL
HBA1C MFR BLD: 7.1 % (ref 4.8–5.6)
HCT VFR BLD AUTO: 35.9 % (ref 34–46.6)
HGB BLD-MCNC: 12.4 G/DL (ref 11.1–15.9)
IMM GRANULOCYTES # BLD AUTO: 0 X10E3/UL (ref 0–0.1)
IMM GRANULOCYTES NFR BLD AUTO: 0 %
LYMPHOCYTES # BLD AUTO: 2.2 X10E3/UL (ref 0.7–3.1)
LYMPHOCYTES NFR BLD AUTO: 40 %
MCH RBC QN AUTO: 29.4 PG (ref 26.6–33)
MCHC RBC AUTO-ENTMCNC: 34.5 G/DL (ref 31.5–35.7)
MCV RBC AUTO: 85 FL (ref 79–97)
MONOCYTES # BLD AUTO: 0.4 X10E3/UL (ref 0.1–0.9)
MONOCYTES NFR BLD AUTO: 7 %
MORPHOLOGY BLD-IMP: ABNORMAL
NEUTROPHILS # BLD AUTO: 2.7 X10E3/UL (ref 1.4–7)
NEUTROPHILS NFR BLD AUTO: 48 %
PLATELET # BLD AUTO: 104 X10E3/UL (ref 150–450)
RBC # BLD AUTO: 4.22 X10E6/UL (ref 3.77–5.28)
WBC # BLD AUTO: 5.6 X10E3/UL (ref 3.4–10.8)

## 2019-10-15 ENCOUNTER — OFFICE VISIT (OUTPATIENT)
Dept: INTERNAL MEDICINE CLINIC | Age: 69
End: 2019-10-15

## 2019-10-15 VITALS
HEIGHT: 59 IN | DIASTOLIC BLOOD PRESSURE: 61 MMHG | RESPIRATION RATE: 18 BRPM | OXYGEN SATURATION: 96 % | TEMPERATURE: 97.3 F | WEIGHT: 161 LBS | HEART RATE: 60 BPM | SYSTOLIC BLOOD PRESSURE: 94 MMHG | BODY MASS INDEX: 32.46 KG/M2

## 2019-10-15 DIAGNOSIS — I10 ESSENTIAL HYPERTENSION: ICD-10-CM

## 2019-10-15 DIAGNOSIS — K76.0 NAFL (NONALCOHOLIC FATTY LIVER): ICD-10-CM

## 2019-10-15 DIAGNOSIS — D69.6 THROMBOCYTOPENIA (HCC): ICD-10-CM

## 2019-10-15 DIAGNOSIS — E11.9 TYPE 2 DIABETES MELLITUS WITHOUT COMPLICATION, WITHOUT LONG-TERM CURRENT USE OF INSULIN (HCC): ICD-10-CM

## 2019-10-15 DIAGNOSIS — M79.3 PANNICULITIS: Primary | ICD-10-CM

## 2019-10-15 NOTE — PROGRESS NOTES
Chief Complaint   Patient presents with    Rash     follow up      1. Have you been to the ER, urgent care clinic since your last visit? Hospitalized since your last visit? No    2. Have you seen or consulted any other health care providers outside of the [address] [place] [address] since your last visit? Include any pap smears or colon screening. No      Pt has complaints that she feels the rash is getting worse.

## 2019-10-15 NOTE — PROGRESS NOTES
SPORTS MEDICINE AND PRIMARY CARE  Claudetta Drum, MD, [address] [address]  Phone:  [phone]  Fax: [phone]      Chief Complaint   Patient presents with    Rash     follow up          SUBECTIVE:    Rubi Green is a 68 y.o. female Patient returns today with recent diagnosis of panniculitis with history of thrombocytopenia, DM type 2, whose control has been fair with hemoglobin A1c of 7.1, _________________, followed by Dr. Izabela Álvarez, primary hypertension, and is seen for evaluation. On last visit we placed her on Keflex and asked her to use warm compresses and Bacitracin ointment on the panniculitis. Patient returns today saying that the rash is getting worse. She does not like to apply Bacitracin because it burns. She does not like to apply the warm compresses because it hurts. She did not allow the dressing to be off so the Dora Garber could get to it\". She has had no chills or fever and is seen for evaluation. Current Outpatient Medications   Medication Sig Dispense Refill    cephALEXin (KEFLEX) 500 mg capsule Take 1 Cap by mouth four (4) times daily for 10 days. 40 Cap 0    bacitracin zinc (BACITRACIN) ointment Apply  to affected area two (2) times a day. 15 g 11    cloNIDine HCl (CATAPRES) 0.2 mg tablet TAKE 1 TAB BY MOUTH TWO (2) TIMES A DAY. 180 Tab 3    fluticasone propionate (FLONASE) 50 mcg/actuation nasal spray 2 Sprays by Both Nostrils route daily.  1 Bottle 11    amLODIPine (NORVASC) 5 mg tablet TAKE 1 TABLET BY MOUTH EVERY DAY 90 Tab 3    glucose blood VI test strips (CONTOUR NEXT STRIPS) strip Test blood sugar once daily 50 strip 11     Past Medical History:   Diagnosis Date    Baker's cyst 3-3-16    r    Bereavement 11/15    59yo  sister     Contusion of knee and lower leg, right, subsequent encounter 11/10/2017    Contusion of knee, right     Cough 2017    Depression with anxiety     Diverticulosis 2-6-15    colonoscopy    DM2 (diabetes mellitus, type 2) ([facility] [address] [facility]     Edema     Encounter related to worker's compensation claim 11/10/2017    fall t work    Fatigue     Hemangioma of liver 06/2018    mri    Hemangioma of liver 09/05/2019    Hypercholesterolemia     Hypertension     Liver disease     hepatitis 30 years ago - Liver mass consistent with hemangioma in the right lobe    NAFL (nonalcoholic fatty liver)     Obesity     Onychomycosis     Panniculitis 10/08/2019    S/P LD (total abdominal hysterectomy) 1985    Thrombocytopenia ([facility] [address] 12/12/2014    Vaginal vault prolapse 05/06/2019    Nguyễn hull md - robotic abdominal sacrolpopexy,enterocele repain,r ureterolysis,extensive lysis of adhesions,transobturator suburethral sling,retocele repair,cystoscopy with calibration     Past Surgical History:   Procedure Laterality Date    HX GYN      partial hysterectomy     Allergies   Allergen Reactions    Aspirin Nausea and Vomiting    Lipitor [Atorvastatin] Myalgia    Metformin Cough    Pravastatin Myalgia       REVIEW OF SYSTEMS:   No drainage or erythema. Social History     Socioeconomic History    Marital status:      Spouse name: Not on file    Number of children: Not on file    Years of education: Not on file    Highest education level: Not on file   Tobacco Use    Smoking status: Never Smoker    Smokeless tobacco: Never Used   Substance and Sexual Activity    Alcohol use: No     Alcohol/week: 0.0 standard drinks    Drug use: No    Sexual activity: Not Currently   Social History Narrative    Medical History: HTNTMJ dysfunctionHAsCecal lipoma 01/04negative head CT 03/04August 14, 2013 CT head w ith contrast unremarkable    except progressive splenoid sinusitisAugust 14, 2013 CT chest unremarkable    Gyn History: Last mammogram date 01/13/2012. Surgical History: LD 1985colonoscopy 01/04    Hospitalization/Major Diagnostic Procedure: Denies Past Hospitalization    Family History:  Mother: alive 71 yrs, HTNFather: unknow nSister(s): alive, DMBrother(s): aliveDaughter(s): aliveSon(s): aliveAunt: alive2    brother(s) , 2 sister(s) . 1 son(s) , 1 daughter(s) . Social History: Alcohol Use Patient does not use alcohol. Smoking Status Patient is a never smoker. Marital Status: . Lives w ith:    spouse. no Children. 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 Education/School: has highschool diploma. Sikhism: Jehovah    W itness - no blood transfusions. r  Family History   Problem Relation Age of Onset    No Known Problems Father        OBJECTIVE:  Visit Vitals  BP 94/61   Pulse 60   Temp 97.3 °F (36.3 °C) (Oral)   Resp 18   Ht 4' 11\" (1.499 m)   Wt 161 lb (73 kg)   SpO2 96%   BMI 32.52 kg/m²     ENT: perrla,  eom intact  NECK: supple. Thyroid normal  CHEST: clear to ascultation and percussion   HEART: regular rate and rhythm  ABD: soft, bowel sounds active  EXTREMITIES: no edema, pulse 1+     Office Visit on 10/08/2019   Component Date Value Ref Range Status    WBC 10/08/2019 5.6  3.4 - 10.8 x10E3/uL Final    RBC 10/08/2019 4.22  3.77 - 5.28 x10E6/uL Final    HGB 10/08/2019 12.4  11.1 - 15.9 g/dL Final    HCT 10/08/2019 35.9  34.0 - 46.6 % Final    MCV 10/08/2019 85  79 - 97 fL Final    MCH 10/08/2019 29.4  26.6 - 33.0 pg Final    MCHC 10/08/2019 34.5  31.5 - 35.7 g/dL Final    RDW 10/08/2019 14.1  12.3 - 15.4 % Final    PLATELET 14/02/2523 742* 150 - 450 x10E3/uL Final    NEUTROPHILS 10/08/2019 48  Not Estab. % Final    Lymphocytes 10/08/2019 40  Not Estab. % Final    MONOCYTES 10/08/2019 7  Not Estab. % Final    EOSINOPHILS 10/08/2019 4  Not Estab. % Final    BASOPHILS 10/08/2019 1  Not Estab. % Final    ABS. NEUTROPHILS 10/08/2019 2.7  1.4 - 7.0 x10E3/uL Final    Abs Lymphocytes 10/08/2019 2.2  0.7 - 3.1 x10E3/uL Final    ABS. MONOCYTES 10/08/2019 0.4  0.1 - 0.9 x10E3/uL Final    ABS. EOSINOPHILS 10/08/2019 0.2  0.0 - 0.4 x10E3/uL Final    ABS.  BASOPHILS 10/08/2019 0.1  0.0 - 0.2 x10E3/uL Final    IMMATURE GRANULOCYTES 10/08/2019 0  Not Estab. % Final    ABS. IMM. GRANS. 10/08/2019 0.0  0.0 - 0.1 x10E3/uL Final    Hematology comments: 10/08/2019 Note:   Final    Verified by microscopic examination.  Hemoglobin A1c 10/08/2019 7.1* 4.8 - 5.6 % Final    Comment:          Prediabetes: 5.7 - 6.4           Diabetes: >6.4           Glycemic control for adults with diabetes: <7.0      Estimated average glucose 10/08/2019 157  mg/dL Final   Office Visit on 08/27/2019   Component Date Value Ref Range Status    Protein, total 08/27/2019 7.4  6.0 - 8.5 g/dL Final    Albumin 08/27/2019 4.5  3.5 - 4.8 g/dL Final    Bilirubin, total 08/27/2019 0.3  0.0 - 1.2 mg/dL Final    Bilirubin, direct 08/27/2019 0.09  0.00 - 0.40 mg/dL Final    Alk.  phosphatase 08/27/2019 86  39 - 117 IU/L Final    AST (SGOT) 08/27/2019 19  0 - 40 IU/L Final    ALT (SGPT) 08/27/2019 12  0 - 32 IU/L Final    Glucose 08/27/2019 108* 65 - 99 mg/dL Final    BUN 08/27/2019 15  8 - 27 mg/dL Final    Creatinine 08/27/2019 0.70  0.57 - 1.00 mg/dL Final    GFR est non-AA 08/27/2019 88  >59 mL/min/1.73 Final    GFR est AA 08/27/2019 102  >59 mL/min/1.73 Final    BUN/Creatinine ratio 08/27/2019 21  12 - 28 Final    Sodium 08/27/2019 143  134 - 144 mmol/L Final    Potassium 08/27/2019 4.2  3.5 - 5.2 mmol/L Final    Chloride 08/27/2019 105  96 - 106 mmol/L Final    CO2 08/27/2019 23  20 - 29 mmol/L Final    Calcium 08/27/2019 9.0  8.7 - 10.3 mg/dL Final    WBC 08/27/2019 6.5  3.4 - 10.8 x10E3/uL Final    RBC 08/27/2019 4.59  3.77 - 5.28 x10E6/uL Final    HGB 08/27/2019 13.0  11.1 - 15.9 g/dL Final    HCT 08/27/2019 40.3  34.0 - 46.6 % Final    MCV 08/27/2019 88  79 - 97 fL Final    MCH 08/27/2019 28.3  26.6 - 33.0 pg Final    MCHC 08/27/2019 32.3  31.5 - 35.7 g/dL Final    RDW 08/27/2019 14.0  12.3 - 15.4 % Final    PLATELET 46/68/5932 742* 150 - 450 x10E3/uL Final    NEUTROPHILS 08/27/2019 48 Not Estab. % Final    Lymphocytes 08/27/2019 40  Not Estab. % Final    MONOCYTES 08/27/2019 6  Not Estab. % Final    EOSINOPHILS 08/27/2019 4  Not Estab. % Final    BASOPHILS 08/27/2019 1  Not Estab. % Final    ABS. NEUTROPHILS 08/27/2019 3.2  1.4 - 7.0 x10E3/uL Final    Abs Lymphocytes 08/27/2019 2.6  0.7 - 3.1 x10E3/uL Final    ABS. MONOCYTES 08/27/2019 0.4  0.1 - 0.9 x10E3/uL Final    ABS. EOSINOPHILS 08/27/2019 0.2  0.0 - 0.4 x10E3/uL Final    ABS. BASOPHILS 08/27/2019 0.1  0.0 - 0.2 x10E3/uL Final    IMMATURE GRANULOCYTES 08/27/2019 1  Not Estab. % Final    ABS. IMM. GRANS. 08/27/2019 0.0  0.0 - 0.1 x10E3/uL Final    AFP, serum 08/27/2019 2.4  0.0 - 8.0 ng/mL Final    Comment: AFP and AFP-L3% measured by [facility] [employer] [facility] Diagnostics Liquid Phase Binding  Methodology. Values obtained with different assay methods or kits cannot be used  interchangeably. Results cannot be interpreted as absolute evidence  of the presence or absence of malignant disease. This test is not interpretable in pregnant females.  AFP-L3%, Serum 08/27/2019 Comment  0.0 - 9.9 % Final    AFP-L3% result is below the detection limit of the assay.  Plt Associated Anti-IIb/IIIa 08/27/2019 Positive* Negative Final    Plt Associated Anti-Ib/IX 08/27/2019 Negative  Negative Final    Plt Associated Anti-Ia/IIa 08/27/2019 Negative  Negative Final          ASSESSMENT:  1. Panniculitis    2. Thrombocytopenia ([address]    3. Type 2 diabetes mellitus without complication, without long-term current use of insulin ([address]    4. NAFL (nonalcoholic fatty liver)    5. Essential hypertension      Panniculitis seems to be responding, albeit slow, to current regimen. The erythema has decreased and the pigmentary changes are changing as if it is in the healing process. We suggest to her that she is getting better and suggest we continue the same pathway we have been going.   What is in her favor is that her white count was not elevated and there was no shift when we checked her CBC. Her platelets, however, had decreased even further to 104,000 from 121,000 in August.  Will just check them again to be sure the trend is not getting significantly worse. The complicating factor in the source of healing is her diabetes. Hemoglobin A1c is 7.1, so theoretically that should not interfere with wound healing. BP control is adequate and actually a little lower than I would like to see it, and for that reason I think we can decrease the Clonidine to one tablet at bedtime. She will come back in a week or two for us to review. I have discussed the diagnosis with the patient and the intended plan as seen in the  orders above. The patient understands and agees with the plan. The patient has   received an after visit summary and questions were answered concerning  future plans  Patient labs and/or xrays were reviewed  Past records were reviewed. PLAN:  .  Orders Placed This Encounter    CBC WITH AUTOMATED DIFF       Follow-up and Dispositions    · Return in about 2 weeks (around 10/29/2019). ATTENTION:   This medical record was transcribed using an electronic medical records system. Although proofread, it may and can contain electronic and spelling errors. Other human spelling and other errors may be present. Corrections may be executed at a later time. Please feel free to contact us for any clarifications as needed.

## 2019-10-15 NOTE — ACP (ADVANCE CARE PLANNING)

## 2019-10-16 LAB
BASOPHILS # BLD AUTO: 0.1 X10E3/UL (ref 0–0.2)
BASOPHILS NFR BLD AUTO: 1 %
EOSINOPHIL # BLD AUTO: 0.3 X10E3/UL (ref 0–0.4)
EOSINOPHIL NFR BLD AUTO: 4 %
ERYTHROCYTE [DISTWIDTH] IN BLOOD BY AUTOMATED COUNT: 14.1 % (ref 12.3–15.4)
HCT VFR BLD AUTO: 37.8 % (ref 34–46.6)
HGB BLD-MCNC: 12.2 G/DL (ref 11.1–15.9)
IMM GRANULOCYTES # BLD AUTO: 0 X10E3/UL (ref 0–0.1)
IMM GRANULOCYTES NFR BLD AUTO: 0 %
LYMPHOCYTES # BLD AUTO: 2.6 X10E3/UL (ref 0.7–3.1)
LYMPHOCYTES NFR BLD AUTO: 37 %
MCH RBC QN AUTO: 28 PG (ref 26.6–33)
MCHC RBC AUTO-ENTMCNC: 32.3 G/DL (ref 31.5–35.7)
MCV RBC AUTO: 87 FL (ref 79–97)
MONOCYTES # BLD AUTO: 0.4 X10E3/UL (ref 0.1–0.9)
MONOCYTES NFR BLD AUTO: 6 %
NEUTROPHILS # BLD AUTO: 3.6 X10E3/UL (ref 1.4–7)
NEUTROPHILS NFR BLD AUTO: 52 %
PLATELET # BLD AUTO: 170 X10E3/UL (ref 150–450)
RBC # BLD AUTO: 4.35 X10E6/UL (ref 3.77–5.28)
WBC # BLD AUTO: 7 X10E3/UL (ref 3.4–10.8)

## 2019-10-18 ENCOUNTER — OFFICE VISIT (OUTPATIENT)
Dept: HEMATOLOGY | Age: 69
End: 2019-10-18

## 2019-10-18 VITALS
DIASTOLIC BLOOD PRESSURE: 79 MMHG | HEART RATE: 85 BPM | TEMPERATURE: 97.7 F | SYSTOLIC BLOOD PRESSURE: 130 MMHG | OXYGEN SATURATION: 97 % | BODY MASS INDEX: 30.5 KG/M2 | WEIGHT: 151 LBS

## 2019-10-18 DIAGNOSIS — K76.0 NAFL (NONALCOHOLIC FATTY LIVER): Primary | ICD-10-CM

## 2019-10-18 DIAGNOSIS — D69.6 THROMBOCYTOPENIA (HCC): ICD-10-CM

## 2019-10-18 NOTE — Clinical Note
10/29/19 Patient: Josse Chahal YOB: 1950 Date of Visit: 10/18/2019 Alley Mcdonald MD 
[facility] [address] [facility] 7 [address] VIA In Basket Dear Alley Mcdonald MD, Thank you for referring Ms. Josse Chahal to [address] [place] [address],[address] for evaluation. My notes for this consultation are attached. If you have questions, please do not hesitate to call me. I look forward to following your patient along with you. Sincerely, Jayashree Avendaño MD

## 2019-10-18 NOTE — PROGRESS NOTES
[address] [facility], MD, MD Maria Isabel Carroll PA-C Romilda Mines, [facility]     April S Trevor, [facility]   DAVID Tran, [facility]       Chantel Patel [facility] 136    at [address]    [address] [place] [address], [facility] Terrence Zuniga  22.    [phone]    FAX: [address] [place] [address], [address]    [phone]    FAX: [phone]       Patient Care Team:  Rachelle Bradley MD as PCP - General (Internal Medicine)    Patient Active Problem List   Diagnosis Code    Hypertension I10    Hypercholesterolemia E78.00    Class 1 obesity due to excess calories with serious comorbidity and body mass index (BMI) of 32.0 to 32.9 in adult E66.09, Z68.32    Depression with anxiety F41.8    Diverticulosis K57.90    S/P LD (total abdominal hysterectomy) Z90.710    Fatigue R53.83    Baker's cyst M71.20    Type 2 diabetes mellitus without complication, without long-term current use of insulin (HCC) E11.9    Thrombocytopenia (HCC) D69.6    Hemangioma of liver D18.03    NAFL (nonalcoholic fatty liver) P40.8    Recurrent depression (HCC) F33.9    Vulval edema N90.89    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    Panniculitis M79.3       Frosty Moose [facility] management of non-alcoholic fatty liver (NAFL). The active problem list, all pertinent past medical history, medications, radiologic findings and laboratory findings related to the liver disorder were reviewed with the patient. The patient is a 68 y.o.  female with a history of chronic thrombocytopenia.  A routine ultrasound was ordered 5/2018 which demonstrated echogenicity with questionable cirrhosis. Assessment of liver fibrosis was performed in the office with Fibroscan 10/2019. The result was 5.8 kPa which correlates with no fibrosis. A CAP score of 317 suggest fatty liver. An ultrasound performed 5/2018 suggested echogenicity consistent with cirrhosis. A mass was present in the right lobe, measuring 2.8 x 2.6 cm. Further imaging was performed with MRI 6/2018. Results suggest that the liver is normal. Changes of cirrhosis were not seen. No fatty liver was seen. An mass is present in the right lobe, segment 5, measuring 3.2 cm with enhancing characteristics consistent with hemangioma. Repeat imaging was performed 9/2019 which demonstrated echogenicity consistent with steatosis and stable hemangioma. The patient notes fatigue. Today, patient denies abdominal pain, change in bowel habits, dark urine, myalgias, arthralgias, pruritus and problems concentrating. The patient completes all daily activities without any functional limitations. ASSESSMENT AND PLAN:  The patient does not have cirrhosis. Although an ultrasound suggested cirrhosis and there is thrombocytopenia, the MRI does not suggest cirrhosis and Fibroscan scan demonstrates normal liver stiffness consistent with no fibrosis. 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 Will send patient to hematology for further workup. Repeat ultrasound done 9/05/19 demonstrates increased echogenicity consistent with steatosis. Stable hemangioma. NAFL/Benign Steatosis  The Fibroscan suggests she has benign steatosis or NAFL. The CAP value is elevated and suggests steatosis with no fibrosis. The liver transaminases are normal. The ALP is normal. Liver function is normal. The platelet count is depressed. The patient was counseled regarding diet and exercise to achieve weight loss. The best diet for patients with fatty liver is one very low in carbohydrates and enriched with protein.       The patient was told to consume any food products and drinks containing fructose as this enhances hepatic fat synthesis. The patient has lost 10 lbs since the last appointment. 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 There is no evidence of overt bleeding. Anti-platelet antibody was positive. This may be due to an underlying diagnosis of ITP. Will refer to hematology. Screening for Esophageal varices   The patient has not had an EGD to screen for varices. Given the fibroscan findings will hold off on EGD for now. Treatment of other medical problems in patients with chronic liver disease  The patient was directed to continue all current medications at the current dosages. There are no contraindications for the patient to take any medications that are necessary for treatment of other medical issues. The patient can take oral diabetic agents for treatment of diabetes and statins for treatment of hypercholesterolemia. This will not impact the patient's current liver disease. The patient does not consume alcohol daily. Normal doses of acetaminophen can be used for pain as needed. Normal doses of acetaminophen as recommended on the label are not hepatotoxic, even in patients with cirrhosis. Counseling for alcohol in patients with chronic liver disease  The patient has cirrhosis and was advised to be abstinent from all alcohol including non-alcoholic beer which does contain some alcohol. The patient does not consume any significant amount of alcohol. Vaccinations   Vaccination for viral hepatitis A is recommended since the patient has no serologic evidence of previous exposure or vaccination with immunity. Vaccination for viral hepatitis B is not needed. The patient has serologic evidence of prior exposure or vaccination with immunity. Routine vaccinations against other bacterial and viral agents can be performed as indicated.   Annual flu vaccination should be administered if indicated. Screening for Hepatocellular Carcinoma  HCC screening is not necessary if the the patient does not have cirrhosis. The data is currently conflicting. Will continue to image the liver with ultrasound The next liver imaging study will be performed in 12/2018. ALLERGIES  Allergies   Allergen Reactions    Aspirin Nausea and Vomiting    Lipitor [Atorvastatin] Myalgia    Metformin Cough    Pravastatin Myalgia     MEDICATIONS  Current Outpatient Medications   Medication Sig    cephALEXin (KEFLEX) 500 mg capsule Take 1 Cap by mouth four (4) times daily for 10 days.  bacitracin zinc (BACITRACIN) ointment Apply  to affected area two (2) times a day.  cloNIDine HCl (CATAPRES) 0.2 mg tablet TAKE 1 TAB BY MOUTH TWO (2) TIMES A DAY. (Patient taking differently: daily. TAKE 1 TAB BY MOUTH TWO (2) TIMES A DAY.)    fluticasone propionate (FLONASE) 50 mcg/actuation nasal spray 2 Sprays by Both Nostrils route daily.  amLODIPine (NORVASC) 5 mg tablet TAKE 1 TABLET BY MOUTH EVERY DAY    glucose blood VI test strips (CONTOUR NEXT STRIPS) strip Test blood sugar once daily     No current facility-administered medications for this visit. SYSTEM REVIEW NOT RELATED TO LIVER DISEASE OR REVIEWED ABOVE:  Constitution systems: Negative for fever, chills, weight gain, weight loss. Eyes: Negative for visual changes. ENT: Negative for sore throat, painful swallowing. Respiratory: Negative for cough, hemoptysis, SOB. Cardiology: Negative for chest pain, palpitations. GI:  Negative for constipation or diarrhea. : Negative for urinary frequency, dysuria, hematuria, nocturia. Skin: Negative for rash. Hematology: Negative for easy bruising, blood clots. Musculo-skelatal: Negative for back pain, muscle pain, weakness. Neurologic: Negative for headaches, dizziness, vertigo, memory problems not related to HE. Psychology: Negative for anxiety, depression.      FAMILY HISTORY:  The patient has no knowledge of the father's medical condition. The mother is Has the following chronic diseases: dementia. There is no family history of liver disease. SOCIAL HISTORY:  The patient is . The patient has 2 children and 5 grandchildren. The patient has never used tobacco products. The patient has never consumed significant amounts of alcohol. The patient currently works full time as a . PHYSICAL EXAMINATION:  Visit Vitals  /79 (BP 1 Location: Left arm, BP Patient Position: Sitting)   Pulse 85   Temp 97.7 °F (36.5 °C) (Tympanic)   Wt 151 lb (68.5 kg)   SpO2 97%   BMI 30.50 kg/m²     General: No acute distress. Eyes: Sclera anicteric. ENT: No oral lesions. Thyroid normal.  Nodes: No adenopathy. Skin: No spider angiomata. No jaundice. No palmar erythema. Respiratory: Lungs clear to auscultation. Cardiovascular: Regular heart rate. No murmurs. No JVD. Abdomen: Soft non-tender. Liver size normal to percussion/palpation. Spleen not palpable. No obvious ascites. Extremities: No edema. No muscle wasting. No gross arthritic changes. Neurologic: Alert and oriented. Cranial nerves grossly intact. No asterixis.     LABORATORY STUDIES:  [facility] of [address] Units 10/15/2019 10/8/2019   WBC 3.4 - 10.8 x10E3/uL 7.0 5.6   ANC 1.4 - 7.0 x10E3/uL 3.6 2.7   HGB 11.1 - 15.9 g/dL 12.2 12.4    - 450 x10E3/uL 170 104 (L)   PLT      INR 0.8 - 1.2     AST 0 - 40 IU/L     ALT 0 - 32 IU/L     Alk Phos 39 - 117 IU/L     Bili, Total 0.0 - 1.2 mg/dL     Bili, Direct 0.00 - 0.40 mg/dL     Albumin 3.5 - 4.8 g/dL     BUN 8 - 27 mg/dL     Creat 0.57 - 1.00 mg/dL     Na 134 - 144 mmol/L     K 3.5 - 5.2 mmol/L     Cl 96 - 106 mmol/L     CO2 20 - 29 mmol/L     Glucose 65 - 99 mg/dL       Liver Milo of [address] [place] [address] Ref Rng & Units 8/27/2019   WBC 3.4 - 10.8 x10E3/uL 6.5   ANC 1.4 - 7.0 x10E3/uL 3.2   HGB 11.1 - 15.9 g/dL 13.0    - 450 x10E3/uL 121 (L)   PLT     INR 0.8 - 1.2    AST 0 - 40 IU/L 19   ALT 0 - 32 IU/L 12   Alk Phos 39 - 117 IU/L 86   Bili, Total 0.0 - 1.2 mg/dL 0.3   Bili, Direct 0.00 - 0.40 mg/dL 0.09   Albumin 3.5 - 4.8 g/dL 4.5   BUN 8 - 27 mg/dL 15   Creat 0.57 - 1.00 mg/dL 0.70   Na 134 - 144 mmol/L 143   K 3.5 - 5.2 mmol/L 4.2   Cl 96 - 106 mmol/L 105   CO2 20 - 29 mmol/L 23   Glucose 65 - 99 mg/dL 108 (H)       SEROLOGIES:  Serologies Latest Ref Rng & Units 6/14/2018   Hep A Ab, Total Negative Negative   Hep B Surface Ag Negative Negative   Hep B Core Ab, Total Negative Positive (A)   Hep B Surface AB QL  Non Reactive   Hep C Ab 0.0 - 0.9 s/co ratio <0.1   Ferritin 15 - 150 ng/mL 153 (H)   Iron % Saturation 15 - 55 % 27   ASMCA 0 - 19 Units 15   Alpha-1 antitrypsin level 90 - 200 mg/dL 122     LIVER HISTOLOGY:  7/2018. FibroScan performed at [facility]. EkPa was 3.9. IQR/med 8%. . The results suggest fatty liver and a fibrosis level of F0.  10/2019. FibroScan performed at [facility]. EkPa was 5.8. IQR/med 14%. . The results suggested a fibrosis level of F0. The CAP score suggests fatty liver. ENDOSCOPIC PROCEDURES:  Not available or performed    RADIOLOGY:  5/2018. Ultrasound of liver. Echogenic consistent with cirrhosis. Liver mass lesion in the right lobe measuring 2.8 x 2.6 cm. No ascites. 6/2018. Dynamic MRI of liver. Normal appearing liver. Liver mass consistent with hemangioma in the right lobe, segment 5 measuring 3.2 cm. OTHER TESTING:  Not available or performed    All of the issues listed in the Assessment and Plan were discussed with the patient. All questions were answered. The patient expressed a clear understanding of the above. Follow-up Everette Osmin Izaiah 32 as needed only. Will refer to hematology for further workup of thrombocytopenia most likely due to ITP. POLINA [place] of [address]  MOB Lisa, [address] [facility].  [address] [facility], MD  [address] [facility], [address] [facility] 22.  [phone]  [address] [facility]

## 2019-10-18 NOTE — PROGRESS NOTES
Chief Complaint   Patient presents with    Other     Fibroscan     1. Have you been to the ER, urgent care clinic since your last visit? Hospitalized since your last visit? No    2. Have you seen or consulted any other health care providers outside of the [address] [place] [address] since your last visit? Include any pap smears or colon screening. No     3 most recent PHQ Screens 9/20/2019   PHQ Not Done -   Little interest or pleasure in doing things Not at all   Feeling down, depressed, irritable, or hopeless Not at all   Total Score PHQ 2 0     Abuse Screening Questionnaire 9/20/2019   Do you ever feel afraid of your partner? N   Are you in a relationship with someone who physically or mentally threatens you? N   Is it safe for you to go home?  Y     Learning Assessment 9/20/2019   PRIMARY LEARNER Patient   HIGHEST LEVEL OF EDUCATION - PRIMARY LEARNER  -   BARRIERS PRIMARY LEARNER NONE   CO-LEARNER CAREGIVER No   PRIMARY LANGUAGE ENGLISH   LEARNER PREFERENCE PRIMARY DEMONSTRATION     -   ANSWERED BY patient   RELATIONSHIP SELF     Visit Vitals  /79 (BP 1 Location: Left arm, BP Patient Position: Sitting)   Pulse 85   Temp 97.7 °F (36.5 °C) (Tympanic)   Wt 151 lb (68.5 kg)   SpO2 97%   BMI 30.50 kg/m²

## 2019-10-22 ENCOUNTER — TELEPHONE (OUTPATIENT)
Dept: HEMATOLOGY | Age: 69
End: 2019-10-22

## 2019-10-22 NOTE — TELEPHONE ENCOUNTER
Informed patient of appointment with Dr. Yisel Yo. 11/21/19 @ 9am. Patient stated the time will not work for her. Patient was given the phone number to Dr. Jackson Pretty office to reschedule. Address was given to the patient as well. Patient had no further questions.

## 2019-10-29 ENCOUNTER — OFFICE VISIT (OUTPATIENT)
Dept: INTERNAL MEDICINE CLINIC | Age: 69
End: 2019-10-29

## 2019-10-29 VITALS
DIASTOLIC BLOOD PRESSURE: 69 MMHG | HEIGHT: 59 IN | WEIGHT: 162.2 LBS | OXYGEN SATURATION: 97 % | RESPIRATION RATE: 18 BRPM | BODY MASS INDEX: 32.7 KG/M2 | HEART RATE: 81 BPM | TEMPERATURE: 95.9 F | SYSTOLIC BLOOD PRESSURE: 127 MMHG

## 2019-10-29 DIAGNOSIS — D69.6 THROMBOCYTOPENIA (HCC): ICD-10-CM

## 2019-10-29 DIAGNOSIS — E11.9 TYPE 2 DIABETES MELLITUS WITHOUT COMPLICATION, WITHOUT LONG-TERM CURRENT USE OF INSULIN (HCC): ICD-10-CM

## 2019-10-29 DIAGNOSIS — M79.3 PANNICULITIS: Primary | ICD-10-CM

## 2019-10-29 NOTE — PROGRESS NOTES
SPORTS MEDICINE AND PRIMARY CARE  Erica La MD, [address] [address]  Phone:  [phone]  Fax: [phone]      Chief Complaint   Patient presents with    Hypertension         SUBECTIVE:    Josse Chahal is a 68 y.o. female Patient returns today with a known history of panniculitis with an open wound on her abdomen, thrombocytopenia, type 2 diabetes, and is seen for evaluation. She tried to get a hold of us. She called two days in a row, the second call they admitted they did not give us the message. She had exquisite pain in the bottom of her tummy, decided that she was allergic to Bacitracin and discontinued the cream and used just dry gauze and warm soapy water and it finally healed. She comes in today complaining of pigmentary changes in the groin. Patient is seen for evaluation. Current Outpatient Medications   Medication Sig Dispense Refill    bacitracin zinc (BACITRACIN) ointment Apply  to affected area two (2) times a day. 15 g 11    cloNIDine HCl (CATAPRES) 0.2 mg tablet TAKE 1 TAB BY MOUTH TWO (2) TIMES A DAY. (Patient taking differently: daily. TAKE 1 TAB BY MOUTH TWO (2) TIMES A DAY.) 180 Tab 3    fluticasone propionate (FLONASE) 50 mcg/actuation nasal spray 2 Sprays by Both Nostrils route daily.  1 Bottle 11    amLODIPine (NORVASC) 5 mg tablet TAKE 1 TABLET BY MOUTH EVERY DAY 90 Tab 3    glucose blood VI test strips (CONTOUR NEXT STRIPS) strip Test blood sugar once daily 50 strip 11     Past Medical History:   Diagnosis Date    Baker's cyst 3-3-16    r    Bereavement 11/15    59yo  sister     Contusion of knee and lower leg, right, subsequent encounter 11/10/2017    Contusion of knee, right     Cough 2017    Depression with anxiety     Diverticulosis 15    colonoscopy    [facility]     Edema     Encounter related to worker's compensation claim 11/10/2017    fall t work    Fatigue     Hemangioma of liver 06/2018    mri    Hemangioma of liver 09/05/2019    Hypercholesterolemia     Hypertension     Liver disease     hepatitis 30 years ago - Liver mass consistent with hemangioma in the right lobe    NAFL (nonalcoholic fatty liver)     Obesity     Onychomycosis     Panniculitis 10/08/2019    S/P LD (total abdominal hysterectomy) 1985    Thromb[facility] 12/12/2014    Vaginal vault prolapse 05/06/2019    Natalie hull md - robotic abdominal sacrolpopexy,enterocele repain,r ureterolysis,extensive lysis of adhesions,transobturator suburethral sling,retocele repair,cystoscopy with calibration     Past Surgical History:   Procedure Laterality Date    HX GYN      partial hysterectomy     Allergies   Allergen Reactions    Aspirin Nausea and Vomiting    Lipitor [Atorvastatin] Myalgia    Metformin Cough    Pravastatin Myalgia       REVIEW OF SYSTEMS:   No chills, no fever. Social History     Socioeconomic History    Marital status:      Spouse name: Not on file    Number of children: Not on file    Years of education: Not on file    Highest education level: Not on file   Tobacco Use    Smoking status: Never Smoker    Smokeless tobacco: Never Used   Substance and Sexual Activity    Alcohol use: No     Alcohol/week: 0.0 standard drinks    Drug use: No    Sexual activity: Not Currently   Social History Narrative    Medical History: HTNTMJ dysfunctionHAsCecal lipoma 01/04negative head CT 03/04August 14, 2013 CT head w ith contrast unremarkable    except progressive splenoid sinusitisAugust 14, 2013 CT chest unremarkable    Gyn History: Last mammogram date 01/13/2012. Surgical History: LD 1985colonoscopy 01/04    Hospitalization/Major Diagnostic Procedure: Denies Past Hospitalization    Family History: Mother: alive 71 yrs, HTNFather: unknow nSister(s): alive, DMBrother(s): aliveDaughter(s): aliveSon(s): aliveAunt: alive2    brother(s) , 2 sister(s) .  1 son(s) , 1 daughter(s) . Social History: Alcohol Use Patient does not use alcohol. Smoking Status Patient is a never smoker. Marital Status: . Lives w ith:    spouse. no Children. 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 Education/School: has highschool diploma. Orthodoxy: Jehovah    W itness - no blood transfusions. r  Family History   Problem Relation Age of Onset    No Known Problems Father        OBJECTIVE:  Visit Vitals  /69 (BP 1 Location: Right arm, BP Patient Position: Sitting)   Pulse 81   Temp 95.9 °F (35.5 °C) (Oral)   Resp 18   Ht 4' 11\" (1.499 m)   Wt 162 lb 3.2 oz (73.6 kg)   SpO2 97%   BMI 32.76 kg/m²     ENT: perrla,  eom intact  NECK: supple. Thyroid normal  CHEST: clear to ascultation and percussion   HEART: regular rate and rhythm  ABD: soft, bowel sounds active  EXTREMITIES: no edema, pulse 1+     Office Visit on 10/15/2019   Component Date Value Ref Range Status    WBC 10/15/2019 7.0  3.4 - 10.8 x10E3/uL Final    RBC 10/15/2019 4.35  3.77 - 5.28 x10E6/uL Final    HGB 10/15/2019 12.2  11.1 - 15.9 g/dL Final    HCT 10/15/2019 37.8  34.0 - 46.6 % Final    MCV 10/15/2019 87  79 - 97 fL Final    MCH 10/15/2019 28.0  26.6 - 33.0 pg Final    MCHC 10/15/2019 32.3  31.5 - 35.7 g/dL Final    RDW 10/15/2019 14.1  12.3 - 15.4 % Final    PLATELET 23/93/8008 216  150 - 450 x10E3/uL Final    NEUTROPHILS 10/15/2019 52  Not Estab. % Final    Lymphocytes 10/15/2019 37  Not Estab. % Final    MONOCYTES 10/15/2019 6  Not Estab. % Final    EOSINOPHILS 10/15/2019 4  Not Estab. % Final    BASOPHILS 10/15/2019 1  Not Estab. % Final    ABS. NEUTROPHILS 10/15/2019 3.6  1.4 - 7.0 x10E3/uL Final    Abs Lymphocytes 10/15/2019 2.6  0.7 - 3.1 x10E3/uL Final    ABS. MONOCYTES 10/15/2019 0.4  0.1 - 0.9 x10E3/uL Final    ABS. EOSINOPHILS 10/15/2019 0.3  0.0 - 0.4 x10E3/uL Final    ABS. BASOPHILS 10/15/2019 0.1  0.0 - 0.2 x10E3/uL Final    IMMATURE GRANULOCYTES 10/15/2019 0  Not Estab. % Final    ABS. IMM. GRANS. 10/15/2019 0.0  0.0 - 0.1 x10E3/uL Final   Office Visit on 10/08/2019   Component Date Value Ref Range Status    WBC 10/08/2019 5.6  3.4 - 10.8 x10E3/uL Final    RBC 10/08/2019 4.22  3.77 - 5.28 x10E6/uL Final    HGB 10/08/2019 12.4  11.1 - 15.9 g/dL Final    HCT 10/08/2019 35.9  34.0 - 46.6 % Final    MCV 10/08/2019 85  79 - 97 fL Final    MCH 10/08/2019 29.4  26.6 - 33.0 pg Final    MCHC 10/08/2019 34.5  31.5 - 35.7 g/dL Final    RDW 10/08/2019 14.1  12.3 - 15.4 % Final    PLATELET 12/03/9780 383* 150 - 450 x10E3/uL Final    NEUTROPHILS 10/08/2019 48  Not Estab. % Final    Lymphocytes 10/08/2019 40  Not Estab. % Final    MONOCYTES 10/08/2019 7  Not Estab. % Final    EOSINOPHILS 10/08/2019 4  Not Estab. % Final    BASOPHILS 10/08/2019 1  Not Estab. % Final    ABS. NEUTROPHILS 10/08/2019 2.7  1.4 - 7.0 x10E3/uL Final    Abs Lymphocytes 10/08/2019 2.2  0.7 - 3.1 x10E3/uL Final    ABS. MONOCYTES 10/08/2019 0.4  0.1 - 0.9 x10E3/uL Final    ABS. EOSINOPHILS 10/08/2019 0.2  0.0 - 0.4 x10E3/uL Final    ABS. BASOPHILS 10/08/2019 0.1  0.0 - 0.2 x10E3/uL Final    IMMATURE GRANULOCYTES 10/08/2019 0  Not Estab. % Final    ABS. IMM. GRANS. 10/08/2019 0.0  0.0 - 0.1 x10E3/uL Final    Hematology comments: 10/08/2019 Note:   Final    Verified by microscopic examination.  Hemoglobin A1c 10/08/2019 7.1* 4.8 - 5.6 % Final    Comment:          Prediabetes: 5.7 - 6.4           Diabetes: >6.4           Glycemic control for adults with diabetes: <7.0      Estimated average glucose 10/08/2019 157  mg/dL Final   Office Visit on 08/27/2019   Component Date Value Ref Range Status    Protein, total 08/27/2019 7.4  6.0 - 8.5 g/dL Final    Albumin 08/27/2019 4.5  3.5 - 4.8 g/dL Final    Bilirubin, total 08/27/2019 0.3  0.0 - 1.2 mg/dL Final    Bilirubin, direct 08/27/2019 0.09  0.00 - 0.40 mg/dL Final    Alk.  phosphatase 08/27/2019 86  39 - 117 IU/L Final    AST (SGOT) 08/27/2019 19 0 - 40 IU/L Final    ALT (SGPT) 08/27/2019 12  0 - 32 IU/L Final    Glucose 08/27/2019 108* 65 - 99 mg/dL Final    BUN 08/27/2019 15  8 - 27 mg/dL Final    Creatinine 08/27/2019 0.70  0.57 - 1.00 mg/dL Final    GFR est non-AA 08/27/2019 88  >59 mL/min/1.73 Final    GFR est AA 08/27/2019 102  >59 mL/min/1.73 Final    BUN/Creatinine ratio 08/27/2019 21  12 - 28 Final    Sodium 08/27/2019 143  134 - 144 mmol/L Final    Potassium 08/27/2019 4.2  3.5 - 5.2 mmol/L Final    Chloride 08/27/2019 105  96 - 106 mmol/L Final    CO2 08/27/2019 23  20 - 29 mmol/L Final    Calcium 08/27/2019 9.0  8.7 - 10.3 mg/dL Final    WBC 08/27/2019 6.5  3.4 - 10.8 x10E3/uL Final    RBC 08/27/2019 4.59  3.77 - 5.28 x10E6/uL Final    HGB 08/27/2019 13.0  11.1 - 15.9 g/dL Final    HCT 08/27/2019 40.3  34.0 - 46.6 % Final    MCV 08/27/2019 88  79 - 97 fL Final    MCH 08/27/2019 28.3  26.6 - 33.0 pg Final    MCHC 08/27/2019 32.3  31.5 - 35.7 g/dL Final    RDW 08/27/2019 14.0  12.3 - 15.4 % Final    PLATELET 32/23/3834 220* 150 - 450 x10E3/uL Final    NEUTROPHILS 08/27/2019 48  Not Estab. % Final    Lymphocytes 08/27/2019 40  Not Estab. % Final    MONOCYTES 08/27/2019 6  Not Estab. % Final    EOSINOPHILS 08/27/2019 4  Not Estab. % Final    BASOPHILS 08/27/2019 1  Not Estab. % Final    ABS. NEUTROPHILS 08/27/2019 3.2  1.4 - 7.0 x10E3/uL Final    Abs Lymphocytes 08/27/2019 2.6  0.7 - 3.1 x10E3/uL Final    ABS. MONOCYTES 08/27/2019 0.4  0.1 - 0.9 x10E3/uL Final    ABS. EOSINOPHILS 08/27/2019 0.2  0.0 - 0.4 x10E3/uL Final    ABS. BASOPHILS 08/27/2019 0.1  0.0 - 0.2 x10E3/uL Final    IMMATURE GRANULOCYTES 08/27/2019 1  Not Estab. % Final    ABS. IMM. GRANS. 08/27/2019 0.0  0.0 - 0.1 x10E3/uL Final    AFP, serum 08/27/2019 2.4  0.0 - 8.0 ng/mL Final    Comment: AFP and AFP-L3% measured by [facility] Liquid Phase Binding  Methodology.   Values obtained with different assay methods or kits cannot be used  interchangeably. Results cannot be interpreted as absolute evidence  of the presence or absence of malignant disease. This test is not interpretable in pregnant females.  AFP-L3%, Serum 08/27/2019 Comment  0.0 - 9.9 % Final    AFP-L3% result is below the detection limit of the assay.  Plt Associated Anti-IIb/IIIa 08/27/2019 Positive* Negative Final    Plt Associated Anti-Ib/IX 08/27/2019 Negative  Negative Final    Plt Associated Anti-Ia/IIa 08/27/2019 Negative  Negative Final          ASSESSMENT:  1. Panniculitis    2. Thrombocytopenia ([address]    3. Type 2 diabetes mellitus without complication, without long-term current use of insulin (HCC)      The panniculitis is now resolved. The wounds are completely healed now. We congratulate her. She did a very good job in spite of her inability to get appropriate assistance from her physician. She has intertrigo, which we will treat with Lotrisone cream.    She has a known history of thrombocytopenia with a white count two weeks ago of 170, suggesting that it is resolving. Her blood sugar control has been good with hemoglobin A1c the first of the month of 7.1. She will be back to see us in about two months, sooner if she has any problems. I have discussed the diagnosis with the patient and the intended plan as seen in the  orders above. The patient understands and agees with the plan. The patient has   received an after visit summary and questions were answered concerning  future plans  Patient labs and/or xrays were reviewed  Past records were reviewed. PLAN:  . No orders of the defined types were placed in this encounter. Follow-up and Dispositions    · Return in about 3 months (around 1/29/2020). ATTENTION:   This medical record was transcribed using an electronic medical records system. Although proofread, it may and can contain electronic and spelling errors.   Other human spelling and other errors may be present. Corrections may be executed at a later time. Please feel free to contact us for any clarifications as needed.

## 2019-10-29 NOTE — PROGRESS NOTES
1. Have you been to the ER, urgent care clinic since your last visit? Hospitalized since your last visit? No    2. Have you seen or consulted any other health care providers outside of the [address] [place] [address] since your last visit? Include any pap smears or colon screening.  No

## 2019-12-10 ENCOUNTER — OFFICE VISIT (OUTPATIENT)
Dept: INTERNAL MEDICINE CLINIC | Age: 69
End: 2019-12-10

## 2019-12-10 VITALS
OXYGEN SATURATION: 98 % | WEIGHT: 161.8 LBS | RESPIRATION RATE: 16 BRPM | DIASTOLIC BLOOD PRESSURE: 83 MMHG | TEMPERATURE: 96.3 F | HEART RATE: 86 BPM | SYSTOLIC BLOOD PRESSURE: 144 MMHG | HEIGHT: 59 IN | BODY MASS INDEX: 32.62 KG/M2

## 2019-12-10 DIAGNOSIS — E11.9 TYPE 2 DIABETES MELLITUS WITHOUT COMPLICATION, WITHOUT LONG-TERM CURRENT USE OF INSULIN (HCC): Primary | ICD-10-CM

## 2019-12-10 DIAGNOSIS — F41.8 DEPRESSION WITH ANXIETY: ICD-10-CM

## 2019-12-10 DIAGNOSIS — E78.00 HYPERCHOLESTEROLEMIA: ICD-10-CM

## 2019-12-10 DIAGNOSIS — I10 ESSENTIAL HYPERTENSION: ICD-10-CM

## 2019-12-10 NOTE — PROGRESS NOTES
Chief Complaint   Patient presents with    Diabetes     Patient is here for a follow up. 1. Have you been to the ER, urgent care clinic since your last visit? Hospitalized since your last visit? No    2. Have you seen or consulted any other health care providers outside of the [address] [place] [address] since your last visit? Include any pap smears or colon screening.  No

## 2019-12-10 NOTE — PROGRESS NOTES
SPORTS MEDICINE AND PRIMARY CARE  Efrain Fang MD, [address] [address]  Phone:  [phone]  Fax: [phone]       Chief Complaint   Patient presents with    Diabetes     Patient is here for a follow up. .      SUBJECTIVE:    Teresa Lee is a 68 y.o. female Patient returns today with known history of DM type 2, whose blood sugar control has been fair with hemoglobin A1c in .1, primary hypertension, dyslipidemia, and anxiety/depression, and is seen for evaluation. Current Outpatient Medications   Medication Sig Dispense Refill    bacitracin zinc (BACITRACIN) ointment Apply  to affected area two (2) times a day. 15 g 11    cloNIDine HCl (CATAPRES) 0.2 mg tablet TAKE 1 TAB BY MOUTH TWO (2) TIMES A DAY. (Patient taking differently: daily. TAKE 1 TAB BY MOUTH TWO (2) TIMES A DAY.) 180 Tab 3    fluticasone propionate (FLONASE) 50 mcg/actuation nasal spray 2 Sprays by Both Nostrils route daily.  1 Bottle 11    amLODIPine (NORVASC) 5 mg tablet TAKE 1 TABLET BY MOUTH EVERY DAY 90 Tab 3    glucose blood VI test strips (CONTOUR NEXT STRIPS) strip Test blood sugar once daily 50 strip 11     Past Medical History:   Diagnosis Date    Baker's cyst 3-3-16    r    Bereavement 11/15    59yo  sister     Contusion of knee and lower leg, right, subsequent encounter 11/10/2017    Contusion of knee, right     Cough 2017    Depression with anxiety     Diverticulosis 2-6-15    colonoscopy    [facility], [facility]     Edema     Encounter related to worker's compensation claim 11/10/2017    fall t work    Fatigue     Hemangioma of liver 2018    mri    Hemangioma of liver 2019    Hypercholesterolemia     Hypertension     Liver disease     hepatitis 30 years ago - Liver mass consistent with hemangioma in the right lobe    NAFL (nonalcoholic fatty liver)     Obesity     Onychomycosis     Panniculitis 10/08/2019    S/P LD (total abdominal hysterectomy) 1985    Thrombocyt[facility] 12/12/2014    Vaginal vault prolapse 05/06/2019    Nick hull md - robotic abdominal sacrolpopexy,enterocele repain,r ureterolysis,extensive lysis of adhesions,transobturator suburethral sling,retocele repair,cystoscopy with calibration     Past Surgical History:   Procedure Laterality Date    HX GYN      partial hysterectomy     Allergies   Allergen Reactions    Aspirin Nausea and Vomiting    Lipitor [Atorvastatin] Myalgia    Metformin Cough    Pravastatin Myalgia         REVIEW OF SYSTEMS:  General: negative for - chills or fever  ENT: negative for - headaches, nasal congestion or tinnitus  Respiratory: negative for - cough, hemoptysis, shortness of breath or wheezing  Cardiovascular : negative for - chest pain, edema, palpitations or shortness of breath  Gastrointestinal: negative for - abdominal pain, blood in stools, heartburn or nausea/vomiting  Genito-Urinary: no dysuria, trouble voiding, or hematuria  Musculoskeletal: negative for - gait disturbance, joint pain, joint stiffness or joint swelling  Neurological: no TIA or stroke symptoms  Hematologic: no bruises, no bleeding, no swollen glands  Integument: no lumps, mole changes, nail changes or rash  Endocrine: no malaise/lethargy or unexpected weight changes      Social History     Socioeconomic History    Marital status:      Spouse name: Not on file    Number of children: Not on file    Years of education: Not on file    Highest education level: Not on file   Tobacco Use    Smoking status: Never Smoker    Smokeless tobacco: Never Used   Substance and Sexual Activity    Alcohol use: No     Alcohol/week: 0.0 standard drinks    Drug use: No    Sexual activity: Not Currently   Social History Narrative    Medical History: HTNTMJ dysfunctionHAsCecal lipoma 01/04negative head CT 03/04August 14, 2013 CT head w ith contrast unremarkable    except progressive splenoid sinusitisAugust 14, 2013 CT chest unremarkable    Gyn History: Last mammogram date 01/13/2012. Surgical History: LD 1985colonoscopy 01/04    Hospitalization/Major Diagnostic Procedure: Denies Past Hospitalization    Family History: Mother: alive 71 yrs, HTNFather: unknow nSister(s): alive, DMBrother(s): aliveDaughter(s): aliveSon(s): aliveAunt: alive2    brother(s) , 2 sister(s) . 1 son(s) , 1 daughter(s) . Social History: Alcohol Use Patient does not use alcohol. Smoking Status Patient is a never smoker. Marital Status: . Lives w ith:    spouse. no Children. 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 Education/School: has highschool diploma. Gnosticist: Jehovah    W itness - no blood transfusions. Family History   Problem Relation Age of Onset    No Known Problems Father        OBJECTIVE:    Visit Vitals  /83   Pulse 86   Temp 96.3 °F (35.7 °C)   Resp 16   Ht 4' 11\" (1.499 m)   Wt 161 lb 12.8 oz (73.4 kg)   SpO2 98%   BMI 32.68 kg/m²     CONSTITUTIONAL: well , well nourished, appears age appropriate  EYES: perrla, eom intact  ENMT:moist mucous membranes, pharynx clear  NECK: supple. Thyroid normal  RESPIRATORY: Chest: clear bilaterally   CARDIOVASCULAR: Heart: regular rate and rhythm  GASTROINTESTINAL: Abdomen: soft, bowel sounds active  HEMATOLOGIC: no pathological lymph nodes palpated  MUSCULOSKELETAL: Extremities: no edema, pulse 1+   INTEGUMENT: No unusual rashes or suspicious skin lesions noted. Nails appear normal.  NEUROLOGIC: non-focal exam   MENTAL STATUS: alert and oriented, appropriate affect           ASSESSMENT:  1. Type 2 diabetes mellitus without complication, without long-term current use of insulin ([address]    2. Essential hypertension    3. Hypercholesterolemia    4. Depression with anxiety      Blood sugar control is with diet alone.   Will check hemoglobin A1c on the next visit and if it remains elevated we will be more aggressive and encourage her to take medications for diabetes. BP elevation is noted. She was taking two Clonidine a day, we cut her back to one, which obviously was not appropriate. She will go back to two Clonidine daily and will come by in a week for a BP check. Her cholesterol is controlled with diet. She likes to avoid medications. There is an underlying issue that she is not discussing today. She has a known history of depression with anxiety and she states that a lot of stressors are bothering her. She will be back in a week for a BP check, two months to see me. I have discussed the diagnosis with the patient and the intended plan as seen in the  orders above. The patient understands and agees with the plan. The patient has   received an after visit summary and questions were answered concerning  future plans  Patient labs and/or xrays were reviewed  Past records were reviewed. PLAN:  . No orders of the defined types were placed in this encounter. Follow-up and Dispositions    · Return in about 1 week (around 12/17/2019) for bp check, blood sugar check. ATTENTION:   This medical record was transcribed using an electronic medical records system. Although proofread, it may and can contain electronic and spelling errors. Other human spelling and other errors may be present. Corrections may be executed at a later time. Please feel free to contact us for any clarifications as needed.

## 2019-12-17 ENCOUNTER — CLINICAL SUPPORT (OUTPATIENT)
Dept: INTERNAL MEDICINE CLINIC | Age: 69
End: 2019-12-17

## 2019-12-17 VITALS
DIASTOLIC BLOOD PRESSURE: 63 MMHG | SYSTOLIC BLOOD PRESSURE: 99 MMHG | BODY MASS INDEX: 32.52 KG/M2 | HEART RATE: 95 BPM | WEIGHT: 161 LBS

## 2019-12-17 DIAGNOSIS — E66.09 CLASS 1 OBESITY DUE TO EXCESS CALORIES WITH SERIOUS COMORBIDITY AND BODY MASS INDEX (BMI) OF 32.0 TO 32.9 IN ADULT: ICD-10-CM

## 2019-12-17 DIAGNOSIS — K76.0 NAFL (NONALCOHOLIC FATTY LIVER): ICD-10-CM

## 2019-12-17 DIAGNOSIS — I10 ESSENTIAL HYPERTENSION: Primary | ICD-10-CM

## 2019-12-17 DIAGNOSIS — D18.03 HEMANGIOMA OF LIVER: ICD-10-CM

## 2019-12-17 DIAGNOSIS — F41.8 DEPRESSION WITH ANXIETY: ICD-10-CM

## 2019-12-17 DIAGNOSIS — F33.9 RECURRENT DEPRESSION (HCC): ICD-10-CM

## 2019-12-17 DIAGNOSIS — D69.6 THROMBOCYTOPENIA (HCC): ICD-10-CM

## 2019-12-17 DIAGNOSIS — R53.82 CHRONIC FATIGUE: ICD-10-CM

## 2019-12-17 NOTE — PROGRESS NOTES
Pt came in today for bp check. Pt states she is taking   Amlodipine 5 mg 1 tab PO daily  Clonidine 0.2 mg 1 tab PO BID  BP-99/63  Pulse-95  [id number]  Per Dr. Cruzito Lentz pt will start taking Clonidine 0.2 mg 1 tab PO @bedtime. Pt is to return in 2 weeks for another bp check.      Mani Russo

## 2020-01-02 ENCOUNTER — CLINICAL SUPPORT (OUTPATIENT)
Dept: INTERNAL MEDICINE CLINIC | Age: 70
End: 2020-01-02

## 2020-01-02 VITALS
BODY MASS INDEX: 32.46 KG/M2 | TEMPERATURE: 97.8 F | HEIGHT: 59 IN | WEIGHT: 161 LBS | HEART RATE: 90 BPM | DIASTOLIC BLOOD PRESSURE: 80 MMHG | RESPIRATION RATE: 17 BRPM | OXYGEN SATURATION: 96 % | SYSTOLIC BLOOD PRESSURE: 135 MMHG

## 2020-01-02 DIAGNOSIS — F41.8 DEPRESSION WITH ANXIETY: ICD-10-CM

## 2020-01-02 DIAGNOSIS — D18.03 HEMANGIOMA OF LIVER: ICD-10-CM

## 2020-01-02 DIAGNOSIS — D69.6 THROMBOCYTOPENIA (HCC): ICD-10-CM

## 2020-01-02 DIAGNOSIS — F33.9 RECURRENT DEPRESSION (HCC): ICD-10-CM

## 2020-01-02 DIAGNOSIS — E78.00 HYPERCHOLESTEROLEMIA: ICD-10-CM

## 2020-01-02 DIAGNOSIS — E11.9 TYPE 2 DIABETES MELLITUS WITHOUT COMPLICATION, WITHOUT LONG-TERM CURRENT USE OF INSULIN (HCC): Primary | ICD-10-CM

## 2020-01-02 RX ORDER — ESCITALOPRAM OXALATE 10 MG/1
10 TABLET ORAL DAILY
Qty: 30 TAB | Refills: 2 | Status: SHIPPED | OUTPATIENT
Start: 2020-01-02 | End: 2020-03-28

## 2020-01-02 NOTE — ASSESSMENT & PLAN NOTE
This condition is managed by Specialist.  Lab Results   Component Value Date/Time    WBC 7.0 10/15/2019 04:21 PM    HGB 12.2 10/15/2019 04:21 PM    HCT 37.8 10/15/2019 04:21 PM    PLATELET 561 35/30/8162 04:21 PM    Creatinine 0.70 08/27/2019 12:00 AM    BUN 15 08/27/2019 12:00 AM    Potassium 4.2 08/27/2019 12:00 AM    INR 1.0 04/18/2019 05:12 PM    Prothrombin time 10.7 04/18/2019 05:12 PM

## 2020-01-02 NOTE — ASSESSMENT & PLAN NOTE
Stable, based on history, physical exam and review of pertinent labs, studies and medications; meds reconciled; continue current treatment plan. Key Antihyperglycemic Medications     Patient is on no antihyperglycemic meds. Other Key Diabetic Medications     Patient is on no other key diabetic meds.         Lab Results   Component Value Date/Time    Hemoglobin A1c 7.1 10/08/2019 05:08 PM    Glucose 108 08/27/2019 12:00 AM    Creatinine 0.70 08/27/2019 12:00 AM    Microalb/Creat ratio (ug/mg creat.) <6.5 09/13/2018 02:05 PM    Cholesterol, total 176 07/01/2019 01:51 PM    HDL Cholesterol 49 07/01/2019 01:51 PM    LDL, calculated 114 07/01/2019 01:51 PM    Triglyceride 66 07/01/2019 01:51 PM     Diabetic Foot and Eye Exam HM Status   Topic Date Due    Eye Exam  10/14/2016    Diabetic Foot Care  07/01/2020

## 2020-01-02 NOTE — PROGRESS NOTES
Chief Complaint   Patient presents with    Leg Pain     right leg     Cough     1. Have you been to the ER, urgent care clinic since your last visit? Hospitalized since your last visit? No    2. Have you seen or consulted any other health care providers outside of the [address] [place] [address] since your last visit? Include any pap smears or colon screening.  No

## 2020-01-02 NOTE — PROGRESS NOTES
SPORTS MEDICINE AND PRIMARY CARE  Rachele Contreras MD, [address]ma[facility] [address]  Phone:  [phone]  Fax: [phone]       Chief Complaint   Patient presents with    Leg Pain     right leg     Cough   . SUBJECTIVE:    Emperatriz Tovar is a 68 y.o. female  Patient returns today with known history of depression,hypertension,  Thrombocytopenia, and dyslipidemia. She complains of non productive cough and r leg pain with prolonged standing. Patient is seen fo evaluation. Current Outpatient Medications   Medication Sig Dispense Refill    escitalopram oxalate (LEXAPRO) 10 mg tablet Take 1 Tab by mouth daily. 30 Tab 2    bacitracin zinc (BACITRACIN) ointment Apply  to affected area two (2) times a day. 15 g 11    cloNIDine HCl (CATAPRES) 0.2 mg tablet TAKE 1 TAB BY MOUTH TWO (2) TIMES A DAY. (Patient taking differently: daily. TAKE 1 TAB BY MOUTH TWO (2) TIMES A DAY.) 180 Tab 3    fluticasone propionate (FLONASE) 50 mcg/actuation nasal spray 2 Sprays by Both Nostrils route daily.  1 Bottle 11    amLODIPine (NORVASC) 5 mg tablet TAKE 1 TABLET BY MOUTH EVERY DAY 90 Tab 3    glucose blood VI test strips (CONTOUR NEXT STRIPS) strip Test blood sugar once daily 50 strip 11     Past Medical History:   Diagnosis Date    Baker's cyst 3-3-16    r    Bereavement 11/15    59yo  sister     Contusion of knee and lower leg, right, subsequent encounter 11/10/2017    Contusion of knee, right     Cough 2017    Depression with anxiety     Diverticulosis 2-6-15    colonoscopy    [facility]     Edema     Encounter related to worker's compensation claim 11/10/2017    fall t work    Fatigue     Hemangioma of liver 2018    mri    Hemangioma of liver 2019    Hypercholesterolemia     Hypertension     Liver disease     hepatitis 30 years ago - Liver mass consistent with hemangioma in the right lobe    NAFL (nonalcoholic fatty liver)     Obesity     Onychomycosis     Panniculitis 10/08/2019    S/P LD (total abdominal hysterectomy) 1985    Thrombocyt[facility] 12/12/2014    Vaginal vault prolapse 05/06/2019    Patience hull md - robotic abdominal sacrolpopexy,enterocele repain,r ureterolysis,extensive lysis of adhesions,transobturator suburethral sling,retocele repair,cystoscopy with calibration     Past Surgical History:   Procedure Laterality Date    HX GYN      partial hysterectomy     Allergies   Allergen Reactions    Aspirin Nausea and Vomiting    Lipitor [Atorvastatin] Myalgia    Metformin Cough    Pravastatin Myalgia         REVIEW OF SYSTEMS:  General: negative for - chills or fever  ENT: negative for - headaches, nasal congestion or tinnitus  Respiratory: negative for - cough, hemoptysis, shortness of breath or wheezing  Cardiovascular : negative for - chest pain, edema, palpitations or shortness of breath  Gastrointestinal: negative for - abdominal pain, blood in stools, heartburn or nausea/vomiting  Genito-Urinary: no dysuria, trouble voiding, or hematuria  Musculoskeletal: negative for - gait disturbance, joint pain, joint stiffness or joint swelling  Neurological: no TIA or stroke symptoms  Hematologic: no bruises, no bleeding, no swollen glands  Integument: no lumps, mole changes, nail changes or rash  Endocrine: no malaise/lethargy or unexpected weight changes      Social History     Socioeconomic History    Marital status:      Spouse name: Not on file    Number of children: Not on file    Years of education: Not on file    Highest education level: Not on file   Tobacco Use    Smoking status: Never Smoker    Smokeless tobacco: Never Used   Substance and Sexual Activity    Alcohol use: No     Alcohol/week: 0.0 standard drinks    Drug use: No    Sexual activity: Not Currently   Social History Narrative    Medical History: HTNTMJ dysfunctionHAsCecal lipoma 01/04negative head CT 03/04August 14, 2013 CT head w ith contrast unremarkable    except progressive splenoid sinusitisAugust 14, 2013 CT chest unremarkable    Gyn History: Last mammogram date 01/13/2012. Surgical History: LD 1985colonoscopy 01/04    Hospitalization/Major Diagnostic Procedure: Denies Past Hospitalization    Family History: Mother: alive 71 yrs, HTNFather: unknow nSister(s): alive, DMBrother(s): aliveDaughter(s): aliveSon(s): aliveAunt: alive2    brother(s) , 2 sister(s) . 1 son(s) , 1 daughter(s) . Social History: Alcohol Use Patient does not use alcohol. Smoking Status Patient is a never smoker. Marital Status: . Lives w ith:    spouse. no Children. 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 Education/School: has highschool diploma. Jehovah's witness: Jehovah    W itness - no blood transfusions. Family History   Problem Relation Age of Onset    No Known Problems Father        OBJECTIVE:    Visit Vitals  /80   Pulse 90   Temp 97.8 °F (36.6 °C) (Oral)   Resp 17   Ht 4' 11\" (1.499 m)   Wt 161 lb (73 kg)   SpO2 96%   BMI 32.52 kg/m²     CONSTITUTIONAL: well , well nourished, appears age appropriate  EYES: perrla, eom intact  ENMT:moist mucous membranes, pharynx clear  NECK: supple. Thyroid normal  RESPIRATORY: Chest: clear bilaterally   CARDIOVASCULAR: Heart: regular rate and rhythm  GASTROINTESTINAL: Abdomen: soft, bowel sounds active  HEMATOLOGIC: no pathological lymph nodes palpated  MUSCULOSKELETAL: Extremities: no edema, pulse 1+   INTEGUMENT: No unusual rashes or suspicious skin lesions noted. Nails appear normal.  NEUROLOGIC: non-focal exam   MENTAL STATUS: alert and oriented, appropriate affect           ASSESSMENT:  1. Type 2 diabetes mellitus without complication, without long-term current use of insulin ([address]    2. Thrombocytopenia ([address]    3. Hemangioma of liver    4. Hypercholesterolemia    5. Recurrent depression ([address]    6. Depression with anxiety      Blood sugar control is adequate.  Will continue to follow hemoglobin A1c. Her last A1c was in October and was noted to be normal at 7.1. For thrombocytopenia she was referred to hematology, who referred her to Dr. Lacey Smith for chronic liver disease as suspected etiology. She also has hemangioma of the liver. Dyslipidemia is adequately controlled with diet and statin. Last cholesterol was 114. I think she is having another bout of depression, now associated with anxiety related to multiple stressors she has in her life. We suggest psychiatric referral and we also will place her on a small dose of Lexapro. We suggest she take some days off of work. She will be back to see us in about a month, sooner if needed. I have discussed the diagnosis with the patient and the intended plan as seen in the  orders above. The patient understands and agees with the plan. The patient has   received an after visit summary and questions were answered concerning  future plans  Patient labs and/or xrays were reviewed  Past records were reviewed. PLAN:  .  Orders Placed This Encounter    REFERRAL TO PSYCHIATRY    escitalopram oxalate (LEXAPRO) 10 mg tablet                  ATTENTION:   This medical record was transcribed using an electronic medical records system. Although proofread, it may and can contain electronic and spelling errors. Other human spelling and other errors may be present. Corrections may be executed at a later time. Please feel free to contact us for any clarifications as needed.

## 2020-01-02 NOTE — ASSESSMENT & PLAN NOTE
Stable, based on history, physical exam and review of pertinent labs, studies and medications; meds reconciled; continue current treatment plan. Key Psychotherapeutic Meds     Patient is on no psychotherapeutic meds. Other [address] [place] [address]     The patient is on no other behavioral health meds.         Lab Results   Component Value Date/Time    Sodium 143 08/27/2019 12:00 AM    Creatinine 0.70 08/27/2019 12:00 AM    WBC 7.0 10/15/2019 04:21 PM    ALT (SGPT) 12 08/27/2019 12:00 AM    AST (SGOT) 19 08/27/2019 12:00 AM

## 2020-02-06 ENCOUNTER — OFFICE VISIT (OUTPATIENT)
Dept: INTERNAL MEDICINE CLINIC | Age: 70
End: 2020-02-06

## 2020-02-06 VITALS
SYSTOLIC BLOOD PRESSURE: 128 MMHG | HEIGHT: 59 IN | BODY MASS INDEX: 32.74 KG/M2 | TEMPERATURE: 97.8 F | HEART RATE: 71 BPM | DIASTOLIC BLOOD PRESSURE: 68 MMHG | RESPIRATION RATE: 16 BRPM | WEIGHT: 162.4 LBS | OXYGEN SATURATION: 98 %

## 2020-02-06 DIAGNOSIS — I10 ESSENTIAL HYPERTENSION: ICD-10-CM

## 2020-02-06 DIAGNOSIS — E11.9 TYPE 2 DIABETES MELLITUS WITHOUT COMPLICATION, WITHOUT LONG-TERM CURRENT USE OF INSULIN (HCC): Primary | ICD-10-CM

## 2020-02-06 DIAGNOSIS — E66.09 CLASS 1 OBESITY DUE TO EXCESS CALORIES WITH SERIOUS COMORBIDITY AND BODY MASS INDEX (BMI) OF 32.0 TO 32.9 IN ADULT: ICD-10-CM

## 2020-02-06 DIAGNOSIS — D69.6 THROMBOCYTOPENIA (HCC): ICD-10-CM

## 2020-02-06 NOTE — PATIENT INSTRUCTIONS
Body Mass Index: Care Instructions Your Care Instructions Body mass index (BMI) can help you see if your weight is raising your risk for health problems. It uses a formula to compare how much you weigh with how tall you are. · A BMI lower than 18.5 is considered underweight. · A BMI between 18.5 and 24.9 is considered healthy. · A BMI between 25 and 29.9 is considered overweight. A BMI of 30 or higher is considered obese. If your BMI is in the normal range, it means that you have a lower risk for weight-related health problems. If your BMI is in the overweight or obese range, you may be at increased risk for weight-related health problems, such as high blood pressure, heart disease, stroke, arthritis or joint pain, and diabetes. If your BMI is in the underweight range, you may be at increased risk for health problems such as fatigue, lower protection (immunity) against illness, muscle loss, bone loss, hair loss, and hormone problems. BMI is just one measure of your risk for weight-related health problems. You may be at higher risk for health problems if you are not active, you eat an unhealthy diet, or you drink too much alcohol or use tobacco products. Follow-up care is a key part of your treatment and safety. Be sure to make and go to all appointments, and call your doctor if you are having problems. It's also a good idea to know your test results and keep a list of the medicines you take. How can you care for yourself at home? · Practice healthy eating habits. This includes eating plenty of fruits, vegetables, whole grains, lean protein, and low-fat dairy. · If your doctor recommends it, get more exercise. Walking is a good choice. Bit by bit, increase the amount you walk every day. Try for at least 30 minutes on most days of the week. · Do not smoke. Smoking can increase your risk for health problems.  If you need help quitting, talk to your doctor about stop-smoking programs and medicines. These can increase your chances of quitting for good. · Limit alcohol to 2 drinks a day for men and 1 drink a day for women. Too much alcohol can cause health problems. If you have a BMI higher than 25 · Your doctor may do other tests to check your risk for weight-related health problems. This may include measuring the distance around your waist. A waist measurement of more than 40 inches in men or 35 inches in women can increase the risk of weight-related health problems. · Talk with your doctor about steps you can take to stay healthy or improve your health. You may need to make lifestyle changes to lose weight and stay healthy, such as changing your diet and getting regular exercise. If you have a BMI lower than 18.5 · Your doctor may do other tests to check your risk for health problems. · Talk with your doctor about steps you can take to stay healthy or improve your health. You may need to make lifestyle changes to gain or maintain weight and stay healthy, such as getting more healthy foods in your diet and doing exercises to build muscle. Where can you learn more? Go to http://jaret-brittany.info/. Enter S176 in the search box to learn more about \"Body Mass Index: Care Instructions. \" Current as of: October 13, 2016 Content Version: 11.4 © 9028-8697 [employer]. Care instructions adapted under license by [employer] (which disclaims liability or warranty for this information). If you have questions about a medical condition or this instruction, always ask your healthcare professional. [address] any warranty or liability for your use of this information.

## 2020-02-06 NOTE — PROGRESS NOTES
SPORTS MEDICINE AND PRIMARY CARE  Reid Stapleton MD, [address] [address]  Phone:  [phone]  Fax: [phone]       Chief Complaint   Patient presents with    Diabetes     Patient is here for a follow up visit. Patient states that she ia very tierd. .      SUBJECTIVE:    Ashanti Wyman is a 68 y.o. female Patient returns today with complaint of shaking of her right hand at times, wonders if it is related to Lexapro. It comes and goes. She also complains of numbness and tingling in her legs at times that also comes and goes. She is seen for evaluation. She has a known history of DM type 2, thrombocytopenia, primary hypertension, obesity. Current Outpatient Medications   Medication Sig Dispense Refill    escitalopram oxalate (LEXAPRO) 10 mg tablet Take 1 Tab by mouth daily. 30 Tab 2    bacitracin zinc (BACITRACIN) ointment Apply  to affected area two (2) times a day. 15 g 11    cloNIDine HCl (CATAPRES) 0.2 mg tablet TAKE 1 TAB BY MOUTH TWO (2) TIMES A DAY. (Patient taking differently: daily. TAKE 1 TAB BY MOUTH TWO (2) TIMES A DAY.) 180 Tab 3    fluticasone propionate (FLONASE) 50 mcg/actuation nasal spray 2 Sprays by Both Nostrils route daily.  1 Bottle 11    amLODIPine (NORVASC) 5 mg tablet TAKE 1 TABLET BY MOUTH EVERY DAY 90 Tab 3    glucose blood VI test strips (CONTOUR NEXT STRIPS) strip Test blood sugar once daily 50 strip 11     Past Medical History:   Diagnosis Date    Baker's cyst 3-3-16    r    Bereavement 11/15    61yo  sister     Contusion of knee and lower leg, right, subsequent encounter 11/10/2017    Contusion of knee, right     Cough 2017    Depression with anxiety     Diverticulosis 2--15    colonoscopy    [facility]     Edema     Encounter related to worker's compensation claim 11/10/2017    fall t work    Fatigue     Hemangioma of liver 2018    mri    Hemangioma of liver 2019  Hypercholesterolemia     Hypertension     Liver disease     hepatitis 30 years ago - Liver mass consistent with hemangioma in the right lobe    NAFL (nonalcoholic fatty liver)     Obesity     Onychomycosis     Panniculitis 10/08/2019    S/P LD (total abdominal hysterectomy) 1985    Thrombocytopenia ([facility] 12/12/2014    Vaginal vault prolapse 05/06/2019    Merna hull md - robotic abdominal sacrolpopexy,enterocele repain,r ureterolysis,extensive lysis of adhesions,transobturator suburethral sling,retocele repair,cystoscopy with calibration     Past Surgical History:   Procedure Laterality Date    HX GYN      partial hysterectomy     Allergies   Allergen Reactions    Aspirin Nausea and Vomiting    Lipitor [Atorvastatin] Myalgia    Metformin Cough    Pravastatin Myalgia         REVIEW OF SYSTEMS:  General: negative for - chills or fever  ENT: negative for - headaches, nasal congestion or tinnitus  Respiratory: negative for - cough, hemoptysis, shortness of breath or wheezing  Cardiovascular : negative for - chest pain, edema, palpitations or shortness of breath  Gastrointestinal: negative for - abdominal pain, blood in stools, heartburn or nausea/vomiting  Genito-Urinary: no dysuria, trouble voiding, or hematuria  Musculoskeletal: negative for - gait disturbance, joint pain, joint stiffness or joint swelling  Neurological: no TIA or stroke symptoms  Hematologic: no bruises, no bleeding, no swollen glands  Integument: no lumps, mole changes, nail changes or rash  Endocrine: no malaise/lethargy or unexpected weight changes      Social History     Socioeconomic History    Marital status:      Spouse name: Not on file    Number of children: Not on file    Years of education: Not on file    Highest education level: Not on file   Tobacco Use    Smoking status: Never Smoker    Smokeless tobacco: Never Used   Substance and Sexual Activity    Alcohol use: No     Alcohol/week: 0.0 standard drinks    Drug use: No    Sexual activity: Not Currently   Social History Narrative    Medical History: HTNTMJ dysfunctionHAsCecal lipoma 01/04negative head CT 03/04August 14, 2013 CT head w ith contrast unremarkable    except progressive splenoid sinusitisAugust 14, 2013 CT chest unremarkable    Gyn History: Last mammogram date 01/13/2012. Surgical History: LD 1985colonoscopy 01/04    Hospitalization/Major Diagnostic Procedure: Denies Past Hospitalization    Family History: Mother: alive 71 yrs, HTNFather: unknow nSister(s): alive, DMBrother(s): aliveDaughter(s): aliveSon(s): aliveAunt: alive2    brother(s) , 2 sister(s) . 1 son(s) , 1 daughter(s) . Social History: Alcohol Use Patient does not use alcohol. Smoking Status Patient is a never smoker. Marital Status: . Lives w ith:    spouse. no Children. 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 Education/School: has highschool diploma. Confucianist: Jehovah    W itness - no blood transfusions. Family History   Problem Relation Age of Onset    No Known Problems Father        OBJECTIVE:    Visit Vitals  /68   Pulse 71   Temp 97.8 °F (36.6 °C)   Resp 16   Ht 4' 11\" (1.499 m)   Wt 162 lb 6.4 oz (73.7 kg)   SpO2 98%   BMI 32.80 kg/m²     CONSTITUTIONAL: well , well nourished, appears age appropriate  EYES: perrla, eom intact  ENMT:moist mucous membranes, pharynx clear  NECK: supple. Thyroid normal  RESPIRATORY: Chest: clear bilaterally   CARDIOVASCULAR: Heart: regular rate and rhythm  GASTROINTESTINAL: Abdomen: soft, bowel sounds active  HEMATOLOGIC: no pathological lymph nodes palpated  MUSCULOSKELETAL: Extremities: no edema, pulse 1+   INTEGUMENT: No unusual rashes or suspicious skin lesions noted. Nails appear normal.  NEUROLOGIC: non-focal exam   MENTAL STATUS: alert and oriented, appropriate affect           ASSESSMENT:  1. Type 2 diabetes mellitus without complication, without long-term current use of insulin ([address]    2. Thrombocytopenia ([address]    3. Essential hypertension    4. Class 1 obesity due to excess calories with serious comorbidity and body mass index (BMI) of 32.0 to 32.9 in adult      I suspect that the hand shaking is actually nerve related, that is her being nervous, and no further recommendations are made except to continue Lexapro. Will assess blood sugar control with hemoglobin A1c. There is a history of thrombocytopenia, although her last CBC revealed a normal platelet count. Will check the platelets again today for confirmation. BP control is at goal and no adjustments will be made. As usual, we encourage a heart healthy, weight reducing diet and physical activity 30 minutes five days a week. She will be back to see us in three months, sooner if she has any problems. Discussed the patient's BMI with her. The BMI follow up plan is as follows:     dietary management education, guidance, and counseling  encourage exercise  monitor weight  prescribed dietary intake    An After Visit Summary was printed and given to the patient. I have discussed the diagnosis with the patient and the intended plan as seen in the  orders above. The patient understands and agees with the plan. The patient has   received an after visit summary and questions were answered concerning  future plans  Patient labs and/or xrays were reviewed  Past records were reviewed. PLAN:  .  Orders Placed This Encounter    HEMOGLOBIN A1C WITH EAG    CBC WITH AUTOMATED DIFF       Follow-up and Dispositions    · Return in about 3 months (around 5/6/2020). ATTENTION:   This medical record was transcribed using an electronic medical records system. Although proofread, it may and can contain electronic and spelling errors. Other human spelling and other errors may be present. Corrections may be executed at a later time. Please feel free to contact us for any clarifications as needed.

## 2020-02-07 LAB
BASOPHILS # BLD AUTO: 0.1 X10E3/UL (ref 0–0.2)
BASOPHILS NFR BLD AUTO: 1 %
EOSINOPHIL # BLD AUTO: 0.4 X10E3/UL (ref 0–0.4)
EOSINOPHIL NFR BLD AUTO: 5 %
ERYTHROCYTE [DISTWIDTH] IN BLOOD BY AUTOMATED COUNT: 13.7 % (ref 11.7–15.4)
EST. AVERAGE GLUCOSE BLD GHB EST-MCNC: 148 MG/DL
HBA1C MFR BLD: 6.8 % (ref 4.8–5.6)
HCT VFR BLD AUTO: 39.7 % (ref 34–46.6)
HGB BLD-MCNC: 12.8 G/DL (ref 11.1–15.9)
IMM GRANULOCYTES # BLD AUTO: 0 X10E3/UL (ref 0–0.1)
IMM GRANULOCYTES NFR BLD AUTO: 0 %
LYMPHOCYTES # BLD AUTO: 2.6 X10E3/UL (ref 0.7–3.1)
LYMPHOCYTES NFR BLD AUTO: 34 %
MCH RBC QN AUTO: 28.6 PG (ref 26.6–33)
MCHC RBC AUTO-ENTMCNC: 32.2 G/DL (ref 31.5–35.7)
MCV RBC AUTO: 89 FL (ref 79–97)
MONOCYTES # BLD AUTO: 0.4 X10E3/UL (ref 0.1–0.9)
MONOCYTES NFR BLD AUTO: 4 %
MORPHOLOGY BLD-IMP: ABNORMAL
NEUTROPHILS # BLD AUTO: 4.3 X10E3/UL (ref 1.4–7)
NEUTROPHILS NFR BLD AUTO: 56 %
PLATELET # BLD AUTO: 116 X10E3/UL (ref 150–450)
RBC # BLD AUTO: 4.47 X10E6/UL (ref 3.77–5.28)
SPECIMEN STATUS REPORT, ROLRST: NORMAL
WBC # BLD AUTO: 7.7 X10E3/UL (ref 3.4–10.8)

## 2020-03-28 RX ORDER — ESCITALOPRAM OXALATE 10 MG/1
TABLET ORAL
Qty: 30 TAB | Refills: 2 | Status: SHIPPED | OUTPATIENT
Start: 2020-03-28 | End: 2020-06-24

## 2020-06-08 ENCOUNTER — HOSPITAL ENCOUNTER (OUTPATIENT)
Dept: MAMMOGRAPHY | Age: 70
Discharge: HOME | End: 2020-06-08
Attending: INTERNAL MEDICINE
Payer: MEDICARE

## 2020-06-08 DIAGNOSIS — Z12.31 VISIT FOR SCREENING MAMMOGRAM: ICD-10-CM

## 2020-06-08 PROCEDURE — 77067 SCR MAMMO BI INCL CAD: CPT

## 2020-06-24 RX ORDER — ESCITALOPRAM OXALATE 10 MG/1
TABLET ORAL
Qty: 30 TAB | Refills: 2 | Status: SHIPPED | OUTPATIENT
Start: 2020-06-24 | End: 2020-09-25

## 2020-06-24 RX ORDER — AMLODIPINE BESYLATE 5 MG/1
TABLET ORAL
Qty: 90 TAB | Refills: 3 | Status: SHIPPED | OUTPATIENT
Start: 2020-06-24 | End: 2021-07-01

## 2020-09-23 RX ORDER — CLONIDINE HYDROCHLORIDE 0.2 MG/1
TABLET ORAL
Qty: 180 TAB | Refills: 3 | Status: SHIPPED | OUTPATIENT
Start: 2020-09-23 | End: 2021-03-11

## 2020-09-25 RX ORDER — ESCITALOPRAM OXALATE 10 MG/1
TABLET ORAL
Qty: 30 TAB | Refills: 2 | Status: SHIPPED | OUTPATIENT
Start: 2020-09-25 | End: 2020-11-29

## 2020-11-11 ENCOUNTER — OFFICE VISIT (OUTPATIENT)
Dept: INTERNAL MEDICINE CLINIC | Age: 70
End: 2020-11-11
Payer: MEDICARE

## 2020-11-11 VITALS
DIASTOLIC BLOOD PRESSURE: 86 MMHG | HEIGHT: 59 IN | SYSTOLIC BLOOD PRESSURE: 158 MMHG | BODY MASS INDEX: 34.96 KG/M2 | HEART RATE: 63 BPM | OXYGEN SATURATION: 95 % | WEIGHT: 173.4 LBS | RESPIRATION RATE: 16 BRPM | TEMPERATURE: 98.7 F

## 2020-11-11 DIAGNOSIS — Z13.31 SCREENING FOR DEPRESSION: ICD-10-CM

## 2020-11-11 DIAGNOSIS — E11.9 TYPE 2 DIABETES MELLITUS WITHOUT COMPLICATION, WITHOUT LONG-TERM CURRENT USE OF INSULIN (HCC): ICD-10-CM

## 2020-11-11 DIAGNOSIS — D18.03 HEMANGIOMA OF LIVER: ICD-10-CM

## 2020-11-11 DIAGNOSIS — Z00.00 MEDICARE ANNUAL WELLNESS VISIT, SUBSEQUENT: Primary | ICD-10-CM

## 2020-11-11 DIAGNOSIS — K76.0 NAFL (NONALCOHOLIC FATTY LIVER): ICD-10-CM

## 2020-11-11 DIAGNOSIS — E66.01 SEVERE OBESITY (HCC): ICD-10-CM

## 2020-11-11 PROCEDURE — G8753 SYS BP > OR = 140: HCPCS | Performed by: INTERNAL MEDICINE

## 2020-11-11 PROCEDURE — G8417 CALC BMI ABV UP PARAM F/U: HCPCS | Performed by: INTERNAL MEDICINE

## 2020-11-11 PROCEDURE — G8399 PT W/DXA RESULTS DOCUMENT: HCPCS | Performed by: INTERNAL MEDICINE

## 2020-11-11 PROCEDURE — 1090F PRES/ABSN URINE INCON ASSESS: CPT | Performed by: INTERNAL MEDICINE

## 2020-11-11 PROCEDURE — G0444 DEPRESSION SCREEN ANNUAL: HCPCS | Performed by: INTERNAL MEDICINE

## 2020-11-11 PROCEDURE — 99213 OFFICE O/P EST LOW 20 MIN: CPT | Performed by: INTERNAL MEDICINE

## 2020-11-11 PROCEDURE — 2022F DILAT RTA XM EVC RTNOPTHY: CPT | Performed by: INTERNAL MEDICINE

## 2020-11-11 PROCEDURE — 3044F HG A1C LEVEL LT 7.0%: CPT | Performed by: INTERNAL MEDICINE

## 2020-11-11 PROCEDURE — 36415 COLL VENOUS BLD VENIPUNCTURE: CPT | Performed by: INTERNAL MEDICINE

## 2020-11-11 PROCEDURE — G9717 DOC PT DX DEP/BP F/U NT REQ: HCPCS | Performed by: INTERNAL MEDICINE

## 2020-11-11 PROCEDURE — G8427 DOCREV CUR MEDS BY ELIG CLIN: HCPCS | Performed by: INTERNAL MEDICINE

## 2020-11-11 PROCEDURE — G8536 NO DOC ELDER MAL SCRN: HCPCS | Performed by: INTERNAL MEDICINE

## 2020-11-11 PROCEDURE — G8754 DIAS BP LESS 90: HCPCS | Performed by: INTERNAL MEDICINE

## 2020-11-11 PROCEDURE — G0439 PPPS, SUBSEQ VISIT: HCPCS | Performed by: INTERNAL MEDICINE

## 2020-11-11 PROCEDURE — G9899 SCRN MAM PERF RSLTS DOC: HCPCS | Performed by: INTERNAL MEDICINE

## 2020-11-11 PROCEDURE — 3017F COLORECTAL CA SCREEN DOC REV: CPT | Performed by: INTERNAL MEDICINE

## 2020-11-11 PROCEDURE — 1101F PT FALLS ASSESS-DOCD LE1/YR: CPT | Performed by: INTERNAL MEDICINE

## 2020-11-11 NOTE — PROGRESS NOTES
1. Have you been to the ER, urgent care clinic since your last visit? Hospitalized since your last visit? No    2. Have you seen or consulted any other health care providers outside of the [address] [place] [address] since your last visit? Include any pap smears or colon screening. No   Wants to discuss elevated Blood sugar  nightmares      This is the Subsequent Medicare Annual Wellness Exam, performed 12 months or more after the Initial AWV or the last Subsequent AWV    I have reviewed the patient's medical history in detail and updated the computerized patient record. Depression Risk Factor Screening:     3 most recent PHQ Screens 9/20/2019   PHQ Not Done -   Little interest or pleasure in doing things Not at all   Feeling down, depressed, irritable, or hopeless Not at all   Total Score PHQ 2 0       Alcohol Risk Screen   Do you average more than 1 drink per night or more than 7 drinks a week:  No    On any one occasion in the past three months have you have had more than 3 drinks containing alcohol:  No        Functional Ability and Level of Safety:   Hearing: Hearing is good. Activities of Daily Living: The home contains: no safety equipment. Patient does total self care     Ambulation: with no difficulty     Fall Risk:  Fall Risk Assessment, last 12 mths 11/11/2020   Able to walk? Yes   Fall in past 12 months? Yes   Fall with injury?  No   Number of falls in past 12 months 1   Fall Risk Score 1     Abuse Screen:  Patient is not abused       Cognitive Screening   Has your family/caregiver stated any concerns about your memory: no     Cognitive Screening: not necessary    Assessment/Plan   Education and counseling provided:  Are appropriate based on today's review and evaluation        Health Maintenance Due     Health Maintenance Due   Topic Date Due    Shingrix Vaccine Age 49> (1 of 2) 09/24/1998    Pneumococcal 65+ years (1 of 1 - PPSV23) 09/24/2013    Eye Exam Retinal or Dilated  10/14/2016    GLAUCOMA SCREENING Q2Y  2019    MICROALBUMIN Q1  2019    Foot Exam Q1  2020    Lipid Screen  2020    Flu Vaccine (1) 2020    Medicare Yearly Exam  10/08/2020       Patient Care Team   Patient Care Team:  Caterina Mendoza MD as PCP - General (Internal Medicine)  Caterina Mendoza MD as PCP - [facility] Empaneled Provider    History     Patient Active Problem List   Diagnosis Code    Hypertension I10    Hypercholesterolemia E78.00    Class 1 obesity due to excess calories with serious comorbidity and body mass index (BMI) of 32.0 to 32.9 in adult E66.09, Z68.32    Depression with anxiety F41.8    Diverticulosis K57.90    S/P LD (total abdominal hysterectomy) Z90.710    Fatigue R53.83    Baker's cyst M71.20    Type 2 diabetes mellitus without complication, without long-term current use of insulin (HCC) E11.9    Thrombocytopenia (HCC) D69.6    Hemangioma of liver D18.03    NAFL (nonalcoholic fatty liver) S04.9    Recurrent depression ([address] F33.9    Vulval edema N90.89    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    Panniculitis M79.3     Past Medical History:   Diagnosis Date    Baker's cyst 3-3-16    r    Bereavement 11/15    61yo  sister     Contusion of knee and lower leg, right, subsequent encounter 11/10/2017    Contusion of knee, right     Cough 2017    Depression with anxiety     Diverticulosis 2-6-15    colonoscopy    DM2 (diabetes mellitus, type 2) ([address]     Edema     Encounter related to worker's compensation claim 11/10/2017    fall t work    Fatigue     Hemangioma of liver 2018    mri    Hemangioma of liver 2019    Hypercholesterolemia     Hypertension     Liver disease     hepatitis 30 years ago - Liver mass consistent with hemangioma in the right lobe    NAFL (nonalcoholic fatty liver)     Obesity     Onychomycosis     Panniculitis 10/08/2019    S/P LD (total abdominal hysterectomy) 1985    Thrombocytopenia ([address] 12/12/2014    Vaginal vault prolapse 05/06/2019    Cindy hull md - robotic abdominal sacrolpopexy,enterocele repain,r ureterolysis,extensive lysis of adhesions,transobturator suburethral sling,retocele repair,cystoscopy with calibration      Past Surgical History:   Procedure Laterality Date    HX GYN      partial hysterectomy     Current Outpatient Medications   Medication Sig Dispense Refill    escitalopram oxalate (LEXAPRO) 10 mg tablet TAKE 1 TABLET BY MOUTH EVERY DAY 30 Tab 2    cloNIDine HCL (CATAPRES) 0.2 mg tablet TAKE 1 TAB BY MOUTH TWO (2) TIMES A DAY. 180 Tab 3    amLODIPine (NORVASC) 5 mg tablet TAKE 1 TABLET BY MOUTH EVERY DAY 90 Tab 3    bacitracin zinc (BACITRACIN) ointment Apply  to affected area two (2) times a day. 15 g 11    glucose blood VI test strips (CONTOUR NEXT STRIPS) strip Test blood sugar once daily 50 strip 11    fluticasone propionate (FLONASE) 50 mcg/actuation nasal spray 2 Sprays by Both Nostrils route daily.  1 Bottle 11     Allergies   Allergen Reactions    Aspirin Nausea and Vomiting    Lipitor [Atorvastatin] Myalgia    Metformin Cough    Pravastatin Myalgia       Family History   Problem Relation Age of Onset    No Known Problems Father      Social History     Tobacco Use    Smoking status: Never Smoker    Smokeless tobacco: Never Used   Substance Use Topics    Alcohol use: No     Alcohol/week: 0.0 standard drinks

## 2020-11-11 NOTE — PROGRESS NOTES
SPORTS [facility]ICINE [facility]  Lauren Mason MD, [address] [address]  Phone:  [phone]  Fax: [phone]      Chief Complaint   Patient presents with    Annual Wellness Visit         SUBECTIVE:    Ted Souza is a 65 y.o. female Patient returns today with known history of type 2 diabetes, hemangioma of the liver, nonalcoholic fatty liver disease, followed by Genie Workman MD, obesity, and is seen for evaluation. Patient returns today voicing no new complaints. Since we last saw her, her  decided to move back in with her. He has prostate cancer now. She is still working in [employer] at [employer]. Her children and grandchildren are all okay, do not have the virus. She wonders if her blood pressure is not up because she has that feeling and headaches. Patient is seen for evaluation. Current Outpatient Medications   Medication Sig Dispense Refill    escitalopram oxalate (LEXAPRO) 10 mg tablet TAKE 1 TABLET BY MOUTH EVERY DAY 30 Tab 2    cloNIDine HCL (CATAPRES) 0.2 mg tablet TAKE 1 TAB BY MOUTH TWO (2) TIMES A DAY. 180 Tab 3    amLODIPine (NORVASC) 5 mg tablet TAKE 1 TABLET BY MOUTH EVERY DAY 90 Tab 3    glucose blood VI test strips (CONTOUR NEXT STRIPS) strip Test blood sugar once daily 50 strip 11    fluticasone propionate (FLONASE) 50 mcg/actuation nasal spray 2 Sprays by Both Nostrils route daily.  1 Bottle 11     Past Medical History:   Diagnosis Date    Baker's cyst 3-3-16    r    Bereavement 11/15    61yo  sister     Contusion of knee and lower leg, right, subsequent encounter 11/10/2017    Contusion of knee, right     Cough 2017    Depression with anxiety     Diverticulosis 2--15    colonoscopy    [facility]     Edema     Encounter related to worker's compensation claim 11/10/2017    fall t work    Fatigue     Hemangioma of liver 2018    mri    Hemangioma of liver 2019    Hypercholesterolemia     Hypertension     Liver disease     hepatitis 30 years ago - Liver mass consistent with hemangioma in the right lobe    NAFL (nonalcoholic fatty liver)     Obesity     Onychomycosis     Panniculitis 10/08/2019    S/P LD (total abdominal hysterectomy) 1985    Thrombocytopenia ([place]) 12/12/2014    Vaginal vault prolapse 05/06/2019    Quang hull md - robotic abdominal sacrolpopexy,enterocele repain,r ureterolysis,extensive lysis of adhesions,transobturator suburethral sling,retocele repair,cystoscopy with calibration     Past Surgical History:   Procedure Laterality Date    HX GYN      partial hysterectomy     Allergies   Allergen Reactions    Aspirin Nausea and Vomiting    Lipitor [Atorvastatin] Myalgia    Metformin Cough    Pravastatin Myalgia       REVIEW OF SYSTEMS:   No chest pain, no shortness of breath. Social History     Socioeconomic History    Marital status: LEGALLY      Spouse name: Not on file    Number of children: Not on file    Years of education: Not on file    Highest education level: Not on file   Tobacco Use    Smoking status: Never Smoker    Smokeless tobacco: Never Used   Substance and Sexual Activity    Alcohol use: No     Alcohol/week: 0.0 standard drinks    Drug use: No    Sexual activity: Not Currently   Social History Narrative    Medical History: HTNTMJ dysfunctionHAsCecal lipoma 01/04negative head CT 03/04August 14, 2013 CT head w ith contrast unremarkable    except progressive splenoid sinusitisAugust 14, 2013 CT chest unremarkable    Gyn History: Last mammogram date 01/13/2012. Surgical History: LD 1985colonoscopy 01/04        Hospitalization/Major Diagnostic Procedure: Denies Past Hospitalization    Family History: Mother: alive 71 yrs, HTNFather: unknow nSister(s): alive, DMBrother(s): aliveDaughter(s): aliveSon(s): aliveAunt: alive2    brother(s) , 2 sister(s) . 1 son-2 boys , 1 daughter - 3 girls . 5 grands Social History: Alcohol Use Patient does not use alcohol. Smoking Status Patient is a never smoker. Marital Status:  x 3 - 2 divorce and 2nd . Lives w ith:    Spouse.- ca prostate no Children. 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 Education/School: has highschool diploma. Church: [employer]    W itness - no blood transfusions. r  Family History   Problem Relation Age of Onset    No Known Problems Father        OBJECTIVE:  Visit Vitals  BP (!) 158/86   Pulse 63   Temp 98.7 °F (37.1 °C) (Oral)   Resp 16   Ht 4' 11\" (1.499 m)   Wt 173 lb 6.4 oz (78.7 kg)   SpO2 95%   BMI 35.02 kg/m²     ENT: perrla,  eom intact  NECK: supple. Thyroid normal  CHEST: clear to ascultation and percussion   HEART: regular rate and rhythm  ABD: soft, bowel sounds active  EXTREMITIES: no edema, pulse 1+     No visits with results within 3 Month(s) from this visit. Latest known visit with results is:   Office Visit on 2020   Component Date Value Ref Range Status    Hemoglobin A1c 2020 6.8* 4.8 - 5.6 % Final    Comment:          Prediabetes: 5.7 - 6.4           Diabetes: >6.4           Glycemic control for adults with diabetes: <7.0      Estimated average glucose 2020 148  mg/dL Final    WBC 2020 7.7  3.4 - 10.8 x10E3/uL Final    RBC 2020 4.47  3.77 - 5.28 x10E6/uL Final    HGB 2020 12.8  11.1 - 15.9 g/dL Final    HCT 2020 39.7  34.0 - 46.6 % Final    MCV 2020 89  79 - 97 fL Final    MCH 2020 28.6  26.6 - 33.0 pg Final    MCHC 2020 32.2  31.5 - 35.7 g/dL Final    RDW 2020 13.7  11.7 - 15.4 % Final    PLATELET  836* 150 - 450 x10E3/uL Final    Comment: Actual platelet count may be somewhat higher than reported due to  aggregation of platelets in this sample.       NEUTROPHILS 2020 56  Not Estab. % Final    Lymphocytes 2020 34  Not Estab. % Final    MONOCYTES 2020 4  Not Estab. % Final    EOSINOPHILS 02/06/2020 5  Not Estab. % Final    BASOPHILS 02/06/2020 1  Not Estab. % Final    ABS. NEUTROPHILS 02/06/2020 4.3  1.4 - 7.0 x10E3/uL Final    Abs Lymphocytes 02/06/2020 2.6  0.7 - 3.1 x10E3/uL Final    ABS. MONOCYTES 02/06/2020 0.4  0.1 - 0.9 x10E3/uL Final    ABS. EOSINOPHILS 02/06/2020 0.4  0.0 - 0.4 x10E3/uL Final    ABS. BASOPHILS 02/06/2020 0.1  0.0 - 0.2 x10E3/uL Final    IMMATURE GRANULOCYTES 02/06/2020 0  Not Estab. % Final    ABS. IMM. GRANS. 02/06/2020 0.0  0.0 - 0.1 x10E3/uL Final    Hematology comments: 02/06/2020 Note:   Final    Verified by microscopic examination.  SPECIMEN STATUS REPORT 02/06/2020 COMMENT   Final    Comment: No Test Indicated  A serum separator tube was received with no test indicated  Dear Doctor,  The requisition we received for the above patient has no test  indicated on the request form for one or more of the specimens  submitted. The [place] Code of [employer] requires a  written and signed request be forwarded to the testing laboratory  following the verbal order of a laboratory test.  Date:_________________________________  ICD-9/10 Diagnosis Code(s):___________________________________________  Physician or Authorized Designee Signature:  ______________________________________________________________________  Your signature confirms your order of the test(s) listed  Required test name(s):________________________________________________  Required test number(s):______________________________________________  Please provide requested information and fax to [phone]  to expedite testing. ASSESSMENT:  1. Medicare annual wellness visit, subsequent    2. Screening for depression    3. Severe obesity ([address]    4. Type 2 diabetes mellitus without complication, without long-term current use of insulin ([address]    5. Hemangioma of liver    6.  NAFL (nonalcoholic fatty liver)      Patient has a known history of DM type 2, diet controlled alone. It will be interesting to see how her diet control is working, particularly since we last saw her, she has decided to gain 11 pounds. 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 She has a known history of hemangioma of the liver, nonalcoholic fatty liver disease, the latter of which has been stable and followed by Dr. Akin Arevalo. We will check a hemoglobin A1c. My greatest concern today, however, is her blood pressure. I would like her to increase the Clonidine to 0.4 mg daily. If that does not get her blood pressure down to less than 130/80, she will increase to 0.6 mg daily in divided doses. If her blood pressure remains elevated, she will [facility] us a [facility] or call us or come in and see us, in which case we will further titrate the Clonidine till no ill effects or add another agent. We advise her that it is important that her blood pressure stays less than 130/80. She agrees. She will be back to see me formally in four months, sooner if she has any problems. I have discussed the diagnosis with the patient and the intended plan as seen in the  orders above. The patient understands and agees with the plan. The patient has   received an after visit summary and questions were answered concerning  future plans  Patient labs and/or xrays were reviewed  Past records were reviewed. PLAN:  . No orders of the defined types were placed in this encounter. ATTENTION:   This medical record was transcribed using an electronic medical records system. Although proofread, it may and can contain electronic and spelling errors. Other human spelling and other errors may be present. Corrections may be executed at a later time. Please feel free to contact us for any clarifications as needed.

## 2020-11-11 NOTE — PATIENT INSTRUCTIONS
Medicare Wellness Visit, Female The best way to live healthy is to have a lifestyle where you eat a well-balanced diet, exercise regularly, limit alcohol use, and quit all forms of tobacco/nicotine, if applicable. Regular preventive services are another way to keep healthy. Preventive services (vaccines, screening tests, monitoring & exams) can help personalize your care plan, which helps you manage your own care. Screening tests can find health problems at the earliest stages, when they are easiest to treat. [address] follows the current, evidence-based guidelines published by the [facility] ([facility]) when recommending preventive services for our patients. Because we follow these guidelines, sometimes recommendations change over time as research supports it. (For example, mammograms used to be recommended annually. Even though Medicare will still pay for an annual mammogram, the newer guidelines recommend a mammogram every two years for women of average risk.) Of course, you and your doctor may decide to screen more often for some diseases, based on your risk and your health status. Preventive services for you include: - Medicare offers their members a free annual wellness visit, which is time for you and your primary care provider to discuss and plan for your preventive service needs. Take advantage of this benefit every year! 
-All adults over the age of 72 should receive the recommended pneumonia vaccines. Current USPSTF guidelines recommend a series of two vaccines for the best pneumonia protection.  
-All adults should have a flu vaccine yearly and a tetanus vaccine every 10 years. All adults age 48 and older should receive a shingles vaccine once in their lifetime.   
-A bone mass density test is recommended when a woman turns 65 to screen for osteoporosis. This test is only recommended one time, as a screening. Some providers will use this same test as a disease monitoring tool if you already have osteoporosis. -All adults age 38-68 who are overweight should have a diabetes screening test once every three years.  
-Other screening tests and preventive services for persons with diabetes include: an eye exam to screen for diabetic retinopathy, a kidney function test, a foot exam, and stricter control over your cholesterol.  
-Cardiovascular screening for adults with routine risk involves an electrocardiogram (ECG) at intervals determined by your doctor.  
-Colorectal cancer screenings should be done for adults age 54-65 with no increased risk factors for colorectal cancer. There are a number of acceptable methods of screening for this type of cancer. Each test has its own benefits and drawbacks. Discuss with your doctor what is most appropriate for you during your annual wellness visit. The different tests include: colonoscopy (considered the best screening method), a fecal occult blood test, a fecal DNA test, and sigmoidoscopy. -Breast cancer screenings are recommended every other year for women of normal risk, age 54-69. 
-Cervical cancer screenings for women over age 72 are only recommended with certain risk factors.  
-All adults born between [facility] should be screened once for Hepatitis C. Here is a list of your current Health Maintenance items (your personalized list of preventive services) with a due date: 
Health Maintenance Due Topic Date Due  Shingles Vaccine (1 of 2) 09/24/1998  Pneumococcal Vaccine (1 of 1 - PPSV23) 09/24/2013  Eye Exam  10/14/2016  Glaucoma Screening   09/07/2019  Albumin Urine Test  09/13/2019 Cristian Goodman Diabetic Foot Care  07/01/2020  Cholesterol Test   07/01/2020  Yearly Flu Vaccine (1) 09/01/2020 Cristian Goodman Annual Well Visit  10/08/2020 Medicare Wellness Visit, Female The best way to live healthy is to have a lifestyle where you eat a well-balanced diet, exercise regularly, limit alcohol use, and quit all forms of tobacco/nicotine, if applicable. Regular preventive services are another way to keep healthy. Preventive services (vaccines, screening tests, monitoring & exams) can help personalize your care plan, which helps you manage your own care. Screening tests can find health problems at the earliest stages, when they are easiest to treat. [facility] follows the current, evidence-based guidelines published by the [facility] ([facility]) when recommending preventive services for our patients. Because we follow these guidelines, sometimes recommendations change over time as research supports it. (For example, mammograms used to be recommended annually. Even though Medicare will still pay for an annual mammogram, the newer guidelines recommend a mammogram every two years for women of average risk). Of course, you and your doctor may decide to screen more often for some diseases, based on your risk and your co-morbidities (chronic disease you are already diagnosed with). Preventive services for you include: - Medicare offers their members a free annual wellness visit, which is time for you and your primary care provider to discuss and plan for your preventive service needs. Take advantage of this benefit every year! 
-All adults over the age of 72 should receive the recommended pneumonia vaccines. Current USPSTF guidelines recommend a series of two vaccines for the best pneumonia protection.  
-All adults should have a flu vaccine yearly and a tetanus vaccine every 10 years.  
-All adults age 48 and older should receive the shingles vaccines (series of two vaccines).      
-All adults age 38-68 who are overweight should have a diabetes screening test once every three years.  
-All adults born between [birth year] and 1967 should be screened once for Hepatitis C. 
 -Other screening tests and preventive services for persons with diabetes include: an eye exam to screen for diabetic retinopathy, a kidney function test, a foot exam, and stricter control over your cholesterol.  
-Cardiovascular screening for adults with routine risk involves an electrocardiogram (ECG) at intervals determined by your doctor.  
-Colorectal cancer screenings should be done for adults age 54-65 with no increased risk factors for colorectal cancer. There are a number of acceptable methods of screening for this type of cancer. Each test has its own benefits and drawbacks. Discuss with your doctor what is most appropriate for you during your annual wellness visit. The different tests include: colonoscopy (considered the best screening method), a fecal occult blood test, a fecal DNA test, and sigmoidoscopy. 
 
-A bone mass density test is recommended when a woman turns 65 to screen for osteoporosis. This test is only recommended one time, as a screening. Some providers will use this same test as a disease monitoring tool if you already have osteoporosis. -Breast cancer screenings are recommended every other year for women of normal risk, age 54-69. 
-Cervical cancer screenings for women over age 72 are only recommended with certain risk factors. Here is a list of your current Health Maintenance items (your personalized list of preventive services) with a due date: 
Health Maintenance Due Topic Date Due  Shingles Vaccine (1 of 2) 09/24/1998  Pneumococcal Vaccine (1 of 1 - PPSV23) 09/24/2013  Eye Exam  10/14/2016  Glaucoma Screening   09/07/2019  Albumin Urine Test  09/13/2019 [address] [place] [address] Diabetic Foot Care  07/01/2020  Cholesterol Test   07/01/2020  Yearly Flu Vaccine (1) 09/01/2020 [address] [place] [address] Annual Well Visit  10/08/2020

## 2020-11-12 LAB
EST. AVERAGE GLUCOSE BLD GHB EST-MCNC: 157 MG/DL
HBA1C MFR BLD: 7.1 % (ref 4.8–5.6)

## 2020-11-29 RX ORDER — ESCITALOPRAM OXALATE 10 MG/1
TABLET ORAL
Qty: 30 TAB | Refills: 2 | Status: SHIPPED | OUTPATIENT
Start: 2020-11-29 | End: 2021-03-02

## 2021-03-02 RX ORDER — ESCITALOPRAM OXALATE 10 MG/1
TABLET ORAL
Qty: 30 TAB | Refills: 2 | Status: SHIPPED | OUTPATIENT
Start: 2021-03-02 | End: 2021-06-15

## 2021-03-11 ENCOUNTER — OFFICE VISIT (OUTPATIENT)
Dept: INTERNAL MEDICINE CLINIC | Age: 71
End: 2021-03-11
Payer: MEDICARE

## 2021-03-11 VITALS
BODY MASS INDEX: 35.44 KG/M2 | HEART RATE: 84 BPM | WEIGHT: 175.8 LBS | TEMPERATURE: 98 F | SYSTOLIC BLOOD PRESSURE: 119 MMHG | DIASTOLIC BLOOD PRESSURE: 74 MMHG | RESPIRATION RATE: 16 BRPM | HEIGHT: 59 IN | OXYGEN SATURATION: 95 %

## 2021-03-11 DIAGNOSIS — E78.00 HYPERCHOLESTEROLEMIA: ICD-10-CM

## 2021-03-11 DIAGNOSIS — I10 ESSENTIAL HYPERTENSION: ICD-10-CM

## 2021-03-11 DIAGNOSIS — F33.9 RECURRENT DEPRESSION (HCC): ICD-10-CM

## 2021-03-11 DIAGNOSIS — K76.0 NAFL (NONALCOHOLIC FATTY LIVER): ICD-10-CM

## 2021-03-11 DIAGNOSIS — D69.6 THROMBOCYTOPENIA (HCC): ICD-10-CM

## 2021-03-11 DIAGNOSIS — E11.9 TYPE 2 DIABETES MELLITUS WITHOUT COMPLICATION, WITHOUT LONG-TERM CURRENT USE OF INSULIN (HCC): Primary | ICD-10-CM

## 2021-03-11 DIAGNOSIS — E66.01 SEVERE OBESITY (HCC): ICD-10-CM

## 2021-03-11 DIAGNOSIS — F41.8 DEPRESSION WITH ANXIETY: ICD-10-CM

## 2021-03-11 DIAGNOSIS — E66.09 CLASS 1 OBESITY DUE TO EXCESS CALORIES WITH SERIOUS COMORBIDITY AND BODY MASS INDEX (BMI) OF 32.0 TO 32.9 IN ADULT: ICD-10-CM

## 2021-03-11 PROCEDURE — 3017F COLORECTAL CA SCREEN DOC REV: CPT | Performed by: INTERNAL MEDICINE

## 2021-03-11 PROCEDURE — G8399 PT W/DXA RESULTS DOCUMENT: HCPCS | Performed by: INTERNAL MEDICINE

## 2021-03-11 PROCEDURE — 1101F PT FALLS ASSESS-DOCD LE1/YR: CPT | Performed by: INTERNAL MEDICINE

## 2021-03-11 PROCEDURE — 3046F HEMOGLOBIN A1C LEVEL >9.0%: CPT | Performed by: INTERNAL MEDICINE

## 2021-03-11 PROCEDURE — G9717 DOC PT DX DEP/BP F/U NT REQ: HCPCS | Performed by: INTERNAL MEDICINE

## 2021-03-11 PROCEDURE — G8754 DIAS BP LESS 90: HCPCS | Performed by: INTERNAL MEDICINE

## 2021-03-11 PROCEDURE — 99214 OFFICE O/P EST MOD 30 MIN: CPT | Performed by: INTERNAL MEDICINE

## 2021-03-11 PROCEDURE — 2022F DILAT RTA XM EVC RTNOPTHY: CPT | Performed by: INTERNAL MEDICINE

## 2021-03-11 PROCEDURE — G8427 DOCREV CUR MEDS BY ELIG CLIN: HCPCS | Performed by: INTERNAL MEDICINE

## 2021-03-11 PROCEDURE — G8752 SYS BP LESS 140: HCPCS | Performed by: INTERNAL MEDICINE

## 2021-03-11 PROCEDURE — G8417 CALC BMI ABV UP PARAM F/U: HCPCS | Performed by: INTERNAL MEDICINE

## 2021-03-11 PROCEDURE — G8536 NO DOC ELDER MAL SCRN: HCPCS | Performed by: INTERNAL MEDICINE

## 2021-03-11 PROCEDURE — 1090F PRES/ABSN URINE INCON ASSESS: CPT | Performed by: INTERNAL MEDICINE

## 2021-03-11 PROCEDURE — G9899 SCRN MAM PERF RSLTS DOC: HCPCS | Performed by: INTERNAL MEDICINE

## 2021-03-11 RX ORDER — CLONIDINE HYDROCHLORIDE 0.2 MG/1
0.2 TABLET ORAL
Qty: 90 TAB | Refills: 3 | Status: SHIPPED | OUTPATIENT
Start: 2021-03-11 | End: 2021-09-29

## 2021-03-11 NOTE — PROGRESS NOTES
SPORTS MEDICINE AND PRIMARY CARE  Sarai Valenzuela MD, [address] [address]  Phone:  [phone]  Fax: [phone]       Chief Complaint   Patient presents with    Hypertension   . SUBJECTIVE:    Monie Mary is a 65 y.o. female Patient returns today with a known history of diabetes mellitus type 2, obesity, thrombocytopenia, depression, NAFL, hypertension, dyslipidemia, depression with anxiety, and is seen for evaluation. Patient returns today saying when she takes Clonidine in the morning she gets dizziness. She takes it very early so she can get to work at a reasonable hour without dizziness. The second one she takes at bedtime. Since we last saw her, she has increased her red meat at the recommendation of the hepatologist, she states. She thinks that is the reason for the weight gain. Other new complaints denied and patient is seen for evaluation. Current Outpatient Medications   Medication Sig Dispense Refill    cloNIDine HCL (CATAPRES) 0.2 mg tablet Take 1 Tab by mouth nightly. 90 Tab 3    escitalopram oxalate (LEXAPRO) 10 mg tablet TAKE 1 TABLET BY MOUTH EVERY DAY 30 Tab 2    amLODIPine (NORVASC) 5 mg tablet TAKE 1 TABLET BY MOUTH EVERY DAY 90 Tab 3    fluticasone propionate (FLONASE) 50 mcg/actuation nasal spray 2 Sprays by Both Nostrils route daily.  1 Bottle 11    glucose blood VI test strips (CONTOUR NEXT STRIPS) strip Test blood sugar once daily 50 strip 11     Past Medical History:   Diagnosis Date    Baker's cyst 3-3-16    r    Bereavement 11/15    59yo  sister     Contusion of knee and lower leg, right, subsequent encounter 11/10/2017    Contusion of knee, right     Cough 2017    Depression with anxiety     Diverticulosis 2-15    colonoscopy    [facility]     Edema     Encounter related to worker's compensation claim 11/10/2017    fall t work    Fatigue     Hemangioma of liver 2018    mri  Hemangioma of liver 09/05/2019    Hypercholesterolemia     Hypertension     Liver disease     hepatitis 30 years ago - Liver mass consistent with hemangioma in the right lobe    NAFL (nonalcoholic fatty liver)     Obesity     Onychomycosis     Panniculitis 10/08/2019    S/P LD (total abdominal hysterectomy) 1985    Thrombocytopenia ([facility] 12/12/2014    Vaginal vault prolapse 05/06/2019    Jj hull md - robotic abdominal sacrolpopexy,enterocele repain,r ureterolysis,extensive lysis of adhesions,transobturator suburethral sling,retocele repair,cystoscopy with calibration     Past Surgical History:   Procedure Laterality Date    HX GYN      partial hysterectomy     Allergies   Allergen Reactions    Aspirin Nausea and Vomiting    Lipitor [Atorvastatin] Myalgia    Metformin Cough    Pravastatin Myalgia         REVIEW OF SYSTEMS:  General: negative for - chills or fever  ENT: negative for - headaches, nasal congestion or tinnitus  Respiratory: negative for - cough, hemoptysis, shortness of breath or wheezing  Cardiovascular : negative for - chest pain, edema, palpitations or shortness of breath  Gastrointestinal: negative for - abdominal pain, blood in stools, heartburn or nausea/vomiting  Genito-Urinary: no dysuria, trouble voiding, or hematuria  Musculoskeletal: negative for - gait disturbance, joint pain, joint stiffness or joint swelling  Neurological: no TIA or stroke symptoms  Hematologic: no bruises, no bleeding, no swollen glands  Integument: no lumps, mole changes, nail changes or rash  Endocrine: no malaise/lethargy or unexpected weight changes      Social History     Socioeconomic History    Marital status: LEGALLY      Spouse name: Not on file    Number of children: Not on file    Years of education: Not on file    Highest education level: Not on file   Tobacco Use    Smoking status: Never Smoker    Smokeless tobacco: Never Used   Substance and Sexual Activity    Alcohol use: No     Alcohol/week: 0.0 standard drinks    Drug use: No    Sexual activity: Not Currently   Social History Narrative    Medical History: HTNTMJ dysfunctionHAsCecal lipoma negative head CT August 2013 CT head w ith contrast unremarkable    except progressive splenoid sinusitisAugust 2013 CT chest unremarkable    Gyn History: Last mammogram date 2012. Surgical History: LD 1985colonoscopy         Hospitalization/Major Diagnostic Procedure: Denies Past Hospitalization    Family History: Mother: 80 yrs  dementa, HTNFather: unknow nSister(s): alive, DMBrother(s): aliveDaughter(s): aliveSon(s): aliveAunt: alive2    brother(s) , 2 sister(s) . 1 son-2 boys , 1 daughter - 3 girls . 5 grands         Social History: Alcohol Use Patient does not use alcohol. Smoking Status Patient is a never smoker. Marital Status:  x 3 - 2 divorce and 2nd . Lives w ith:    Spouse.- ca prostate no Children. 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 Education/School: has highschool diploma. Zoroastrianism: Jehovah    W itness - no blood transfusions. Family History   Problem Relation Age of Onset    No Known Problems Father        OBJECTIVE:    Visit Vitals  /74   Pulse 84   Temp 98 °F (36.7 °C) (Oral)   Resp 16   Ht 4' 11\" (1.499 m)   Wt 175 lb 12.8 oz (79.7 kg)   SpO2 95%   BMI 35.51 kg/m²     CONSTITUTIONAL: well , well nourished, appears age appropriate  EYES: perrla, eom intact  ENMT:moist mucous membranes, pharynx clear  NECK: supple. Thyroid normal  RESPIRATORY: Chest: clear bilaterally   CARDIOVASCULAR: Heart: regular rate and rhythm  GASTROINTESTINAL: Abdomen: soft, bowel sounds active  HEMATOLOGIC: no pathological lymph nodes palpated  MUSCULOSKELETAL: Extremities: no edema, pulse 1+ Foot exam reveals onychomycosis, pulses are intact, fine filament sensation is intact. No lesions.       INTEGUMENT: No unusual rashes or suspicious skin lesions noted. Nails appear normal.  NEUROLOGIC: non-focal exam   MENTAL STATUS: alert and oriented, appropriate affect           ASSESSMENT:  1. Type 2 diabetes mellitus without complication, without long-term current use of insulin ([address]    2. Severe obesity ([address]    3. Thrombocytopenia ([address]    4. Recurrent depression ([address]    5. NAFL (nonalcoholic fatty liver)    6. Class 1 obesity due to excess calories with serious comorbidity and body mass index (BMI) of 32.0 to 32.9 in adult    7. Essential hypertension    8. Hypercholesterolemia    9. Depression with anxiety      We will assess blood sugar control with hemoglobin A1c and report to her the results. Patient has graduated to severe obesity and we encourage a heart healthy, weight reducing diet and suggest it is something other than the _______________ (inaudible) that is causing her to gain weight. For the thrombocytopenia, we will check a CBC today. The NAFL is followed by the hepatologist, whom she seems regularly. BP control is at goal and for that reason we will decrease Clonidine to 0.2 at bedtime. She will continue the amlodipine 5 mg daily. There is a history of dyslipidemia. We will check her cholesterol today and if she is not in the appropriate range we will add a statin to her current regimen, which I suspect we will need to and advise her of that today. She has depression with anxiety and is on Lexapro. She will be back to see me in four to six months or as needed. I have discussed the diagnosis with the patient and the intended plan as seen in the  Orders. The patient understands and agees with the plan. The patient has   received an after visit summary and questions were answered concerning  future plans  Patient labs and/or xrays were reviewed  Past records were reviewed.     PLAN:  .  Orders Placed This Encounter    URINALYSIS W/ RFLX MICROSCOPIC    CBC WITH AUTOMATED DIFF    METABOLIC PANEL, COMPREHENSIVE    LIPID PANEL  TSH 3RD GENERATION    HEMOGLOBIN A1C WITH EAG    MICROALBUMIN, UR, RAND W/ MICROALB/CREAT RATIO    cloNIDine HCL (CATAPRES) 0.2 mg tablet       Follow-up and Dispositions    · Return in about 4 months (around 7/11/2021). ATTENTION:   This medical record was transcribed using an electronic medical records system. Although proofread, it may and can contain electronic and spelling errors. Other human spelling and other errors may be present. Corrections may be executed at a later time. Please feel free to contact us for any clarifications as needed.

## 2021-03-11 NOTE — PROGRESS NOTES
1. Have you been to the ER, urgent care clinic since your last visit? Hospitalized since your last visit? No    2. Have you seen or consulted any other health care providers outside of the [address] [place] [address] since your last visit? Include any pap smears or colon screening.  No     Wants to discuss medication

## 2021-03-12 LAB
ALBUMIN SERPL-MCNC: 3.8 G/DL (ref 3.5–5)
ALBUMIN/GLOB SERPL: 0.9 {RATIO} (ref 1.1–2.2)
ALP SERPL-CCNC: 113 U/L (ref 45–117)
ALT SERPL-CCNC: 22 U/L (ref 12–78)
ANION GAP SERPL CALC-SCNC: 5 MMOL/L (ref 5–15)
APPEARANCE UR: CLEAR
AST SERPL-CCNC: 14 U/L (ref 15–37)
BASOPHILS # BLD: 0.1 K/UL (ref 0–0.1)
BASOPHILS NFR BLD: 1 % (ref 0–1)
BILIRUB SERPL-MCNC: 0.4 MG/DL (ref 0.2–1)
BILIRUB UR QL: NEGATIVE
BUN SERPL-MCNC: 15 MG/DL (ref 6–20)
BUN/CREAT SERPL: 18 (ref 12–20)
CALCIUM SERPL-MCNC: 9.2 MG/DL (ref 8.5–10.1)
CHLORIDE SERPL-SCNC: 106 MMOL/L (ref 97–108)
CHOLEST SERPL-MCNC: 205 MG/DL
CO2 SERPL-SCNC: 27 MMOL/L (ref 21–32)
COLOR UR: ABNORMAL
CREAT SERPL-MCNC: 0.84 MG/DL (ref 0.55–1.02)
CREAT UR-MCNC: 206 MG/DL
DIFFERENTIAL METHOD BLD: ABNORMAL
EOSINOPHIL # BLD: 0.3 K/UL (ref 0–0.4)
EOSINOPHIL NFR BLD: 4 % (ref 0–7)
ERYTHROCYTE [DISTWIDTH] IN BLOOD BY AUTOMATED COUNT: 13.9 % (ref 11.5–14.5)
EST. AVERAGE GLUCOSE BLD GHB EST-MCNC: 177 MG/DL
GLOBULIN SER CALC-MCNC: 4.1 G/DL (ref 2–4)
GLUCOSE SERPL-MCNC: 139 MG/DL (ref 65–100)
GLUCOSE UR STRIP.AUTO-MCNC: 250 MG/DL
HBA1C MFR BLD: 7.8 % (ref 4–5.6)
HCT VFR BLD AUTO: 43.5 % (ref 35–47)
HDLC SERPL-MCNC: 61 MG/DL
HDLC SERPL: 3.4 {RATIO} (ref 0–5)
HGB BLD-MCNC: 13.4 G/DL (ref 11.5–16)
HGB UR QL STRIP: NEGATIVE
IMM GRANULOCYTES # BLD AUTO: 0 K/UL (ref 0–0.04)
IMM GRANULOCYTES NFR BLD AUTO: 0 % (ref 0–0.5)
KETONES UR QL STRIP.AUTO: ABNORMAL MG/DL
LDLC SERPL CALC-MCNC: 122.8 MG/DL (ref 0–100)
LEUKOCYTE ESTERASE UR QL STRIP.AUTO: NEGATIVE
LIPID PROFILE,FLP: ABNORMAL
LYMPHOCYTES # BLD: 2.2 K/UL (ref 0.8–3.5)
LYMPHOCYTES NFR BLD: 26 % (ref 12–49)
MCH RBC QN AUTO: 28.5 PG (ref 26–34)
MCHC RBC AUTO-ENTMCNC: 30.8 G/DL (ref 30–36.5)
MCV RBC AUTO: 92.6 FL (ref 80–99)
MICROALBUMIN UR-MCNC: 1.24 MG/DL
MICROALBUMIN/CREAT UR-RTO: 6 MG/G (ref 0–30)
MONOCYTES # BLD: 0.4 K/UL (ref 0–1)
MONOCYTES NFR BLD: 5 % (ref 5–13)
NEUTS SEG # BLD: 5.3 K/UL (ref 1.8–8)
NEUTS SEG NFR BLD: 64 % (ref 32–75)
NITRITE UR QL STRIP.AUTO: NEGATIVE
NRBC # BLD: 0 K/UL (ref 0–0.01)
NRBC BLD-RTO: 0 PER 100 WBC
PH UR STRIP: 5.5 [PH] (ref 5–8)
PLATELET # BLD AUTO: 140 K/UL (ref 150–400)
PLATELET COMMENTS,PCOM: ABNORMAL
POTASSIUM SERPL-SCNC: 3.7 MMOL/L (ref 3.5–5.1)
PROT SERPL-MCNC: 7.9 G/DL (ref 6.4–8.2)
PROT UR STRIP-MCNC: NEGATIVE MG/DL
RBC # BLD AUTO: 4.7 M/UL (ref 3.8–5.2)
RBC MORPH BLD: ABNORMAL
SODIUM SERPL-SCNC: 138 MMOL/L (ref 136–145)
SP GR UR REFRACTOMETRY: 1.03 (ref 1–1.03)
TRIGL SERPL-MCNC: 106 MG/DL (ref ?–150)
TSH SERPL DL<=0.05 MIU/L-ACNC: 1.23 UIU/ML (ref 0.36–3.74)
UROBILINOGEN UR QL STRIP.AUTO: 1 EU/DL (ref 0.2–1)
VLDLC SERPL CALC-MCNC: 21.2 MG/DL
WBC # BLD AUTO: 8.3 K/UL (ref 3.6–11)

## 2021-03-12 RX ORDER — ROSUVASTATIN CALCIUM 5 MG/1
5 TABLET, COATED ORAL
Qty: 90 TAB | Refills: 3 | Status: SHIPPED | OUTPATIENT
Start: 2021-03-12 | End: 2022-01-17

## 2021-03-15 ENCOUNTER — IMMUNIZATION (OUTPATIENT)
Dept: INTERNAL MEDICINE CLINIC | Age: 71
End: 2021-03-15
Payer: MEDICARE

## 2021-03-15 DIAGNOSIS — Z23 ENCOUNTER FOR IMMUNIZATION: Primary | ICD-10-CM

## 2021-03-15 PROCEDURE — 0001A COVID-19, MRNA, LNP-S, PF, 30MCG/0.3ML DOSE(PFIZER): CPT | Performed by: FAMILY MEDICINE

## 2021-03-15 PROCEDURE — 91300 COVID-19, MRNA, LNP-S, PF, 30MCG/0.3ML DOSE(PFIZER): CPT | Performed by: FAMILY MEDICINE

## 2021-04-05 ENCOUNTER — IMMUNIZATION (OUTPATIENT)
Dept: INTERNAL MEDICINE CLINIC | Age: 71
End: 2021-04-05
Payer: MEDICARE

## 2021-04-05 DIAGNOSIS — Z23 ENCOUNTER FOR IMMUNIZATION: Primary | ICD-10-CM

## 2021-04-05 PROCEDURE — 91300 COVID-19, MRNA, LNP-S, PF, 30MCG/0.3ML DOSE(PFIZER): CPT | Performed by: FAMILY MEDICINE

## 2021-04-05 PROCEDURE — 0002A COVID-19, MRNA, LNP-S, PF, 30MCG/0.3ML DOSE(PFIZER): CPT | Performed by: FAMILY MEDICINE

## 2021-06-14 ENCOUNTER — TRANSCRIBE ORDER (OUTPATIENT)
Dept: SCHEDULING | Age: 71
End: 2021-06-14

## 2021-06-14 DIAGNOSIS — Z12.31 SCREENING MAMMOGRAM FOR HIGH-RISK PATIENT: Primary | ICD-10-CM

## 2021-06-15 RX ORDER — ESCITALOPRAM OXALATE 10 MG/1
TABLET ORAL
Qty: 30 TABLET | Refills: 2 | Status: SHIPPED | OUTPATIENT
Start: 2021-06-15 | End: 2021-09-16

## 2021-06-21 ENCOUNTER — HOSPITAL ENCOUNTER (OUTPATIENT)
Dept: MAMMOGRAPHY | Age: 71
Discharge: HOME | End: 2021-06-21
Attending: INTERNAL MEDICINE
Payer: MEDICARE

## 2021-06-21 DIAGNOSIS — Z12.31 SCREENING MAMMOGRAM FOR HIGH-RISK PATIENT: ICD-10-CM

## 2021-06-21 PROCEDURE — 77067 SCR MAMMO BI INCL CAD: CPT

## 2021-07-01 RX ORDER — AMLODIPINE BESYLATE 5 MG/1
TABLET ORAL
Qty: 90 TABLET | Refills: 3 | Status: SHIPPED | OUTPATIENT
Start: 2021-07-01 | End: 2022-06-04

## 2021-09-16 RX ORDER — ESCITALOPRAM OXALATE 10 MG/1
TABLET ORAL
Qty: 30 TABLET | Refills: 2 | Status: SHIPPED | OUTPATIENT
Start: 2021-09-16 | End: 2021-12-11

## 2021-09-29 RX ORDER — CLONIDINE HYDROCHLORIDE 0.2 MG/1
TABLET ORAL
Qty: 180 TABLET | Refills: 3 | Status: SHIPPED | OUTPATIENT
Start: 2021-09-29 | End: 2022-10-17

## 2021-11-16 ENCOUNTER — OFFICE VISIT (OUTPATIENT)
Dept: INTERNAL MEDICINE CLINIC | Age: 71
End: 2021-11-16
Payer: MEDICARE

## 2021-11-16 VITALS
DIASTOLIC BLOOD PRESSURE: 83 MMHG | HEIGHT: 59 IN | BODY MASS INDEX: 35.7 KG/M2 | TEMPERATURE: 98.3 F | OXYGEN SATURATION: 97 % | RESPIRATION RATE: 18 BRPM | SYSTOLIC BLOOD PRESSURE: 178 MMHG | HEART RATE: 71 BPM | WEIGHT: 177.1 LBS

## 2021-11-16 DIAGNOSIS — E66.01 SEVERE OBESITY (HCC): ICD-10-CM

## 2021-11-16 DIAGNOSIS — K76.0 NAFL (NONALCOHOLIC FATTY LIVER): ICD-10-CM

## 2021-11-16 DIAGNOSIS — E11.9 TYPE 2 DIABETES MELLITUS WITHOUT COMPLICATION, WITHOUT LONG-TERM CURRENT USE OF INSULIN (HCC): ICD-10-CM

## 2021-11-16 DIAGNOSIS — D18.03 HEMANGIOMA OF LIVER: ICD-10-CM

## 2021-11-16 DIAGNOSIS — I10 PRIMARY HYPERTENSION: ICD-10-CM

## 2021-11-16 DIAGNOSIS — D69.6 THROMBOCYTOPENIA (HCC): ICD-10-CM

## 2021-11-16 DIAGNOSIS — F41.8 DEPRESSION WITH ANXIETY: ICD-10-CM

## 2021-11-16 DIAGNOSIS — M79.602 PAIN OF LEFT UPPER EXTREMITY: ICD-10-CM

## 2021-11-16 DIAGNOSIS — Z13.31 SCREENING FOR DEPRESSION: ICD-10-CM

## 2021-11-16 DIAGNOSIS — Z00.00 MEDICARE ANNUAL WELLNESS VISIT, SUBSEQUENT: Primary | ICD-10-CM

## 2021-11-16 DIAGNOSIS — E78.00 HYPERCHOLESTEROLEMIA: ICD-10-CM

## 2021-11-16 PROCEDURE — 3017F COLORECTAL CA SCREEN DOC REV: CPT | Performed by: INTERNAL MEDICINE

## 2021-11-16 PROCEDURE — 2022F DILAT RTA XM EVC RTNOPTHY: CPT | Performed by: INTERNAL MEDICINE

## 2021-11-16 PROCEDURE — G8427 DOCREV CUR MEDS BY ELIG CLIN: HCPCS | Performed by: INTERNAL MEDICINE

## 2021-11-16 PROCEDURE — G8754 DIAS BP LESS 90: HCPCS | Performed by: INTERNAL MEDICINE

## 2021-11-16 PROCEDURE — G8417 CALC BMI ABV UP PARAM F/U: HCPCS | Performed by: INTERNAL MEDICINE

## 2021-11-16 PROCEDURE — G8536 NO DOC ELDER MAL SCRN: HCPCS | Performed by: INTERNAL MEDICINE

## 2021-11-16 PROCEDURE — G9717 DOC PT DX DEP/BP F/U NT REQ: HCPCS | Performed by: INTERNAL MEDICINE

## 2021-11-16 PROCEDURE — G8753 SYS BP > OR = 140: HCPCS | Performed by: INTERNAL MEDICINE

## 2021-11-16 PROCEDURE — G9899 SCRN MAM PERF RSLTS DOC: HCPCS | Performed by: INTERNAL MEDICINE

## 2021-11-16 PROCEDURE — 1101F PT FALLS ASSESS-DOCD LE1/YR: CPT | Performed by: INTERNAL MEDICINE

## 2021-11-16 PROCEDURE — G0439 PPPS, SUBSEQ VISIT: HCPCS | Performed by: INTERNAL MEDICINE

## 2021-11-16 PROCEDURE — 1090F PRES/ABSN URINE INCON ASSESS: CPT | Performed by: INTERNAL MEDICINE

## 2021-11-16 PROCEDURE — G8399 PT W/DXA RESULTS DOCUMENT: HCPCS | Performed by: INTERNAL MEDICINE

## 2021-11-16 PROCEDURE — 99213 OFFICE O/P EST LOW 20 MIN: CPT | Performed by: INTERNAL MEDICINE

## 2021-11-16 PROCEDURE — 3051F HG A1C>EQUAL 7.0%<8.0%: CPT | Performed by: INTERNAL MEDICINE

## 2021-11-16 RX ORDER — IBUPROFEN 800 MG/1
800 TABLET ORAL
Qty: 90 TABLET | Refills: 2 | Status: SHIPPED | OUTPATIENT
Start: 2021-11-16 | End: 2022-01-17 | Stop reason: SINTOL

## 2021-11-16 NOTE — PATIENT INSTRUCTIONS
Medicare Wellness Visit, Female     The best way to live healthy is to have a lifestyle where you eat a well-balanced diet, exercise regularly, limit alcohol use, and quit all forms of tobacco/nicotine, if applicable. Regular preventive services are another way to keep healthy. Preventive services (vaccines, screening tests, monitoring & exams) can help personalize your care plan, which helps you manage your own care. Screening tests can find health problems at the earliest stages, when they are easiest to treat. [facility] follows the current, evidence-based guidelines published by the [facility] ([facility]) when recommending preventive services for our patients. Because we follow these guidelines, sometimes recommendations change over time as research supports it. (For example, mammograms used to be recommended annually. Even though Medicare will still pay for an annual mammogram, the newer guidelines recommend a mammogram every two years for women of average risk). Of course, you and your doctor may decide to screen more often for some diseases, based on your risk and your co-morbidities (chronic disease you are already diagnosed with). Preventive services for you include:  - Medicare offers their members a free annual wellness visit, which is time for you and your primary care provider to discuss and plan for your preventive service needs. Take advantage of this benefit every year!  -All adults over the age of 72 should receive the recommended pneumonia vaccines. Current USPSTF guidelines recommend a series of two vaccines for the best pneumonia protection.   -All adults should have a flu vaccine yearly and a tetanus vaccine every 10 years.   -All adults age 48 and older should receive the shingles vaccines (series of two vaccines).       -All adults age 38-68 who are overweight should have a diabetes screening test once every three years.   -All adults born between [birth year] and 1965 should be screened once for Hepatitis C.  -Other screening tests and preventive services for persons with diabetes include: an eye exam to screen for diabetic retinopathy, a kidney function test, a foot exam, and stricter control over your cholesterol.   -Cardiovascular screening for adults with routine risk involves an electrocardiogram (ECG) at intervals determined by your doctor.   -Colorectal cancer screenings should be done for adults age 54-65 with no increased risk factors for colorectal cancer. There are a number of acceptable methods of screening for this type of cancer. Each test has its own benefits and drawbacks. Discuss with your doctor what is most appropriate for you during your annual wellness visit. The different tests include: colonoscopy (considered the best screening method), a fecal occult blood test, a fecal DNA test, and sigmoidoscopy.    -A bone mass density test is recommended when a woman turns 65 to screen for osteoporosis. This test is only recommended one time, as a screening. Some providers will use this same test as a disease monitoring tool if you already have osteoporosis. -Breast cancer screenings are recommended every other year for women of normal risk, age 54-69.  -Cervical cancer screenings for women over age 72 are only recommended with certain risk factors.      Here is a list of your current Health Maintenance items (your personalized list of preventive services) with a due date:  Health Maintenance Due   Topic Date Due    Shingles Vaccine (1 of 2) Never done    Eye Exam  10/14/2016    Diabetic Foot Care  07/01/2020    Yearly Flu Vaccine (1) Never done    COVID-19 Vaccine (3 - Booster for [employer] series) 10/05/2021

## 2021-11-16 NOTE — PROGRESS NOTES
1. Have you been to the ER, urgent care clinic since your last visit? Hospitalized since your last visit? No    2. Have you seen or consulted any other health care providers outside of the [address] [place] [address] since your last visit? Include any pap smears or colon screening. No      Wants to discuss left arm pain  This is the Subsequent Medicare Annual Wellness Exam, performed 12 months or more after the Initial AWV or the last Subsequent AWV    I have reviewed the patient's medical history in detail and updated the computerized patient record. Assessment/Plan   Education and counseling provided:  Are appropriate based on today's review and evaluation         Depression Risk Factor Screening     3 most recent PHQ Screens 9/20/2019   PHQ Not Done -   Little interest or pleasure in doing things Not at all   Feeling down, depressed, irritable, or hopeless Not at all   Total Score PHQ 2 0       Alcohol Risk Screen    Do you average more than 1 drink per night or more than 7 drinks a week:  No    On any one occasion in the past three months have you have had more than 3 drinks containing alcohol:  No        Functional Ability and Level of Safety    Hearing: Hearing is good. Activities of Daily Living: The home contains: handrails and grab bars  Patient does total self care      Ambulation: with no difficulty     Fall Risk:  Fall Risk Assessment, last 12 mths 11/16/2021   Able to walk? Yes   Fall in past 12 months? 0   Do you feel unsteady? 0   Are you worried about falling 0   Number of falls in past 12 months -   Fall with injury?  -      Abuse Screen:  Patient is not abused       Cognitive Screening    Has your family/caregiver stated any concerns about your memory: no     Cognitive Screening: not necessary    Health Maintenance Due     Health Maintenance Due   Topic Date Due    Shingrix Vaccine Age 49> (1 of 2) Never done    Eye Exam Retinal or Dilated  10/14/2016    Foot Exam Q1  07/01/2020    Flu Vaccine (1) Never done    COVID-19 Vaccine (3 - Booster for [employer] series) 10/05/2021    Medicare Yearly Exam  2021       Patient Care Team   Patient Care Team:  Nettie Ceja MD as PCP - General (Internal Medicine)  Nettie Ceja MD as PCP - [facility] Empaneled Provider    History     Patient Active Problem List   Diagnosis Code    Hypertension I10    Hypercholesterolemia E78.00    Class 1 obesity due to excess calories with serious comorbidity and body mass index (BMI) of 32.0 to 32.9 in adult E66.09, Z68.32    Depression with anxiety F41.8    Diverticulosis K57.90    S/P LD (total abdominal hysterectomy) Z90.710    Fatigue R53.83    Baker's cyst M71.20    Type 2 diabetes mellitus without complication, without long-term current use of insulin (HCC) E11.9    Thrombocytopenia (HCC) D69.6    Hemangioma of liver D18.03    NAFL (nonalcoholic fatty liver) F43.6    Recurrent depression ([address] F33.9    Vulval edema N90.89    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    Panniculitis M79.3    Severe obesity ([address] E66.01     Past Medical History:   Diagnosis Date    Baker's cyst 3-3-16    r    Bereavement 11/15    61yo  sister     Contusion of knee and lower leg, right, subsequent encounter 11/10/2017    Contusion of knee, right     Cough 2017    Depression with anxiety     Diverticulosis 2-6-15    colonoscopy    DM2 (diabetes mellitus, type 2) ([address]     Edema     Encounter related to worker's compensation claim 11/10/2017    fall t work    Fatigue     Hemangioma of liver 2018    mri    Hemangioma of liver 2019    Hypercholesterolemia     Hypertension     Liver disease     hepatitis 30 years ago - Liver mass consistent with hemangioma in the right lobe    NAFL (nonalcoholic fatty liver)     Obesity     Onychomycosis     Panniculitis 10/08/2019    S/P LD (total abdominal hysterectomy) 1985    Thrombocytopenia (HCC) 2014    Vaginal vault prolapse 05/06/2019    Cristopher hull md - robotic abdominal sacrolpopexy,enterocele repain,r ureterolysis,extensive lysis of adhesions,transobturator suburethral sling,retocele repair,cystoscopy with calibration      Past Surgical History:   Procedure Laterality Date    HX GYN      partial hysterectomy     Current Outpatient Medications   Medication Sig Dispense Refill    cloNIDine HCL (CATAPRES) 0.2 mg tablet TAKE 1 TAB BY MOUTH TWO (2) TIMES A DAY. 180 Tablet 3    escitalopram oxalate (LEXAPRO) 10 mg tablet TAKE 1 TABLET BY MOUTH EVERY DAY 30 Tablet 2    amLODIPine (NORVASC) 5 mg tablet TAKE 1 TABLET BY MOUTH EVERY DAY 90 Tablet 3    linaGLIPtin (TRADJENTA) 5 mg tablet Take 1 Tab by mouth daily. 90 Tab 11    rosuvastatin (CRESTOR) 5 mg tablet Take 1 Tab by mouth nightly. 90 Tab 3    fluticasone propionate (FLONASE) 50 mcg/actuation nasal spray 2 Sprays by Both Nostrils route daily.  1 Bottle 11    glucose blood VI test strips (CONTOUR NEXT STRIPS) strip Test blood sugar once daily 50 strip 11     Allergies   Allergen Reactions    Aspirin Nausea and Vomiting    Lipitor [Atorvastatin] Myalgia    Metformin Cough    Pravastatin Myalgia       Family History   Problem Relation Age of Onset    No Known Problems Father      Social History     Tobacco Use    Smoking status: Never Smoker    Smokeless tobacco: Never Used   Substance Use Topics    Alcohol use: No     Alcohol/week: 0.0 standard drinks         Ankur Gupta LPN normal...

## 2021-11-16 NOTE — PROGRESS NOTES
SPORTS MEDICINE AND PRIMARY CARE  Lorna Desai MD, [address] [address]  Phone:  [phone]  Fax: [phone]      Chief Complaint   Patient presents with    Annual Wellness Visit         SUBECTIVE:    Martha Corona is a 66 y.o. female Patient returns today with a known history of primary hypertension, dyslipidemia, depression with anxiety, obesity, nonalcoholic fatty liver disease, type 2 diabetes, thrombocytopenia, and is seen for evaluation. Patient complains of left arm discomfort and when she wakes up, she sees red bruises, although she has none today. She has had pain in the arm ever since she had the second COVID shot. \"It just hurts all day and all night\". It started when she had the shot and radiates down the whole arm to her wrist. Patient states when the needle was stuck into the arm and she received the injection, it started hurting at that point and just has not stopped. Current Outpatient Medications   Medication Sig Dispense Refill    ibuprofen (MOTRIN) 800 mg tablet Take 1 Tablet by mouth every eight (8) hours as needed for Pain. 90 Tablet 2    cloNIDine HCL (CATAPRES) 0.2 mg tablet TAKE 1 TAB BY MOUTH TWO (2) TIMES A DAY. 180 Tablet 3    escitalopram oxalate (LEXAPRO) 10 mg tablet TAKE 1 TABLET BY MOUTH EVERY DAY 30 Tablet 2    amLODIPine (NORVASC) 5 mg tablet TAKE 1 TABLET BY MOUTH EVERY DAY 90 Tablet 3    linaGLIPtin (TRADJENTA) 5 mg tablet Take 1 Tab by mouth daily. 90 Tab 11    rosuvastatin (CRESTOR) 5 mg tablet Take 1 Tab by mouth nightly.  90 Tab 3    glucose blood VI test strips (CONTOUR NEXT STRIPS) strip Test blood sugar once daily 50 strip 11     Past Medical History:   Diagnosis Date    Arm pain 2021    Baker's cyst 3-3-16    r    Bereavement 11/15    61yo  sister     Contusion of knee and lower leg, right, subsequent encounter 11/10/2017    Contusion of knee, right     Cough 2017    Depression with anxiety  Diverticulosis 2-6-15    colonoscopy    DM2 [facility]diabetes mellitus, type 2) [facility]     Edema     Encounter related to worker's compensation claim 11/10/2017    fall t work    Fatigue     Hemangioma of liver 2018    mri    Hemangioma of liver 2019    Hypercholesterolemia     Hypertension     Liver disease     hepatitis 30 years ago - Liver mass consistent with hemangioma in the right lobe    NAFL (nonalcoholic fatty liver)     Obesity     Onychomycosis     Panniculitis 10/08/2019    S/P LD (total abdominal hysterectomy) 1985    Thrombocytopenia ([facility] 2014    Vaginal vault prolapse 2019    Dossidarlene hull md - robotic abdominal sacrolpopexy,enterocele repain,r ureterolysis,extensive lysis of adhesions,transobturator suburethral sling,retocele repair,cystoscopy with calibration     Past Surgical History:   Procedure Laterality Date    HX GYN      partial hysterectomy     Allergies   Allergen Reactions    Aspirin Nausea and Vomiting    Lipitor [Atorvastatin] Myalgia    Metformin Cough    Pravastatin Myalgia       REVIEW OF SYSTEMS:   No chest pain or shortness of breath. Social History     Socioeconomic History    Marital status: LEGALLY    Tobacco Use    Smoking status: Never Smoker    Smokeless tobacco: Never Used   Substance and Sexual Activity    Alcohol use: No     Alcohol/week: 0.0 standard drinks    Drug use: No    Sexual activity: Not Currently   Social History Narrative    Medical History: HTNTMJ dysfunctionHAsCecal lipoma negative head CT August 2013 CT head w ith contrast unremarkable    except progressive splenoid sinusitisAugust 2013 CT chest unremarkable    Gyn History: Last mammogram date 2012. Surgical History: LD colonoscopy         Hospitalization/Major Diagnostic Procedure: Denies Past Hospitalization    Family History:  Mother: 80 yrs  dementa, HTNFather: unknow nSister(s): alive, DMBrother(s): aliveDaughter(s): aliveSon(s): aliveAunt: alive2    brother(s) , 2 sister(s) . 1 son-2 boys , 1 daughter - 3 girls . 5 grands         Social History: Alcohol Use Patient does not use alcohol. Smoking Status Patient is a never smoker. Marital Status:  x 3 - 2 divorce and 2nd . Lives w ith:    Spouse.- ca prostate no Children. 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 Education/School: has highschool diploma. Restoration: Jehovah    W itness - no blood transfusions. r  Family History   Problem Relation Age of Onset    No Known Problems Father        OBJECTIVE:  Visit Vitals  BP (!) 178/83   Pulse 71   Temp 98.3 °F (36.8 °C) (Oral)   Resp 18   Ht 4' 11\" (1.499 m)   Wt 177 lb 1.6 oz (80.3 kg)   SpO2 97%   BMI 35.77 kg/m²     ENT: perrla,  eom intact  NECK: supple. Thyroid normal  CHEST: clear to ascultation and percussion   HEART: regular rate and rhythm  ABD: soft, bowel sounds active  EXTREMITIES: no edema, pulse 1+     No visits with results within 3 Month(s) from this visit. Latest known visit with results is:   Office Visit on 2021   Component Date Value Ref Range Status    Microalbumin,urine random 2021 1.24  MG/DL Final    No reference range has been established.  Creatinine, urine 2021 206.00  mg/dL Final    No reference range has been established.  Microalbumin/Creat ratio (mg/g cre* 2021 6  0 - 30 mg/g Final    TSH 2021 1.23  0.36 - 3.74 uIU/mL Final    Comment:   Due to TSH heterogeneity, both structurally and degree of glycosylation,  monoclonal antibodies used in the TSH assay may not accurately quantitate TSH.   Therefore, this result should be correlated with clinical findings as well as  with other assessments of thyroid function, e.g., free T4, free T3.      Hemoglobin A1c 2021 7.8* 4.0 - 5.6 % Final    Comment: NEW METHOD PLEASE NOTE NEW REFERENCE RANGE  (NOTE)  HbA1C Interpretive Ranges  <5.7              Normal  5.7 - 6.4 Consider Prediabetes  >6.5              Consider Diabetes      Est. average glucose 03/11/2021 177  mg/dL Final    LIPID PROFILE 03/11/2021        Final    Cholesterol, total 03/11/2021 205* <200 MG/DL Final    Triglyceride 03/11/2021 106  <150 MG/DL Final    Comment: Based on NCEP-ATP III:  Triglycerides <150 mg/dL  is considered normal, 150-199  mg/dL  borderline high,  200-499 mg/dL high and  greater than or equal to 500  mg/dL very high.  HDL Cholesterol 03/11/2021 61  MG/DL Final    Comment: Based on NCEP ATP III, HDL Cholesterol <40 mg/dL is considered low and >60  mg/dL is elevated.  LDL, calculated 03/11/2021 122.8* 0 - 100 MG/DL Final    Comment: Based on the NCEP-ATP: LDL-C concentrations are considered  optimal <100 mg/dL,  near optimal/above Normal 100-129 mg/dL Borderline High: 130-159, High: 160-189  mg/dL Very High: Greater than or equal to 190 mg/dL      VLDL, calculated 03/11/2021 21.2  MG/DL Final    CHOL/HDL Ratio 03/11/2021 3.4  0.0 - 5.0   Final    Sodium 03/11/2021 138  136 - 145 mmol/L Final    Potassium 03/11/2021 3.7  3.5 - 5.1 mmol/L Final    Chloride 03/11/2021 106  97 - 108 mmol/L Final    CO2 03/11/2021 27  21 - 32 mmol/L Final    Anion gap 03/11/2021 5  5 - 15 mmol/L Final    Glucose 03/11/2021 139* 65 - 100 mg/dL Final    BUN 03/11/2021 15  6 - 20 MG/DL Final    Creatinine 03/11/2021 0.84  0.55 - 1.02 MG/DL Final    BUN/Creatinine ratio 03/11/2021 18  12 - 20   Final    GFR est AA 03/11/2021 >60  >60 ml/min/1.73m2 Final    GFR est non-AA 03/11/2021 >60  >60 ml/min/1.73m2 Final    Comment: Estimated GFR is calculated using the IDMS-traceable Modification of Diet in  Renal Disease (MDRD) Study equation, reported for both  Americans  (GFRAA) and non- Americans (GFRNA), and normalized to 1.73m2 body  surface area. The physician must decide which value applies to the patient.       Calcium 03/11/2021 9.2  8.5 - 10.1 MG/DL Final    Bilirubin, total 03/11/2021 0.4  0.2 - 1.0 MG/DL Final    ALT (SGPT) 03/11/2021 22  12 - 78 U/L Final    AST (SGOT) 03/11/2021 14* 15 - 37 U/L Final    Alk. phosphatase 03/11/2021 113  45 - 117 U/L Final    Protein, total 03/11/2021 7.9  6.4 - 8.2 g/dL Final    Albumin 03/11/2021 3.8  3.5 - 5.0 g/dL Final    Globulin 03/11/2021 4.1* 2.0 - 4.0 g/dL Final    A-G Ratio 03/11/2021 0.9* 1.1 - 2.2   Final    WBC 03/11/2021 8.3  3.6 - 11.0 K/uL Final    RBC 03/11/2021 4.70  3.80 - 5.20 M/uL Final    HGB 03/11/2021 13.4  11.5 - 16.0 g/dL Final    HCT 03/11/2021 43.5  35.0 - 47.0 % Final    MCV 03/11/2021 92.6  80.0 - 99.0 FL Final    MCH 03/11/2021 28.5  26.0 - 34.0 PG Final    MCHC 03/11/2021 30.8  30.0 - 36.5 g/dL Final    RDW 03/11/2021 13.9  11.5 - 14.5 % Final    PLATELET 43/77/1854 435* 150 - 400 K/uL Final    RESULTS VERIFIED BY SMEAR    NRBC 03/11/2021 0.0  0  WBC Final    ABSOLUTE NRBC 03/11/2021 0.00  0.00 - 0.01 K/uL Final    NEUTROPHILS 03/11/2021 64  32 - 75 % Final    LYMPHOCYTES 03/11/2021 26  12 - 49 % Final    MONOCYTES 03/11/2021 5  5 - 13 % Final    EOSINOPHILS 03/11/2021 4  0 - 7 % Final    BASOPHILS 03/11/2021 1  0 - 1 % Final    IMMATURE GRANULOCYTES 03/11/2021 0  0.0 - 0.5 % Final    ABS. NEUTROPHILS 03/11/2021 5.3  1.8 - 8.0 K/UL Final    ABS. LYMPHOCYTES 03/11/2021 2.2  0.8 - 3.5 K/UL Final    ABS. MONOCYTES 03/11/2021 0.4  0.0 - 1.0 K/UL Final    ABS. EOSINOPHILS 03/11/2021 0.3  0.0 - 0.4 K/UL Final    ABS. BASOPHILS 03/11/2021 0.1  0.0 - 0.1 K/UL Final    ABS. IMM.  GRANS. 03/11/2021 0.0  0.00 - 0.04 K/UL Final    DF 03/11/2021 SMEAR SCANNED    Final    PLATELET COMMENTS 01/83/9515 CLUMPED PLATELETS    Final    PRESENT    RBC COMMENTS 03/11/2021 NORMOCYTIC, NORMOCHROMIC    Final    Color 03/11/2021 YELLOW/STRAW    Final    Color Reference Range: Straw, Yellow or Dark Yellow    Appearance 03/11/2021 CLEAR  CLEAR   Final    Specific gravity 03/11/2021 1.026  1.003 - 1.030   Final    pH (UA) 03/11/2021 5.5  5.0 - 8.0   Final    Protein 03/11/2021 Negative  Negative mg/dL Final    Glucose 03/11/2021 250* Negative mg/dL Final    Ketone 03/11/2021 TRACE* Negative mg/dL Final    Bilirubin 03/11/2021 Negative  Negative   Final    Blood 03/11/2021 Negative  Negative   Final    Urobilinogen 03/11/2021 1.0  0.2 - 1.0 EU/dL Final    Nitrites 03/11/2021 Negative  Negative   Final    Leukocyte Esterase 03/11/2021 Negative  Negative   Final          ASSESSMENT:  1. Medicare annual wellness visit, subsequent    2. Screening for depression    3. Primary hypertension    4. Hypercholesterolemia    5. Depression with anxiety    6. Hemangioma of liver    7. NAFL (nonalcoholic fatty liver)    8. Type 2 diabetes mellitus without complication, without long-term current use of insulin ([address]    9. Thrombocytopenia ([address]    10. Severe obesity ([address]    11. Pain of left upper extremity      Blood pressure control is atrocious. At home blood pressure is usually in the 140s. We will increase the Clonidine to 0.4 a day. She agrees with the plan and will send us a note if the blood pressure remains more than 140. She has dyslipidemia, for which we started her on Rosuvastatin, a small dose, the last time we saw her and we will check the lipid panel today and make an adjustment in Rosuvastatin. Chronic history of depression and anxiety, which is now just aggravated by the discomfort she is having today. Nonalcoholic fatty liver disease is followed by the [facility]. She has type 2 diabetes and we will check hemoglobin A1c. We will also check a CBC in view of the history of thrombocytopenia. Obesity remains a challenge and we encourage a heart healthy, diabetic, weight reducing diet. The pain in the upper extremity would be post COVID vaccine pain. She has pain at the injection site, where there is a questionable area of fluctuance. We will ask for a sonogram of that area. We give her ibuprofen as an anti-inflammatory agent to see if we can decrease inflammation and discomfort and we will give her some physical therapy to see if they can help us with resolving the discomfort. Some of the patients with arm pain from COVID has had actual injections that were too high and got into the synovium. This is not the case four our patient, however. She will be back to see us in two months, sooner if it does not resolve. I have discussed the diagnosis with the patient and the intended plan as seen in the  orders above. The patient understands and agees with the plan. The patient has   received an after visit summary and questions were answered concerning  future plans  Patient labs and/or xrays were reviewed  Past records were reviewed. PLAN:  .  Orders Placed This Encounter    ANNUAL DEPRESSION SCREEN 8-15 MIN    US EXT NONVAS LT LTD    HEMOGLOBIN A1C WITH EAG    GAMMOPATHY EVAL, SPEP/DEEP, IG QT/FLC    LIPID PANEL    CBC WITH AUTOMATED DIFF    REFERRAL TO PHYSICAL THERAPY    ibuprofen (MOTRIN) 800 mg tablet       Follow-up and Dispositions    · Return in about 2 months (around 1/16/2022). ATTENTION:   This medical record was transcribed using an electronic medical records system. Although proofread, it may and can contain electronic and spelling errors. Other human spelling and other errors may be present. Corrections may be executed at a later time. Please feel free to contact us for any clarifications as needed.

## 2021-11-18 LAB
ALBUMIN SERPL ELPH-MCNC: 3.8 G/DL (ref 2.9–4.4)
ALBUMIN/GLOB SERPL: 1 {RATIO} (ref 0.7–1.7)
ALPHA1 GLOB SERPL ELPH-MCNC: 0.2 G/DL (ref 0–0.4)
ALPHA2 GLOB SERPL ELPH-MCNC: 0.8 G/DL (ref 0.4–1)
B-GLOBULIN SERPL ELPH-MCNC: 1.2 G/DL (ref 0.7–1.3)
CHOLEST SERPL-MCNC: 149 MG/DL (ref 100–199)
EST. AVERAGE GLUCOSE BLD GHB EST-MCNC: 194 MG/DL
GAMMA GLOB SERPL ELPH-MCNC: 1.8 G/DL (ref 0.4–1.8)
GLOBULIN SER-MCNC: 4 G/DL (ref 2.2–3.9)
HBA1C MFR BLD: 8.4 % (ref 4.8–5.6)
HDLC SERPL-MCNC: 63 MG/DL
IGA SERPL-MCNC: 283 MG/DL (ref 64–422)
IGG SERPL-MCNC: 1926 MG/DL (ref 586–1602)
IGM SERPL-MCNC: 52 MG/DL (ref 26–217)
INTERPRETATION SERPL IEP-IMP: ABNORMAL
KAPPA LC FREE SER-MCNC: 22.4 MG/L (ref 3.3–19.4)
KAPPA LC FREE/LAMBDA FREE SER: 1.34 {RATIO} (ref 0.26–1.65)
LAMBDA LC FREE SERPL-MCNC: 16.7 MG/L (ref 5.7–26.3)
LDLC SERPL CALC-MCNC: 75 MG/DL (ref 0–99)
M PROTEIN SERPL ELPH-MCNC: ABNORMAL G/DL
PLEASE NOTE:, 149534: ABNORMAL
PROT SERPL-MCNC: 7.8 G/DL (ref 6–8.5)
TRIGL SERPL-MCNC: 52 MG/DL (ref 0–149)
VLDLC SERPL CALC-MCNC: 11 MG/DL (ref 5–40)

## 2021-12-06 ENCOUNTER — HOSPITAL ENCOUNTER (OUTPATIENT)
Dept: ULTRASOUND IMAGING | Age: 71
Discharge: HOME | End: 2021-12-06
Attending: INTERNAL MEDICINE
Payer: MEDICARE

## 2021-12-06 DIAGNOSIS — M79.602 PAIN OF LEFT UPPER EXTREMITY: ICD-10-CM

## 2021-12-06 PROCEDURE — 76882 US LMTD JT/FCL EVL NVASC XTR: CPT

## 2021-12-11 RX ORDER — ESCITALOPRAM OXALATE 10 MG/1
TABLET ORAL
Qty: 30 TABLET | Refills: 2 | Status: SHIPPED | OUTPATIENT
Start: 2021-12-11 | End: 2022-06-03

## 2022-01-17 ENCOUNTER — OFFICE VISIT (OUTPATIENT)
Dept: INTERNAL MEDICINE CLINIC | Age: 72
End: 2022-01-17
Payer: MEDICARE

## 2022-01-17 VITALS
RESPIRATION RATE: 16 BRPM | TEMPERATURE: 98 F | WEIGHT: 169.6 LBS | BODY MASS INDEX: 34.19 KG/M2 | HEART RATE: 53 BPM | SYSTOLIC BLOOD PRESSURE: 122 MMHG | DIASTOLIC BLOOD PRESSURE: 76 MMHG | HEIGHT: 59 IN | OXYGEN SATURATION: 98 %

## 2022-01-17 DIAGNOSIS — K76.0 NAFL (NONALCOHOLIC FATTY LIVER): ICD-10-CM

## 2022-01-17 DIAGNOSIS — E11.9 TYPE 2 DIABETES MELLITUS WITHOUT COMPLICATION, WITHOUT LONG-TERM CURRENT USE OF INSULIN (HCC): ICD-10-CM

## 2022-01-17 DIAGNOSIS — F41.8 DEPRESSION WITH ANXIETY: ICD-10-CM

## 2022-01-17 DIAGNOSIS — I10 PRIMARY HYPERTENSION: Primary | ICD-10-CM

## 2022-01-17 DIAGNOSIS — E11.65 TYPE 2 DIABETES MELLITUS WITH HYPERGLYCEMIA, WITHOUT LONG-TERM CURRENT USE OF INSULIN (HCC): ICD-10-CM

## 2022-01-17 DIAGNOSIS — F33.9 RECURRENT DEPRESSION (HCC): ICD-10-CM

## 2022-01-17 DIAGNOSIS — D69.6 THROMBOCYTOPENIA (HCC): ICD-10-CM

## 2022-01-17 DIAGNOSIS — E78.00 HYPERCHOLESTEROLEMIA: ICD-10-CM

## 2022-01-17 DIAGNOSIS — E66.01 SEVERE OBESITY (HCC): ICD-10-CM

## 2022-01-17 DIAGNOSIS — D18.03 HEMANGIOMA OF LIVER: ICD-10-CM

## 2022-01-17 PROCEDURE — G8417 CALC BMI ABV UP PARAM F/U: HCPCS | Performed by: INTERNAL MEDICINE

## 2022-01-17 PROCEDURE — G8536 NO DOC ELDER MAL SCRN: HCPCS | Performed by: INTERNAL MEDICINE

## 2022-01-17 PROCEDURE — 1090F PRES/ABSN URINE INCON ASSESS: CPT | Performed by: INTERNAL MEDICINE

## 2022-01-17 PROCEDURE — G8427 DOCREV CUR MEDS BY ELIG CLIN: HCPCS | Performed by: INTERNAL MEDICINE

## 2022-01-17 PROCEDURE — 3046F HEMOGLOBIN A1C LEVEL >9.0%: CPT | Performed by: INTERNAL MEDICINE

## 2022-01-17 PROCEDURE — 2022F DILAT RTA XM EVC RTNOPTHY: CPT | Performed by: INTERNAL MEDICINE

## 2022-01-17 PROCEDURE — G8752 SYS BP LESS 140: HCPCS | Performed by: INTERNAL MEDICINE

## 2022-01-17 PROCEDURE — 3017F COLORECTAL CA SCREEN DOC REV: CPT | Performed by: INTERNAL MEDICINE

## 2022-01-17 PROCEDURE — 99213 OFFICE O/P EST LOW 20 MIN: CPT | Performed by: INTERNAL MEDICINE

## 2022-01-17 PROCEDURE — G8754 DIAS BP LESS 90: HCPCS | Performed by: INTERNAL MEDICINE

## 2022-01-17 PROCEDURE — G9899 SCRN MAM PERF RSLTS DOC: HCPCS | Performed by: INTERNAL MEDICINE

## 2022-01-17 PROCEDURE — G9717 DOC PT DX DEP/BP F/U NT REQ: HCPCS | Performed by: INTERNAL MEDICINE

## 2022-01-17 PROCEDURE — G8399 PT W/DXA RESULTS DOCUMENT: HCPCS | Performed by: INTERNAL MEDICINE

## 2022-01-17 PROCEDURE — 1101F PT FALLS ASSESS-DOCD LE1/YR: CPT | Performed by: INTERNAL MEDICINE

## 2022-01-17 RX ORDER — ROSUVASTATIN CALCIUM 5 MG/1
TABLET, COATED ORAL
Qty: 90 TABLET | Refills: 3 | Status: SHIPPED | OUTPATIENT
Start: 2022-01-17

## 2022-01-17 NOTE — PROGRESS NOTES
SPORTS MEDICINE AND PRIMARY CARE  Deisi Saleem MD, [address] [address]  Phone:  [phone]  Fax: [phone]      Chief Complaint   Patient presents with    Hypertension         SUBECTIVE:    Dawson Hodgkin is a 66 y.o. female Patient returns today with a known history of primary hypertension, thrombocytopenia, dyslipidemia, nonalcoholic fatty liver disease, type 2 diabetes, obesity, recurrent depression, thrombocytopenia and is seen for evaluation. She voices no new complaints. She did not get the Tradjenta because of cost.  It was reasonable, now it has gone up to $140 a month. Patient is seen for evaluation. Current Outpatient Medications   Medication Sig Dispense Refill    SITagliptin (JANUVIA) 100 mg tablet Take 1 Tablet by mouth daily. 30 Tablet 11    escitalopram oxalate (LEXAPRO) 10 mg tablet TAKE 1 TABLET BY MOUTH EVERY DAY 30 Tablet 2    cloNIDine HCL (CATAPRES) 0.2 mg tablet TAKE 1 TAB BY MOUTH TWO (2) TIMES A DAY. 180 Tablet 3    amLODIPine (NORVASC) 5 mg tablet TAKE 1 TABLET BY MOUTH EVERY DAY 90 Tablet 3    rosuvastatin (CRESTOR) 5 mg tablet Take 1 Tab by mouth nightly.  90 Tab 3    glucose blood VI test strips (CONTOUR NEXT STRIPS) strip Test blood sugar once daily 50 strip 11     Past Medical History:   Diagnosis Date    Arm pain 2021    Baker's cyst 3-3-16    r    Bereavement 11/15    59yo  sister     Contusion of knee and lower leg, right, subsequent encounter 11/10/2017    Contusion of knee, right     Cough 2017    Depression with anxiety     Diverticulosis 2-6-15    colonoscopy    DM2 [facility]diabetes mellitus, type 2) [facility]     Edema     Encounter related to worker's compensation claim 11/10/2017    fall t work    Fatigue     Hemangioma of liver 2018    mri    Hemangioma of liver 2019    Hypercholesterolemia     Hypertension     Liver disease     hepatitis 30 years ago - Liver mass consistent with hemangioma in the right lobe    NAFL (nonalcoholic fatty liver)     Obesity     Onychomycosis     Panniculitis 10/08/2019    S/P LD (total abdominal hysterectomy) 1985    Thrombocytopenia ([address] 2014    Vaginal vault prolapse 2019    Minnie hull md - robotic abdominal sacrolpopexy,enterocele repain,r ureterolysis,extensive lysis of adhesions,transobturator suburethral sling,retocele repair,cystoscopy with calibration     Past Surgical History:   Procedure Laterality Date    HX GYN      partial hysterectomy     Allergies   Allergen Reactions    Aspirin Nausea and Vomiting    Lipitor [Atorvastatin] Myalgia    Metformin Cough    Pravastatin Myalgia       REVIEW OF SYSTEMS:   No hypo or hyperglycemic symptoms. Social History     Socioeconomic History    Marital status: LEGALLY    Tobacco Use    Smoking status: Never Smoker    Smokeless tobacco: Never Used   Vaping Use    Vaping Use: Never used   Substance and Sexual Activity    Alcohol use: No     Alcohol/week: 0.0 standard drinks    Drug use: No    Sexual activity: Not Currently   Social History Narrative    Medical History: HTNTMJ dysfunctionHAsCecal lipoma negative head CT August 2013 CT head w ith contrast unremarkable    except progressive splenoid sinusitisAugust 2013 CT chest unremarkable    Gyn History: Last mammogram date 2012. Surgical History: LD 1985colonoscopy         Hospitalization/Major Diagnostic Procedure: Denies Past Hospitalization    Family History: Mother: 80 yrs  dementa, HTNFather: unknow nSister(s): alive, DMBrother(s): aliveDaughter(s): aliveSon(s): aliveAunt: alive2    brother(s) , 2 sister(s) . 1 son-2 boys , 1 daughter - 3 girls . 5 grands         Social History: Alcohol Use Patient does not use alcohol. Smoking Status Patient is a never smoker. Marital Status:  x 3 - 2 divorce and 2nd .  Lives w ith:    Spouse.- ca prostate no Children. 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 Education/School: has highschool diploma. Voodoo: Jehovah    W itness - no blood transfusions. r  Family History   Problem Relation Age of Onset    No Known Problems Father        OBJECTIVE:  Visit Vitals  /76   Pulse (!) 53   Temp 98 °F (36.7 °C) (Oral)   Resp 16   Ht 4' 11\" (1.499 m)   Wt 169 lb 9.6 oz (76.9 kg)   SpO2 98%   BMI 34.26 kg/m²     ENT: perrla,  eom intact  NECK: supple. Thyroid normal  CHEST: clear to ascultation and percussion   HEART: regular rate and rhythm  ABD: soft, bowel sounds active  EXTREMITIES: no edema, pulse 1+     Office Visit on 11/16/2021   Component Date Value Ref Range Status    Cholesterol, total 11/16/2021 149  100 - 199 mg/dL Final    Triglyceride 11/16/2021 52  0 - 149 mg/dL Final    HDL Cholesterol 11/16/2021 63  >39 mg/dL Final    VLDL, calculated 11/16/2021 11  5 - 40 mg/dL Final    LDL, calculated 11/16/2021 75  0 - 99 mg/dL Final    Immunoglobulin G, Qt. 11/16/2021 1,926* 586 - 1,602 mg/dL Final    Immunoglobulin A, Qt. 11/16/2021 283  64 - 422 mg/dL Final    Immunoglobulin M, Qt. 11/16/2021 52  26 - 217 mg/dL Final    Protein, total 11/16/2021 7.8  6.0 - 8.5 g/dL Final    Albumin 11/16/2021 3.8  2.9 - 4.4 g/dL Final    Alpha-1-Globulin 11/16/2021 0.2  0.0 - 0.4 g/dL Final    Alpha-2-Globulin 11/16/2021 0.8  0.4 - 1.0 g/dL Final    Beta Globulin 11/16/2021 1.2  0.7 - 1.3 g/dL Final    Gamma Globulin 11/16/2021 1.8  0.4 - 1.8 g/dL Final    M-Brandon 11/16/2021 Not Observed  Not Observed g/dL Final    Globulin, total 11/16/2021 4.0* 2.2 - 3.9 g/dL Final    A/G ratio 11/16/2021 1.0  0.7 - 1.7 Final    Immunofixation Result 11/16/2021 Comment*  Final    Polyclonal increase detected in one or more immunoglobulins.  Please note 11/16/2021 Comment   Final    Comment: Protein electrophoresis scan will follow via computer, mail, or   delivery.       Free Kappa Lt Chains, serum 11/16/2021 22.4* 3.3 - 19.4 mg/L Final    Free Lambda Lt Chains, serum 11/16/2021 16.7  5.7 - 26.3 mg/L Final    Kappa/Lambda ratio, serum 11/16/2021 1.34  0.26 - 1.65 Final    Hemoglobin A1c 11/16/2021 8.4* 4.8 - 5.6 % Final    Comment:          Prediabetes: 5.7 - 6.4           Diabetes: >6.4           Glycemic control for adults with diabetes: <7.0      Estimated average glucose 11/16/2021 194  mg/dL Final          ASSESSMENT:  1. Primary hypertension    2. Hypercholesterolemia    3. Depression with anxiety    4. NAFL (nonalcoholic fatty liver)    5. Hemangioma of liver    6. Type 2 diabetes mellitus without complication, without long-term current use of insulin (HCC)    7. Type 2 diabetes mellitus with hyperglycemia, without long-term current use of insulin (HCC)    8. Severe obesity ([address]    9. Recurrent depression ([address]    10. Thrombocytopenia ([address]      Blood pressure control is adequate. Her cholesterol is also at goal with an LDL of 78. She has nonalcoholic fatty liver disease, which has been stable. I am concerned about control of her diabetes. She is not taking any medication for diabetes. We will check a renal panel today. It is too soon to check hemoglobin A1c. We will have her come back in six weeks. We will try Januvia and see if her insurance will pick that up. If it will not, she is agreeable to starting insulin and we will use the Relion insulin. We encourage a heart healthy, weight reducing diet. For the thrombocytopenia we will check a CBC today. She will be back to see me in six weeks. I have discussed the diagnosis with the patient and the intended plan as seen in the  orders above. The patient understands and agees with the plan. The patient has   received an after visit summary and questions were answered concerning  future plans  Patient labs and/or xrays were reviewed  Past records were reviewed.     PLAN:  .  Orders Placed This Encounter    CBC WITH AUTOMATED DIFF    RENAL FUNCTION PANEL    SITagliptin (JANUVIA) 100 mg tablet       Follow-up and Dispositions    · Return in about 5 weeks (around 2/21/2022). ATTENTION:   This medical record was transcribed using an electronic medical records system. Although proofread, it may and can contain electronic and spelling errors. Other human spelling and other errors may be present. Corrections may be executed at a later time. Please feel free to contact us for any clarifications as needed.

## 2022-01-17 NOTE — PROGRESS NOTES
1. Have you been to the ER, urgent care clinic since your last visit? Hospitalized since your last visit? No    2. Have you seen or consulted any other health care providers outside of the [address] [place] [address] since your last visit? Include any pap smears or colon screening.  No    Left arm pain related to covid vaccine

## 2022-01-18 LAB
ALBUMIN SERPL-MCNC: 4.2 G/DL (ref 3.7–4.7)
BASOPHILS # BLD AUTO: 0.1 X10E3/UL (ref 0–0.2)
BASOPHILS NFR BLD AUTO: 1 %
BUN SERPL-MCNC: 15 MG/DL (ref 8–27)
BUN/CREAT SERPL: 18 (ref 12–28)
CALCIUM SERPL-MCNC: 9.2 MG/DL (ref 8.7–10.3)
CHLORIDE SERPL-SCNC: 101 MMOL/L (ref 96–106)
CO2 SERPL-SCNC: 23 MMOL/L (ref 20–29)
CREAT SERPL-MCNC: 0.85 MG/DL (ref 0.57–1)
EOSINOPHIL # BLD AUTO: 0.2 X10E3/UL (ref 0–0.4)
EOSINOPHIL NFR BLD AUTO: 2 %
ERYTHROCYTE [DISTWIDTH] IN BLOOD BY AUTOMATED COUNT: 13.1 % (ref 11.7–15.4)
GLUCOSE SERPL-MCNC: 205 MG/DL (ref 65–99)
HCT VFR BLD AUTO: 40.4 % (ref 34–46.6)
HGB BLD-MCNC: 13.4 G/DL (ref 11.1–15.9)
IMM GRANULOCYTES # BLD AUTO: 0 X10E3/UL (ref 0–0.1)
IMM GRANULOCYTES NFR BLD AUTO: 0 %
LYMPHOCYTES # BLD AUTO: 2.5 X10E3/UL (ref 0.7–3.1)
LYMPHOCYTES NFR BLD AUTO: 31 %
MCH RBC QN AUTO: 28.6 PG (ref 26.6–33)
MCHC RBC AUTO-ENTMCNC: 33.2 G/DL (ref 31.5–35.7)
MCV RBC AUTO: 86 FL (ref 79–97)
MONOCYTES # BLD AUTO: 0.4 X10E3/UL (ref 0.1–0.9)
MONOCYTES NFR BLD AUTO: 5 %
NEUTROPHILS # BLD AUTO: 4.8 X10E3/UL (ref 1.4–7)
NEUTROPHILS NFR BLD AUTO: 61 %
PHOSPHATE SERPL-MCNC: 3.9 MG/DL (ref 3–4.3)
PLATELET # BLD AUTO: 195 X10E3/UL (ref 150–450)
POTASSIUM SERPL-SCNC: 4 MMOL/L (ref 3.5–5.2)
RBC # BLD AUTO: 4.69 X10E6/UL (ref 3.77–5.28)
SODIUM SERPL-SCNC: 138 MMOL/L (ref 134–144)
WBC # BLD AUTO: 7.8 X10E3/UL (ref 3.4–10.8)

## 2022-03-18 PROBLEM — E66.01 SEVERE OBESITY (HCC): Status: ACTIVE | Noted: 2020-11-11

## 2022-03-18 PROBLEM — K76.0 NAFL (NONALCOHOLIC FATTY LIVER): Status: ACTIVE | Noted: 2018-07-05

## 2022-03-18 PROBLEM — M79.3 PANNICULITIS: Status: ACTIVE | Noted: 2019-10-08

## 2022-03-18 PROBLEM — N81.9 VAGINAL VAULT PROLAPSE: Status: ACTIVE | Noted: 2019-05-06

## 2022-03-19 PROBLEM — D18.03 HEMANGIOMA OF LIVER: Status: ACTIVE | Noted: 2018-06-07

## 2022-03-19 PROBLEM — F33.9 RECURRENT DEPRESSION (HCC): Status: ACTIVE | Noted: 2018-09-13

## 2022-06-03 RX ORDER — ESCITALOPRAM OXALATE 10 MG/1
TABLET ORAL
Qty: 30 TABLET | Refills: 2 | Status: SHIPPED | OUTPATIENT
Start: 2022-06-03 | End: 2022-09-07

## 2022-06-04 RX ORDER — AMLODIPINE BESYLATE 5 MG/1
TABLET ORAL
Qty: 90 TABLET | Refills: 3 | Status: SHIPPED | OUTPATIENT
Start: 2022-06-04

## 2022-06-11 LAB — HBA1C MFR BLD HPLC: 9.2 %

## 2022-07-11 ENCOUNTER — TRANSCRIBE ORDER (OUTPATIENT)
Dept: SCHEDULING | Age: 72
End: 2022-07-11

## 2022-07-11 DIAGNOSIS — Z12.31 VISIT FOR SCREENING MAMMOGRAM: Primary | ICD-10-CM

## 2022-07-20 ENCOUNTER — HOSPITAL ENCOUNTER (OUTPATIENT)
Dept: MAMMOGRAPHY | Age: 72
Discharge: HOME | End: 2022-07-20
Attending: INTERNAL MEDICINE
Payer: MEDICARE

## 2022-07-20 DIAGNOSIS — Z12.31 VISIT FOR SCREENING MAMMOGRAM: ICD-10-CM

## 2022-07-20 PROCEDURE — 77067 SCR MAMMO BI INCL CAD: CPT

## 2022-09-07 RX ORDER — ESCITALOPRAM OXALATE 10 MG/1
TABLET ORAL
Qty: 30 TABLET | Refills: 2 | Status: SHIPPED | OUTPATIENT
Start: 2022-09-07

## 2022-09-09 ENCOUNTER — OFFICE VISIT (OUTPATIENT)
Dept: INTERNAL MEDICINE CLINIC | Age: 72
End: 2022-09-09
Payer: MEDICARE

## 2022-09-09 VITALS
SYSTOLIC BLOOD PRESSURE: 133 MMHG | BODY MASS INDEX: 32.74 KG/M2 | HEART RATE: 95 BPM | DIASTOLIC BLOOD PRESSURE: 85 MMHG | RESPIRATION RATE: 20 BRPM | OXYGEN SATURATION: 100 % | TEMPERATURE: 98.5 F | WEIGHT: 162.4 LBS | HEIGHT: 59 IN

## 2022-09-09 DIAGNOSIS — R20.2 TINGLING IN EXTREMITIES: ICD-10-CM

## 2022-09-09 DIAGNOSIS — E66.09 CLASS 1 OBESITY DUE TO EXCESS CALORIES WITH SERIOUS COMORBIDITY AND BODY MASS INDEX (BMI) OF 32.0 TO 32.9 IN ADULT: ICD-10-CM

## 2022-09-09 DIAGNOSIS — E78.00 HYPERCHOLESTEROLEMIA: ICD-10-CM

## 2022-09-09 DIAGNOSIS — K76.0 NAFL (NONALCOHOLIC FATTY LIVER): ICD-10-CM

## 2022-09-09 DIAGNOSIS — D69.6 THROMBOCYTOPENIA (HCC): ICD-10-CM

## 2022-09-09 DIAGNOSIS — M79.644 THUMB PAIN, RIGHT: ICD-10-CM

## 2022-09-09 DIAGNOSIS — E11.9 TYPE 2 DIABETES MELLITUS WITHOUT COMPLICATION, WITHOUT LONG-TERM CURRENT USE OF INSULIN (HCC): ICD-10-CM

## 2022-09-09 DIAGNOSIS — G44.89 OTHER HEADACHE SYNDROME: ICD-10-CM

## 2022-09-09 DIAGNOSIS — I10 PRIMARY HYPERTENSION: Primary | ICD-10-CM

## 2022-09-09 PROCEDURE — G8536 NO DOC ELDER MAL SCRN: HCPCS | Performed by: INTERNAL MEDICINE

## 2022-09-09 PROCEDURE — G9899 SCRN MAM PERF RSLTS DOC: HCPCS | Performed by: INTERNAL MEDICINE

## 2022-09-09 PROCEDURE — G8427 DOCREV CUR MEDS BY ELIG CLIN: HCPCS | Performed by: INTERNAL MEDICINE

## 2022-09-09 PROCEDURE — G9717 DOC PT DX DEP/BP F/U NT REQ: HCPCS | Performed by: INTERNAL MEDICINE

## 2022-09-09 PROCEDURE — 3017F COLORECTAL CA SCREEN DOC REV: CPT | Performed by: INTERNAL MEDICINE

## 2022-09-09 PROCEDURE — 99214 OFFICE O/P EST MOD 30 MIN: CPT | Performed by: INTERNAL MEDICINE

## 2022-09-09 PROCEDURE — G8752 SYS BP LESS 140: HCPCS | Performed by: INTERNAL MEDICINE

## 2022-09-09 PROCEDURE — 1090F PRES/ABSN URINE INCON ASSESS: CPT | Performed by: INTERNAL MEDICINE

## 2022-09-09 PROCEDURE — 73140 X-RAY EXAM OF FINGER(S): CPT | Performed by: INTERNAL MEDICINE

## 2022-09-09 PROCEDURE — G8417 CALC BMI ABV UP PARAM F/U: HCPCS | Performed by: INTERNAL MEDICINE

## 2022-09-09 PROCEDURE — G8754 DIAS BP LESS 90: HCPCS | Performed by: INTERNAL MEDICINE

## 2022-09-09 PROCEDURE — 1123F ACP DISCUSS/DSCN MKR DOCD: CPT | Performed by: INTERNAL MEDICINE

## 2022-09-09 PROCEDURE — G8399 PT W/DXA RESULTS DOCUMENT: HCPCS | Performed by: INTERNAL MEDICINE

## 2022-09-09 PROCEDURE — 3046F HEMOGLOBIN A1C LEVEL >9.0%: CPT | Performed by: INTERNAL MEDICINE

## 2022-09-09 PROCEDURE — 2022F DILAT RTA XM EVC RTNOPTHY: CPT | Performed by: INTERNAL MEDICINE

## 2022-09-09 PROCEDURE — 1101F PT FALLS ASSESS-DOCD LE1/YR: CPT | Performed by: INTERNAL MEDICINE

## 2022-09-09 RX ORDER — DULOXETIN HYDROCHLORIDE 30 MG/1
30 CAPSULE, DELAYED RELEASE ORAL DAILY
Qty: 30 CAPSULE | Refills: 5 | Status: SHIPPED | OUTPATIENT
Start: 2022-09-09

## 2022-09-09 NOTE — PROGRESS NOTES
Jocy Brunner is a 66 y.o. female    Chief Complaint   Patient presents with    Tingling     Legs     Diabetes    Hypertension     1. Have you been to the ER, urgent care clinic since your last visit? Hospitalized since your last visit? No    2. Have you seen or consulted any other health care providers outside of the [address] [place] [address] since your last visit? Include any pap smears or colon screening.  No

## 2022-09-09 NOTE — PROGRESS NOTES
SPORTS MEDICINE AND PRIMARY CARE  Rio Torres MD, [address] [id number]  Phone:  [phone]  Fax: [phone]       Chief Complaint   Patient presents with    Tingling     Legs     Diabetes    Hypertension   . SUBJECTIVE:    Symone Mckeon is a 66 y.o. female Patient returns today with a known history of hypertension, dyslipidemia, obesity, type 2 diabetes, thrombocytopenia, and NAFLD. Patient comes in complaining of tingling in her legs from the knee distally, but not necessarily the feet, and has been going on for the past three weeks. She tries to move her feet around to get the feeling to go away. She was playing with the dog and hit the dog's teeth with her thumb and is still having some discomfort there. It has been there for a couple weeks. She complains of occipital headache and has the sensation that something is squeezing together. No visual disturbances, no nausea or vomiting. Sometimes feels like a drip in the back that lasts sometimes all day. It has been going on for the past month. She is seen for evaluation. Current Outpatient Medications   Medication Sig Dispense Refill    DULoxetine (CYMBALTA) 30 mg capsule Take 1 Capsule by mouth daily. 30 Capsule 5    escitalopram oxalate (LEXAPRO) 10 mg tablet TAKE 1 TABLET BY MOUTH EVERY DAY 30 Tablet 2    amLODIPine (NORVASC) 5 mg tablet TAKE 1 TABLET BY MOUTH EVERY DAY 90 Tablet 3    SITagliptin (JANUVIA) 100 mg tablet Take 1 Tablet by mouth daily. 30 Tablet 11    rosuvastatin (CRESTOR) 5 mg tablet TAKE 1 TABLET BY MOUTH AT BEDTIME 90 Tablet 3    cloNIDine HCL (CATAPRES) 0.2 mg tablet TAKE 1 TAB BY MOUTH TWO (2) TIMES A DAY.  180 Tablet 3    glucose blood VI test strips (CONTOUR NEXT STRIPS) strip Test blood sugar once daily 50 strip 11     Past Medical History:   Diagnosis Date    Arm pain 2021    Baker's cyst 2016    r    Bereavement 2015    59yo  sister     Contusion of knee and lower leg, right, subsequent encounter 11/10/2017    Contusion of knee, right     Cough 02/14/2017    Depression with anxiety     Diverticulosis 02/06/2015    colonoscopy    DM2 (diabetes mellitus, type 2) ([facility]     Edema     Encounter related to worker's compensation claim 11/10/2017    fall t work    Fatigue     Hemangioma of liver 06/2018    mri    Hemangioma of liver 09/05/2019    Hypercholesterolemia     Hypertension     Liver disease     hepatitis 30 years ago - Liver mass consistent with hemangioma in the right lobe    Menopause     LMP-unknown    NAFL (nonalcoholic fatty liver)     Obesity     Onychomycosis     Other headache syndrome 09/09/2022    Panniculitis 10/08/2019    S/P LD (total abdominal hysterectomy) 1985    Thrombocytopenia ([facility] 12/12/2014    Thumb pain, right 09/09/2022    Tingling in extremities 09/09/2022    Vaginal vault prolapse 05/06/2019    Tiffany hull md - robotic abdominal sacrolpopexy,enterocele repain,r ureterolysis,extensive lysis of adhesions,transobturator suburethral sling,retocele repair,cystoscopy with calibration     Past Surgical History:   Procedure Laterality Date    HX GYN      partial hysterectomy     Allergies   Allergen Reactions    Aspirin Nausea and Vomiting    Lipitor [Atorvastatin] Myalgia    Metformin Cough    Pravastatin Myalgia         REVIEW OF SYSTEMS:  General: negative for - chills or fever  ENT: negative for - headaches, nasal congestion or tinnitus  Respiratory: negative for - cough, hemoptysis, shortness of breath or wheezing  Cardiovascular : negative for - chest pain, edema, palpitations or shortness of breath  Gastrointestinal: negative for - abdominal pain, blood in stools, heartburn or nausea/vomiting  Genito-Urinary: no dysuria, trouble voiding, or hematuria  Musculoskeletal: negative for - gait disturbance, joint pain, joint stiffness or joint swelling  Neurological: no TIA or stroke symptoms  Hematologic: no bruises, no bleeding, no swollen glands  Integument: no lumps, mole changes, nail changes or rash  Endocrine: no malaise/lethargy or unexpected weight changes      Social History     Socioeconomic History    Marital status: LEGALLY    Tobacco Use    Smoking status: Never    Smokeless tobacco: Never   Vaping Use    Vaping Use: Never used   Substance and Sexual Activity    Alcohol use: No     Alcohol/week: 0.0 standard drinks    Drug use: No    Sexual activity: Not Currently   Social History Narrative    Medical History: HTNTMJ dysfunctionHAsCecal lipoma negative head CT August 2013 CT head w ith contrast unremarkable    except progressive splenoid sinusitisAugust 2013 CT chest unremarkable    Gyn History: Last mammogram date 2012. Surgical History: [facility] 1985colonoscopy         Hospitalization/Major Diagnostic Procedure: Denies Past Hospitalization    Family History: Mother: 80 yrs  dementa, HTNFather: unknow nSister(s): alive, DMBrother(s): aliveDaughter(s): aliveSon(s): aliveAunt: alive2    brother(s) , 2 sister(s) . 1 son-2 boys , 1 daughter - 3 girls . 5 grands         Social History: Alcohol Use Patient does not use alcohol. Smoking Status Patient is a never smoker. Marital Status:  x 3 - 2 divorce and 2nd . Lives w ith:    Spouse.- ca prostate no Children. 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 Education/School: has highschool diploma. Advent: Jehovah    W itness - no blood transfusions. Family History   Problem Relation Age of Onset    No Known Problems Father        OBJECTIVE:    Visit Vitals  /85 (BP 1 Location: Left upper arm, BP Patient Position: Sitting)   Pulse 95   Temp 98.5 °F (36.9 °C) (Oral)   Resp 20   Ht 4' 11\" (1.499 m)   Wt 162 lb 6.4 oz (73.7 kg)   SpO2 100%   BMI 32.80 kg/m²     CONSTITUTIONAL: well , well nourished, appears age appropriate  EYES: perrla, eom intact  ENMT:moist mucous membranes, pharynx clear  NECK: supple.  Thyroid normal  RESPIRATORY: Chest: clear bilaterally   CARDIOVASCULAR: Heart: regular rate and rhythm  GASTROINTESTINAL: Abdomen: soft, bowel sounds active  HEMATOLOGIC: no pathological lymph nodes palpated  MUSCULOSKELETAL: Extremities: no edema, pulse 1+   INTEGUMENT: No unusual rashes or suspicious skin lesions noted. Nails appear normal.  NEUROLOGIC: non-focal exam   MENTAL STATUS: alert and oriented, appropriate affect           ASSESSMENT:  1. Primary hypertension    2. Hypercholesterolemia    3. Class 1 obesity due to excess calories with serious comorbidity and body mass index (BMI) of 32.0 to 32.9 in adult    4. Type 2 diabetes mellitus without complication, without long-term current use of insulin ([address]    5. Thrombocytopenia ([address]    6. NAFL (nonalcoholic fatty liver)    7. Tingling in extremities    8. Thumb pain, right    9. Other headache syndrome      Blood pressure control is at goal, no titration is needed. She has dyslipidemia and we will check a lipid panel today and report to her the results in the mail. Obesity remains a concern, but since we last saw her, we congratulate her. She has lost 7 pounds. We encourage her weight loss and physical activity. She has type 2 diabetes and I think that is the cause of the tingling in her extremities. I think she has a diabetic neuropathy. We discuss the medications we can use and will start her off with Cymbalta and see if we can make her more comfortable. There is a history of thrombocytopenia, for which we will check CBC today. NAFLD is being followed and will check LFTs. If they remain normal, no further evaluation will be undertaken. If they are abnormal, we will refer to the [facility]. She has pain of the thumb and we will take an xray to exclude possibility of fracture. She banged her thumb on the cage. She complains of occipital headaches she has had for the past month. We will investigate with MRI.     She agrees with the plan and will be back to see me in three to four months, sooner if any problems. We encouraged her to get MyChart, however will communicate with her with a letter in the mail. I have discussed the diagnosis with the patient and the intended plan as seen in the  Orders. The patient understands and agees with the plan. The patient has   received an after visit summary and questions were answered concerning  future plans  Patient labs and/or xrays were reviewed  Past records were reviewed. PLAN:  .  Orders Placed This Encounter    MRI BRAIN W WO CONT    XR THUMB RT MIN 2 V    URINALYSIS W/ RFLX MICROSCOPIC    CBC WITH AUTOMATED DIFF    METABOLIC PANEL, COMPREHENSIVE    LIPID PANEL    MICROALBUMIN, UR, RAND W/ MICROALB/CREAT RATIO    DULoxetine (CYMBALTA) 30 mg capsule       Follow-up and Dispositions    Return in about 4 months (around 1/9/2023). ATTENTION:   This medical record was transcribed using an electronic medical records system. Although proofread, it may and can contain electronic and spelling errors. Other human spelling and other errors may be present. Corrections may be executed at a later time. Please feel free to contact us for any clarifications as needed.

## 2022-09-10 LAB
ALBUMIN SERPL-MCNC: 4.5 G/DL (ref 3.7–4.7)
ALBUMIN/CREAT UR: 15 MG/G CREAT (ref 0–29)
ALBUMIN/GLOB SERPL: 1.3 {RATIO} (ref 1.2–2.2)
ALP SERPL-CCNC: 91 IU/L (ref 44–121)
ALT SERPL-CCNC: 13 IU/L (ref 0–32)
APPEARANCE UR: CLEAR
AST SERPL-CCNC: 20 IU/L (ref 0–40)
BASOPHILS # BLD AUTO: 0.1 X10E3/UL (ref 0–0.2)
BASOPHILS NFR BLD AUTO: 1 %
BILIRUB SERPL-MCNC: 0.5 MG/DL (ref 0–1.2)
BILIRUB UR QL STRIP: NEGATIVE
BUN SERPL-MCNC: 10 MG/DL (ref 8–27)
BUN/CREAT SERPL: 12 (ref 12–28)
CALCIUM SERPL-MCNC: 9.4 MG/DL (ref 8.7–10.3)
CHLORIDE SERPL-SCNC: 103 MMOL/L (ref 96–106)
CHOLEST SERPL-MCNC: 140 MG/DL (ref 100–199)
CO2 SERPL-SCNC: 22 MMOL/L (ref 20–29)
COLOR UR: YELLOW
CREAT SERPL-MCNC: 0.81 MG/DL (ref 0.57–1)
CREAT UR-MCNC: 143.9 MG/DL
EGFR: 77 ML/MIN/1.73
EOSINOPHIL # BLD AUTO: 0.1 X10E3/UL (ref 0–0.4)
EOSINOPHIL NFR BLD AUTO: 1 %
ERYTHROCYTE [DISTWIDTH] IN BLOOD BY AUTOMATED COUNT: 13.2 % (ref 11.7–15.4)
GLOBULIN SER CALC-MCNC: 3.4 G/DL (ref 1.5–4.5)
GLUCOSE SERPL-MCNC: 214 MG/DL (ref 65–99)
GLUCOSE UR QL STRIP: ABNORMAL
HCT VFR BLD AUTO: 41.9 % (ref 34–46.6)
HDLC SERPL-MCNC: 53 MG/DL
HGB BLD-MCNC: 14 G/DL (ref 11.1–15.9)
HGB UR QL STRIP: NEGATIVE
IMM GRANULOCYTES # BLD AUTO: 0 X10E3/UL (ref 0–0.1)
IMM GRANULOCYTES NFR BLD AUTO: 0 %
KETONES UR QL STRIP: ABNORMAL
LDLC SERPL CALC-MCNC: 75 MG/DL (ref 0–99)
LEUKOCYTE ESTERASE UR QL STRIP: NEGATIVE
LYMPHOCYTES # BLD AUTO: 2 X10E3/UL (ref 0.7–3.1)
LYMPHOCYTES NFR BLD AUTO: 27 %
MCH RBC QN AUTO: 29.5 PG (ref 26.6–33)
MCHC RBC AUTO-ENTMCNC: 33.4 G/DL (ref 31.5–35.7)
MCV RBC AUTO: 88 FL (ref 79–97)
MICRO URNS: ABNORMAL
MICROALBUMIN UR-MCNC: 21.4 UG/ML
MONOCYTES # BLD AUTO: 0.3 X10E3/UL (ref 0.1–0.9)
MONOCYTES NFR BLD AUTO: 4 %
MORPHOLOGY BLD-IMP: ABNORMAL
NEUTROPHILS # BLD AUTO: 5 X10E3/UL (ref 1.4–7)
NEUTROPHILS NFR BLD AUTO: 67 %
NITRITE UR QL STRIP: NEGATIVE
PH UR STRIP: 5.5 [PH] (ref 5–7.5)
PLATELET # BLD AUTO: 118 X10E3/UL (ref 150–450)
POTASSIUM SERPL-SCNC: 4.1 MMOL/L (ref 3.5–5.2)
PROT SERPL-MCNC: 7.9 G/DL (ref 6–8.5)
PROT UR QL STRIP: ABNORMAL
RBC # BLD AUTO: 4.75 X10E6/UL (ref 3.77–5.28)
SODIUM SERPL-SCNC: 141 MMOL/L (ref 134–144)
SP GR UR STRIP: 1.02 (ref 1–1.03)
TRIGL SERPL-MCNC: 58 MG/DL (ref 0–149)
UROBILINOGEN UR STRIP-MCNC: 1 MG/DL (ref 0.2–1)
VLDLC SERPL CALC-MCNC: 12 MG/DL (ref 5–40)
WBC # BLD AUTO: 7.5 X10E3/UL (ref 3.4–10.8)

## 2022-09-16 DIAGNOSIS — M79.644 PAIN OF FINGER OF RIGHT HAND: Primary | ICD-10-CM

## 2022-09-23 ENCOUNTER — TELEPHONE (OUTPATIENT)
Dept: PHARMACY | Age: 72
End: 2022-09-23

## 2022-09-23 NOTE — TELEPHONE ENCOUNTER
Adherence not for [facility]. Januvia 100 mg fill for 90 days >$200, Raina has tried to reach out to patient to let her know that a 30 day supply is $35. She does not have a Mychart. I will send a letter.     For Pharmacy Admin Tracking Only      Gap Closed?: No    Time Spent (min): 15

## 2022-10-17 RX ORDER — CLONIDINE HYDROCHLORIDE 0.2 MG/1
TABLET ORAL
Qty: 180 TABLET | Refills: 3 | Status: SHIPPED | OUTPATIENT
Start: 2022-10-17

## 2022-12-08 RX ORDER — ESCITALOPRAM OXALATE 10 MG/1
TABLET ORAL
Qty: 30 TABLET | Refills: 2 | Status: SHIPPED | OUTPATIENT
Start: 2022-12-08

## 2023-02-13 ENCOUNTER — OFFICE VISIT (OUTPATIENT)
Dept: INTERNAL MEDICINE CLINIC | Age: 73
End: 2023-02-13
Payer: MEDICARE

## 2023-02-13 VITALS
BODY MASS INDEX: 30.91 KG/M2 | DIASTOLIC BLOOD PRESSURE: 80 MMHG | OXYGEN SATURATION: 97 % | SYSTOLIC BLOOD PRESSURE: 140 MMHG | TEMPERATURE: 98.2 F | HEART RATE: 66 BPM | WEIGHT: 153.3 LBS | RESPIRATION RATE: 20 BRPM | HEIGHT: 59 IN

## 2023-02-13 DIAGNOSIS — M79.645 THUMB PAIN, LEFT: ICD-10-CM

## 2023-02-13 DIAGNOSIS — I10 PRIMARY HYPERTENSION: ICD-10-CM

## 2023-02-13 DIAGNOSIS — K76.0 NAFL (NONALCOHOLIC FATTY LIVER): ICD-10-CM

## 2023-02-13 DIAGNOSIS — F33.9 RECURRENT DEPRESSION (HCC): ICD-10-CM

## 2023-02-13 DIAGNOSIS — E11.9 TYPE 2 DIABETES MELLITUS WITHOUT COMPLICATION, WITHOUT LONG-TERM CURRENT USE OF INSULIN (HCC): ICD-10-CM

## 2023-02-13 DIAGNOSIS — D69.6 THROMBOCYTOPENIA (HCC): ICD-10-CM

## 2023-02-13 DIAGNOSIS — Z13.39 SCREENING FOR ALCOHOLISM: ICD-10-CM

## 2023-02-13 DIAGNOSIS — R21 RASH: ICD-10-CM

## 2023-02-13 DIAGNOSIS — E66.09 CLASS 1 OBESITY DUE TO EXCESS CALORIES WITH SERIOUS COMORBIDITY AND BODY MASS INDEX (BMI) OF 32.0 TO 32.9 IN ADULT: ICD-10-CM

## 2023-02-13 DIAGNOSIS — Z00.00 MEDICARE ANNUAL WELLNESS VISIT, SUBSEQUENT: Primary | ICD-10-CM

## 2023-02-13 DIAGNOSIS — E78.00 HYPERCHOLESTEROLEMIA: ICD-10-CM

## 2023-02-13 PROCEDURE — G9717 DOC PT DX DEP/BP F/U NT REQ: HCPCS | Performed by: INTERNAL MEDICINE

## 2023-02-13 PROCEDURE — 1101F PT FALLS ASSESS-DOCD LE1/YR: CPT | Performed by: INTERNAL MEDICINE

## 2023-02-13 PROCEDURE — 1123F ACP DISCUSS/DSCN MKR DOCD: CPT | Performed by: INTERNAL MEDICINE

## 2023-02-13 PROCEDURE — G8427 DOCREV CUR MEDS BY ELIG CLIN: HCPCS | Performed by: INTERNAL MEDICINE

## 2023-02-13 PROCEDURE — G8399 PT W/DXA RESULTS DOCUMENT: HCPCS | Performed by: INTERNAL MEDICINE

## 2023-02-13 PROCEDURE — G8536 NO DOC ELDER MAL SCRN: HCPCS | Performed by: INTERNAL MEDICINE

## 2023-02-13 PROCEDURE — 2022F DILAT RTA XM EVC RTNOPTHY: CPT | Performed by: INTERNAL MEDICINE

## 2023-02-13 PROCEDURE — 3017F COLORECTAL CA SCREEN DOC REV: CPT | Performed by: INTERNAL MEDICINE

## 2023-02-13 PROCEDURE — 3079F DIAST BP 80-89 MM HG: CPT | Performed by: INTERNAL MEDICINE

## 2023-02-13 PROCEDURE — 3077F SYST BP >= 140 MM HG: CPT | Performed by: INTERNAL MEDICINE

## 2023-02-13 PROCEDURE — 1090F PRES/ABSN URINE INCON ASSESS: CPT | Performed by: INTERNAL MEDICINE

## 2023-02-13 PROCEDURE — 3046F HEMOGLOBIN A1C LEVEL >9.0%: CPT | Performed by: INTERNAL MEDICINE

## 2023-02-13 PROCEDURE — G0439 PPPS, SUBSEQ VISIT: HCPCS | Performed by: INTERNAL MEDICINE

## 2023-02-13 PROCEDURE — G9899 SCRN MAM PERF RSLTS DOC: HCPCS | Performed by: INTERNAL MEDICINE

## 2023-02-13 PROCEDURE — G8417 CALC BMI ABV UP PARAM F/U: HCPCS | Performed by: INTERNAL MEDICINE

## 2023-02-13 PROCEDURE — 99213 OFFICE O/P EST LOW 20 MIN: CPT | Performed by: INTERNAL MEDICINE

## 2023-02-13 PROCEDURE — 73140 X-RAY EXAM OF FINGER(S): CPT | Performed by: INTERNAL MEDICINE

## 2023-02-13 RX ORDER — ROSUVASTATIN CALCIUM 5 MG/1
5 TABLET, COATED ORAL
Qty: 90 TABLET | Refills: 3 | Status: SHIPPED | OUTPATIENT
Start: 2023-02-13

## 2023-02-13 NOTE — PROGRESS NOTES
SPORTS MEDICINE AND PRIMARY CARE  Lindsey Gallagher MD, [address] [id number]  Phone:  [phone]  Fax: [phone]       Chief Complaint   Patient presents with    Diabetes    Cholesterol Problem    Hypertension    Other     Blisters on left leg   . SUBJECTIVE:    Kori Moulton is a 74 y.o. female Patient returns today with a history of nonalcoholic fatty liver, type 2 diabetes, recurrent depression, thrombocytopenia, obesity, dyslipidemia, primary hypertension and is seen for evaluation. Concern lateral aspect of her left leg and concerned about a knot on her left thumb. She is taking her medications regularly, did not take them this morning, and is seen for evaluation. Current Outpatient Medications   Medication Sig Dispense Refill    rosuvastatin (CRESTOR) 5 mg tablet Take 1 Tablet by mouth nightly. 90 Tablet 3    escitalopram oxalate (LEXAPRO) 10 mg tablet TAKE 1 TABLET BY MOUTH EVERY DAY 30 Tablet 2    cloNIDine HCL (CATAPRES) 0.2 mg tablet TAKE 1 TAB BY MOUTH TWO (2) TIMES A DAY. 180 Tablet 3    amLODIPine (NORVASC) 5 mg tablet TAKE 1 TABLET BY MOUTH EVERY DAY 90 Tablet 3    DULoxetine (CYMBALTA) 30 mg capsule Take 1 Capsule by mouth daily. (Patient not taking: Reported on 2023) 30 Capsule 5    SITagliptin (JANUVIA) 100 mg tablet Take 1 Tablet by mouth daily.  (Patient not taking: Reported on 2023) 30 Tablet 11    glucose blood VI test strips (CONTOUR NEXT STRIPS) strip Test blood sugar once daily (Patient not taking: Reported on 2023) 50 strip 11     Past Medical History:   Diagnosis Date    Arm pain 2021    Baker's cyst 2016    r    Bereavement 2015    57yo  sister     Contusion of knee and lower leg, right, subsequent encounter 11/10/2017    Contusion of knee, right     Cough 2017    Depression with anxiety     Diverticulosis 2015    colonoscopy    [facility]     Edema     Encounter related to worker's compensation claim 11/10/2017    fall t work    Fatigue     Hemangioma of liver 06/2018    mri    Hemangioma of liver 09/05/2019    Hypercholesterolemia     Hypertension     Liver disease     hepatitis 30 years ago - Liver mass consistent with hemangioma in the right lobe    Menopause     LMP-unknown    NAFL (nonalcoholic fatty liver)     Obesity     Onychomycosis     Other headache syndrome 09/09/2022    Panniculitis 10/08/2019    S/P LD (total abdominal hysterectomy) 1985    Thrombocytopenia ([address] 12/12/2014    Thumb pain, right 09/09/2022    Tingling in extremities 09/09/2022    Vaginal vault prolapse 05/06/2019    Marcos hull md - robotic abdominal sacrolpopexy,enterocele repain,r ureterolysis,extensive lysis of adhesions,transobturator suburethral sling,retocele repair,cystoscopy with calibration     Past Surgical History:   Procedure Laterality Date    HX GYN      partial hysterectomy     Allergies   Allergen Reactions    Aspirin Nausea and Vomiting    Lipitor [Atorvastatin] Myalgia    Metformin Cough    Pravastatin Myalgia         REVIEW OF SYSTEMS:  General: negative for - chills or fever  ENT: negative for - headaches, nasal congestion or tinnitus  Respiratory: negative for - cough, hemoptysis, shortness of breath or wheezing  Cardiovascular : negative for - chest pain, edema, palpitations or shortness of breath  Gastrointestinal: negative for - abdominal pain, blood in stools, heartburn or nausea/vomiting  Genito-Urinary: no dysuria, trouble voiding, or hematuria  Musculoskeletal: negative for - gait disturbance, joint pain, joint stiffness or joint swelling  Neurological: no TIA or stroke symptoms  Hematologic: no bruises, no bleeding, no swollen glands  Integument: no lumps, mole changes, nail changes or rash  Endocrine: no malaise/lethargy or unexpected weight changes      Social History     Socioeconomic History    Marital status: LEGALLY    Tobacco Use    Smoking status: Never    Smokeless tobacco: Never   Vaping Use    Vaping Use: Never used   Substance and Sexual Activity    Alcohol use: No     Alcohol/week: 0.0 standard drinks    Drug use: No    Sexual activity: Not Currently   Social History Narrative    Medical History: HTNTMJ dysfunctionHAsCecal lipoma negative head CT August 2013 CT head w ith contrast unremarkable    except progressive splenoid sinusitisAugust 2013 CT chest unremarkable    Gyn History: Last mammogram date 2012. Surgical History: [facility] 1985colonoscopy         Hospitalization/Major Diagnostic Procedure: Denies Past Hospitalization    Family History: Mother: 80 yrs  dementa, HTNFather: unknow nSister(s): alive, DMBrother(s): aliveDaughter(s): aliveSon(s): aliveAunt: alive2    brother(s) , 2 sister(s) . 1 son-2 boys , 1 daughter - 3 girls . 5 grands         Social History: Alcohol Use Patient does not use alcohol. Smoking Status Patient is a never smoker. Marital Status:  x 3 - 2 divorce and 2nd . Lives w ith:    Spouse.- ca prostate no Children. 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 Education/School: has highschool diploma. Sabianism: Jehovah    W itness - no blood transfusions. Family History   Problem Relation Age of Onset    No Known Problems Father        OBJECTIVE:    Visit Vitals  BP (!) 140/80 (BP 1 Location: Left upper arm, BP Patient Position: Sitting)   Pulse 66   Temp 98.2 °F (36.8 °C) (Oral)   Resp 20   Ht 4' 11\" (1.499 m)   Wt 153 lb 4.8 oz (69.5 kg)   SpO2 97%   BMI 30.96 kg/m²     CONSTITUTIONAL: well , well nourished, appears age appropriate  EYES: perrla, eom intact  ENMT:moist mucous membranes, pharynx clear  NECK: supple.  Thyroid normal  RESPIRATORY: Chest: clear bilaterally   CARDIOVASCULAR: Heart: regular rate and rhythm  GASTROINTESTINAL: Abdomen: soft, bowel sounds active  HEMATOLOGIC: no pathological lymph nodes palpated  MUSCULOSKELETAL: Extremities: no edema, pulse 1+   INTEGUMENT: No unusual rashes or suspicious skin lesions noted. Nails appear normal.  NEUROLOGIC: non-focal exam   MENTAL STATUS: alert and oriented, appropriate affect           ASSESSMENT:  1. Medicare annual wellness visit, subsequent    2. Type 2 diabetes mellitus without complication, without long-term current use of insulin ([facility]    3. Recurrent depression ([facility]    4. Thrombocytopenia ([facility]    5. NAFL (nonalcoholic fatty liver)    6. Class 1 obesity due to excess calories with serious comorbidity and body mass index (BMI) of 32.0 to 32.9 in adult    7. Hypercholesterolemia    8. Primary hypertension    9. Thumb pain, left    10. Rash    11. Screening for alcoholism      _______________ (clipped audio) eruption on the lateral aspect of the thigh proximally. I am not sure of its etiology. It does not appear to be a herpetic lesion. There is a history of NAFLD, for which we follow LFTs periodically. Type 2 diabetes will be checked with hemoglobin A1c. She is currently on Januvia. Apparently [facility]. She has a history of recurrent depression and is on Lexapro. History of thrombocytopenia, for which we will check a platelet count today. Obesity remains a concern, but she has lost 9 pounds since we last saw her and we congratulate her. Cholesterol was at goal on the last check. Will continue to monitor it. Blood pressure repeat is at goal.  She is not taking her blood pressure medicines regularly, specifically she did not take it this morning. We encourage compliance. She will be back to see me in three to four months, sooner if she needs to. I have discussed the diagnosis with the patient and the intended plan as seen in the  Orders. The patient understands and agees with the plan.   The patient has   received an after visit summary and questions were answered concerning  future plans  Patient labs and/or xrays were reviewed  Past records were reviewed. PLAN:  .  Orders Placed This Encounter    XR THUMB LT MIN 2 V    RENAL FUNCTION PANEL    HEMOGLOBIN A1C WITH EAG    CBC WITH AUTOMATED DIFF    REFERRAL TO DERMATOLOGY    rosuvastatin (CRESTOR) 5 mg tablet       Follow-up and Dispositions    Return in about 4 months (around 6/13/2023). ATTENTION:   This medical record was transcribed using an electronic medical records system. Although proofread, it may and can contain electronic and spelling errors. Other human spelling and other errors may be present. Corrections may be executed at a later time. Please feel free to contact us for any clarifications as needed.

## 2023-02-13 NOTE — PATIENT INSTRUCTIONS
Medicare Wellness Visit, Female     The best way to live healthy is to have a lifestyle where you eat a well-balanced diet, exercise regularly, limit alcohol use, and quit all forms of tobacco/nicotine, if applicable. Regular preventive services are another way to keep healthy. Preventive services (vaccines, screening tests, monitoring & exams) can help personalize your care plan, which helps you manage your own care. Screening tests can find health problems at the earliest stages, when they are easiest to treat. [facility] follows the current, evidence-based guidelines published by the [facility] ([facility]) when recommending preventive services for our patients. Because we follow these guidelines, sometimes recommendations change over time as research supports it. (For example, mammograms used to be recommended annually. Even though Medicare will still pay for an annual mammogram, the newer guidelines recommend a mammogram every two years for women of average risk). Of course, you and your doctor may decide to screen more often for some diseases, based on your risk and your co-morbidities (chronic disease you are already diagnosed with). Preventive services for you include:  - Medicare offers their members a free annual wellness visit, which is time for you and your primary care provider to discuss and plan for your preventive service needs.  Take advantage of this benefit every year!    -Over the age of 72 should receive the recommended pneumonia vaccines.    -All adults should have a flu vaccine yearly.  -All adults should have a tetanus vaccine every 10 years.   -Over the age 48 should receive the shingles vaccines.        -All adults should be screened once for Hepatitis C.  -All adults age 38-68 who are overweight should have a diabetes screening test once every three years.   -Other screening tests and preventive services for persons with diabetes include: an eye exam to screen for diabetic retinopathy, a kidney function test, a foot exam, and stricter control over your cholesterol.   -Cardiovascular screening for adults with routine risk involves an electrocardiogram (ECG) at intervals determined by your doctor.     -Colorectal cancer screenings should be done for adults age 39-70 with no increased risk factors for colorectal cancer. There are a number of acceptable methods of screening for this type of cancer. Each test has its own benefits and drawbacks. Discuss with your doctor what is most appropriate for you during your annual wellness visit. The different tests include: colonoscopy (considered the best screening method), a fecal occult blood test, a fecal DNA test, and sigmoidoscopy.    -Lung cancer screening is recommended annually with a low dose CT scan for adults between age 54 and 68, who have smoked at least 30 pack years (equivalent of 1 pack per day for 30 days), and who is a current smoker or quit less than 15 years ago.    -A bone mass density test is recommended when a woman turns 65 to screen for osteoporosis. This test is only recommended one time, as a screening. Some providers will use this same test as a disease monitoring tool if you already have osteoporosis. -Breast cancer screenings are recommended every other year for women of normal risk, age 54-69.    -Cervical cancer screenings for women over age 72 are only recommended with certain risk factors.      Here is a list of your current Health Maintenance items (your personalized list of preventive services) with a due date:  Health Maintenance Due   Topic Date Due    Pneumococcal Vaccine (1 - PCV) Never done    Shingles Vaccine (1 of 2) Never done    Eye Exam  10/14/2016    COVID-19 Vaccine (3 - Booster for Pfizer series) 05/31/2021    Yearly Flu Vaccine (1) Never done    Hemoglobin A1C    09/11/2022    Diabetic Foot Care  11/16/2022

## 2023-02-13 NOTE — PROGRESS NOTES
This is the Subsequent Medicare Annual Wellness Exam, performed 12 months or more after the Initial AWV or the last Subsequent AWV    I have reviewed the patient's medical history in detail and updated the computerized patient record. Assessment/Plan   Education and counseling provided:  Are appropriate based on today's review and evaluation    1. Type 2 diabetes mellitus without complication, without long-term current use of insulin ([facility]  Assessment & Plan:   uncontrolled, continue current medications  Orders:  -     RENAL FUNCTION PANEL; Future  -     DE COLLECTION VENOUS BLOOD VENIPUNCTURE  -     HEMOGLOBIN A1C WITH EAG; Future  -     CBC WITH AUTOMATED DIFF; Future  2. Recurrent depression ([facility]  Assessment & Plan:   monitored by specialist. No acute findings meriting change in the plan  3. Thrombocytopenia ([facility]  Assessment & Plan:   asymptomatic, continue current medications  4. NAFL (nonalcoholic fatty liver)  5. Class 1 obesity due to excess calories with serious comorbidity and body mass index (BMI) of 32.0 to 32.9 in adult  6. Hypercholesterolemia  7. Primary hypertension  8. Thumb pain, left  -     XR THUMB LT MIN 2 V; Future  9.  Rash  -     REFERRAL TO DERMATOLOGY       Depression Risk Factor Screening     3 most recent PHQ Screens 2/13/2023   PHQ Not Done -   Little interest or pleasure in doing things Not at all   Feeling down, depressed, irritable, or hopeless Not at all   Total Score PHQ 2 0   Trouble falling or staying asleep, or sleeping too much -   Feeling tired or having little energy -   Poor appetite, weight loss, or overeating -   Feeling bad about yourself - or that you are a failure or have let yourself or your family down -   Trouble concentrating on things such as school, work, reading, or watching TV -   Moving or speaking so slowly that other people could have noticed; or the opposite being so fidgety that others notice -   Thoughts of being better off dead, or hurting yourself in some way -   PHQ 9 Score -   How difficult have these problems made it for you to do your work, take care of your home and get along with others -       Alcohol & Drug Abuse Risk Screen    Do you average more than 1 drink per night or more than 7 drinks a week:  No    On any one occasion in the past three months have you have had more than 3 drinks containing alcohol:  No          Functional Ability and Level of Safety    Hearing: Hearing is good. Activities of Daily Living: The home contains: no safety equipment. Patient does total self care      Ambulation: with no difficulty     Fall Risk:  Fall Risk Assessment, last 12 mths 2/13/2023   Able to walk? Yes   Fall in past 12 months? 1   Do you feel unsteady? 0   Are you worried about falling 0   Is the gait abnormal? 0   Number of falls in past 12 months 1   Fall with injury?  0      Abuse Screen:  Patient is not abused       Cognitive Screening    Has your family/caregiver stated any concerns about your memory: no     Cognitive Screening: Normal - Verbal Fluency Test    Health Maintenance Due     Health Maintenance Due   Topic Date Due    Pneumococcal 65+ years (1 - PCV) Never done    Shingles Vaccine (1 of 2) Never done    Eye Exam Retinal or Dilated  10/14/2016    COVID-19 Vaccine (3 - Booster for Pfizer series) 05/31/2021    Flu Vaccine (1) Never done    A1C test (Diabetic or Prediabetic)  09/11/2022    Foot Exam Q1  11/16/2022    Medicare Yearly Exam  11/17/2022       Patient Care Team   Patient Care Team:  John Paul Mitchell MD as PCP - General (Internal Medicine Physician)  John Paul Mitchell MD as PCP - REHABILITATION [facility] Empaneled Provider    History     Patient Active Problem List   Diagnosis Code    Hypertension I10    Hypercholesterolemia E78.00    Class 1 obesity due to excess calories with serious comorbidity and body mass index (BMI) of 32.0 to 32.9 in adult E66.09, Z68.32    Depression with anxiety F41.8    Diverticulosis K57.90    S/P LD (total abdominal hysterectomy) Z90.710    Fatigue R53.83    Baker's cyst M71.20    Type 2 diabetes mellitus without complication, without long-term current use of insulin (HCC) E11.9    Thrombocytopenia (HCC) D69.6    Hemangioma of liver D18.03    NAFL (nonalcoholic fatty liver) I50.9    Recurrent depression (HCC) F33.9    Vulval edema N90.89    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    Panniculitis M79.3    Arm pain M79.603    Tingling in extremities R20.2    Thumb pain, right M79.644    Other headache syndrome G44.89     Past Medical History:   Diagnosis Date    Arm pain 2021    Baker's cyst 2016    r    Bereavement 2015    57yo  sister     Contusion of knee and lower leg, right, subsequent encounter 11/10/2017    Contusion of knee, right     Cough 2017    Depression with anxiety     Diverticulosis 2015    colonoscopy    DM2 (diabetes mellitus, type 2) ([address]     Edema     Encounter related to worker's compensation claim 11/10/2017    fall t work    Fatigue     Hemangioma of liver 2018    mri    Hemangioma of liver 2019    Hypercholesterolemia     Hypertension     Liver disease     hepatitis 30 years ago - Liver mass consistent with hemangioma in the right lobe    Menopause     LMP-unknown    NAFL (nonalcoholic fatty liver)     Obesity     Onychomycosis     Other headache syndrome 2022    Panniculitis 10/08/2019    S/P LD (total abdominal hysterectomy) 1985    Thrombocytopenia ([address] 2014    Thumb pain, right 2022    Tingling in extremities 2022    Vaginal vault prolapse 2019    Steve hull md - robotic abdominal sacrolpopexy,enterocele repain,r ureterolysis,extensive lysis of adhesions,transobturator suburethral sling,retocele repair,cystoscopy with calibration      Past Surgical History:   Procedure Laterality Date    HX GYN      partial hysterectomy     Current Outpatient Medications   Medication Sig Dispense Refill    rosuvastatin (CRESTOR) 5 mg tablet Take 1 Tablet by mouth nightly. 90 Tablet 3    escitalopram oxalate (LEXAPRO) 10 mg tablet TAKE 1 TABLET BY MOUTH EVERY DAY 30 Tablet 2    cloNIDine HCL (CATAPRES) 0.2 mg tablet TAKE 1 TAB BY MOUTH TWO (2) TIMES A DAY. 180 Tablet 3    amLODIPine (NORVASC) 5 mg tablet TAKE 1 TABLET BY MOUTH EVERY DAY 90 Tablet 3    DULoxetine (CYMBALTA) 30 mg capsule Take 1 Capsule by mouth daily. (Patient not taking: Reported on 2/13/2023) 30 Capsule 5    SITagliptin (JANUVIA) 100 mg tablet Take 1 Tablet by mouth daily.  (Patient not taking: Reported on 2/13/2023) 30 Tablet 11    glucose blood VI test strips (CONTOUR NEXT STRIPS) strip Test blood sugar once daily (Patient not taking: Reported on 2/13/2023) 50 strip 11     Allergies   Allergen Reactions    Aspirin Nausea and Vomiting    Lipitor [Atorvastatin] Myalgia    Metformin Cough    Pravastatin Myalgia       Family History   Problem Relation Age of Onset    No Known Problems Father      Social History     Tobacco Use    Smoking status: Never    Smokeless tobacco: Never   Substance Use Topics    Alcohol use: No     Alcohol/week: 0.0 standard drinks         Elsy Yarbrough LPN

## 2023-02-14 LAB
ALBUMIN SERPL-MCNC: 4.7 G/DL (ref 3.7–4.7)
BASOPHILS # BLD AUTO: 0.1 X10E3/UL (ref 0–0.2)
BASOPHILS NFR BLD AUTO: 1 %
BUN SERPL-MCNC: 10 MG/DL (ref 8–27)
BUN/CREAT SERPL: 11 (ref 12–28)
CALCIUM SERPL-MCNC: 9.7 MG/DL (ref 8.7–10.3)
CHLORIDE SERPL-SCNC: 101 MMOL/L (ref 96–106)
CO2 SERPL-SCNC: 23 MMOL/L (ref 20–29)
CREAT SERPL-MCNC: 0.91 MG/DL (ref 0.57–1)
EGFRCR SERPLBLD CKD-EPI 2021: 66 ML/MIN/1.73
EOSINOPHIL # BLD AUTO: 0.2 X10E3/UL (ref 0–0.4)
EOSINOPHIL NFR BLD AUTO: 2 %
ERYTHROCYTE [DISTWIDTH] IN BLOOD BY AUTOMATED COUNT: 13.1 % (ref 11.7–15.4)
EST. AVERAGE GLUCOSE BLD GHB EST-MCNC: 301 MG/DL
GLUCOSE SERPL-MCNC: 231 MG/DL (ref 70–99)
HBA1C MFR BLD: 12.1 % (ref 4.8–5.6)
HCT VFR BLD AUTO: 45 % (ref 34–46.6)
HGB BLD-MCNC: 14.4 G/DL (ref 11.1–15.9)
IMM GRANULOCYTES # BLD AUTO: 0 X10E3/UL (ref 0–0.1)
IMM GRANULOCYTES NFR BLD AUTO: 0 %
LYMPHOCYTES # BLD AUTO: 2.8 X10E3/UL (ref 0.7–3.1)
LYMPHOCYTES NFR BLD AUTO: 36 %
MCH RBC QN AUTO: 28.8 PG (ref 26.6–33)
MCHC RBC AUTO-ENTMCNC: 32 G/DL (ref 31.5–35.7)
MCV RBC AUTO: 90 FL (ref 79–97)
MONOCYTES # BLD AUTO: 0.4 X10E3/UL (ref 0.1–0.9)
MONOCYTES NFR BLD AUTO: 5 %
NEUTROPHILS # BLD AUTO: 4.5 X10E3/UL (ref 1.4–7)
NEUTROPHILS NFR BLD AUTO: 56 %
PHOSPHATE SERPL-MCNC: 3.6 MG/DL (ref 3–4.3)
PLATELET # BLD AUTO: 195 X10E3/UL (ref 150–450)
POTASSIUM SERPL-SCNC: 4.2 MMOL/L (ref 3.5–5.2)
RBC # BLD AUTO: 5 X10E6/UL (ref 3.77–5.28)
SODIUM SERPL-SCNC: 139 MMOL/L (ref 134–144)
WBC # BLD AUTO: 7.9 X10E3/UL (ref 3.4–10.8)

## 2023-02-15 DIAGNOSIS — E11.9 TYPE 2 DIABETES MELLITUS WITHOUT COMPLICATION, WITH LONG-TERM CURRENT USE OF INSULIN (HCC): Primary | ICD-10-CM

## 2023-02-15 DIAGNOSIS — Z79.4 TYPE 2 DIABETES MELLITUS WITHOUT COMPLICATION, WITH LONG-TERM CURRENT USE OF INSULIN (HCC): Primary | ICD-10-CM

## 2023-02-15 RX ORDER — BLOOD-GLUCOSE SENSOR
EACH MISCELLANEOUS
Qty: 4 EACH | Refills: 11 | Status: SHIPPED | OUTPATIENT
Start: 2023-02-15

## 2023-02-15 RX ORDER — BLOOD-GLUCOSE TRANSMITTER
EACH MISCELLANEOUS
Qty: 1 EACH | Refills: 11 | Status: SHIPPED | OUTPATIENT
Start: 2023-02-15

## 2023-02-15 RX ORDER — BLOOD-GLUCOSE,RECEIVER,CONT
EACH MISCELLANEOUS
Qty: 1 EACH | Refills: 11 | Status: SHIPPED | OUTPATIENT
Start: 2023-02-15

## 2023-02-15 RX ORDER — INSULIN ASPART 100 [IU]/ML
8 INJECTION, SUSPENSION SUBCUTANEOUS
Qty: 2 ADJUSTABLE DOSE PRE-FILLED PEN SYRINGE | Refills: 11 | Status: SHIPPED | OUTPATIENT
Start: 2023-02-15

## 2023-02-15 RX ORDER — PEN NEEDLE, DIABETIC 31 GX3/16"
NEEDLE, DISPOSABLE MISCELLANEOUS
Qty: 60 PEN NEEDLE | Refills: 11 | Status: SHIPPED | OUTPATIENT
Start: 2023-02-15

## 2023-04-03 RX ORDER — AMLODIPINE BESYLATE 5 MG/1
TABLET ORAL
Qty: 90 TABLET | Refills: 3 | Status: SHIPPED | OUTPATIENT
Start: 2023-04-03

## 2023-07-21 NOTE — PROGRESS NOTES
Chief Complaint   Patient presents with    Diabetes     Patient is here for a follow up visit. Patient states that she ia very tierd. 1. Have you been to the ER, urgent care clinic since your last visit? Hospitalized since your last visit? No    2. Have you seen or consulted any other health care providers outside of the [address] [place] [address] since your last visit? Include any pap smears or colon screening.  No LCV:5/9/2022  [facility] [place]  NCV: 7-31-23  Overdue appt, labs: Cr,Nak, BP above protocol-- FYI to clinic  RF 90Day      BP Readings from Last 3 Encounters:   05/30/23 (!) 162/87   02/01/21 (!) 143/99   08/15/19 134/83

## 2023-11-02 RX ORDER — CLONIDINE HYDROCHLORIDE 0.2 MG/1
0.2 TABLET ORAL 2 TIMES DAILY
Qty: 180 TABLET | Refills: 3 | Status: SHIPPED | OUTPATIENT
Start: 2023-11-02

## 2023-11-13 ENCOUNTER — OFFICE VISIT (OUTPATIENT)
Facility: CLINIC | Age: 73
End: 2023-11-13
Payer: MEDICARE

## 2023-11-13 VITALS
TEMPERATURE: 97.9 F | HEART RATE: 67 BPM | OXYGEN SATURATION: 99 % | RESPIRATION RATE: 20 BRPM | BODY MASS INDEX: 31.21 KG/M2 | HEIGHT: 59 IN | WEIGHT: 154.8 LBS | SYSTOLIC BLOOD PRESSURE: 134 MMHG | DIASTOLIC BLOOD PRESSURE: 80 MMHG

## 2023-11-13 DIAGNOSIS — E78.00 HYPERCHOLESTEROLEMIA: ICD-10-CM

## 2023-11-13 DIAGNOSIS — I10 PRIMARY HYPERTENSION: ICD-10-CM

## 2023-11-13 DIAGNOSIS — Z78.0 POST-MENOPAUSAL: ICD-10-CM

## 2023-11-13 DIAGNOSIS — D69.6 THROMBOCYTOPENIA (HCC): ICD-10-CM

## 2023-11-13 DIAGNOSIS — Z13.820 SCREENING FOR OSTEOPOROSIS: ICD-10-CM

## 2023-11-13 DIAGNOSIS — D18.03 HEMANGIOMA OF LIVER: ICD-10-CM

## 2023-11-13 DIAGNOSIS — Z12.31 ENCOUNTER FOR SCREENING MAMMOGRAM FOR MALIGNANT NEOPLASM OF BREAST: ICD-10-CM

## 2023-11-13 DIAGNOSIS — K76.0 NAFL (NONALCOHOLIC FATTY LIVER): ICD-10-CM

## 2023-11-13 DIAGNOSIS — Z11.59 ENCOUNTER FOR SCREENING FOR OTHER VIRAL DISEASES: ICD-10-CM

## 2023-11-13 DIAGNOSIS — E11.9 TYPE 2 DIABETES MELLITUS WITHOUT COMPLICATION, WITHOUT LONG-TERM CURRENT USE OF INSULIN (HCC): Primary | ICD-10-CM

## 2023-11-13 PROBLEM — M79.644 THUMB PAIN, RIGHT: Status: RESOLVED | Noted: 2022-09-09 | Resolved: 2023-11-13

## 2023-11-13 PROBLEM — M79.3 PANNICULITIS: Status: RESOLVED | Noted: 2019-10-08 | Resolved: 2023-11-13

## 2023-11-13 PROBLEM — F33.9 RECURRENT DEPRESSION (HCC): Status: RESOLVED | Noted: 2018-09-13 | Resolved: 2023-11-13

## 2023-11-13 PROBLEM — R20.2 TINGLING IN EXTREMITIES: Status: RESOLVED | Noted: 2022-09-09 | Resolved: 2023-11-13

## 2023-11-13 LAB — GLUCOSE, POC: 294 MG/DL

## 2023-11-13 PROCEDURE — 82962 GLUCOSE BLOOD TEST: CPT | Performed by: INTERNAL MEDICINE

## 2023-11-13 PROCEDURE — 3077F SYST BP >= 140 MM HG: CPT | Performed by: INTERNAL MEDICINE

## 2023-11-13 PROCEDURE — 3046F HEMOGLOBIN A1C LEVEL >9.0%: CPT | Performed by: INTERNAL MEDICINE

## 2023-11-13 PROCEDURE — 3080F DIAST BP >= 90 MM HG: CPT | Performed by: INTERNAL MEDICINE

## 2023-11-13 PROCEDURE — 99214 OFFICE O/P EST MOD 30 MIN: CPT | Performed by: INTERNAL MEDICINE

## 2023-11-13 PROCEDURE — 36415 COLL VENOUS BLD VENIPUNCTURE: CPT | Performed by: INTERNAL MEDICINE

## 2023-11-13 PROCEDURE — 1123F ACP DISCUSS/DSCN MKR DOCD: CPT | Performed by: INTERNAL MEDICINE

## 2023-11-13 RX ORDER — LOSARTAN POTASSIUM 100 MG/1
100 TABLET ORAL DAILY
Qty: 90 TABLET | Refills: 3 | Status: SHIPPED | OUTPATIENT
Start: 2023-11-13

## 2023-11-13 RX ORDER — TIRZEPATIDE 2.5 MG/.5ML
2.5 INJECTION, SOLUTION SUBCUTANEOUS WEEKLY
Qty: 4 EACH | Refills: 0 | Status: SHIPPED | OUTPATIENT
Start: 2023-11-13

## 2023-11-13 ASSESSMENT — PATIENT HEALTH QUESTIONNAIRE - PHQ9
SUM OF ALL RESPONSES TO PHQ9 QUESTIONS 1 & 2: 0
SUM OF ALL RESPONSES TO PHQ QUESTIONS 1-9: 0
SUM OF ALL RESPONSES TO PHQ QUESTIONS 1-9: 0
2. FEELING DOWN, DEPRESSED OR HOPELESS: 0
SUM OF ALL RESPONSES TO PHQ QUESTIONS 1-9: 0
1. LITTLE INTEREST OR PLEASURE IN DOING THINGS: 0
SUM OF ALL RESPONSES TO PHQ QUESTIONS 1-9: 0

## 2023-11-14 LAB
APPEARANCE UR: CLEAR
BACTERIA #/AREA URNS HPF: ABNORMAL /[HPF]
BASOPHILS # BLD AUTO: 0.1 X10E3/UL (ref 0–0.2)
BASOPHILS NFR BLD AUTO: 1 %
BILIRUB UR QL STRIP: NEGATIVE
CASTS URNS QL MICRO: ABNORMAL /LPF
COLOR UR: YELLOW
EOSINOPHIL # BLD AUTO: 0.2 X10E3/UL (ref 0–0.4)
EOSINOPHIL NFR BLD AUTO: 4 %
EPI CELLS #/AREA URNS HPF: >10 /HPF (ref 0–10)
ERYTHROCYTE [DISTWIDTH] IN BLOOD BY AUTOMATED COUNT: 13.1 % (ref 11.7–15.4)
GLUCOSE UR QL STRIP: ABNORMAL
HBA1C MFR BLD: 11.9 % (ref 4.8–5.6)
HCT VFR BLD AUTO: 41.6 % (ref 34–46.6)
HGB BLD-MCNC: 13.1 G/DL (ref 11.1–15.9)
HGB UR QL STRIP: NEGATIVE
IMM GRANULOCYTES # BLD AUTO: 0 X10E3/UL (ref 0–0.1)
IMM GRANULOCYTES NFR BLD AUTO: 0 %
KETONES UR QL STRIP: NEGATIVE
LEUKOCYTE ESTERASE UR QL STRIP: ABNORMAL
LYMPHOCYTES # BLD AUTO: 1.9 X10E3/UL (ref 0.7–3.1)
LYMPHOCYTES NFR BLD AUTO: 33 %
MCH RBC QN AUTO: 29.4 PG (ref 26.6–33)
MCHC RBC AUTO-ENTMCNC: 31.5 G/DL (ref 31.5–35.7)
MCV RBC AUTO: 94 FL (ref 79–97)
MICRO URNS: ABNORMAL
MONOCYTES # BLD AUTO: 0.3 X10E3/UL (ref 0.1–0.9)
MONOCYTES NFR BLD AUTO: 5 %
NEUTROPHILS # BLD AUTO: 3.3 X10E3/UL (ref 1.4–7)
NEUTROPHILS NFR BLD AUTO: 57 %
NITRITE UR QL STRIP: NEGATIVE
PH UR STRIP: 5.5 [PH] (ref 5–7.5)
PLATELET # BLD AUTO: 178 X10E3/UL (ref 150–450)
PROT UR QL STRIP: NEGATIVE
RBC # BLD AUTO: 4.45 X10E6/UL (ref 3.77–5.28)
RBC #/AREA URNS HPF: ABNORMAL /HPF (ref 0–2)
SP GR UR STRIP: 1.02 (ref 1–1.03)
UROBILINOGEN UR STRIP-MCNC: 0.2 MG/DL (ref 0.2–1)
WBC # BLD AUTO: 5.8 X10E3/UL (ref 3.4–10.8)
WBC #/AREA URNS HPF: ABNORMAL /HPF (ref 0–5)
YEAST #/AREA URNS HPF: PRESENT /[HPF]

## 2023-11-14 RX ORDER — INSULIN LISPRO 100 [IU]/ML
5 INJECTION, SUSPENSION SUBCUTANEOUS 2 TIMES DAILY WITH MEALS
Qty: 5 ADJUSTABLE DOSE PRE-FILLED PEN SYRINGE | Refills: 3 | Status: SHIPPED | OUTPATIENT
Start: 2023-11-14

## 2023-11-15 LAB
ALBUMIN SERPL-MCNC: 4.1 G/DL (ref 3.8–4.8)
ALBUMIN/CREAT UR: 7 MG/G CREAT (ref 0–29)
ALBUMIN/GLOB SERPL: 1.4 {RATIO} (ref 1.2–2.2)
ALP SERPL-CCNC: 94 IU/L (ref 44–121)
ALT SERPL-CCNC: 12 IU/L (ref 0–32)
AST SERPL-CCNC: 19 IU/L (ref 0–40)
BILIRUB SERPL-MCNC: 0.5 MG/DL (ref 0–1.2)
BUN SERPL-MCNC: 9 MG/DL (ref 8–27)
BUN/CREAT SERPL: 12 (ref 12–28)
CALCIUM SERPL-MCNC: 9 MG/DL (ref 8.7–10.3)
CHLORIDE SERPL-SCNC: 100 MMOL/L (ref 96–106)
CHOLEST SERPL-MCNC: 146 MG/DL (ref 100–199)
CO2 SERPL-SCNC: 27 MMOL/L (ref 20–29)
CREAT SERPL-MCNC: 0.74 MG/DL (ref 0.57–1)
CREAT UR-MCNC: 113.4 MG/DL
EGFRCR SERPLBLD CKD-EPI 2021: 84 ML/MIN/1.73
GLOBULIN SER CALC-MCNC: 3 G/DL (ref 1.5–4.5)
GLUCOSE SERPL-MCNC: 278 MG/DL (ref 70–99)
HBV CORE AB SERPL QL IA: POSITIVE
HBV SURFACE AB SER QL: NON REACTIVE
HBV SURFACE AG SERPL QL IA: NEGATIVE
HDLC SERPL-MCNC: 54 MG/DL
LDLC SERPL CALC-MCNC: 81 MG/DL (ref 0–99)
MICROALBUMIN UR-MCNC: 8.1 UG/ML
POTASSIUM SERPL-SCNC: 4 MMOL/L (ref 3.5–5.2)
PROT SERPL-MCNC: 7.1 G/DL (ref 6–8.5)
SODIUM SERPL-SCNC: 137 MMOL/L (ref 134–144)
TRIGL SERPL-MCNC: 53 MG/DL (ref 0–149)
TSH SERPL DL<=0.005 MIU/L-ACNC: 1.07 UIU/ML (ref 0.45–4.5)
VLDLC SERPL CALC-MCNC: 11 MG/DL (ref 5–40)

## 2023-12-12 ENCOUNTER — HOSPITAL ENCOUNTER (OUTPATIENT)
Facility: HOSPITAL | Age: 73
Discharge: HOME | End: 2023-12-15
Attending: INTERNAL MEDICINE
Payer: MEDICARE

## 2023-12-12 VITALS — BODY MASS INDEX: 31.25 KG/M2 | HEIGHT: 59 IN | WEIGHT: 155 LBS

## 2023-12-12 DIAGNOSIS — Z78.0 POST-MENOPAUSAL: ICD-10-CM

## 2023-12-12 DIAGNOSIS — E11.9 TYPE 2 DIABETES MELLITUS WITHOUT COMPLICATION, WITHOUT LONG-TERM CURRENT USE OF INSULIN (HCC): ICD-10-CM

## 2023-12-12 DIAGNOSIS — Z13.820 SCREENING FOR OSTEOPOROSIS: ICD-10-CM

## 2023-12-12 DIAGNOSIS — Z12.31 ENCOUNTER FOR SCREENING MAMMOGRAM FOR MALIGNANT NEOPLASM OF BREAST: ICD-10-CM

## 2023-12-12 PROCEDURE — 77080 DXA BONE DENSITY AXIAL: CPT

## 2023-12-12 PROCEDURE — 77063 BREAST TOMOSYNTHESIS BI: CPT

## 2024-01-30 RX ORDER — ROSUVASTATIN CALCIUM 5 MG/1
5 TABLET, COATED ORAL NIGHTLY
Qty: 90 TABLET | Refills: 3 | Status: SHIPPED | OUTPATIENT
Start: 2024-01-30

## 2024-02-15 ENCOUNTER — HOSPITAL ENCOUNTER (INPATIENT)
Facility: HOSPITAL | Age: 74
LOS: 4 days | Discharge: HOME HEALTH | DRG: 638 | End: 2024-02-21
Attending: EMERGENCY MEDICINE | Admitting: STUDENT IN AN ORGANIZED HEALTH CARE EDUCATION/TRAINING PROGRAM
Payer: MEDICARE

## 2024-02-15 ENCOUNTER — APPOINTMENT (OUTPATIENT)
Facility: HOSPITAL | Age: 74
DRG: 638 | End: 2024-02-15
Attending: EMERGENCY MEDICINE
Payer: MEDICARE

## 2024-02-15 DIAGNOSIS — E11.65 TYPE 2 DIABETES MELLITUS WITH HYPERGLYCEMIA (HCC): ICD-10-CM

## 2024-02-15 DIAGNOSIS — R78.81 BACTEREMIA: ICD-10-CM

## 2024-02-15 DIAGNOSIS — L03.119 CELLULITIS OF LOWER LEG: ICD-10-CM

## 2024-02-15 DIAGNOSIS — R73.9 HYPERGLYCEMIA: Primary | ICD-10-CM

## 2024-02-15 PROBLEM — L03.90 CELLULITIS: Status: ACTIVE | Noted: 2024-02-15

## 2024-02-15 LAB
ALBUMIN SERPL-MCNC: 3.3 G/DL (ref 3.5–5)
ALBUMIN/GLOB SERPL: 0.9 (ref 1.1–2.2)
ALP SERPL-CCNC: 68 U/L (ref 45–117)
ALT SERPL-CCNC: 16 U/L (ref 12–78)
ANION GAP SERPL CALC-SCNC: 3 MMOL/L (ref 5–15)
AST SERPL-CCNC: 17 U/L (ref 15–37)
BASOPHILS # BLD: 0.1 K/UL (ref 0–0.1)
BASOPHILS NFR BLD: 1 % (ref 0–1)
BILIRUB SERPL-MCNC: 0.5 MG/DL (ref 0.2–1)
BUN SERPL-MCNC: 14 MG/DL (ref 6–20)
BUN/CREAT SERPL: 16 (ref 12–20)
CALCIUM SERPL-MCNC: 9.1 MG/DL (ref 8.5–10.1)
CHLORIDE SERPL-SCNC: 109 MMOL/L (ref 97–108)
CO2 SERPL-SCNC: 28 MMOL/L (ref 21–32)
COMMENT:: NORMAL
CREAT SERPL-MCNC: 0.89 MG/DL (ref 0.55–1.02)
DIFFERENTIAL METHOD BLD: NORMAL
ECHO BSA: 1.78 M2
EOSINOPHIL # BLD: 0.3 K/UL (ref 0–0.4)
EOSINOPHIL NFR BLD: 4 % (ref 0–7)
ERYTHROCYTE [DISTWIDTH] IN BLOOD BY AUTOMATED COUNT: 13.5 % (ref 11.5–14.5)
EST. AVERAGE GLUCOSE BLD GHB EST-MCNC: 189 MG/DL
GLOBULIN SER CALC-MCNC: 3.7 G/DL (ref 2–4)
GLUCOSE BLD STRIP.AUTO-MCNC: 189 MG/DL (ref 65–117)
GLUCOSE SERPL-MCNC: 171 MG/DL (ref 65–100)
HBA1C MFR BLD: 8.2 % (ref 4–5.6)
HCT VFR BLD AUTO: 40 % (ref 35–47)
HGB BLD-MCNC: 12.5 G/DL (ref 11.5–16)
IMM GRANULOCYTES # BLD AUTO: 0 K/UL (ref 0–0.04)
IMM GRANULOCYTES NFR BLD AUTO: 0 % (ref 0–0.5)
LACTATE SERPL-SCNC: 1.3 MMOL/L (ref 0.4–2)
LYMPHOCYTES # BLD: 1.7 K/UL (ref 0.8–3.5)
LYMPHOCYTES NFR BLD: 24 % (ref 12–49)
MCH RBC QN AUTO: 28.7 PG (ref 26–34)
MCHC RBC AUTO-ENTMCNC: 31.3 G/DL (ref 30–36.5)
MCV RBC AUTO: 91.7 FL (ref 80–99)
MONOCYTES # BLD: 0.3 K/UL (ref 0–1)
MONOCYTES NFR BLD: 5 % (ref 5–13)
NEUTS SEG # BLD: 4.6 K/UL (ref 1.8–8)
NEUTS SEG NFR BLD: 66 % (ref 32–75)
NRBC # BLD: 0 K/UL (ref 0–0.01)
NRBC BLD-RTO: 0 PER 100 WBC
PLATELET # BLD AUTO: 248 K/UL (ref 150–400)
PMV BLD AUTO: 10.4 FL (ref 8.9–12.9)
POTASSIUM SERPL-SCNC: 3.7 MMOL/L (ref 3.5–5.1)
PROT SERPL-MCNC: 7 G/DL (ref 6.4–8.2)
RBC # BLD AUTO: 4.36 M/UL (ref 3.8–5.2)
SERVICE CMNT-IMP: ABNORMAL
SODIUM SERPL-SCNC: 140 MMOL/L (ref 136–145)
SPECIMEN HOLD: NORMAL
WBC # BLD AUTO: 7 K/UL (ref 3.6–11)

## 2024-02-15 PROCEDURE — 6360000002 HC RX W HCPCS: Performed by: STUDENT IN AN ORGANIZED HEALTH CARE EDUCATION/TRAINING PROGRAM

## 2024-02-15 PROCEDURE — 85025 COMPLETE CBC W/AUTO DIFF WBC: CPT

## 2024-02-15 PROCEDURE — 96374 THER/PROPH/DIAG INJ IV PUSH: CPT

## 2024-02-15 PROCEDURE — 83036 HEMOGLOBIN GLYCOSYLATED A1C: CPT

## 2024-02-15 PROCEDURE — 6370000000 HC RX 637 (ALT 250 FOR IP): Performed by: STUDENT IN AN ORGANIZED HEALTH CARE EDUCATION/TRAINING PROGRAM

## 2024-02-15 PROCEDURE — 87154 CUL TYP ID BLD PTHGN 6+ TRGT: CPT

## 2024-02-15 PROCEDURE — 80053 COMPREHEN METABOLIC PANEL: CPT

## 2024-02-15 PROCEDURE — 96375 TX/PRO/DX INJ NEW DRUG ADDON: CPT

## 2024-02-15 PROCEDURE — 87040 BLOOD CULTURE FOR BACTERIA: CPT

## 2024-02-15 PROCEDURE — 99285 EMERGENCY DEPT VISIT HI MDM: CPT

## 2024-02-15 PROCEDURE — 93971 EXTREMITY STUDY: CPT

## 2024-02-15 PROCEDURE — 83605 ASSAY OF LACTIC ACID: CPT

## 2024-02-15 PROCEDURE — 82962 GLUCOSE BLOOD TEST: CPT

## 2024-02-15 PROCEDURE — G0378 HOSPITAL OBSERVATION PER HR: HCPCS

## 2024-02-15 PROCEDURE — 96372 THER/PROPH/DIAG INJ SC/IM: CPT

## 2024-02-15 PROCEDURE — 36415 COLL VENOUS BLD VENIPUNCTURE: CPT

## 2024-02-15 PROCEDURE — 2580000003 HC RX 258: Performed by: EMERGENCY MEDICINE

## 2024-02-15 PROCEDURE — 6360000002 HC RX W HCPCS: Performed by: EMERGENCY MEDICINE

## 2024-02-15 RX ORDER — INSULIN LISPRO 100 [IU]/ML
0-4 INJECTION, SOLUTION INTRAVENOUS; SUBCUTANEOUS NIGHTLY
Status: DISCONTINUED | OUTPATIENT
Start: 2024-02-15 | End: 2024-02-16

## 2024-02-15 RX ORDER — SODIUM CHLORIDE 0.9 % (FLUSH) 0.9 %
5-40 SYRINGE (ML) INJECTION PRN
Status: DISCONTINUED | OUTPATIENT
Start: 2024-02-15 | End: 2024-02-21 | Stop reason: HOSPADM

## 2024-02-15 RX ORDER — SODIUM CHLORIDE 0.9 % (FLUSH) 0.9 %
5-40 SYRINGE (ML) INJECTION EVERY 12 HOURS SCHEDULED
Status: DISCONTINUED | OUTPATIENT
Start: 2024-02-15 | End: 2024-02-21 | Stop reason: HOSPADM

## 2024-02-15 RX ORDER — ONDANSETRON 4 MG/1
4 TABLET, ORALLY DISINTEGRATING ORAL EVERY 8 HOURS PRN
Status: DISCONTINUED | OUTPATIENT
Start: 2024-02-15 | End: 2024-02-21 | Stop reason: HOSPADM

## 2024-02-15 RX ORDER — ROSUVASTATIN CALCIUM 10 MG/1
5 TABLET, COATED ORAL NIGHTLY
Status: DISCONTINUED | OUTPATIENT
Start: 2024-02-15 | End: 2024-02-21 | Stop reason: HOSPADM

## 2024-02-15 RX ORDER — AMLODIPINE BESYLATE 5 MG/1
5 TABLET ORAL DAILY
Status: DISCONTINUED | OUTPATIENT
Start: 2024-02-16 | End: 2024-02-21 | Stop reason: HOSPADM

## 2024-02-15 RX ORDER — LOSARTAN POTASSIUM 50 MG/1
100 TABLET ORAL DAILY
Status: DISCONTINUED | OUTPATIENT
Start: 2024-02-15 | End: 2024-02-21 | Stop reason: HOSPADM

## 2024-02-15 RX ORDER — ENOXAPARIN SODIUM 100 MG/ML
40 INJECTION SUBCUTANEOUS DAILY
Status: DISCONTINUED | OUTPATIENT
Start: 2024-02-15 | End: 2024-02-21 | Stop reason: HOSPADM

## 2024-02-15 RX ORDER — ACETAMINOPHEN 650 MG/1
650 SUPPOSITORY RECTAL EVERY 6 HOURS PRN
Status: DISCONTINUED | OUTPATIENT
Start: 2024-02-15 | End: 2024-02-21 | Stop reason: HOSPADM

## 2024-02-15 RX ORDER — INSULIN GLARGINE 100 [IU]/ML
19 INJECTION, SOLUTION SUBCUTANEOUS
Status: DISCONTINUED | OUTPATIENT
Start: 2024-02-15 | End: 2024-02-16

## 2024-02-15 RX ORDER — DEXTROSE MONOHYDRATE 100 MG/ML
INJECTION, SOLUTION INTRAVENOUS CONTINUOUS PRN
Status: DISCONTINUED | OUTPATIENT
Start: 2024-02-15 | End: 2024-02-21 | Stop reason: HOSPADM

## 2024-02-15 RX ORDER — TRAMADOL HYDROCHLORIDE 50 MG/1
50 TABLET ORAL EVERY 6 HOURS PRN
Status: ON HOLD | COMMUNITY
End: 2024-02-20 | Stop reason: HOSPADM

## 2024-02-15 RX ORDER — ACETAMINOPHEN 325 MG/1
650 TABLET ORAL EVERY 6 HOURS PRN
Status: DISCONTINUED | OUTPATIENT
Start: 2024-02-15 | End: 2024-02-21 | Stop reason: HOSPADM

## 2024-02-15 RX ORDER — ONDANSETRON 2 MG/ML
4 INJECTION INTRAMUSCULAR; INTRAVENOUS EVERY 6 HOURS PRN
Status: DISCONTINUED | OUTPATIENT
Start: 2024-02-15 | End: 2024-02-21 | Stop reason: HOSPADM

## 2024-02-15 RX ORDER — CLONIDINE HYDROCHLORIDE 0.2 MG/1
0.2 TABLET ORAL 2 TIMES DAILY
Status: DISCONTINUED | OUTPATIENT
Start: 2024-02-15 | End: 2024-02-21 | Stop reason: HOSPADM

## 2024-02-15 RX ORDER — SODIUM CHLORIDE 9 MG/ML
INJECTION, SOLUTION INTRAVENOUS PRN
Status: DISCONTINUED | OUTPATIENT
Start: 2024-02-15 | End: 2024-02-16 | Stop reason: SDUPTHER

## 2024-02-15 RX ORDER — POLYETHYLENE GLYCOL 3350 17 G/17G
17 POWDER, FOR SOLUTION ORAL DAILY PRN
Status: DISCONTINUED | OUTPATIENT
Start: 2024-02-15 | End: 2024-02-21 | Stop reason: HOSPADM

## 2024-02-15 RX ORDER — INSULIN LISPRO 100 [IU]/ML
0-4 INJECTION, SOLUTION INTRAVENOUS; SUBCUTANEOUS
Status: DISCONTINUED | OUTPATIENT
Start: 2024-02-15 | End: 2024-02-16

## 2024-02-15 RX ADMIN — ENOXAPARIN SODIUM 40 MG: 100 INJECTION SUBCUTANEOUS at 20:51

## 2024-02-15 RX ADMIN — ROSUVASTATIN 5 MG: 10 TABLET, FILM COATED ORAL at 20:51

## 2024-02-15 RX ADMIN — WATER 1000 MG: 1 INJECTION INTRAMUSCULAR; INTRAVENOUS; SUBCUTANEOUS at 15:57

## 2024-02-15 RX ADMIN — CLONIDINE HYDROCHLORIDE 0.2 MG: 0.2 TABLET ORAL at 20:51

## 2024-02-15 RX ADMIN — INSULIN GLARGINE 19 UNITS: 100 INJECTION, SOLUTION SUBCUTANEOUS at 20:52

## 2024-02-15 RX ADMIN — LOSARTAN POTASSIUM 100 MG: 50 TABLET, FILM COATED ORAL at 20:51

## 2024-02-15 ASSESSMENT — PAIN SCALES - GENERAL
PAINLEVEL_OUTOF10: 5
PAINLEVEL_OUTOF10: 0

## 2024-02-15 ASSESSMENT — PAIN - FUNCTIONAL ASSESSMENT
PAIN_FUNCTIONAL_ASSESSMENT: PREVENTS OR INTERFERES SOME ACTIVE ACTIVITIES AND ADLS
PAIN_FUNCTIONAL_ASSESSMENT: 0-10
PAIN_FUNCTIONAL_ASSESSMENT: 0-10

## 2024-02-15 ASSESSMENT — LIFESTYLE VARIABLES
HOW MANY STANDARD DRINKS CONTAINING ALCOHOL DO YOU HAVE ON A TYPICAL DAY: PATIENT DOES NOT DRINK
HOW OFTEN DO YOU HAVE A DRINK CONTAINING ALCOHOL: NEVER

## 2024-02-15 ASSESSMENT — PAIN DESCRIPTION - DESCRIPTORS: DESCRIPTORS: ACHING

## 2024-02-15 ASSESSMENT — PAIN DESCRIPTION - ORIENTATION: ORIENTATION: RIGHT;LEFT

## 2024-02-15 ASSESSMENT — PAIN DESCRIPTION - LOCATION: LOCATION: ANKLE;LEG

## 2024-02-15 NOTE — H&P
History and Physical    Date of Service:  2/15/2024  Primary Care Provider: Afshin Hand MD  Source of information: The patient and Chart review    Chief Complaint: Wound Check      History of Presenting Illness:   Helen Gillette is a 73 y.o. female who presents with LLE cellulitis.    This is a 73-year-old -American female with past medical history of uncontrolled type 2 diabetes, hypercholesterolemia, hypertension, hemangioma of the liver, nonalcoholic fatty liver disease, who comes in for the above.  Apparently patient has had a left lower extremity scrape about 3 months ago which has not healed properly.  She reports that she has been getting wound care and has been seen at care more.  She was also recently started on amoxicillin but she stopped taking it about 4 days ago because she was having itching.  She reports that since about 3 months ago her leg has been having worsening swelling and redness, though the actual scrape itself is healed.  She reports that she has had increased pain as well due to the swelling.  She denies any fever, chills, nausea, vomiting, diaphoresis.    Patient also has a history of type 2 diabetes and is on 70/30 with her diabetes being adjusted due to suboptimal control.  This suboptimal control of diabetes potentially has made the cellulitis worse.  On arrival to the ER her blood sugar was 171.    The ER spoke to the patient's primary care provider who was concerned due to the worsening cellulitis and uncontrolled diabetes, so the hospitalist service was consulted for admission.    The patient denies any headache, blurry vision, sore throat, trouble swallowing, trouble with speech, chest pain, SOB, cough, fever, chills, N/V/D, abd pain, urinary symptoms, constipation, recent travels, sick contacts, focal or generalized neurological symptoms, falls, injuries, rashes, contact with COVID-19 diagnosed patients, hematemesis, melena, hemoptysis, hematuria, rashes,  [facility] of Occupational Health - Occupational Stress Questionnaire     Feeling of Stress : Not at all   Social Connections: Moderately Isolated (12/18/2023)    Social Connection and Isolation Panel [NHANES]     Frequency of Communication with Friends and Family: Never     Frequency of Social Gatherings with Friends and Family: Never     Attends Evangelical Services: More than 4 times per year     Active Member of Clubs or Organizations: No     Attends Club or Organization Meetings: Never     Marital Status:    Intimate Partner Violence: Not At Risk (12/18/2023)    Humiliation, Afraid, Rape, and Kick questionnaire     Fear of Current or Ex-Partner: No     Emotionally Abused: No     Physically Abused: No     Sexually Abused: No   Depression: Not at risk (12/18/2023)    PHQ-2     PHQ-2 Score: 0   Housing Stability: Low Risk  (12/18/2023)    Housing Stability Vital Sign     Unable to Pay for Housing in the Last Year: No     Number of Places Lived in the Last Year: 1     Unstable Housing in the Last Year: No   Interpersonal Safety: Not At Risk (12/18/2023)    Humiliation, Afraid, Rape, and Kick questionnaire     Fear of Current or Ex-Partner: No     Emotionally Abused: No     Physically Abused: No     Sexually Abused: No   Utilities: Not At Risk (12/18/2023)    [facility] Utilities     Threatened with loss of utilities: No        Medications were reconciled to the best of my ability given all available resources at the time of admission. Route is PO if not otherwise noted.     Family and social history were personally reviewed, all pertinent and relevant details are outlined as above.    Objective:   BP (!) 152/91   Pulse 94   Temp 98.1 °F (36.7 °C) (Oral)   Resp 18   Ht 1.499 m (4' 11\")   Wt 75.9 kg (167 lb 5.3 oz)   LMP  (LMP Unknown)   SpO2 98%   BMI 33.80 kg/m²         PHYSICAL EXAM:   General: Alert x oriented x 3, awake, no acute distress,   HEENT: PEERL, EOMI, moist mucus membranes  Neck: Supple,

## 2024-02-15 NOTE — ED PROVIDER NOTES
Coordination: Coordination normal.   Psychiatric:         Behavior: Behavior normal.         DIAGNOSTIC RESULTS     EKG:         RADIOLOGY:           Vascular duplex lower extremity venous left              LABS:  Labs Reviewed   CBC WITH AUTO DIFFERENTIAL   EXTRA TUBES HOLD   COMPREHENSIVE METABOLIC PANEL       All other labs were within normal range or not returned as of this dictation.    EMERGENCY DEPARTMENT COURSE and DIFFERENTIAL DIAGNOSIS/MDM:   Vitals:    Vitals:    02/15/24 1204   BP: (!) 152/91   Pulse: 94   Resp: 18   Temp: 98.1 °F (36.7 °C)   TempSrc: Oral   SpO2: 98%   Weight: 75.9 kg (167 lb 5.3 oz)   Height: 1.499 m (4' 11\")           Medical Decision Making  This is a 73-year-old female with chronic swelling and discomfort in the left lower leg since October of last year.  Her initial injury was a skin scrape it took a prolonged period of time to heal.  The leg is continued with swelling and discomfort.  She has been followed by care more for this and they recently put her on some oral antibiotic which she was not able to tolerate.  She therefore did not finish taking the medication.  Since it was not getting any better she called her primary physician who sent her to the ED here.  There is been no fever or chills, nausea or vomiting or other acute symptomatology.  Her physical exam is unremarkable with the exception of localized swelling of the left lower leg which is somewhat warm to touch.  No open wound is noted.  There is no drainage from the leg.  Will evaluate with labs including CBC and comprehensive metabolic.  A Doppler study of the leg is being done.  There is no area of fluctuance noted on exam in the leg.  Upon return of the studies we will consult her primary care who sent her here.    Amount and/or Complexity of Data Reviewed  Labs: ordered. Decision-making details documented in ED Course.  Radiology: ordered and independent interpretation performed. Decision-making details documented

## 2024-02-15 NOTE — ED TRIAGE NOTES
Patient arrives to ED reports she scraped her leg 3 months ago while at work, reports wound hasn't healed.  Patient reports she was seen at [facility] and given amoxicillin without improvement.   Patient also reports Amoxicillin broke her out in a rash, states she did not finish the prescription.

## 2024-02-16 ENCOUNTER — APPOINTMENT (OUTPATIENT)
Facility: HOSPITAL | Age: 74
DRG: 638 | End: 2024-02-16
Payer: MEDICARE

## 2024-02-16 PROBLEM — R73.9 HYPERGLYCEMIA: Status: ACTIVE | Noted: 2024-02-16

## 2024-02-16 PROBLEM — L03.119 CELLULITIS OF LOWER LEG: Status: ACTIVE | Noted: 2024-02-16

## 2024-02-16 LAB
ACCESSION NUMBER, LLC1M: ABNORMAL
ACINETOBACTER CALCOAC BAUMANNII COMPLEX BY PCR: NOT DETECTED
ALBUMIN SERPL-MCNC: 3.3 G/DL (ref 3.5–5)
ALBUMIN/GLOB SERPL: 0.8 (ref 1.1–2.2)
ALP SERPL-CCNC: 72 U/L (ref 45–117)
ALT SERPL-CCNC: 17 U/L (ref 12–78)
ANION GAP BLD CALC-SCNC: 10 (ref 10–20)
ANION GAP BLD CALC-SCNC: 12 (ref 10–20)
ANION GAP SERPL CALC-SCNC: 4 MMOL/L (ref 5–15)
ANION GAP SERPL CALC-SCNC: 7 MMOL/L (ref 5–15)
AST SERPL-CCNC: 15 U/L (ref 15–37)
B FRAGILIS DNA BLD POS QL NAA+NON-PROBE: NOT DETECTED
BASOPHILS # BLD: 0.1 K/UL (ref 0–0.1)
BASOPHILS # BLD: 0.1 K/UL (ref 0–0.1)
BASOPHILS NFR BLD: 1 % (ref 0–1)
BASOPHILS NFR BLD: 1 % (ref 0–1)
BILIRUB SERPL-MCNC: 0.7 MG/DL (ref 0.2–1)
BIOFIRE TEST COMMENT: ABNORMAL
BUN SERPL-MCNC: 11 MG/DL (ref 6–20)
BUN SERPL-MCNC: 13 MG/DL (ref 6–20)
BUN/CREAT SERPL: 11 (ref 12–20)
BUN/CREAT SERPL: 15 (ref 12–20)
C ALBICANS DNA BLD POS QL NAA+NON-PROBE: NOT DETECTED
C AURIS DNA BLD POS QL NAA+NON-PROBE: NOT DETECTED
C GATTII+NEOFOR DNA BLD POS QL NAA+N-PRB: NOT DETECTED
C GLABRATA DNA BLD POS QL NAA+NON-PROBE: NOT DETECTED
C KRUSEI DNA BLD POS QL NAA+NON-PROBE: NOT DETECTED
C PARAP DNA BLD POS QL NAA+NON-PROBE: NOT DETECTED
C TROPICLS DNA BLD POS QL NAA+NON-PROBE: NOT DETECTED
CA-I BLD-MCNC: 1.2 MMOL/L (ref 1.12–1.32)
CA-I BLD-MCNC: 1.22 MMOL/L (ref 1.12–1.32)
CALCIUM SERPL-MCNC: 9 MG/DL (ref 8.5–10.1)
CALCIUM SERPL-MCNC: 9.1 MG/DL (ref 8.5–10.1)
CHLORIDE BLD-SCNC: 106 MMOL/L (ref 100–108)
CHLORIDE BLD-SCNC: 108 MMOL/L (ref 100–108)
CHLORIDE SERPL-SCNC: 108 MMOL/L (ref 97–108)
CHLORIDE SERPL-SCNC: 111 MMOL/L (ref 97–108)
CK SERPL-CCNC: 146 U/L (ref 26–192)
CO2 BLD-SCNC: 27 MMOL/L (ref 19–24)
CO2 BLD-SCNC: 28 MMOL/L (ref 19–24)
CO2 SERPL-SCNC: 24 MMOL/L (ref 21–32)
CO2 SERPL-SCNC: 25 MMOL/L (ref 21–32)
COMMENT:: NORMAL
CREAT SERPL-MCNC: 0.87 MG/DL (ref 0.55–1.02)
CREAT SERPL-MCNC: 0.98 MG/DL (ref 0.55–1.02)
CREAT UR-MCNC: 0.6 MG/DL (ref 0.6–1.3)
CREAT UR-MCNC: 0.6 MG/DL (ref 0.6–1.3)
DIFFERENTIAL METHOD BLD: ABNORMAL
DIFFERENTIAL METHOD BLD: ABNORMAL
E CLOAC COMP DNA BLD POS NAA+NON-PROBE: NOT DETECTED
E COLI DNA BLD POS QL NAA+NON-PROBE: NOT DETECTED
E FAECALIS DNA BLD POS QL NAA+NON-PROBE: NOT DETECTED
E FAECIUM DNA BLD POS QL NAA+NON-PROBE: NOT DETECTED
ENTEROBACTERALES DNA BLD POS NAA+N-PRB: NOT DETECTED
EOSINOPHIL # BLD: 0.5 K/UL (ref 0–0.4)
EOSINOPHIL # BLD: 0.5 K/UL (ref 0–0.4)
EOSINOPHIL NFR BLD: 7 % (ref 0–7)
EOSINOPHIL NFR BLD: 7 % (ref 0–7)
ERYTHROCYTE [DISTWIDTH] IN BLOOD BY AUTOMATED COUNT: 13.4 % (ref 11.5–14.5)
ERYTHROCYTE [DISTWIDTH] IN BLOOD BY AUTOMATED COUNT: 13.6 % (ref 11.5–14.5)
GLOBULIN SER CALC-MCNC: 4.4 G/DL (ref 2–4)
GLUCOSE BLD STRIP.AUTO-MCNC: 147 MG/DL (ref 65–117)
GLUCOSE BLD STRIP.AUTO-MCNC: 150 MG/DL (ref 65–117)
GLUCOSE BLD STRIP.AUTO-MCNC: 188 MG/DL (ref 65–117)
GLUCOSE BLD STRIP.AUTO-MCNC: 194 MG/DL (ref 65–117)
GLUCOSE BLD STRIP.AUTO-MCNC: 212 MG/DL (ref 74–106)
GLUCOSE BLD STRIP.AUTO-MCNC: 213 MG/DL (ref 74–106)
GLUCOSE SERPL-MCNC: 172 MG/DL (ref 65–100)
GLUCOSE SERPL-MCNC: 199 MG/DL (ref 65–100)
GP B STREP DNA BLD POS QL NAA+NON-PROBE: NOT DETECTED
HAEM INFLU DNA BLD POS QL NAA+NON-PROBE: NOT DETECTED
HCT VFR BLD AUTO: 33.9 % (ref 35–47)
HCT VFR BLD AUTO: 40.3 % (ref 35–47)
HGB BLD-MCNC: 11 G/DL (ref 11.5–16)
HGB BLD-MCNC: 13.5 G/DL (ref 11.5–16)
IMM GRANULOCYTES # BLD AUTO: 0 K/UL (ref 0–0.04)
IMM GRANULOCYTES # BLD AUTO: 0 K/UL (ref 0–0.04)
IMM GRANULOCYTES NFR BLD AUTO: 0 % (ref 0–0.5)
IMM GRANULOCYTES NFR BLD AUTO: 0 % (ref 0–0.5)
K OXYTOCA DNA BLD POS QL NAA+NON-PROBE: NOT DETECTED
KLEBSIELLA SP DNA BLD POS QL NAA+NON-PRB: NOT DETECTED
KLEBSIELLA SP DNA BLD POS QL NAA+NON-PRB: NOT DETECTED
L MONOCYTOG DNA BLD POS QL NAA+NON-PROBE: NOT DETECTED
LACTATE BLD-SCNC: 4.17 MMOL/L (ref 0.4–2)
LACTATE BLD-SCNC: 4.8 MMOL/L (ref 0.4–2)
LACTATE SERPL-SCNC: 1.8 MMOL/L (ref 0.4–2)
LACTATE SERPL-SCNC: 3.6 MMOL/L (ref 0.4–2)
LYMPHOCYTES # BLD: 2.1 K/UL (ref 0.8–3.5)
LYMPHOCYTES # BLD: 2.3 K/UL (ref 0.8–3.5)
LYMPHOCYTES NFR BLD: 34 % (ref 12–49)
LYMPHOCYTES NFR BLD: 34 % (ref 12–49)
MAGNESIUM SERPL-MCNC: 2 MG/DL (ref 1.6–2.4)
MCH RBC QN AUTO: 29.2 PG (ref 26–34)
MCH RBC QN AUTO: 29.9 PG (ref 26–34)
MCHC RBC AUTO-ENTMCNC: 32.4 G/DL (ref 30–36.5)
MCHC RBC AUTO-ENTMCNC: 33.5 G/DL (ref 30–36.5)
MCV RBC AUTO: 89.2 FL (ref 80–99)
MCV RBC AUTO: 89.9 FL (ref 80–99)
MONOCYTES # BLD: 0.3 K/UL (ref 0–1)
MONOCYTES # BLD: 0.4 K/UL (ref 0–1)
MONOCYTES NFR BLD: 5 % (ref 5–13)
MONOCYTES NFR BLD: 5 % (ref 5–13)
N MEN DNA BLD POS QL NAA+NON-PROBE: NOT DETECTED
NEUTS SEG # BLD: 3.4 K/UL (ref 1.8–8)
NEUTS SEG # BLD: 3.5 K/UL (ref 1.8–8)
NEUTS SEG NFR BLD: 53 % (ref 32–75)
NEUTS SEG NFR BLD: 53 % (ref 32–75)
NRBC # BLD: 0 K/UL (ref 0–0.01)
NRBC # BLD: 0 K/UL (ref 0–0.01)
NRBC BLD-RTO: 0 PER 100 WBC
NRBC BLD-RTO: 0 PER 100 WBC
P AERUGINOSA DNA BLD POS NAA+NON-PROBE: NOT DETECTED
PHOSPHATE SERPL-MCNC: 1.8 MG/DL (ref 2.6–4.7)
PLATELET # BLD AUTO: 217 K/UL (ref 150–400)
PLATELET # BLD AUTO: 224 K/UL (ref 150–400)
PMV BLD AUTO: 10.3 FL (ref 8.9–12.9)
PMV BLD AUTO: 10.8 FL (ref 8.9–12.9)
POTASSIUM BLD-SCNC: 4 MMOL/L (ref 3.5–5.5)
POTASSIUM BLD-SCNC: 4.2 MMOL/L (ref 3.5–5.5)
POTASSIUM SERPL-SCNC: 4.1 MMOL/L (ref 3.5–5.1)
POTASSIUM SERPL-SCNC: 4.5 MMOL/L (ref 3.5–5.1)
PROT SERPL-MCNC: 7.7 G/DL (ref 6.4–8.2)
PROTEUS SP DNA BLD POS QL NAA+NON-PROBE: NOT DETECTED
RBC # BLD AUTO: 3.77 M/UL (ref 3.8–5.2)
RBC # BLD AUTO: 4.52 M/UL (ref 3.8–5.2)
RESISTANT GENE TARGETS: ABNORMAL
S AUREUS DNA BLD POS QL NAA+NON-PROBE: NOT DETECTED
S AUREUS+CONS DNA BLD POS NAA+NON-PROBE: DETECTED
S EPIDERMIDIS DNA BLD POS QL NAA+NON-PRB: NOT DETECTED
S LUGDUNENSIS DNA BLD POS QL NAA+NON-PRB: NOT DETECTED
S MALTOPHILIA DNA BLD POS QL NAA+NON-PRB: NOT DETECTED
S MARCESCENS DNA BLD POS NAA+NON-PROBE: NOT DETECTED
S PNEUM DNA BLD POS QL NAA+NON-PROBE: NOT DETECTED
S PYO DNA BLD POS QL NAA+NON-PROBE: NOT DETECTED
SALMONELLA DNA BLD POS QL NAA+NON-PROBE: NOT DETECTED
SERVICE CMNT-IMP: ABNORMAL
SODIUM BLD-SCNC: 145 MMOL/L (ref 136–145)
SODIUM BLD-SCNC: 146 MMOL/L (ref 136–145)
SODIUM SERPL-SCNC: 139 MMOL/L (ref 136–145)
SODIUM SERPL-SCNC: 140 MMOL/L (ref 136–145)
SPECIMEN HOLD: NORMAL
SPECIMEN SITE: ABNORMAL
SPECIMEN SITE: ABNORMAL
STREPTOCOCCUS DNA BLD POS NAA+NON-PROBE: DETECTED
TROPONIN I SERPL HS-MCNC: 5 NG/L (ref 0–51)
WBC # BLD AUTO: 6.4 K/UL (ref 3.6–11)
WBC # BLD AUTO: 6.7 K/UL (ref 3.6–11)

## 2024-02-16 PROCEDURE — 82962 GLUCOSE BLOOD TEST: CPT

## 2024-02-16 PROCEDURE — 96368 THER/DIAG CONCURRENT INF: CPT

## 2024-02-16 PROCEDURE — 6360000002 HC RX W HCPCS: Performed by: INTERNAL MEDICINE

## 2024-02-16 PROCEDURE — 80047 BASIC METABLC PNL IONIZED CA: CPT

## 2024-02-16 PROCEDURE — 87040 BLOOD CULTURE FOR BACTERIA: CPT

## 2024-02-16 PROCEDURE — G0378 HOSPITAL OBSERVATION PER HR: HCPCS

## 2024-02-16 PROCEDURE — 96372 THER/PROPH/DIAG INJ SC/IM: CPT

## 2024-02-16 PROCEDURE — 2580000003 HC RX 258: Performed by: NURSE PRACTITIONER

## 2024-02-16 PROCEDURE — 96375 TX/PRO/DX INJ NEW DRUG ADDON: CPT

## 2024-02-16 PROCEDURE — 96367 TX/PROPH/DG ADDL SEQ IV INF: CPT

## 2024-02-16 PROCEDURE — 83735 ASSAY OF MAGNESIUM: CPT

## 2024-02-16 PROCEDURE — 2580000003 HC RX 258: Performed by: INTERNAL MEDICINE

## 2024-02-16 PROCEDURE — 80053 COMPREHEN METABOLIC PANEL: CPT

## 2024-02-16 PROCEDURE — 6360000002 HC RX W HCPCS: Performed by: STUDENT IN AN ORGANIZED HEALTH CARE EDUCATION/TRAINING PROGRAM

## 2024-02-16 PROCEDURE — 6360000002 HC RX W HCPCS

## 2024-02-16 PROCEDURE — 6370000000 HC RX 637 (ALT 250 FOR IP): Performed by: CLINICAL NURSE SPECIALIST

## 2024-02-16 PROCEDURE — 85025 COMPLETE CBC W/AUTO DIFF WBC: CPT

## 2024-02-16 PROCEDURE — 96366 THER/PROPH/DIAG IV INF ADDON: CPT

## 2024-02-16 PROCEDURE — 96365 THER/PROPH/DIAG IV INF INIT: CPT

## 2024-02-16 PROCEDURE — 99221 1ST HOSP IP/OBS SF/LOW 40: CPT | Performed by: CLINICAL NURSE SPECIALIST

## 2024-02-16 PROCEDURE — 71045 X-RAY EXAM CHEST 1 VIEW: CPT

## 2024-02-16 PROCEDURE — 84100 ASSAY OF PHOSPHORUS: CPT

## 2024-02-16 PROCEDURE — 84484 ASSAY OF TROPONIN QUANT: CPT

## 2024-02-16 PROCEDURE — 36415 COLL VENOUS BLD VENIPUNCTURE: CPT

## 2024-02-16 PROCEDURE — 6370000000 HC RX 637 (ALT 250 FOR IP): Performed by: STUDENT IN AN ORGANIZED HEALTH CARE EDUCATION/TRAINING PROGRAM

## 2024-02-16 PROCEDURE — 6360000002 HC RX W HCPCS: Performed by: NURSE PRACTITIONER

## 2024-02-16 PROCEDURE — 83605 ASSAY OF LACTIC ACID: CPT

## 2024-02-16 PROCEDURE — 82550 ASSAY OF CK (CPK): CPT

## 2024-02-16 PROCEDURE — 2580000003 HC RX 258: Performed by: STUDENT IN AN ORGANIZED HEALTH CARE EDUCATION/TRAINING PROGRAM

## 2024-02-16 PROCEDURE — 99221 1ST HOSP IP/OBS SF/LOW 40: CPT | Performed by: INTERNAL MEDICINE

## 2024-02-16 RX ORDER — SODIUM CHLORIDE 9 MG/ML
INJECTION, SOLUTION INTRAVENOUS PRN
Status: DISCONTINUED | OUTPATIENT
Start: 2024-02-16 | End: 2024-02-21 | Stop reason: HOSPADM

## 2024-02-16 RX ORDER — SODIUM CHLORIDE 0.9 % (FLUSH) 0.9 %
5-40 SYRINGE (ML) INJECTION PRN
Status: DISCONTINUED | OUTPATIENT
Start: 2024-02-16 | End: 2024-02-21 | Stop reason: HOSPADM

## 2024-02-16 RX ORDER — INSULIN LISPRO 100 [IU]/ML
0-8 INJECTION, SOLUTION INTRAVENOUS; SUBCUTANEOUS
Status: DISCONTINUED | OUTPATIENT
Start: 2024-02-16 | End: 2024-02-21 | Stop reason: HOSPADM

## 2024-02-16 RX ORDER — INSULIN GLARGINE 100 [IU]/ML
20 INJECTION, SOLUTION SUBCUTANEOUS NIGHTLY
Status: DISCONTINUED | OUTPATIENT
Start: 2024-02-16 | End: 2024-02-20

## 2024-02-16 RX ORDER — INSULIN LISPRO 100 [IU]/ML
0-4 INJECTION, SOLUTION INTRAVENOUS; SUBCUTANEOUS NIGHTLY
Status: DISCONTINUED | OUTPATIENT
Start: 2024-02-16 | End: 2024-02-21 | Stop reason: HOSPADM

## 2024-02-16 RX ORDER — 0.9 % SODIUM CHLORIDE 0.9 %
30 INTRAVENOUS SOLUTION INTRAVENOUS ONCE
Status: COMPLETED | OUTPATIENT
Start: 2024-02-16 | End: 2024-02-16

## 2024-02-16 RX ORDER — SODIUM CHLORIDE 0.9 % (FLUSH) 0.9 %
5-40 SYRINGE (ML) INJECTION EVERY 12 HOURS SCHEDULED
Status: DISCONTINUED | OUTPATIENT
Start: 2024-02-16 | End: 2024-02-21 | Stop reason: HOSPADM

## 2024-02-16 RX ORDER — INSULIN LISPRO 100 [IU]/ML
4 INJECTION, SOLUTION INTRAVENOUS; SUBCUTANEOUS
Status: DISCONTINUED | OUTPATIENT
Start: 2024-02-16 | End: 2024-02-21 | Stop reason: HOSPADM

## 2024-02-16 RX ORDER — DIPHENHYDRAMINE HYDROCHLORIDE 50 MG/ML
25 INJECTION INTRAMUSCULAR; INTRAVENOUS EVERY 6 HOURS PRN
Status: DISCONTINUED | OUTPATIENT
Start: 2024-02-16 | End: 2024-02-21 | Stop reason: HOSPADM

## 2024-02-16 RX ORDER — DIPHENHYDRAMINE HYDROCHLORIDE 50 MG/ML
INJECTION INTRAMUSCULAR; INTRAVENOUS
Status: COMPLETED
Start: 2024-02-16 | End: 2024-02-16

## 2024-02-16 RX ORDER — 0.9 % SODIUM CHLORIDE 0.9 %
500 INTRAVENOUS SOLUTION INTRAVENOUS ONCE
Status: DISCONTINUED | OUTPATIENT
Start: 2024-02-16 | End: 2024-02-21 | Stop reason: HOSPADM

## 2024-02-16 RX ADMIN — LOSARTAN POTASSIUM 100 MG: 50 TABLET, FILM COATED ORAL at 11:54

## 2024-02-16 RX ADMIN — CLONIDINE HYDROCHLORIDE 0.2 MG: 0.2 TABLET ORAL at 11:54

## 2024-02-16 RX ADMIN — PIPERACILLIN AND TAZOBACTAM 4500 MG: 4; .5 INJECTION, POWDER, LYOPHILIZED, FOR SOLUTION INTRAVENOUS at 17:22

## 2024-02-16 RX ADMIN — VANCOMYCIN HYDROCHLORIDE 2000 MG: 10 INJECTION, POWDER, LYOPHILIZED, FOR SOLUTION INTRAVENOUS at 10:50

## 2024-02-16 RX ADMIN — WATER 2000 MG: 1 INJECTION INTRAMUSCULAR; INTRAVENOUS; SUBCUTANEOUS at 15:06

## 2024-02-16 RX ADMIN — VANCOMYCIN HYDROCHLORIDE 750 MG: 750 INJECTION, POWDER, LYOPHILIZED, FOR SOLUTION INTRAVENOUS at 23:46

## 2024-02-16 RX ADMIN — ROSUVASTATIN 5 MG: 10 TABLET, FILM COATED ORAL at 21:02

## 2024-02-16 RX ADMIN — SODIUM CHLORIDE 2227 ML: 9 INJECTION, SOLUTION INTRAVENOUS at 10:51

## 2024-02-16 RX ADMIN — ENOXAPARIN SODIUM 40 MG: 100 INJECTION SUBCUTANEOUS at 11:55

## 2024-02-16 RX ADMIN — CLONIDINE HYDROCHLORIDE 0.2 MG: 0.2 TABLET ORAL at 21:02

## 2024-02-16 RX ADMIN — PIPERACILLIN AND TAZOBACTAM 3375 MG: 3; .375 INJECTION, POWDER, LYOPHILIZED, FOR SOLUTION INTRAVENOUS at 21:10

## 2024-02-16 RX ADMIN — INSULIN GLARGINE 20 UNITS: 100 INJECTION, SOLUTION SUBCUTANEOUS at 21:13

## 2024-02-16 RX ADMIN — DIPHENHYDRAMINE HYDROCHLORIDE 25 MG: 50 INJECTION INTRAMUSCULAR; INTRAVENOUS at 10:29

## 2024-02-16 RX ADMIN — AMLODIPINE BESYLATE 5 MG: 5 TABLET ORAL at 11:54

## 2024-02-16 NOTE — CONSULTS
Infectious Disease Consult Note    Reason for Consult: left leg wound  Date of Consultation: 2024  Date of Admission: 2/15/2024  Referring Physician: Volodymyr Chou NP      HPI:    The patient was personally evaluated and examined by me at bedside today.  The patient is lying in bed and is minimally interactive as I see her, she denies fevers, sweats, chills or headache.  There is no shortness of breath, cough, sputum, chest pain or chest pressure.  The patient denies nausea, vomiting, diarrhea or constipation.  She has no abdominal pain.  She appears sleepy but does cooperate with glucose fingerstick and with my exam.  The patient appears to be comfortable, relaxed and in no distress.  She appears to be well-nourished with BMI of 33.8      The patient was admitted by way of the emergency room on 02/15/2024 with a chief complaint of leg swelling with mild increase in pain.  She is without fevers or chills and any other complaints.  She is afebrile with temperature of 98.1 without leukocytosis WBC 7.0.  Her PMD is concerned about worsening cellulitis and uncontrolled diabetes.  ED blood cultures were drawn and empiric antibiotic were initiated with ceftriaxone given in the ED and with hospitalist changing antibiotic to cefazolin.  Blood cultures were found to be positive and vancomycin was added to the regimen with Ancef changed to Zosyn.      Past Medical History:  Past Medical History:   Diagnosis Date    Arm pain 2021    Baker's cyst 2016    r    Bereavement 2015    57yo  sister     Contusion of knee and lower leg, right, subsequent encounter 11/10/2017    Contusion of knee, right     Cough 2017    Depression with anxiety     Diverticulosis 2015    colonoscopy    DM2 (diabetes mellitus, type 2) (HCC)     Edema     Encounter related to worker's compensation claim 11/10/2017    fall t work    Fatigue     Hemangioma of liver 2018    mri    Hemangioma of liver  Lymphocytes Absolute 2.3 0.8 - 3.5 K/UL    Monocytes Absolute 0.4 0.0 - 1.0 K/UL    Eosinophils Absolute 0.5 (H) 0.0 - 0.4 K/UL    Basophils Absolute 0.1 0.0 - 0.1 K/UL    Absolute Immature Granulocyte 0.0 0.00 - 0.04 K/UL    Differential Type AUTOMATED     POCT Glucose    Collection Time: 02/16/24  6:18 AM   Result Value Ref Range    POC Glucose 188 (H) 65 - 117 mg/dL    Performed by: JAVID Ron  PCT    POC CHEMISTRY (NA,K,ICA,GLU,CALC HCT/HGB,LACTATE,CREA,CL)    Collection Time: 02/16/24 10:33 AM   Result Value Ref Range    POC Sodium 145 136 - 145 MMOL/L    POC Potassium 4.0 3.5 - 5.5 MMOL/L    POC Ionized Calcium 1.20 1.12 - 1.32 mmol/L    POC Chloride 106 100 - 108 MMOL/L    POC Creatinine 0.6 0.6 - 1.3 MG/DL    Anion Gap, POC 12 10 - 20      POC Glucose 212 (H) 74 - 106 MG/DL    eGFR, POC >60 >60 ml/min/1.73m2    POC TCO2 27 (H) 19 - 24 MMOL/L    POC Lactic Acid 4.80 (HH) 0.40 - 2.00 mmol/L    Source VENOUS BLOOD      Performed by: KAYLEY Long    POC CHEMISTRY (NA,K,ICA,GLU,CALC HCT/HGB,LACTATE,CREA,CL)    Collection Time: 02/16/24 10:41 AM   Result Value Ref Range    POC Sodium 146 (H) 136 - 145 MMOL/L    POC Potassium 4.2 3.5 - 5.5 MMOL/L    POC Ionized Calcium 1.22 1.12 - 1.32 mmol/L    POC Chloride 108 100 - 108 MMOL/L    POC Creatinine 0.6 0.6 - 1.3 MG/DL    Anion Gap, POC 10 10 - 20      POC Glucose 213 (H) 74 - 106 MG/DL    eGFR, POC >60 >60 ml/min/1.73m2    POC TCO2 28 (H) 19 - 24 MMOL/L    POC Lactic Acid 4.17 (HH) 0.40 - 2.00 mmol/L    Source VENOUS BLOOD      Performed by: KAYLEY Long    Comprehensive Metabolic Panel    Collection Time: 02/16/24 10:43 AM   Result Value Ref Range    Sodium 140 136 - 145 mmol/L    Potassium 4.1 3.5 - 5.1 mmol/L    Chloride 108 97 - 108 mmol/L    CO2 25 21 - 32 mmol/L    Anion Gap 7 5 - 15 mmol/L    Glucose 199 (H) 65 - 100 mg/dL    BUN 11 6 - 20 MG/DL    Creatinine 0.98 0.55 - 1.02 MG/DL    Bun/Cre Ratio 11 (L) 12 - 20      Est, Glom Filt Rate >60 >60

## 2024-02-16 NOTE — PROGRESS NOTES
Code sepsis was called on Helen Gillette    Sepsis present due to SIRS at least 2/4 vitals stable, itching and shaking  Sepsis Source  Bacteremia  Lactic Acid Greater > 2, Repeat Lactic Acid ordered within 4 hrs YES  Severe? No  Shock present? no  Sepsis OrderSet Used? yes    Sepsis Re-Assessment Documentation:     Date: 2/16/2024   Time: 4:06 PM    The sepsis reassessment was performed at 1500

## 2024-02-16 NOTE — PROGRESS NOTES
Pharmacist Note - Vancomycin Dosing    Consult provided for this 73 y.o. female for indication of Bloodstream Infection, LLE cellulitis.  Antibiotic regimen(s): Vancomycin + Cefazolin  Patient on vancomycin PTA? NO     Recent Labs     02/15/24  1330 24  0025 24  1033 24  1041   WBC 7.0 6.7  --   --    CREATININE 0.89 0.87 0.6 0.6   BUN 14 13  --   --      Frequency of BMP: none, will order daily x3  Height: 149.9 cm  Weight: 75.9 kg  Est CrCl: 76 ml/min; UO: -- ml/kg/hr  Temp (24hrs), Av.9 °F (36.6 °C), Min:97.5 °F (36.4 °C), Max:98.5 °F (36.9 °C)    Cultures:  2/15 Blood: Probable Staph 1/2, GPC in chains 1/2, pending          BioFire: detected only Staph and Strep genus, mec/A gene NOT tested for    MRSA Swab ordered (if applicable)? N/A    The plan below is expected to result in a target range of AUC/SHERYL 400-600    Therapy will be initiated with a loading dose of 2000 mg IV x 1 to be followed by a maintenance dose of 750 mg IV every 12 hours.  Pharmacy to follow patient daily and order levels / make dose adjustments as appropriate.    *Vancomycin has been dosed used Bayesian kinetics software to target an AUC/SHERYL of 400-600, which provides adequate exposure for an assumed infection due to MRSA with an SHERYL of 1 or less while reducing the risk of nephrotoxicity as seen with traditional trough based dosing goals.

## 2024-02-16 NOTE — CONSULTS
improving insulin sensitivity and action  Procedure for blood glucose monitoring & options for low-cost products  Medications plan at discharge     Billing Code(s)     [x] 92192 IP initial hospital care - 40 minutes     Before making these care recommendations, I personally reviewed the hospitalization record, including notes, laboratory & diagnostic data and current medications, and examined the patient at the bedside (circumstances permitting) before determining care. More than fifty (50) percent of the time was spent in patient counseling and/or care coordination.  Total minutes: 50    ANTIONE OBRIEN - CNS  Diabetes Clinical Nurse Specialist  [facility] Diabetes [facility]  Access via [employer]

## 2024-02-16 NOTE — PROGRESS NOTES
[facility]  Hospitalist Group                                                                                          Hospitalist Progress Note  ANTIONE Seaman CNP  Answering service: [phone] OR [phone] from in house phone        Date of Service:  2024  NAME:  Helen Gillette  :  1950  MRN:  [id number]       Admission Summary:   Helen Gillette is a 73 y.o. female who presents with LLE cellulitis.     This is a 73-year-old -American female with past medical history of uncontrolled type 2 diabetes, hypercholesterolemia, hypertension, hemangioma of the liver, nonalcoholic fatty liver disease, who comes in for the above.  Apparently patient has had a left lower extremity scrape about 3 months ago which has not healed properly.  She reports that she has been getting wound care and has been seen at care more.  She was also recently started on amoxicillin but she stopped taking it about 4 days ago because she was having itching.  She reports that since about 3 months ago her leg has been having worsening swelling and redness, though the actual scrape itself is healed.  She reports that she has had increased pain as well due to the swelling.  She denies any fever, chills, nausea, vomiting, diaphoresis.     Patient also has a history of type 2 diabetes and is on 70/30 with her diabetes being adjusted due to suboptimal control.  This suboptimal control of diabetes potentially has made the cellulitis worse.  On arrival to the ER her blood sugar was 171.     The ER spoke to the patient's primary care provider who was concerned due to the worsening cellulitis and uncontrolled diabetes, so the hospitalist service was consulted for admission.       Interval history / Subjective:     Patient seen and examined on rounds, c/o being cold and having chills.  RR called because the patient was having lots of itching.  VS stable but bedside lactate elevated at 4.8, repeat done  insulin lispro (HUMALOG) injection vial 0-4 Units  0-4 Units SubCUTAneous Nightly    glucose chewable tablet 16 g  4 tablet Oral PRN    dextrose bolus 10% 125 mL  125 mL IntraVENous PRN    Or    dextrose bolus 10% 250 mL  250 mL IntraVENous PRN    glucagon injection 1 mg  1 mg SubCUTAneous PRN    dextrose 10 % infusion   IntraVENous Continuous PRN    insulin glargine (LANTUS) injection vial 19 Units  19 Units SubCUTAneous Dinner     ______________________________________________________________________  EXPECTED LENGTH OF STAY: Unable to retrieve estimated LOS  ACTUAL LENGTH OF STAY:          0                 ANTIONE Seaman - CNP

## 2024-02-16 NOTE — ED NOTES
ED TO INPATIENT SBAR HANDOFF    Patient Name: Helen Gillette   :  1950  73 y.o.   MRN:  [id number]  ED Room #:  ER21/21  Family/Caregiver Present no   Restraints no   Sitter no   Sepsis Risk Score Sepsis Risk Score: 1.28    Situation  Code Status: Full Code     Allergies: Atorvastatin, Metformin, Pravastatin, Amoxicillin, and Aspirin  Weight: Patient Vitals for the past 96 hrs (Last 3 readings):   Weight   02/15/24 1204 75.9 kg (167 lb 5.3 oz)     Arrived from: home  Chief Complaint:   Chief Complaint   Patient presents with    Wound Check     Hospital Problem/Diagnosis:  Principal Problem:    Cellulitis  Resolved Problems:    * No resolved hospital problems. *    Imaging:   Vascular duplex lower extremity venous left   Final Result        Abnormal labs:   Abnormal Labs Reviewed   COMPREHENSIVE METABOLIC PANEL - Abnormal; Notable for the following components:       Result Value    Chloride 109 (*)     Anion Gap 3 (*)     Glucose 171 (*)     Albumin 3.3 (*)     Albumin/Globulin Ratio 0.9 (*)     All other components within normal limits   HEMOGLOBIN A1C - Abnormal; Notable for the following components:    Hemoglobin A1C 8.2 (*)     All other components within normal limits   POCT GLUCOSE - Abnormal; Notable for the following components:    POC Glucose 189 (*)     All other components within normal limits         Abnormal Assessment Findings: wound RLE-mild redness-flaky dry    Background  History:   Past Medical History:   Diagnosis Date    Arm pain 2021    Baker's cyst 2016    r    Bereavement 2015    59yo  sister     Contusion of knee and lower leg, right, subsequent encounter 11/10/2017    Contusion of knee, right     Cough 2017    Depression with anxiety     Diverticulosis 2015    colonoscopy    DM2 (diabetes mellitus, type 2) (HCC)     Edema     Encounter related to worker's compensation claim 11/10/2017    fall t work    Fatigue     Hemangioma of liver 2018    mri

## 2024-02-16 NOTE — PROGRESS NOTES
Rapid Response Note     02/16/24 at 0959    A rapid response was called on this patient for Severe Itching/Possible Reaction.    Response    Response team at the bedside for evaluation.    Phoenix Chou NP responding at the bedside.    Dmitri NATH is the primary nurse during this episode.    Ongoing vital signs monitored throughout critical time.    Situation    0959- The patient was noted to have severe itching/reaction. She is in distress and is writhing in the bed. Vital signs are stable. Patient denies difficulty breathing.     1015- Staff obtaining blood cultures.    1029- Benadryl given.    1044- Lactic Acid level checked on EPOC. Result is 4.8. Test is repeated and result is 4.1. CODE SEPSIS called.    1051- IV fluids started. Vancomycin started.    The patient was left in the care of her primary nursing team.        Sepsis Screening    Are two or more SIRS criteria present? No    Is the patient's history suggestive of a new infection? Yes Skin/soft tissue infection    Communication with provider:       Bedside EPOC Lab Values  Lactic level is 4.1 mmol/L

## 2024-02-16 NOTE — ED NOTES
Bedside and Verbal shift change report given to Jelena (oncoming nurse) by Vanessa (offgoing nurse). Report included the following information Nurse Handoff Report, ED Encounter Summary, ED SBAR, Intake/Output, MAR, and Recent Results.

## 2024-02-17 LAB
ANION GAP SERPL CALC-SCNC: 4 MMOL/L (ref 5–15)
BACTERIA SPEC CULT: ABNORMAL
BACTERIA SPEC CULT: ABNORMAL
BUN SERPL-MCNC: 12 MG/DL (ref 6–20)
BUN/CREAT SERPL: 13 (ref 12–20)
CALCIUM SERPL-MCNC: 8.4 MG/DL (ref 8.5–10.1)
CHLORIDE SERPL-SCNC: 113 MMOL/L (ref 97–108)
CO2 SERPL-SCNC: 23 MMOL/L (ref 21–32)
CREAT SERPL-MCNC: 0.95 MG/DL (ref 0.55–1.02)
GLUCOSE BLD STRIP.AUTO-MCNC: 122 MG/DL (ref 65–117)
GLUCOSE BLD STRIP.AUTO-MCNC: 124 MG/DL (ref 65–117)
GLUCOSE BLD STRIP.AUTO-MCNC: 141 MG/DL (ref 65–117)
GLUCOSE BLD STRIP.AUTO-MCNC: 147 MG/DL (ref 65–117)
GLUCOSE BLD STRIP.AUTO-MCNC: 155 MG/DL (ref 65–117)
GLUCOSE SERPL-MCNC: 147 MG/DL (ref 65–100)
POTASSIUM SERPL-SCNC: 3.9 MMOL/L (ref 3.5–5.1)
SERVICE CMNT-IMP: ABNORMAL
SODIUM SERPL-SCNC: 140 MMOL/L (ref 136–145)

## 2024-02-17 PROCEDURE — 1100000000 HC RM PRIVATE

## 2024-02-17 PROCEDURE — 87040 BLOOD CULTURE FOR BACTERIA: CPT

## 2024-02-17 PROCEDURE — G0378 HOSPITAL OBSERVATION PER HR: HCPCS

## 2024-02-17 PROCEDURE — 80048 BASIC METABOLIC PNL TOTAL CA: CPT

## 2024-02-17 PROCEDURE — 2580000003 HC RX 258: Performed by: STUDENT IN AN ORGANIZED HEALTH CARE EDUCATION/TRAINING PROGRAM

## 2024-02-17 PROCEDURE — 6360000002 HC RX W HCPCS: Performed by: STUDENT IN AN ORGANIZED HEALTH CARE EDUCATION/TRAINING PROGRAM

## 2024-02-17 PROCEDURE — 96366 THER/PROPH/DIAG IV INF ADDON: CPT

## 2024-02-17 PROCEDURE — 36415 COLL VENOUS BLD VENIPUNCTURE: CPT

## 2024-02-17 PROCEDURE — 2580000003 HC RX 258: Performed by: NURSE PRACTITIONER

## 2024-02-17 PROCEDURE — 2580000003 HC RX 258: Performed by: INTERNAL MEDICINE

## 2024-02-17 PROCEDURE — 96372 THER/PROPH/DIAG INJ SC/IM: CPT

## 2024-02-17 PROCEDURE — 82962 GLUCOSE BLOOD TEST: CPT

## 2024-02-17 PROCEDURE — 6370000000 HC RX 637 (ALT 250 FOR IP): Performed by: CLINICAL NURSE SPECIALIST

## 2024-02-17 PROCEDURE — 6360000002 HC RX W HCPCS: Performed by: INTERNAL MEDICINE

## 2024-02-17 PROCEDURE — 6370000000 HC RX 637 (ALT 250 FOR IP): Performed by: STUDENT IN AN ORGANIZED HEALTH CARE EDUCATION/TRAINING PROGRAM

## 2024-02-17 RX ADMIN — LOSARTAN POTASSIUM 100 MG: 50 TABLET, FILM COATED ORAL at 08:27

## 2024-02-17 RX ADMIN — Medication 4 UNITS: at 17:44

## 2024-02-17 RX ADMIN — CLONIDINE HYDROCHLORIDE 0.2 MG: 0.2 TABLET ORAL at 08:27

## 2024-02-17 RX ADMIN — CLONIDINE HYDROCHLORIDE 0.2 MG: 0.2 TABLET ORAL at 21:43

## 2024-02-17 RX ADMIN — SODIUM CHLORIDE, PRESERVATIVE FREE 10 ML: 5 INJECTION INTRAVENOUS at 08:31

## 2024-02-17 RX ADMIN — PIPERACILLIN AND TAZOBACTAM 3375 MG: 3; .375 INJECTION, POWDER, LYOPHILIZED, FOR SOLUTION INTRAVENOUS at 05:57

## 2024-02-17 RX ADMIN — PIPERACILLIN AND TAZOBACTAM 3375 MG: 3; .375 INJECTION, POWDER, LYOPHILIZED, FOR SOLUTION INTRAVENOUS at 14:45

## 2024-02-17 RX ADMIN — PIPERACILLIN AND TAZOBACTAM 3375 MG: 3; .375 INJECTION, POWDER, LYOPHILIZED, FOR SOLUTION INTRAVENOUS at 21:44

## 2024-02-17 RX ADMIN — ROSUVASTATIN 5 MG: 10 TABLET, FILM COATED ORAL at 21:43

## 2024-02-17 RX ADMIN — SODIUM CHLORIDE, PRESERVATIVE FREE 10 ML: 5 INJECTION INTRAVENOUS at 08:32

## 2024-02-17 RX ADMIN — AMLODIPINE BESYLATE 5 MG: 5 TABLET ORAL at 08:27

## 2024-02-17 RX ADMIN — ENOXAPARIN SODIUM 40 MG: 100 INJECTION SUBCUTANEOUS at 08:26

## 2024-02-17 RX ADMIN — SODIUM CHLORIDE, PRESERVATIVE FREE 5 ML: 5 INJECTION INTRAVENOUS at 21:00

## 2024-02-17 RX ADMIN — INSULIN GLARGINE 20 UNITS: 100 INJECTION, SOLUTION SUBCUTANEOUS at 21:51

## 2024-02-17 NOTE — PROGRESS NOTES
Day #2 of Vancomycin - Dosing Update  Indication:  Possible bacteremia, LLE cellulitis  Current regimen:  750 mg IV Q 12 hr  Abx regimen:  Zosyn + Vancomycin  ID Following ?: NO  Concomitant nephrotoxic drugs (requires more frequent monitoring): None  Frequency of BMP?: Daily through     Recent Labs     02/15/24  1330 24  0025 24  1033 24  1041 24  1043 24  0612   WBC 7.0 6.7  --   --  6.4  --    CREATININE 0.89 0.87   < > 0.6 0.98 0.95   BUN 14 13  --   --  11 12    < > = values in this interval not displayed.     Est CrCl: ~45-50 ml/min; UO: -- ml/kg/hr  Temp (24hrs), Av.8 °F (36.6 °C), Min:97.5 °F (36.4 °C), Max:98.4 °F (36.9 °C)    Cultures:   2/15 Blood: CoNS in 1/2 bottles + possible alpha Strep in 2/2 bottles, pending  2/15 BCID2: Staph spp + Strep spp (no resistance genes)   Blood: NGTD    MRSA Swab ordered (if applicable)? N/A    Goal target range AUC/SHERYL 400-600    Recent level history:  Date/Time Dose & Interval Measured Level (mcg/mL) Associated AUC/SHERYL Dose Adjustment                                                 Plan: The dosing model is now predicting a supra-therapeutic AUC/SHERYL of 675.  Will adjust the dose preemptively to 1000 mg IV Q 24 hr for a predicted AUC/SHERYL of 480 and will check a random level with am labs tomorrow.  Pharmacy will continue to monitor patient daily and will make dosage adjustments based upon changing renal function.

## 2024-02-17 NOTE — PROGRESS NOTES
[facility]  Hospitalist Group                                                                                          Hospitalist Progress Note  Yuki Hall PA-C  Answering service: [phone] OR [phone] from in house phone        Date of Service:  2024  NAME:  Helen Gillette  :  1950  MRN:  [id number]       Admission Summary:   Helen Gillette is a 73 y.o. female who presents with LLE cellulitis.     This is a 73-year-old -American female with past medical history of uncontrolled type 2 diabetes, hypercholesterolemia, hypertension, hemangioma of the liver, nonalcoholic fatty liver disease, who comes in for the above.  Apparently patient has had a left lower extremity scrape about 3 months ago which has not healed properly.  She reports that she has been getting wound care and has been seen at care more.  She was also recently started on amoxicillin but she stopped taking it about 4 days ago because she was having itching.  She reports that since about 3 months ago her leg has been having worsening swelling and redness, though the actual scrape itself is healed.  She reports that she has had increased pain as well due to the swelling.  She denies any fever, chills, nausea, vomiting, diaphoresis.     Patient also has a history of type 2 diabetes and is on 70/30 with her diabetes being adjusted due to suboptimal control.  This suboptimal control of diabetes potentially has made the cellulitis worse.  On arrival to the ER her blood sugar was 171.     The ER spoke to the patient's primary care provider who was concerned due to the worsening cellulitis and uncontrolled diabetes, so the hospitalist service was consulted for admission.       Interval history / Subjective:     Patient seen and examined on rounds, c/o being cold and having chills.  RR called because the patient was having lots of itching.  VS stable but bedside lactate elevated at 4.8, repeat done similar.   Cbc, bmp, trop ordered stat, blood cultures repeated already this am due to + blood culture 1/2 bottles, vanc added to abx regimen and ancef changed to zosyn.  Repeat assessment      2/17  Pt seen and examined this am. No complaints at this time. Notes she has been itching since taking her amoxicillin last week but it acutely worsened yesterday.  Itching has much improved since receiving IV benadryl. Pt is upset with her PCP for \"not catching this sooner.\"       Assessment & Plan:      Sepsis   +bacteremia  +blood culture 1/2, 2/15, repeat ordered  -lactate resolved  -benadryl x 1 for itching  -cxr without acute findings   -ID consult    LLE cellulilitis  -improved after single dose of rocephin  -continue iv vanc and ceftriaxone,   -per ID, dc ancef and zosyn   -u/s negative for dvt  -CK wnl   -wound care      Hx of DM2  - SSI  - hypoglycemia protocol  - accuchecks      Hx of HTN  - continue losartan, amlodipine, clonidine       Code status: full code  Prophylaxis: lovenox  Care Plan discussed with: patient, nursing, attending  Anticipated Disposition: tbd  Inpatient  Cardiac monitoring: Remote Telemetry     Principal Problem:    Cellulitis  Active Problems:    Diabetes mellitus (HCC)    Hyperglycemia    Cellulitis of lower leg  Resolved Problems:    * No resolved hospital problems. *         Social Determinants of Health     Tobacco Use: Low Risk  (12/18/2023)    Patient History     Smoking Tobacco Use: Never     Smokeless Tobacco Use: Never     Passive Exposure: Not on file   Alcohol Use: Not At Risk (2/15/2024)    AUDIT-C     Frequency of Alcohol Consumption: Never     Average Number of Drinks: Patient does not drink     Frequency of Binge Drinking: Never   Financial Resource Strain: Low Risk  (12/18/2023)    Overall Financial Resource Strain (CARDIA)     Difficulty of Paying Living Expenses: Not hard at all   Food Insecurity: No Food Insecurity (2/16/2024)    Hunger Vital Sign     Worried About Running Out of

## 2024-02-18 LAB
ANION GAP SERPL CALC-SCNC: 3 MMOL/L (ref 5–15)
BUN SERPL-MCNC: 13 MG/DL (ref 6–20)
BUN/CREAT SERPL: 15 (ref 12–20)
CALCIUM SERPL-MCNC: 8.6 MG/DL (ref 8.5–10.1)
CHLORIDE SERPL-SCNC: 112 MMOL/L (ref 97–108)
CO2 SERPL-SCNC: 26 MMOL/L (ref 21–32)
CREAT SERPL-MCNC: 0.88 MG/DL (ref 0.55–1.02)
GLUCOSE BLD STRIP.AUTO-MCNC: 102 MG/DL (ref 65–117)
GLUCOSE BLD STRIP.AUTO-MCNC: 152 MG/DL (ref 65–117)
GLUCOSE BLD STRIP.AUTO-MCNC: 169 MG/DL (ref 65–117)
GLUCOSE BLD STRIP.AUTO-MCNC: 179 MG/DL (ref 65–117)
GLUCOSE SERPL-MCNC: 160 MG/DL (ref 65–100)
POTASSIUM SERPL-SCNC: 4 MMOL/L (ref 3.5–5.1)
SERVICE CMNT-IMP: ABNORMAL
SERVICE CMNT-IMP: NORMAL
SODIUM SERPL-SCNC: 141 MMOL/L (ref 136–145)
VANCOMYCIN SERPL-MCNC: 12.1 UG/ML

## 2024-02-18 PROCEDURE — 6360000002 HC RX W HCPCS: Performed by: STUDENT IN AN ORGANIZED HEALTH CARE EDUCATION/TRAINING PROGRAM

## 2024-02-18 PROCEDURE — 6370000000 HC RX 637 (ALT 250 FOR IP): Performed by: STUDENT IN AN ORGANIZED HEALTH CARE EDUCATION/TRAINING PROGRAM

## 2024-02-18 PROCEDURE — 36415 COLL VENOUS BLD VENIPUNCTURE: CPT

## 2024-02-18 PROCEDURE — 6360000002 HC RX W HCPCS: Performed by: INTERNAL MEDICINE

## 2024-02-18 PROCEDURE — 6360000002 HC RX W HCPCS: Performed by: FAMILY MEDICINE

## 2024-02-18 PROCEDURE — 6370000000 HC RX 637 (ALT 250 FOR IP): Performed by: CLINICAL NURSE SPECIALIST

## 2024-02-18 PROCEDURE — 2580000003 HC RX 258: Performed by: STUDENT IN AN ORGANIZED HEALTH CARE EDUCATION/TRAINING PROGRAM

## 2024-02-18 PROCEDURE — 2580000003 HC RX 258: Performed by: NURSE PRACTITIONER

## 2024-02-18 PROCEDURE — 1100000000 HC RM PRIVATE

## 2024-02-18 PROCEDURE — 2580000003 HC RX 258: Performed by: FAMILY MEDICINE

## 2024-02-18 PROCEDURE — 82962 GLUCOSE BLOOD TEST: CPT

## 2024-02-18 PROCEDURE — 2580000003 HC RX 258: Performed by: INTERNAL MEDICINE

## 2024-02-18 PROCEDURE — 80048 BASIC METABOLIC PNL TOTAL CA: CPT

## 2024-02-18 PROCEDURE — 80202 ASSAY OF VANCOMYCIN: CPT

## 2024-02-18 RX ADMIN — PIPERACILLIN AND TAZOBACTAM 3375 MG: 3; .375 INJECTION, POWDER, LYOPHILIZED, FOR SOLUTION INTRAVENOUS at 21:42

## 2024-02-18 RX ADMIN — CLONIDINE HYDROCHLORIDE 0.2 MG: 0.2 TABLET ORAL at 21:35

## 2024-02-18 RX ADMIN — VANCOMYCIN HYDROCHLORIDE 1000 MG: 1 INJECTION, POWDER, LYOPHILIZED, FOR SOLUTION INTRAVENOUS at 00:07

## 2024-02-18 RX ADMIN — LOSARTAN POTASSIUM 100 MG: 50 TABLET, FILM COATED ORAL at 08:26

## 2024-02-18 RX ADMIN — ENOXAPARIN SODIUM 40 MG: 100 INJECTION SUBCUTANEOUS at 08:26

## 2024-02-18 RX ADMIN — AMLODIPINE BESYLATE 5 MG: 5 TABLET ORAL at 08:26

## 2024-02-18 RX ADMIN — SODIUM CHLORIDE, PRESERVATIVE FREE 10 ML: 5 INJECTION INTRAVENOUS at 21:45

## 2024-02-18 RX ADMIN — PIPERACILLIN AND TAZOBACTAM 3375 MG: 3; .375 INJECTION, POWDER, LYOPHILIZED, FOR SOLUTION INTRAVENOUS at 06:50

## 2024-02-18 RX ADMIN — VANCOMYCIN HYDROCHLORIDE 750 MG: 750 INJECTION, POWDER, LYOPHILIZED, FOR SOLUTION INTRAVENOUS at 12:30

## 2024-02-18 RX ADMIN — CLONIDINE HYDROCHLORIDE 0.2 MG: 0.2 TABLET ORAL at 08:26

## 2024-02-18 RX ADMIN — Medication 4 UNITS: at 08:26

## 2024-02-18 RX ADMIN — PIPERACILLIN AND TAZOBACTAM 3375 MG: 3; .375 INJECTION, POWDER, LYOPHILIZED, FOR SOLUTION INTRAVENOUS at 14:30

## 2024-02-18 RX ADMIN — SODIUM CHLORIDE, PRESERVATIVE FREE 10 ML: 5 INJECTION INTRAVENOUS at 21:00

## 2024-02-18 RX ADMIN — INSULIN GLARGINE 20 UNITS: 100 INJECTION, SOLUTION SUBCUTANEOUS at 21:41

## 2024-02-18 RX ADMIN — Medication 4 UNITS: at 12:33

## 2024-02-18 RX ADMIN — SODIUM CHLORIDE, PRESERVATIVE FREE 10 ML: 5 INJECTION INTRAVENOUS at 09:00

## 2024-02-18 RX ADMIN — Medication 4 UNITS: at 17:57

## 2024-02-18 ASSESSMENT — PAIN SCALES - GENERAL: PAINLEVEL_OUTOF10: 0

## 2024-02-18 NOTE — PROGRESS NOTES
[facility]  Hospitalist Group                                                                                          Hospitalist Progress Note  Yuki Hall PA-C  Answering service: [phone] OR [phone] from in house phone        Date of Service:  2024  NAME:  Helen Gillette  :  1950  MRN:  [id number]       Admission Summary:   Helen Gillette is a 73 y.o. female who presents with LLE cellulitis.     This is a 73-year-old -American female with past medical history of uncontrolled type 2 diabetes, hypercholesterolemia, hypertension, hemangioma of the liver, nonalcoholic fatty liver disease, who comes in for the above.  Apparently patient has had a left lower extremity scrape about 3 months ago which has not healed properly.  She reports that she has been getting wound care and has been seen at care more.  She was also recently started on amoxicillin but she stopped taking it about 4 days ago because she was having itching.  She reports that since about 3 months ago her leg has been having worsening swelling and redness, though the actual scrape itself is healed.  She reports that she has had increased pain as well due to the swelling.  She denies any fever, chills, nausea, vomiting, diaphoresis.     Patient also has a history of type 2 diabetes and is on 70/30 with her diabetes being adjusted due to suboptimal control.  This suboptimal control of diabetes potentially has made the cellulitis worse.  On arrival to the ER her blood sugar was 171.     The ER spoke to the patient's primary care provider who was concerned due to the worsening cellulitis and uncontrolled diabetes, so the hospitalist service was consulted for admission.       Interval history / Subjective:     Patient seen and examined on rounds, c/o being cold and having chills.  RR called because the patient was having lots of itching.  VS stable but bedside lactate elevated at 4.8, repeat done similar.   Overall Financial Resource Strain (CARDIA)     Difficulty of Paying Living Expenses: Not hard at all   Food Insecurity: No Food Insecurity (2/16/2024)    Hunger Vital Sign     Worried About Running Out of Food in the Last Year: Never true     Ran Out of Food in the Last Year: Never true   Transportation Needs: No Transportation Needs (2/16/2024)    PRAPARE - Transportation     Lack of Transportation (Medical): No     Lack of Transportation (Non-Medical): No   Physical Activity: Inactive (12/18/2023)    Exercise Vital Sign     Days of Exercise per Week: 0 days     Minutes of Exercise per Session: 0 min   Stress: No Stress Concern Present (12/18/2023)    [employer] - Occupational Stress Questionnaire     Feeling of Stress : Not at all   Social Connections: Moderately Isolated (12/18/2023)    Social Connection and Isolation Panel [NHANES]     Frequency of Communication with Friends and Family: Never     Frequency of Social Gatherings with Friends and Family: Never     Attends [employer] Services: More than 4 times per year     Active Member of Clubs or Organizations: No     Attends Club or Organization Meetings: Never     Marital Status:    Intimate Partner Violence: Not At Risk (12/18/2023)    Humiliation, Afraid, Rape, and Kick questionnaire     Fear of Current or Ex-Partner: No     Emotionally Abused: No     Physically Abused: No     Sexually Abused: No   Depression: Not at risk (12/18/2023)    PHQ-2     PHQ-2 Score: 0   Housing Stability: Low Risk  (2/16/2024)    Housing Stability Vital Sign     Unable to Pay for Housing in the Last Year: No     Number of Places Lived in the Last Year: 1     Unstable Housing in the Last Year: No   Interpersonal Safety: Not At Risk (2/16/2024)    Interpersonal Safety ([facility] HRSN)     How often does anyone, including family and friends, physically hurt you?: Never     How often does anyone, including family and friends, scream or curse at you?: Not

## 2024-02-18 NOTE — PROGRESS NOTES
Day #3 of Vancomycin - Level/Dosing Update  Indication:  Possible bacteremia, LLE cellulitis  Current regimen:  1000 mg IV Q 24 hr  Abx regimen:  Zosyn + Vancomycin  ID Following ?: NO  Concomitant nephrotoxic drugs (requires more frequent monitoring): None  Frequency of BMP?: Daily through     Recent Labs     02/15/24  1330 24  0025 24  1033 24  1043 24  0612 24  0656   WBC 7.0 6.7  --  6.4  --   --    CREATININE 0.89 0.87   < > 0.98 0.95 0.88   BUN 14 13  --  11 12 13    < > = values in this interval not displayed.     Est CrCl: ~50-55 ml/min; UO: -- ml/kg/hr  Temp (24hrs), Av.1 °F (36.7 °C), Min:97.9 °F (36.6 °C), Max:98.4 °F (36.9 °C)    Cultures:   2/15 Blood: CoNS in 1/2 bottles + possible alpha Strep in 2/2 bottles, final  2/15 BCID2: Staph spp + Strep spp (no resistance genes)   Blood: NGTD   Blood x 2: NGTD    MRSA Swab ordered (if applicable)? N/A    Goal target range AUC/SHERYL 400-600    Recent level history:  Date/Time Dose & Interval Measured Level (mcg/mL) Associated AUC/SHERYL Dose Adjustment     @ 0656 1000 mg IV Q 24 hr 12.1 (~7 hr post-dose) 314 Change back to 750 mg IV Q12H                                         Plan: The random vancomycin level drawn this morning correlates with an AUC/SHERYL of 314, which is below the goal range.  Plan will be to adjust the dose back to 750 mg IV Q12H for an estimated AUC/SHERYL of 440.  Pharmacy will continue to monitor patient daily and will make dosage adjustments based upon changing renal function.    *Random vancomycin levels are used to calculate AUC/SHERYL, this level should not be interpreted as a trough. Vancomycin has been dosed using Bayesian kinetics software to target an AUC24:SHERYL of 400-600, which provides adequate exposure for as assumed infection due to MRSA with an SHERYL of 1 or less while reducing the risk of nephrotoxicity as seen with traditional trough based dosing goals.

## 2024-02-19 ENCOUNTER — APPOINTMENT (OUTPATIENT)
Facility: HOSPITAL | Age: 74
DRG: 638 | End: 2024-02-19
Payer: MEDICARE

## 2024-02-19 PROBLEM — L03.90 CELLULITIS: Status: RESOLVED | Noted: 2024-02-15 | Resolved: 2024-02-19

## 2024-02-19 LAB
ECHO AO ROOT DIAM: 3.2 CM
ECHO AO ROOT INDEX: 1.87 CM/M2
ECHO AV AREA PEAK VELOCITY: 2 CM2
ECHO AV AREA/BSA PEAK VELOCITY: 1.2 CM2/M2
ECHO AV PEAK GRADIENT: 5 MMHG
ECHO AV PEAK VELOCITY: 1.1 M/S
ECHO AV VELOCITY RATIO: 0.82
ECHO BSA: 1.78 M2
ECHO EST RA PRESSURE: 3 MMHG
ECHO LA DIAMETER INDEX: 2.46 CM/M2
ECHO LA DIAMETER: 4.2 CM
ECHO LA TO AORTIC ROOT RATIO: 1.31
ECHO LA VOL A-L A2C: 70 ML (ref 22–52)
ECHO LA VOL A-L A4C: 74 ML (ref 22–52)
ECHO LA VOL MOD A2C: 69 ML (ref 22–52)
ECHO LA VOL MOD A4C: 73 ML (ref 22–52)
ECHO LA VOLUME AREA LENGTH: 73 ML
ECHO LA VOLUME INDEX A-L A2C: 41 ML/M2 (ref 16–34)
ECHO LA VOLUME INDEX A-L A4C: 43 ML/M2 (ref 16–34)
ECHO LA VOLUME INDEX AREA LENGTH: 43 ML/M2 (ref 16–34)
ECHO LA VOLUME INDEX MOD A2C: 40 ML/M2 (ref 16–34)
ECHO LA VOLUME INDEX MOD A4C: 43 ML/M2 (ref 16–34)
ECHO LV E' LATERAL VELOCITY: 8 CM/S
ECHO LV E' SEPTAL VELOCITY: 7 CM/S
ECHO LV FRACTIONAL SHORTENING: 33 % (ref 28–44)
ECHO LV INTERNAL DIMENSION DIASTOLE INDEX: 2.51 CM/M2
ECHO LV INTERNAL DIMENSION DIASTOLIC: 4.3 CM (ref 3.9–5.3)
ECHO LV INTERNAL DIMENSION SYSTOLIC INDEX: 1.7 CM/M2
ECHO LV INTERNAL DIMENSION SYSTOLIC: 2.9 CM
ECHO LV IVSD: 1.1 CM (ref 0.6–0.9)
ECHO LV MASS 2D: 142.5 G (ref 67–162)
ECHO LV MASS INDEX 2D: 83.3 G/M2 (ref 43–95)
ECHO LV POSTERIOR WALL DIASTOLIC: 0.9 CM (ref 0.6–0.9)
ECHO LV RELATIVE WALL THICKNESS RATIO: 0.42
ECHO LVOT AREA: 2.5 CM2
ECHO LVOT DIAM: 1.8 CM
ECHO LVOT PEAK GRADIENT: 3 MMHG
ECHO LVOT PEAK VELOCITY: 0.9 M/S
ECHO MV A VELOCITY: 0.73 M/S
ECHO MV AREA PHT: 5.3 CM2
ECHO MV E DECELERATION TIME (DT): 142.7 MS
ECHO MV E VELOCITY: 0.82 M/S
ECHO MV E/A RATIO: 1.12
ECHO MV E/E' LATERAL: 10.25
ECHO MV E/E' RATIO (AVERAGED): 10.98
ECHO MV PRESSURE HALF TIME (PHT): 41.4 MS
ECHO PV MAX VELOCITY: 1.1 M/S
ECHO PV PEAK GRADIENT: 7 MMHG
ECHO RIGHT VENTRICULAR SYSTOLIC PRESSURE (RVSP): 25 MMHG
ECHO RV FREE WALL PEAK S': 9 CM/S
ECHO RV INTERNAL DIMENSION: 3 CM
ECHO RV TAPSE: 0.8 CM (ref 1.7–?)
ECHO TV REGURGITANT MAX VELOCITY: 2.36 M/S
ECHO TV REGURGITANT PEAK GRADIENT: 22 MMHG
GLUCOSE BLD STRIP.AUTO-MCNC: 113 MG/DL (ref 65–117)
GLUCOSE BLD STRIP.AUTO-MCNC: 132 MG/DL (ref 65–117)
GLUCOSE BLD STRIP.AUTO-MCNC: 88 MG/DL (ref 65–117)
GLUCOSE BLD STRIP.AUTO-MCNC: 97 MG/DL (ref 65–117)
SERVICE CMNT-IMP: ABNORMAL
SERVICE CMNT-IMP: NORMAL

## 2024-02-19 PROCEDURE — 6360000002 HC RX W HCPCS: Performed by: STUDENT IN AN ORGANIZED HEALTH CARE EDUCATION/TRAINING PROGRAM

## 2024-02-19 PROCEDURE — 2580000003 HC RX 258: Performed by: STUDENT IN AN ORGANIZED HEALTH CARE EDUCATION/TRAINING PROGRAM

## 2024-02-19 PROCEDURE — 2580000003 HC RX 258

## 2024-02-19 PROCEDURE — 2580000003 HC RX 258: Performed by: FAMILY MEDICINE

## 2024-02-19 PROCEDURE — 93306 TTE W/DOPPLER COMPLETE: CPT

## 2024-02-19 PROCEDURE — 82962 GLUCOSE BLOOD TEST: CPT

## 2024-02-19 PROCEDURE — 2580000003 HC RX 258: Performed by: INTERNAL MEDICINE

## 2024-02-19 PROCEDURE — 93306 TTE W/DOPPLER COMPLETE: CPT | Performed by: SPECIALIST

## 2024-02-19 PROCEDURE — 99232 SBSQ HOSP IP/OBS MODERATE 35: CPT | Performed by: INTERNAL MEDICINE

## 2024-02-19 PROCEDURE — 1100000000 HC RM PRIVATE

## 2024-02-19 PROCEDURE — 6370000000 HC RX 637 (ALT 250 FOR IP): Performed by: CLINICAL NURSE SPECIALIST

## 2024-02-19 PROCEDURE — 6360000002 HC RX W HCPCS: Performed by: FAMILY MEDICINE

## 2024-02-19 PROCEDURE — 2580000003 HC RX 258: Performed by: NURSE PRACTITIONER

## 2024-02-19 PROCEDURE — 6360000002 HC RX W HCPCS: Performed by: INTERNAL MEDICINE

## 2024-02-19 PROCEDURE — 6370000000 HC RX 637 (ALT 250 FOR IP): Performed by: STUDENT IN AN ORGANIZED HEALTH CARE EDUCATION/TRAINING PROGRAM

## 2024-02-19 PROCEDURE — 6360000002 HC RX W HCPCS

## 2024-02-19 RX ADMIN — Medication 4 UNITS: at 12:25

## 2024-02-19 RX ADMIN — WATER 1000 MG: 1 INJECTION INTRAMUSCULAR; INTRAVENOUS; SUBCUTANEOUS at 10:22

## 2024-02-19 RX ADMIN — CLONIDINE HYDROCHLORIDE 0.2 MG: 0.2 TABLET ORAL at 10:20

## 2024-02-19 RX ADMIN — SODIUM CHLORIDE, PRESERVATIVE FREE 10 ML: 5 INJECTION INTRAVENOUS at 09:00

## 2024-02-19 RX ADMIN — ENOXAPARIN SODIUM 40 MG: 100 INJECTION SUBCUTANEOUS at 10:20

## 2024-02-19 RX ADMIN — Medication 4 UNITS: at 08:11

## 2024-02-19 RX ADMIN — Medication 4 UNITS: at 19:19

## 2024-02-19 RX ADMIN — ROSUVASTATIN 5 MG: 10 TABLET, FILM COATED ORAL at 22:00

## 2024-02-19 RX ADMIN — SODIUM CHLORIDE, PRESERVATIVE FREE 10 ML: 5 INJECTION INTRAVENOUS at 22:03

## 2024-02-19 RX ADMIN — CLONIDINE HYDROCHLORIDE 0.2 MG: 0.2 TABLET ORAL at 22:00

## 2024-02-19 RX ADMIN — SODIUM CHLORIDE, PRESERVATIVE FREE 10 ML: 5 INJECTION INTRAVENOUS at 10:27

## 2024-02-19 RX ADMIN — VANCOMYCIN HYDROCHLORIDE 750 MG: 750 INJECTION, POWDER, LYOPHILIZED, FOR SOLUTION INTRAVENOUS at 00:54

## 2024-02-19 RX ADMIN — PIPERACILLIN AND TAZOBACTAM 3375 MG: 3; .375 INJECTION, POWDER, LYOPHILIZED, FOR SOLUTION INTRAVENOUS at 05:55

## 2024-02-19 RX ADMIN — VANCOMYCIN HYDROCHLORIDE 750 MG: 750 INJECTION, POWDER, LYOPHILIZED, FOR SOLUTION INTRAVENOUS at 12:26

## 2024-02-19 RX ADMIN — LOSARTAN POTASSIUM 100 MG: 50 TABLET, FILM COATED ORAL at 10:21

## 2024-02-19 RX ADMIN — AMLODIPINE BESYLATE 5 MG: 5 TABLET ORAL at 10:21

## 2024-02-19 NOTE — PROGRESS NOTES
to the worsening cellulitis and uncontrolled diabetes, so the hospitalist service was consulted for admission.    Subjective:     C/o sore arms from needle sticks  11 Point Review of Systems:   Negative except no cp/sob    []            Unable to obtain ROS due to:       []            mental status change []            sedated []            intubated     Social History     Tobacco Use    Smoking status: Never    Smokeless tobacco: Never   Substance Use Topics    Alcohol use: No     Alcohol/week: 0.0 standard drinks of alcohol     Medications reviewed:  Current Facility-Administered Medications   Medication Dose Route Frequency    vancomycin (VANCOCIN) 750 mg in sodium chloride 0.9 % 250 mL IVPB ([id number])  750 mg IntraVENous Q12H    vancomycin (VANCOCIN) intermittent dosing (placeholder)   Other RX Placeholder    diphenhydrAMINE (BENADRYL) injection 25 mg  25 mg IntraVENous Q6H PRN    sodium chloride 0.9 % bolus 500 mL  500 mL IntraVENous Once    sodium chloride flush 0.9 % injection 5-40 mL  5-40 mL IntraVENous 2 times per day    sodium chloride flush 0.9 % injection 5-40 mL  5-40 mL IntraVENous PRN    0.9 % sodium chloride infusion   IntraVENous PRN    insulin glargine (LANTUS) injection vial 20 Units  20 Units SubCUTAneous Nightly    insulin lispro (HUMALOG) injection vial 0-8 Units  0-8 Units SubCUTAneous TID WC    insulin lispro (HUMALOG) injection vial 0-4 Units  0-4 Units SubCUTAneous Nightly    insulin lispro (HUMALOG) injection vial 4 Units  4 Units SubCUTAneous TID WC    piperacillin-tazobactam (ZOSYN) 3,375 mg in sodium chloride 0.9 % 50 mL IVPB (mini-bag)  3,375 mg IntraVENous Q8H    sodium chloride flush 0.9 % injection 5-40 mL  5-40 mL IntraVENous 2 times per day    sodium chloride flush 0.9 % injection 5-40 mL  5-40 mL IntraVENous PRN    enoxaparin (LOVENOX) injection 40 mg  40 mg SubCUTAneous Daily    ondansetron (ZOFRAN-ODT) disintegrating tablet 4 mg  4 mg Oral Q8H PRN    Or    ondansetron (ZOFRAN)  rashes or lesions.  Not Jaundiced  Lymph nodes: Cervical, supraclavicular normal.  Psych:  Good insight.  Not depressed.  Not anxious or agitated.  Neurologic: Normal strength, Alert and oriented X 3.       Lab Data Reviewed:    Recent Labs     02/16/24  1043   WBC 6.4   HGB 13.5   HCT 40.3        Recent Labs     02/16/24  1043 02/17/24  0612 02/18/24  0656    140 141   K 4.1 3.9 4.0    113* 112*   CO2 25 23 26   BUN 11 12 13   MG 2.0  --   --    PHOS 1.8*  --   --    ALT 17  --   --      Lab Results   Component Value Date/Time    GLUCPOC 294 11/13/2023 10:30 AM     No results for input(s): \"PH\", \"PCO2\", \"PO2\", \"HCO3\", \"FIO2\" in the last 72 hours.  No results for input(s): \"INR\" in the last 72 hours.  ___________________________________________________  ___________________________________________________    Attending Physician: Afshin Hand MD

## 2024-02-19 NOTE — PLAN OF CARE
INFECTIOUS DISEASE discharge plan:    Diagnosis: Alpha streptococcus bacteremia    Antibiotic: ceftriaxone 2 g IV Q 24 H through 3/1/24    PICC line insertion for outpatient antibiotic.     Routine PICC/ Neptali/ Portacath Care including PRN catheter flow management    Weekly labs with results fax to # [phone]. Call critical lab values to [phone].   [x] CBC w/diff  [x] BUN/Creatinine  [x] AST, ALT  [] CPK  [] CRP, ESR  [] Vancomycin trough level goal  drawn every Monday and Thursday. Pharm D consult for Vancomycin dosing.     Home Health to pull PICC line after last dose or send to IR for Neptali removal    May send to IR for line evaluation or replacement PRN Phone # [phone]    Conditional order: OK to discharge of TTE negative for vegetation with valves visualized.

## 2024-02-19 NOTE — PROGRESS NOTES
Patient refused BMP blood work this morning. Patient complaints that her arms are very sore and she cannot allow any needle in. Patient educated. Joy DESAI aware.

## 2024-02-19 NOTE — PROGRESS NOTES
Infectious Diseases Follow Up    2024      Assessment & Plan:     73-year-old -American female with past medical history of uncontrolled type 2 diabetes, hypercholesterolemia, hypertension, hemangioma of the liver, nonalcoholic fatty liver disease, who was admitted for LLE cellulitis.    Polymicrobial bacteremia  + blood cx akng strep multiple colonies 2/2 bottles; CoNS 1/2 bottles, no hardware pt, suspect contaminant  Repeat Blood Cx  NGTD  TTE ordered.   S/p Zosyn  Currently on ceftriaxone, vancomycin. Vancomycin stopped . Continue ceftriaxone for kang strep through 3/1/24 per Dr. Lou. Conditional Abx discharge order in place.     LLE cellulitis  Per pt edema improved  Erythema, edema, not hot to touch, does not meggan    DM II. A1c 8.2, poorly controlled    Obesity. BMI 33.73          Subjective:     Patient up in recliner, states she feels much better. Reports that LLE was twice the size on admission.     Objective:     Vitals: BP (!) 141/65   Pulse 76   Temp 97.9 °F (36.6 °C) (Oral)   Resp 16   Ht 1.499 m (4' 11\")   Wt 75.8 kg (167 lb)   LMP  (LMP Unknown)   SpO2 97%   BMI 33.73 kg/m²      Tmax:  Temp (24hrs), Av.8 °F (36.6 °C), Min:97.7 °F (36.5 °C), Max:97.9 °F (36.6 °C)      Exam:   Patient is intubated:  no    Exam:    General:  Alert, cooperative, well nourished, NAD   Eyes:  Sclera anicteric. Pupils equally round and reactive to light.   Mouth/Throat: Mucous membranes normal, oral pharynx clear   Neck: Supple   Lungs:   Clear to auscultation bilaterally, good effort   CV:  Regular rate and rhythm,no murmur, click, rub or gallop   Abdomen:   Soft, non-tender. bowel sounds normal. non-distended   Extremities: No cyanosis, LLE edema   Skin: Skin color, texture, turgor normal. LLE erythema   Lymph nodes: Deferred   Musculoskeletal: No swelling or deformity   Lines/Devices:  Intact, no erythema, drainage or tenderness   Psych: Alert and oriented, normal mood affect given the

## 2024-02-20 LAB
GLUCOSE BLD STRIP.AUTO-MCNC: 114 MG/DL (ref 65–117)
GLUCOSE BLD STRIP.AUTO-MCNC: 148 MG/DL (ref 65–117)
GLUCOSE BLD STRIP.AUTO-MCNC: 153 MG/DL (ref 65–117)
GLUCOSE BLD STRIP.AUTO-MCNC: 189 MG/DL (ref 65–117)
SERVICE CMNT-IMP: ABNORMAL
SERVICE CMNT-IMP: NORMAL

## 2024-02-20 PROCEDURE — 2580000003 HC RX 258

## 2024-02-20 PROCEDURE — 2709999900 HC NON-CHARGEABLE SUPPLY

## 2024-02-20 PROCEDURE — 76937 US GUIDE VASCULAR ACCESS: CPT

## 2024-02-20 PROCEDURE — 6370000000 HC RX 637 (ALT 250 FOR IP): Performed by: STUDENT IN AN ORGANIZED HEALTH CARE EDUCATION/TRAINING PROGRAM

## 2024-02-20 PROCEDURE — 82962 GLUCOSE BLOOD TEST: CPT

## 2024-02-20 PROCEDURE — 02HV33Z INSERTION OF INFUSION DEVICE INTO SUPERIOR VENA CAVA, PERCUTANEOUS APPROACH: ICD-10-PCS | Performed by: INTERNAL MEDICINE

## 2024-02-20 PROCEDURE — 6370000000 HC RX 637 (ALT 250 FOR IP): Performed by: CLINICAL NURSE SPECIALIST

## 2024-02-20 PROCEDURE — 1100000000 HC RM PRIVATE

## 2024-02-20 PROCEDURE — C1751 CATH, INF, PER/CENT/MIDLINE: HCPCS

## 2024-02-20 PROCEDURE — 99231 SBSQ HOSP IP/OBS SF/LOW 25: CPT | Performed by: INTERNAL MEDICINE

## 2024-02-20 PROCEDURE — 2580000003 HC RX 258: Performed by: NURSE PRACTITIONER

## 2024-02-20 PROCEDURE — 6360000002 HC RX W HCPCS: Performed by: STUDENT IN AN ORGANIZED HEALTH CARE EDUCATION/TRAINING PROGRAM

## 2024-02-20 PROCEDURE — 6360000002 HC RX W HCPCS

## 2024-02-20 RX ORDER — CEFTRIAXONE 500 MG/1
2000 INJECTION, POWDER, FOR SOLUTION INTRAMUSCULAR; INTRAVENOUS EVERY 24 HOURS
Qty: 36 EACH | Refills: 0 | Status: SHIPPED | OUTPATIENT
Start: 2024-02-21 | End: 2024-03-01

## 2024-02-20 RX ORDER — INSULIN GLARGINE 100 [IU]/ML
10 INJECTION, SOLUTION SUBCUTANEOUS NIGHTLY
Status: DISCONTINUED | OUTPATIENT
Start: 2024-02-20 | End: 2024-02-21 | Stop reason: HOSPADM

## 2024-02-20 RX ADMIN — AMLODIPINE BESYLATE 5 MG: 5 TABLET ORAL at 10:07

## 2024-02-20 RX ADMIN — ENOXAPARIN SODIUM 40 MG: 100 INJECTION SUBCUTANEOUS at 10:07

## 2024-02-20 RX ADMIN — CLONIDINE HYDROCHLORIDE 0.2 MG: 0.2 TABLET ORAL at 21:30

## 2024-02-20 RX ADMIN — CLONIDINE HYDROCHLORIDE 0.2 MG: 0.2 TABLET ORAL at 10:07

## 2024-02-20 RX ADMIN — SODIUM CHLORIDE, PRESERVATIVE FREE 10 ML: 5 INJECTION INTRAVENOUS at 22:33

## 2024-02-20 RX ADMIN — Medication 4 UNITS: at 12:32

## 2024-02-20 RX ADMIN — LOSARTAN POTASSIUM 100 MG: 50 TABLET, FILM COATED ORAL at 10:07

## 2024-02-20 RX ADMIN — Medication 4 UNITS: at 08:19

## 2024-02-20 RX ADMIN — WATER 2000 MG: 1 INJECTION INTRAMUSCULAR; INTRAVENOUS; SUBCUTANEOUS at 10:07

## 2024-02-20 RX ADMIN — ROSUVASTATIN 5 MG: 10 TABLET, FILM COATED ORAL at 21:30

## 2024-02-20 RX ADMIN — SODIUM CHLORIDE, PRESERVATIVE FREE 10 ML: 5 INJECTION INTRAVENOUS at 10:08

## 2024-02-20 NOTE — PROGRESS NOTES
Infectious Diseases Follow Up    2024      Assessment & Plan:     73-year-old -American female with past medical history of uncontrolled type 2 diabetes, hypercholesterolemia, hypertension, hemangioma of the liver, nonalcoholic fatty liver disease, who was admitted for LLE cellulitis.    Polymicrobial bacteremia  + blood cx kang strep multiple colonies 2/2 bottles; CoNS 1/2 bottles, no hardware pt, suspect contaminant  Repeat Blood Cx  NGTD  TTE () no vegetation noted   S/p Zosyn  Currently on ceftriaxone, vancomycin. Vancomycin stopped . Continue ceftriaxone for kang strep through 3/1/24 per Dr. Lou. Conditional Abx discharge order in place.     LLE cellulitis  Per pt edema improved  Erythema, edema, not hot to touch, does not meggan  improved    DM II. A1c 8.2, poorly controlled    Obesity. BMI 33.73              Subjective:     Patient up in recliner, states leg looks even better today. Erythema much improved over yesterday.     Objective:     Vitals: BP (!) 147/75   Pulse 73   Temp 97.9 °F (36.6 °C) (Oral)   Resp 12   Ht 1.499 m (4' 11\")   Wt 75.8 kg (167 lb)   LMP  (LMP Unknown)   SpO2 100%   BMI 33.73 kg/m²      Tmax:  Temp (24hrs), Av.9 °F (36.6 °C), Min:97.7 °F (36.5 °C), Max:97.9 °F (36.6 °C)      Exam:   Patient is intubated:  no    Exam:    General:  Alert, cooperative, well nourished, NAD   Eyes:  Sclera anicteric. Pupils equally round and reactive to light.   Mouth/Throat: Mucous membranes normal, oral pharynx clear   Neck: Supple   Lungs:   Clear to auscultation bilaterally, good effort   CV:  Regular rate and rhythm,no murmur, click, rub or gallop   Abdomen:   Soft, non-tender. bowel sounds normal. non-distended   Extremities: No cyanosis, LLE edema   Skin: Skin color, texture, turgor normal. LLE erythema   Lymph nodes: Deferred   Musculoskeletal: No swelling or deformity   Lines/Devices:  Intact, no erythema, drainage or tenderness   Psych: Alert and oriented,  normal mood affect given the setting           Labs:        Invalid input(s): \"ITNL\"   No results for input(s): \"CPK\", \"CKMB\" in the last 72 hours.    Invalid input(s): \"TROIQ\"  Recent Labs     02/18/24  0656      K 4.0   *   CO2 26   BUN 13         Antibiotics:    Ceftriaxone  vancomycin      Case discussed with:    Patient, Dr. Dasha Crain, APRN - NP

## 2024-02-20 NOTE — PROGRESS NOTES
Hospital Progress Note  Afshin Hand MD   Answering service: [phone] OR    [employer]      NAME:  Helen Gillette   :   1950   MRN:  [id number]     Date/Time:  2024 6:58 AM    Plan:     Continue vanc and ceftriaxone   Diabetic control   Encourage activity  Home soon/ID  Risk of Deterioration: Low  []           Moderate  [x]           High  []                 Assessment:     Principal Problem:    Cellulitis of lower leg     mproved after single dose of rocephin  -continue iv vanc and ceftriaxone,   -per ID, dc ancef and zosyn   -u/s negative for dvt  -CK wnl   -wound care    Active Problems:    Diabetes mellitus (HCC)     Basal bolus     Diabetic health following      Hypertension    Continue home meds      Hypercholesterolemia     Continue crestor          Admission summary:3-year-old -American female with past medical history of uncontrolled type 2 diabetes, hypercholesterolemia, hypertension, hemangioma of the liver, nonalcoholic fatty liver disease, who comes in for the above.  Apparently patient has had a left lower extremity scrape about 3 months ago which has not healed properly.  She reports that she has been getting wound care and has been seen at care more.  She was also recently started on amoxicillin but she stopped taking it about 4 days ago because she was having itching.  She reports that since about 3 months ago her leg has been having worsening swelling and redness, though the actual scrape itself is healed.  She reports that she has had increased pain as well due to the swelling.  She denies any fever, chills, nausea, vomiting, diaphoresis.     Patient also has a history of type 2 diabetes and is on 70/30 with her diabetes being adjusted due to suboptimal control.  This suboptimal control of diabetes potentially has made the cellulitis worse.  On arrival to the ER her blood sugar was 171.     The ER spoke to the patient's primary care provider who was concerned due

## 2024-02-20 NOTE — CARE COORDINATION
RUR:   6%  Prior Level of Functioning:  Indpt. With ADLs  Disposition:  [facility] with skilled nursing; IV abx infusion with [facility]   Transportation at discharge:   Amira, Friend, will provide transportation home   IM/IMM Medicare/ letter given:   Received at 2/20/24  Caregiver Contact:   Abhilash Casillas / Son / [phone]  Dx -  Cellulitis, Hyperglycemia, Cellulitis of lower leg   Barriers to discharge: Pending PICC placement; pending teaching at 10am 2/20/24  [facility] [address], [place]       CM Note -  CM met patient at the bedside and verified demographics and insurance information. CM explained role and reason for visit; patient verbalized understanding. Patient lives in a 1story home with 4 steps she has no issues walking up the stairs. She lives alone and has support from her neighbors and her kids out of state. She uses no DME, she is INDPT with ADL, she has no hx of HH or transfer to facility. Her friend Amira will transport her home. CM inquired if there are any other needs and questions at this time; patient verbalized she had none at this time.    02/20/24 1610   Service Assessment   Patient Orientation Alert and Oriented   Cognition Alert   History Provided By Patient   Primary Caregiver Self   Support Systems Friends/Neighbors   PCP Verified by CM Yes   Last Visit to PCP Within last 3 months   Prior Functional Level Independent in ADLs/IADLs   Current Functional Level Independent in ADLs/IADLs   Can patient return to prior living arrangement Yes   Family able to assist with home care needs: Yes  (Neighbor and friends)   Financial Resources Medicare   Social/Functional History   Lives With Alone   Type of Home House   Home Layout One level   Receives Help From Friend(s);Neighbor   ADL Assistance Independent   Homemaking Assistance Independent   Ambulation Assistance Independent   Transfer Assistance Independent   Mode of Transportation Car   Discharge

## 2024-02-20 NOTE — PROGRESS NOTES
Dr. Hand notified and aware that patient's blood sugar is 118 this evening. RN inquired if Lantus 20 units should be give as ordered for tonight's dose. Dr. Hand said to hold Lantus 20 units for tonight.

## 2024-02-20 NOTE — CARE COORDINATION
RUR:   6%  Prior Level of Functioning:  indpt. With ADLS  Disposition:  home with HH and home infusion abx  HH; Accepted   [facility]   home infusion; accepted    [facility]- benefit check   teaching at 10am 2/21/24  PICC pending  [facility]       Transportation at discharge:   Friend will transport home   IM/IMM Medicare/ letter given:   Received 2/20/24  Caregiver Contact:   Abhilash Casillas - [phone]  Dx - Cellulitis, Hyperglycemia, Cellulitis of lower leg   [facility] [address] [facility] [address]   Barriers to discharge: PICC line pending, pending teaching     CM met patient at the bedside to verify demographics and information. Patient currently lives at home in [place] and goes up 4 steps with no issues. She currently lives alone, but has her friends and neighbor as support. Her kids live out of state. She does not own any DME, ADL indept., no hx of HH or facility. She has no preference in HH company at this time. Choice of HH was provided. CM spoke to patient regarding IV at home infusion plan at this time. Patient stated that she is able to perform iv infusion with teaching, and has her neighbor's support as well. Patient's friend, Amira, will provide transportation back to home.    02/20/24 1400   Service Assessment   Patient Orientation Alert and Oriented   Cognition Alert   History Provided By Patient   Primary Caregiver Self   Support Systems Friends/Neighbors   PCP Verified by CM Yes   Last Visit to PCP Within last 3 months   Prior Functional Level Independent in ADLs/IADLs   Current Functional Level Independent in ADLs/IADLs   Can patient return to prior living arrangement Yes   Financial Resources Medicare   Social/Functional History   Lives With Alone;Home care staff   Type of Home House   Home Layout One level   Home Access Level entry   Receives Help From Friend(s);Neighbor   ADL Assistance Independent   Homemaking Assistance Independent   Ambulation

## 2024-02-21 VITALS
OXYGEN SATURATION: 100 % | WEIGHT: 167 LBS | DIASTOLIC BLOOD PRESSURE: 75 MMHG | RESPIRATION RATE: 14 BRPM | SYSTOLIC BLOOD PRESSURE: 142 MMHG | TEMPERATURE: 97.7 F | HEART RATE: 89 BPM | BODY MASS INDEX: 33.67 KG/M2 | HEIGHT: 59 IN

## 2024-02-21 LAB
ANION GAP SERPL CALC-SCNC: 2 MMOL/L (ref 5–15)
BACTERIA SPEC CULT: NORMAL
BASOPHILS # BLD: 0.1 K/UL (ref 0–0.1)
BASOPHILS NFR BLD: 1 % (ref 0–1)
BUN SERPL-MCNC: 17 MG/DL (ref 6–20)
BUN/CREAT SERPL: 20 (ref 12–20)
CALCIUM SERPL-MCNC: 8.7 MG/DL (ref 8.5–10.1)
CHLORIDE SERPL-SCNC: 110 MMOL/L (ref 97–108)
CO2 SERPL-SCNC: 26 MMOL/L (ref 21–32)
CREAT SERPL-MCNC: 0.87 MG/DL (ref 0.55–1.02)
DIFFERENTIAL METHOD BLD: ABNORMAL
EOSINOPHIL # BLD: 0.7 K/UL (ref 0–0.4)
EOSINOPHIL NFR BLD: 8 % (ref 0–7)
ERYTHROCYTE [DISTWIDTH] IN BLOOD BY AUTOMATED COUNT: 13.6 % (ref 11.5–14.5)
GLUCOSE BLD STRIP.AUTO-MCNC: 183 MG/DL (ref 65–117)
GLUCOSE BLD STRIP.AUTO-MCNC: 220 MG/DL (ref 65–117)
GLUCOSE SERPL-MCNC: 188 MG/DL (ref 65–100)
HCT VFR BLD AUTO: 37.5 % (ref 35–47)
HGB BLD-MCNC: 12.2 G/DL (ref 11.5–16)
IMM GRANULOCYTES # BLD AUTO: 0 K/UL (ref 0–0.04)
IMM GRANULOCYTES NFR BLD AUTO: 0 % (ref 0–0.5)
LYMPHOCYTES # BLD: 1.9 K/UL (ref 0.8–3.5)
LYMPHOCYTES NFR BLD: 23 % (ref 12–49)
MCH RBC QN AUTO: 29.3 PG (ref 26–34)
MCHC RBC AUTO-ENTMCNC: 32.5 G/DL (ref 30–36.5)
MCV RBC AUTO: 89.9 FL (ref 80–99)
MONOCYTES # BLD: 0.4 K/UL (ref 0–1)
MONOCYTES NFR BLD: 5 % (ref 5–13)
NEUTS SEG # BLD: 5.3 K/UL (ref 1.8–8)
NEUTS SEG NFR BLD: 63 % (ref 32–75)
NRBC # BLD: 0 K/UL (ref 0–0.01)
NRBC BLD-RTO: 0 PER 100 WBC
PLATELET # BLD AUTO: 219 K/UL (ref 150–400)
PMV BLD AUTO: 10.9 FL (ref 8.9–12.9)
POTASSIUM SERPL-SCNC: 3.8 MMOL/L (ref 3.5–5.1)
RBC # BLD AUTO: 4.17 M/UL (ref 3.8–5.2)
SERVICE CMNT-IMP: ABNORMAL
SERVICE CMNT-IMP: ABNORMAL
SERVICE CMNT-IMP: NORMAL
SODIUM SERPL-SCNC: 138 MMOL/L (ref 136–145)
WBC # BLD AUTO: 8.4 K/UL (ref 3.6–11)

## 2024-02-21 PROCEDURE — 36415 COLL VENOUS BLD VENIPUNCTURE: CPT

## 2024-02-21 PROCEDURE — 2580000003 HC RX 258: Performed by: STUDENT IN AN ORGANIZED HEALTH CARE EDUCATION/TRAINING PROGRAM

## 2024-02-21 PROCEDURE — 6360000002 HC RX W HCPCS

## 2024-02-21 PROCEDURE — 85025 COMPLETE CBC W/AUTO DIFF WBC: CPT

## 2024-02-21 PROCEDURE — 82962 GLUCOSE BLOOD TEST: CPT

## 2024-02-21 PROCEDURE — 80048 BASIC METABOLIC PNL TOTAL CA: CPT

## 2024-02-21 PROCEDURE — 2580000003 HC RX 258: Performed by: NURSE PRACTITIONER

## 2024-02-21 PROCEDURE — 99238 HOSP IP/OBS DSCHRG MGMT 30/<: CPT | Performed by: INTERNAL MEDICINE

## 2024-02-21 PROCEDURE — 2580000003 HC RX 258

## 2024-02-21 RX ADMIN — SODIUM CHLORIDE, PRESERVATIVE FREE 10 ML: 5 INJECTION INTRAVENOUS at 09:15

## 2024-02-21 RX ADMIN — WATER 2000 MG: 1 INJECTION INTRAMUSCULAR; INTRAVENOUS; SUBCUTANEOUS at 09:12

## 2024-02-21 NOTE — PROCEDURES
Order for ultrasound-guided bedside PICC placement with Bard Sherlock 3CG TPSPICC received and verified.  Consent obtained from pt after risks, benefits, procedure explained and questions answered.      PRE-PROCEDURE VERIFICATION  Correct Procedure: yes  Correct Site:  yes  Temperature: Temp: 97.7 °F (36.5 °C), Temperature Source:    Recent Labs     02/18/24  0656   BUN 13     Allergies: Atorvastatin, Metformin, Pravastatin, Amoxicillin, and Aspirin  Education materials, including PICC Booklet, for PICC Care given to patient: yes.   See Patient Education activity for further details.    PROCEDURE DETAIL  PICC placed using modified Seldinger method.  A single lumen PICC line was started for long term antibiotics. The following documentation is in addition to the PICC properties in the lines/airways flowsheet :  Lot #: [id number]  Was xylocaine 1% used intradermally: yes  Catheter to vein ratio: 32%  Arm Circumference 10 cm above AC: 35.5 (cm)  Total Catheter Length: 34 (cm)  External Catheter Length: 0 (cm)  Vein Selection for PICC: Right brachial  Central Line Bundle followed yes  Complication Related to Insertion: None    The placement was verified by ECG  technology: The  tip location is in the superior vena cava. See ECG results for PICC tip placement.  Report given to nurse PHAM Khan    Line is okay to use.          ANJELICA MORALES RN

## 2024-02-21 NOTE — CARE COORDINATION
RUR:  6%  Prior Level of Functioning: indpt with ADLs  Disposition: Home with friend assistance   Accepted  [facility]    Home Infusion  [facility]  PICC placed / order uploaded  Teaching completed    Transportation at discharge:  Patient will be driving herself home  IM/IMM Medicare/Jay letter given:  Received 2/20/24   Caregiver Contact:  Abhilash Casillas / Son / [phone]  Dx: Cellulitis, Hyperglycemia, Cellulitis of lower leg   [facility] [address] [place]

## 2024-02-21 NOTE — DISCHARGE SUMMARY
empagliflozin 10 MG tablet  Commonly known as: Jardiance  Take 1 tablet by mouth daily            CONTINUE taking these medications      amLODIPine 5 MG tablet  Commonly known as: NORVASC     cloNIDine 0.2 MG tablet  Commonly known as: CATAPRES  TAKE 1 TABLET BY MOUTH TWICE A DAY     losartan 100 MG tablet  Commonly known as: COZAAR  Take 1 tablet by mouth daily     rosuvastatin 5 MG tablet  Commonly known as: CRESTOR  TAKE 1 TABLET BY MOUTH NIGHTLY     Tirzepatide 2.5 MG/0.5ML Sopn SC injection  Commonly known as: Mounjaro  Inject 0.5 mLs into the skin once a week            STOP taking these medications      HumuLIN 70/30 KwikPen (70-30) 100 UNIT per ML injection pen  Generic drug: Insulin NPH Isophane & Regular     traMADol 50 MG tablet  Commonly known as: ULTRAM               Where to Get Your Medications        These medications were sent to [facility]/pharmacy #[id number] [place], [place] - [address] -  [phone] - F [phone] [address] [id number]      Hours: 24-hours Phone: [phone]   cefTRIAXone 500 MG injection  empagliflozin 10 MG tablet       Radha Crain APRN - NP  Nurse Practitioner  Infectious Disease  Plan of Care     Signed  Date of Service:  2/19/2024  2:17 PM     Signed                                      INFECTIOUS DISEASE discharge plan:     Diagnosis: Alpha streptococcus bacteremia     Antibiotic: ceftriaxone 2 g IV Q 24 H through 3/1/24     PICC line insertion for outpatient antibiotic.      Routine PICC/ Neptali/ Portacath Care including PRN catheter flow management     Weekly labs with results fax to # [phone]. Call critical lab values to [phone].   [x] CBC w/diff  [x] BUN/Creatinine  [x] AST, ALT  [] CPK  [] CRP, ESR  [] Vancomycin trough level goal  drawn every Monday and Thursday. Pharm D consult for Vancomycin dosing.      Home Health to pull PICC line after last dose or send to IR for Neptali removal     May send to IR for line evaluation or

## 2024-02-21 NOTE — DISCHARGE SUMMARY
Discharge Summary       PATIENT ID: Helen Gillette  MRN: [id number]   YOB: 1950    DATE OF ADMISSION: 2/15/2024 12:51 PM    DATE OF DISCHARGE: 2/20/24  PRIMARY CARE PROVIDER: Afshin Hand MD     ATTENDING PHYSICIAN: Afshin Hand MD    DISCHARGING PROVIDER: Afshin Hand MD      Admitting Note: 73 y.o. female who presents with LLE cellulitis.     This is a 73-year-old -American female with past medical history of uncontrolled type 2 diabetes, hypercholesterolemia, hypertension, hemangioma of the liver, nonalcoholic fatty liver disease, who comes in for the above.  Apparently patient has had a left lower extremity scrape about 3 months ago which has not healed properly.  She reports that she has been getting wound care and has been seen at care more.  She was also recently started on amoxicillin but she stopped taking it about 4 days ago because she was having itching.  She reports that since about 3 months ago her leg has been having worsening swelling and redness, though the actual scrape itself is healed.  She reports that she has had increased pain as well due to the swelling.  She denies any fever, chills, nausea, vomiting, diaphoresis.     Patient also has a history of type 2 diabetes and is on 70/30 with her diabetes being adjusted due to suboptimal control.  This suboptimal control of diabetes potentially has made the cellulitis worse.  On arrival to the ER her blood sugar was 171.     The ER spoke to the patient's primary care provider who was concerned due to the worsening cellulitis and uncontrolled diabetes, so the hospitalist service was consulted for admission.     The patient denies any headache, blurry vision, sore throat, trouble swallowing, trouble with speech, chest pain, SOB, cough, fever, chills, N/V/D, abd pain, urinary symptoms, constipation, recent travels, sick contacts, focal or generalized neurological symptoms, falls, injuries, rashes, contact      CONTINUE taking these medications      amLODIPine 5 MG tablet  Commonly known as: NORVASC     cloNIDine 0.2 MG tablet  Commonly known as: CATAPRES  TAKE 1 TABLET BY MOUTH TWICE A DAY     losartan 100 MG tablet  Commonly known as: COZAAR  Take 1 tablet by mouth daily     rosuvastatin 5 MG tablet  Commonly known as: CRESTOR  TAKE 1 TABLET BY MOUTH NIGHTLY     Tirzepatide 2.5 MG/0.5ML Sopn SC injection  Commonly known as: Mounjaro  Inject 0.5 mLs into the skin once a week            STOP taking these medications      HumuLIN 70/30 KwikPen (70-30) 100 UNIT per ML injection pen  Generic drug: Insulin NPH Isophane & Regular     traMADol 50 MG tablet  Commonly known as: ULTRAM               Where to Get Your Medications        These medications were sent to [facility]/pharmacy #[id number] - [place], [place] - [address] - P [phone] - F [phone] [address] [address]      Hours: 24-hours Phone: [phone]   cefTRIAXone 500 MG injection       Radha Crain APRN - NP  Nurse Practitioner  Infectious Disease  Plan of Care     Signed  Date of Service:  2/19/2024  2:17 PM     Signed                                      INFECTIOUS DISEASE discharge plan:     Diagnosis: Alpha streptococcus bacteremia     Antibiotic: ceftriaxone 2 g IV Q 24 H through 3/1/24     PICC line insertion for outpatient antibiotic.      Routine PICC/ Neptali/ Portacath Care including PRN catheter flow management     Weekly labs with results fax to # [phone]. Call critical lab values to [phone].   [x] CBC w/diff  [x] BUN/Creatinine  [x] AST, ALT  [] CPK  [] CRP, ESR  [] Vancomycin trough level goal  drawn every Monday and Thursday. Pharm D consult for Vancomycin dosing.      Home Health to pull PICC line after last dose or send to IR for Neptali removal     May send to IR for line evaluation or replacement PRN Phone # [phone]     Conditional order: OK to discharge of TTE negative for vegetation with valves

## 2024-02-21 NOTE — PLAN OF CARE
Problem: Discharge Planning  Goal: Discharge to home or other facility with appropriate resources  2/21/2024 0934 by Tanya Carter RN  Outcome: Completed  2/21/2024 0323 by Karthikeyan Carvajal RN  Outcome: Progressing     Problem: Safety - Adult  Goal: Free from fall injury  2/21/2024 0934 by Tanya Carter RN  Outcome: Completed  2/21/2024 0323 by Karthikeyan Carvajal RN  Outcome: Progressing     Problem: Chronic Conditions and Co-morbidities  Goal: Patient's chronic conditions and co-morbidity symptoms are monitored and maintained or improved  Outcome: Completed

## 2024-02-22 LAB
BACTERIA SPEC CULT: NORMAL
BACTERIA SPEC CULT: NORMAL
SERVICE CMNT-IMP: NORMAL
SERVICE CMNT-IMP: NORMAL

## 2024-02-23 ENCOUNTER — TELEPHONE (OUTPATIENT)
Facility: CLINIC | Age: 74
End: 2024-02-23

## 2024-02-23 NOTE — TELEPHONE ENCOUNTER
Patient called in requesting help with a medication error. Medication was accidentally sent to [facility], that did not need to be. Patient has everything she needs for the infusion at home of the antibiotics.  Spoke with [facility].  Called [facility] to cancel any possible refills of the antibiotics, per Dr Hand.  The issue has been resolved.  varsha WADDELL

## 2024-02-28 NOTE — PROGRESS NOTES
Physician Progress Note      PATIENT:               OLEG RODRIGUEZ  CSN #:                  [id number]  :                       1950  ADMIT DATE:       2/15/2024 12:51 PM  DISCH DATE:        2024 11:24 AM  RESPONDING  PROVIDER #:        Afshin Hand MD          QUERY TEXT:    Patient admitted with cellulitis. Noted documentation of sepsis. In order to   support the diagnosis of sepsis, please include additional clinical indicators   in your documentation.  Or please document if the diagnosis of sepsis has   been ruled out after further study    The medical record reflects the following:  Risk Factors: sepsis    Clinical Indicators:  : Progress notes on 23 by Effie Garibay - Sepsis  +bacteremia  +blood culture , 2/15, repeat ordered  -RR called, lactate elevated, 1L NS  -added vanc and zosyn, dc ancef  Progress notes by Breann Garcia -  Polymicrobial bacteremia  + blood cx kang strep multiple colonies / bottles; CoNS 1/2 bottles, no   hardware pt, suspect  contaminant  Repeat Blood Cx  NGTD  TTE ordered.  S/p Zosyn      Treatment:  ceftriaxone, vancomycin, zosyn  ID consult    Thank you,  Yani Singh RN CDI  CRCR  Clinical Documentation  [phone] or via [employer]  [email or web address]  Options provided:  -- Sepsis present as evidenced by, Please document evidence.  -- Sepsis was ruled out after study  -- Other - I will add my own diagnosis  -- Disagree - Not applicable / Not valid  -- Disagree - Clinically unable to determine / Unknown  -- Refer to Clinical Documentation Reviewer    PROVIDER RESPONSE TEXT:    Sepsis was ruled out after study.    Query created by: Yani Singh on 2024 9:46 AM      Electronically signed by:  Afshin Hand MD 2024 5:21 PM

## 2024-03-04 ENCOUNTER — TELEPHONE (OUTPATIENT)
Facility: CLINIC | Age: 74
End: 2024-03-04

## 2024-03-04 RX ORDER — AMLODIPINE BESYLATE 5 MG/1
5 TABLET ORAL DAILY
Qty: 90 TABLET | Refills: 3 | Status: SHIPPED | OUTPATIENT
Start: 2024-03-04 | End: 2024-03-05 | Stop reason: SDUPTHER

## 2024-03-04 NOTE — TELEPHONE ENCOUNTER
Patient is stating that she has run out of her amlodipine. Can she please get a refill? Has appt on 03/05/24. Thanks!

## 2024-03-05 ENCOUNTER — OFFICE VISIT (OUTPATIENT)
Facility: CLINIC | Age: 74
End: 2024-03-05

## 2024-03-05 VITALS
BODY MASS INDEX: 32.28 KG/M2 | WEIGHT: 160.1 LBS | OXYGEN SATURATION: 98 % | HEIGHT: 59 IN | TEMPERATURE: 98 F | HEART RATE: 64 BPM | SYSTOLIC BLOOD PRESSURE: 122 MMHG | RESPIRATION RATE: 18 BRPM | DIASTOLIC BLOOD PRESSURE: 78 MMHG

## 2024-03-05 DIAGNOSIS — K76.0 NAFL (NONALCOHOLIC FATTY LIVER): ICD-10-CM

## 2024-03-05 DIAGNOSIS — E66.9 OBESITY (BMI 30.0-34.9): ICD-10-CM

## 2024-03-05 DIAGNOSIS — Z09 HOSPITAL DISCHARGE FOLLOW-UP: Primary | ICD-10-CM

## 2024-03-05 DIAGNOSIS — E11.65 TYPE 2 DIABETES MELLITUS WITH HYPERGLYCEMIA, WITHOUT LONG-TERM CURRENT USE OF INSULIN (HCC): ICD-10-CM

## 2024-03-05 DIAGNOSIS — I10 PRIMARY HYPERTENSION: ICD-10-CM

## 2024-03-05 DIAGNOSIS — L03.119 CELLULITIS OF LOWER LEG: ICD-10-CM

## 2024-03-05 DIAGNOSIS — E78.00 HYPERCHOLESTEROLEMIA: ICD-10-CM

## 2024-03-05 PROBLEM — R73.9 HYPERGLYCEMIA: Status: RESOLVED | Noted: 2024-02-16 | Resolved: 2024-03-05

## 2024-03-05 PROBLEM — G44.89 OTHER HEADACHE SYNDROME: Status: RESOLVED | Noted: 2022-09-09 | Resolved: 2024-03-05

## 2024-03-05 RX ORDER — AMLODIPINE BESYLATE 5 MG/1
5 TABLET ORAL DAILY
Qty: 90 TABLET | Refills: 3 | Status: SHIPPED | OUTPATIENT
Start: 2024-03-05 | End: 2024-03-05 | Stop reason: SDUPTHER

## 2024-03-05 RX ORDER — TIRZEPATIDE 2.5 MG/.5ML
2.5 INJECTION, SOLUTION SUBCUTANEOUS WEEKLY
Qty: 4 EACH | Refills: 0 | Status: SHIPPED | OUTPATIENT
Start: 2024-03-05

## 2024-03-05 RX ORDER — AMLODIPINE BESYLATE 5 MG/1
5 TABLET ORAL DAILY
Qty: 90 TABLET | Refills: 3 | Status: SHIPPED | OUTPATIENT
Start: 2024-03-05

## 2024-03-05 ASSESSMENT — PATIENT HEALTH QUESTIONNAIRE - PHQ9
SUM OF ALL RESPONSES TO PHQ QUESTIONS 1-9: 0
SUM OF ALL RESPONSES TO PHQ9 QUESTIONS 1 & 2: 0
2. FEELING DOWN, DEPRESSED OR HOPELESS: 0
1. LITTLE INTEREST OR PLEASURE IN DOING THINGS: 0
SUM OF ALL RESPONSES TO PHQ QUESTIONS 1-9: 0

## 2024-03-05 NOTE — PROGRESS NOTES
SPORTS MEDICINE AND PRIMARY CARE  Afshin Hand MD, FACP, CMD  [address] [address]  Phone:  [phone]  Fax: [phone]       Chief Complaint   Patient presents with    Follow-Up from Hospital   .      SUBJECTIVE:    Helen Gillette is a 73 y.o. female Patient returns today for transition of care services following admission on 02/15 and discharged on 02/20 from [facility] with final diagnoses of alpha streptococcus bacteremia and left lower extremity cellulitis and was placed on Rocephin, which she continued through March 1st, the erythema decreased during the hospital stay, as well as resolution of the warmth, diabetes, which is notoriously poorly controlled as an outpatient, but was controlled with diet alone during the hospital stay, (she did not require insulin at that time, reflecting her true dietary indiscretion at home), and primary hypertension. She also has dyslipidemia and comes in today for follow up and ongoing care.     The nurse performed assessment of her condition and medication review.  Patient is seen for evaluation.    Patient has been checking blood sugars at home and tried to use MyChart, but was not successful.  They were as low as 137 on the 28th and as high as 239 on the 3rd.  Other specific complaints denied and patient is seen for evaluation.             Current Outpatient Medications   Medication Sig Dispense Refill    Tirzepatide (MOUNJARO) 2.5 MG/0.5ML SOPN SC injection Inject 0.5 mLs into the skin once a week 4 each 0    amLODIPine (NORVASC) 5 MG tablet Take 1 tablet by mouth daily 90 tablet 3    empagliflozin (JARDIANCE) 10 MG tablet Take 1 tablet by mouth daily 90 tablet 1    rosuvastatin (CRESTOR) 5 MG tablet TAKE 1 TABLET BY MOUTH NIGHTLY 90 tablet 3    losartan (COZAAR) 100 MG tablet Take 1 tablet by mouth daily 90 tablet 3    cloNIDine (CATAPRES) 0.2 MG tablet TAKE 1 TABLET BY MOUTH TWICE A  tablet 3     No current facility-administered

## 2024-03-05 NOTE — PROGRESS NOTES
Chief Complaint   Patient presents with    Follow-Up from Hospital     \"Have you been to the ER, urgent care clinic since your last visit?  Hospitalized since your last visit?\"    YES - When: approximately 10 days ago.  Where and Why: [facility].    “Have you seen or consulted any other health care providers outside of [facility] since your last visit?”    NO

## 2024-03-14 ENCOUNTER — OFFICE VISIT (OUTPATIENT)
Age: 74
End: 2024-03-14
Payer: MEDICARE

## 2024-03-14 DIAGNOSIS — E11.65 TYPE 2 DIABETES MELLITUS WITH HYPERGLYCEMIA, UNSPECIFIED WHETHER LONG TERM INSULIN USE (HCC): Primary | ICD-10-CM

## 2024-03-14 PROCEDURE — G0108 DIAB MANAGE TRN  PER INDIV: HCPCS | Performed by: DIETITIAN, REGISTERED

## 2024-03-14 NOTE — PROGRESS NOTES
[facility]  Diabetes Self-Management Education & Support Program    Reason for Referral: Type 2 DM with hyperglycemia  Referral Source: Camille Saavedra AP*  Services requested: DSMES       ASSESSMENT    From my perspective, the participant would benefit from DSMES specifically related to reducing risks, healthy eating, monitoring, taking medications, physical activity, and healthy coping. Will adapt DSMES program to build on participant's skills score and confidence score as noted in the Diabetes Skills, Confidence, and Preparedness Index.    During the program, we will focus on providing DSMES that specifically addresses participant's interest in reducing risks, healthy eating, monitoring, taking medications, physical activity, healthy coping, and problem solving, as shown by their reported readiness to change.    The participant would be best served by attending weekly group class series.    Diabetes Self-Management Education Follow-up Visit: 4/9/24       Clinical Presentation  Helen Gillette is a 73 y.o.  female referred for diabetes self-management education. Participant has Type 2 DM not on insulin for 11-20 years. Family history positive for diabetes. Patient reports not receiving DSMES services in the past. Most recent A1c value:   Lab Results   Component Value Date/Time    LABA1C 8.2 02/15/2024 01:30 PM       Diabetes-related medical history:  Macrovascular disease  peripheral vascular disease      Diabetes-related medications:  Current dosing: Jardiance 10 mg daily am    Blood Pressure Management  losartan - 100 MG      Lipid Management  rosuvastatin - 5 MG      Clot Prevention  This patient does not have an active medication from one of the medication groupers.    Learning Assessment  Learning objectives Educator assessment (3/14/2024)   Diabetes Disease Process  The participant can   A) describe diabetes in basic terms;   B) state the type of diabetes they

## 2024-04-09 ENCOUNTER — NURSE ONLY (OUTPATIENT)
Age: 74
End: 2024-04-09
Payer: MEDICARE

## 2024-04-09 DIAGNOSIS — E11.65 TYPE 2 DIABETES MELLITUS WITH HYPERGLYCEMIA, UNSPECIFIED WHETHER LONG TERM INSULIN USE (HCC): Primary | ICD-10-CM

## 2024-04-09 PROCEDURE — G0109 DIAB MANAGE TRN IND/GROUP: HCPCS | Performed by: DIETITIAN, REGISTERED

## 2024-04-09 NOTE — PROGRESS NOTES
[facility]  Diabetes Self-Management Education & Support Program  Encounter Note      SUMMARY  Diabetes self-care management training was completed related to reducing risks. The participant will return on April 16 to continue DSMES related to healthy eating and monitoring. The participant did identify SMART Goal(s) and will practice knowledge and skills related to reducing risks to improve diabetes self-management.        EVALUATION:  Helen arrived late for class - participated 1 hour today. She participated in class discussions and activity. Identified that she needs to follow up with dentist, start daily foot care and exam along with finding a new eye care provider.    RECOMMENDATIONS:  1) find new ophthalmologist to complete dilated eye exam  2) schedule visit with dentist       TOPICS DISCUSSED TODAY:  HOW DO I STAY HEALTHY? 60      Next provider visit is scheduled for 9/9/24       SMART GOAL(S)   Begin doing daily foot check over the next week  ACHIEVEMENT OF GOAL(S) : 0-24%       DATE DSMES TOPIC EVALUATION     4/9/2024 HOW DO I STAY HEALTHY?   Prevention   Vaccinations   Preconception care (if applicable)  Examinations   Eye    Foot   Diabetic complications' prevention   Dental health   Heart health   Kidney Health   Nerve health   Sleep health      The participant has a personal diabetes care record to keep abreast of diabetes health Yes     The participant needs to address pursuing new provider for her eye health and needs to schedule visit with dentist.  Provided pt with name of eye provider that is accepting new patients and insurances to contact.           OBINNA MONTEZ RD, [facility] on 4/9/2024 at 10:02 AM    I have personally reviewed the health record, including provider notes, laboratory data and current medications before making these care and education recommendations. Total minutes: 60

## 2024-04-16 ENCOUNTER — NURSE ONLY (OUTPATIENT)
Age: 74
End: 2024-04-16
Payer: MEDICARE

## 2024-04-16 DIAGNOSIS — E11.65 TYPE 2 DIABETES MELLITUS WITH HYPERGLYCEMIA, UNSPECIFIED WHETHER LONG TERM INSULIN USE (HCC): Primary | ICD-10-CM

## 2024-04-16 PROCEDURE — G0109 DIAB MANAGE TRN IND/GROUP: HCPCS | Performed by: DIETITIAN, REGISTERED

## 2024-04-16 NOTE — PROGRESS NOTES
[facility]  Diabetes Self-Management Education & Support Program  Encounter Note      SUMMARY  Diabetes self-care management training was completed related to healthy eating and monitoring. he participant will return on April 23 to continue DSMES related to taking medications and physical activity. The participant did identify SMART Goal(s) and will practice knowledge and skills related to reducing risks and healthy eating and monitoring to improve diabetes self-management.        EVALUATION:  Helen was seen in class today. She asked good questions and participated in class discussions. Shared that she has started her daily foot checks. Provided with information for eye provider.     RECOMMENDATIONS:  Work on determining portion sizes.   Plan meals for 3-4 servings or 45-60 g per meal.   Continue to testing daily     TOPICS DISCUSSED TODAY:  WHAT CAN I EAT? 60  HOW CAN BLOOD GLUCOSE MONITORING HELP ME? 60      Next provider visit is scheduled for unknown       SMART GOAL(S)  Begin doing daily foot check ove the next week.  ACHIEVEMENT OF GOAL(S) : 0-24%         DATE DSMES TOPIC EVALUATION     4/16/2024 WHAT CAN I EAT?   General principles   Determining a healthy weight   Nutritional terms & tools   Healthy Plate method   Carbohydrate Counting   Reading food labels   Free apps        The participant   Uses Healthy Plate principles in constructing meals: Yes  Reads food labels in choosing acceptable foods: Yes    The participant needs to address paying attention to \"how much fruit\" and what serving is she eating at a meal to assess if her portions are correct. Encouraged to eat 3 meals daily.     DATE DSMES TOPIC EVALUATION     4/16/2024 HOW CAN BLOOD GLUCOSE MONITORING HELP ME?   Value of blood glucose monitoring   Realistic expectations   Blood glucose monitoring targets   Target adjustments   Setting a1c & blood glucose targets with provider   Meter selection    Technique for obtaining

## 2024-04-30 ENCOUNTER — NURSE ONLY (OUTPATIENT)
Age: 74
End: 2024-04-30
Payer: MEDICARE

## 2024-04-30 DIAGNOSIS — E11.65 TYPE 2 DIABETES MELLITUS WITH HYPERGLYCEMIA, UNSPECIFIED WHETHER LONG TERM INSULIN USE (HCC): Primary | ICD-10-CM

## 2024-04-30 PROCEDURE — G0109 DIAB MANAGE TRN IND/GROUP: HCPCS | Performed by: DIETITIAN, REGISTERED

## 2024-04-30 NOTE — PROGRESS NOTES
[facility]  Diabetes Self-Management Education & Support Program  Encounter Note      SUMMARY  Diabetes self-care management training was completed related to healthy coping and problem solving. The participant will return on May 09 to continue DSMES related to taking medications and physical activity. The participant did identify SMART Goal(s) and will practice knowledge and skills related to reducing risks, healthy eating and monitoring, and healthy coping and problem solving to improve diabetes self-management.        EVALUATION:  Helen was seen in class today - education was provided by myself and my colleague Laura NATH [facility]. Helen participated in class discussions and learning activities.She will be returning to complete her missed class 3 topics - medications and activity.    RECOMMENDATIONS:  1) work on having a routine for taking medication timely.     TOPICS DISCUSSED TODAY:  HOW DO I FIND SUPPORT TO TACKLE THIS CONDITION? 60  HOW DO I FIGURE OUT WHAT'S INFLUENCING MY BLOOD GLUCOSES? 60      Next provider visit is scheduled for 6/6/24       SMART GOAL(S)   Set an alarm for my medications to take on time.  ACHIEVEMENT OF GOAL(S) : 0-24%           DATE DSMES TOPIC EVALUATION     4/30/2024 HOW DO I FIND SUPPORT TO TACKLE THIS CONDITION?   Normal responses to diabetes diagnosis or complication   Shock   Anger & resentment   Guilt/self-blame   Sadness & worry   Depression    Anxiety   Pregnancy   Constructive strategies to normal responses    Exploring feelings & attitudes   Motivation: Cost versus benefits analysis   Problem-solving: Chain analysis   Obtaining support: Communicating   Family & friends   Health team   iii. Community   Stress   Symptoms   Managing stress   Fill your tool kit        The participant can identify people that support them in caring for their diabetes health: son      The participant would like to pursue the following in keeping themselves healthy after

## 2024-05-09 ENCOUNTER — OFFICE VISIT (OUTPATIENT)
Age: 74
End: 2024-05-09
Payer: MEDICARE

## 2024-05-09 DIAGNOSIS — E11.65 TYPE 2 DIABETES MELLITUS WITH HYPERGLYCEMIA, UNSPECIFIED WHETHER LONG TERM INSULIN USE (HCC): Primary | ICD-10-CM

## 2024-05-09 PROCEDURE — G0108 DIAB MANAGE TRN  PER INDIV: HCPCS | Performed by: DIETITIAN, REGISTERED

## 2024-05-09 NOTE — PROGRESS NOTES
[facility]  Diabetes Self-Management Education & Support Program  Encounter Note      SUMMARY  Diabetes self-care management training was completed related to taking medications. The participant will return on May 30 to continue DSMES related to physical activity. The participant did identify SMART Goal(s) and will practice knowledge and skills related to reducing risks, healthy eating and monitoring, healthy coping and problem solving, and medications to improve diabetes self-management.        EVALUATION:  Helen shared that she \"fell asleep at work recently\" and that has not happened ever - notices that she is more tired, light headed and at times if fearful of driving due to her symptoms. Advised her to not wait for her next scheduled visit but to see asap. She and I discussed changes at work - having difficulty with routine meals, testing BG and DM self care compared to past. Shared that she is paying more attention to her portions lately and her BG values are improving.     RECOMMENDATIONS:  1) reports side effects to .  2) consider logging BG, meal times and your side effects/symptoms you are feeling to share with PCP   3) may need to talk to supervisor at work and advise of your medical condition to have time for testing BG, eating and taking care of DM.      TOPICS DISCUSSED TODAY:  HOW DO MY DIABETES MEDICATIONS WORK? 60      Next provider visit is scheduled for 6/6/24       SMART GOAL(S)   Contact provider to schedule appointment to talk about side effects of medications.  ACHIEVEMENT OF GOAL(S) : 0-24%         DATE DSMES TOPIC EVALUATION     5/9/2024 HOW DO MY DIABETES MEDICATIONS WORK?   Type 1 medications & methods   Insulin injections   Injection sites   Type 2 medications   Oral agents   GLP-1 agonists   Hypoglycemia symptoms & treatment   Glucagon emergency kits   General guidance regarding insulin use whether Type 1, 2 or gestational diabetes   Storage of

## 2024-07-01 ENCOUNTER — OFFICE VISIT (OUTPATIENT)
Facility: CLINIC | Age: 74
End: 2024-07-01
Payer: MEDICARE

## 2024-07-01 VITALS
WEIGHT: 147.8 LBS | OXYGEN SATURATION: 98 % | TEMPERATURE: 97.5 F | SYSTOLIC BLOOD PRESSURE: 172 MMHG | RESPIRATION RATE: 18 BRPM | DIASTOLIC BLOOD PRESSURE: 97 MMHG | HEART RATE: 72 BPM | BODY MASS INDEX: 29.8 KG/M2 | HEIGHT: 59 IN

## 2024-07-01 DIAGNOSIS — Z12.11 SCREEN FOR COLON CANCER: ICD-10-CM

## 2024-07-01 DIAGNOSIS — E11.65 TYPE 2 DIABETES MELLITUS WITH HYPERGLYCEMIA, WITHOUT LONG-TERM CURRENT USE OF INSULIN (HCC): ICD-10-CM

## 2024-07-01 DIAGNOSIS — F41.8 DEPRESSION WITH ANXIETY: ICD-10-CM

## 2024-07-01 DIAGNOSIS — E78.00 HYPERCHOLESTEROLEMIA: ICD-10-CM

## 2024-07-01 DIAGNOSIS — Z00.00 MEDICARE ANNUAL WELLNESS VISIT, SUBSEQUENT: Primary | ICD-10-CM

## 2024-07-01 DIAGNOSIS — I10 PRIMARY HYPERTENSION: ICD-10-CM

## 2024-07-01 PROCEDURE — 36415 COLL VENOUS BLD VENIPUNCTURE: CPT | Performed by: INTERNAL MEDICINE

## 2024-07-01 PROCEDURE — 99214 OFFICE O/P EST MOD 30 MIN: CPT | Performed by: INTERNAL MEDICINE

## 2024-07-01 PROCEDURE — 3077F SYST BP >= 140 MM HG: CPT | Performed by: INTERNAL MEDICINE

## 2024-07-01 PROCEDURE — 3052F HG A1C>EQUAL 8.0%<EQUAL 9.0%: CPT | Performed by: INTERNAL MEDICINE

## 2024-07-01 PROCEDURE — 1123F ACP DISCUSS/DSCN MKR DOCD: CPT | Performed by: INTERNAL MEDICINE

## 2024-07-01 PROCEDURE — 3079F DIAST BP 80-89 MM HG: CPT | Performed by: INTERNAL MEDICINE

## 2024-07-01 PROCEDURE — G0439 PPPS, SUBSEQ VISIT: HCPCS | Performed by: INTERNAL MEDICINE

## 2024-07-01 RX ORDER — OLMESARTAN MEDOXOMIL 40 MG/1
40 TABLET ORAL DAILY
Qty: 90 TABLET | Refills: 3 | Status: SHIPPED | OUTPATIENT
Start: 2024-07-01 | End: 2025-07-01

## 2024-07-01 RX ORDER — HYDRALAZINE HYDROCHLORIDE 25 MG/1
25 TABLET, FILM COATED ORAL 4 TIMES DAILY
Qty: 90 TABLET | Refills: 3 | Status: SHIPPED | OUTPATIENT
Start: 2024-07-01 | End: 2024-07-01

## 2024-07-01 RX ORDER — LOSARTAN POTASSIUM 100 MG/1
100 TABLET ORAL DAILY
Qty: 90 TABLET | Refills: 3 | Status: CANCELLED | OUTPATIENT
Start: 2024-07-01

## 2024-07-01 RX ORDER — HYDRALAZINE HYDROCHLORIDE 25 MG/1
25 TABLET, FILM COATED ORAL 3 TIMES DAILY
Qty: 90 TABLET | Refills: 3 | Status: SHIPPED | OUTPATIENT
Start: 2024-07-01

## 2024-07-01 ASSESSMENT — PATIENT HEALTH QUESTIONNAIRE - PHQ9
SUM OF ALL RESPONSES TO PHQ9 QUESTIONS 1 & 2: 0
SUM OF ALL RESPONSES TO PHQ QUESTIONS 1-9: 0
1. LITTLE INTEREST OR PLEASURE IN DOING THINGS: NOT AT ALL
SUM OF ALL RESPONSES TO PHQ QUESTIONS 1-9: 0
2. FEELING DOWN, DEPRESSED OR HOPELESS: NOT AT ALL

## 2024-07-01 ASSESSMENT — LIFESTYLE VARIABLES
HOW OFTEN DO YOU HAVE A DRINK CONTAINING ALCOHOL: NEVER
HOW MANY STANDARD DRINKS CONTAINING ALCOHOL DO YOU HAVE ON A TYPICAL DAY: PATIENT DOES NOT DRINK

## 2024-07-01 NOTE — PROGRESS NOTES
Chief Complaint   Patient presents with    Medicare AWV     \"Have you been to the ER, urgent care clinic since your last visit?  Hospitalized since your last visit?\"    NO    “Have you seen or consulted any other health care providers outside of [facility] since your last visit?”    NO            Click Here for Release of Records Request  
Medicare Annual Wellness Visit    Helen Gillette is here for Medicare AWV    Assessment & Plan   Medicare annual wellness visit, subsequent  Recommendations for Preventive Services Due: see orders and patient instructions/AVS.  Recommended screening schedule for the next 5-10 years is provided to the patient in written form: see Patient Instructions/AVS.     No follow-ups on file.     Subjective       Patient's complete Health Risk Assessment and screening values have been reviewed and are found in Flowsheets. The following problems were reviewed today and where indicated follow up appointments were made and/or referrals ordered.    Positive Risk Factor Screenings with Interventions:       Cognitive:   Clock Drawing Test (CDT): (!) Abnormal  Words recalled: 0 Words Recalled  Total Score: (!) 0  Total Score Interpretation: Abnormal Mini-Cog  Interventions:  See A/P for plan and any pertinent orders             Dentist Screen:  Have you seen the dentist within the past year?: (!) No    Intervention:  See A/P for any pertinent orders     Vision Screen:  Do you have difficulty driving, watching TV, or doing any of your daily activities because of your eyesight?: No  Have you had an eye exam within the past year?: (!) No  No results found.    Interventions:   See A/P for any pertinent orders      Advanced Directives:  Do you have a Living Will?: (!) No    Intervention:  has NO advanced directive - information provided                     Objective   Vitals:    07/01/24 1026 07/01/24 1040   BP: (!) 188/82 (!) 172/97   Site: Left Upper Arm Left Upper Arm   Position: Sitting Sitting   Pulse: 72    Resp: 18    Temp: 97.5 °F (36.4 °C)    TempSrc: Oral    SpO2: 98%    Weight: 67 kg (147 lb 12.8 oz)    Height: 1.499 m (4' 11\")       Body mass index is 29.85 kg/m².               Allergies   Allergen Reactions    Atorvastatin Myalgia    Metformin Cough    Pravastatin Myalgia    Amoxicillin Rash    Aspirin Nausea And Vomiting 
[place] [employer] - Occupational Stress Questionnaire     Feeling of Stress : Not at all   Social Connections: Moderately Isolated (12/18/2023)    Social Connection and Isolation Panel [NHANES]     Frequency of Communication with Friends and Family: Never     Frequency of Social Gatherings with Friends and Family: Never     Attends Latter-day Services: More than 4 times per year     Active Member of Clubs or Organizations: No     Attends Club or Organization Meetings: Never     Marital Status:    Intimate Partner Violence: Not At Risk (12/18/2023)    Humiliation, Afraid, Rape, and Kick questionnaire     Fear of Current or Ex-Partner: No     Emotionally Abused: No     Physically Abused: No     Sexually Abused: No   Housing Stability: Low Risk  (2/16/2024)    Housing Stability Vital Sign     Unable to Pay for Housing in the Last Year: No     Number of Places Lived in the Last Year: 1     Unstable Housing in the Last Year: No     Family History   Problem Relation Age of Onset    No Known Problems Father        OBJECTIVE:    BP (!) 172/97 (Site: Left Upper Arm, Position: Sitting)   Pulse 72   Temp 97.5 °F (36.4 °C) (Oral)   Resp 18   Ht 1.499 m (4' 11\")   Wt 67 kg (147 lb 12.8 oz)   LMP  (LMP Unknown)   SpO2 98%   BMI 29.85 kg/m²   CONSTITUTIONAL: well , well nourished, appears age appropriate  EYES: perrla, eom intact  ENMT:moist mucous membranes, pharynx clear  NECK: supple. Thyroid normal  RESPIRATORY: Chest: clear bilaterally   CARDIOVASCULAR: Heart: regular rate and rhythm  GASTROINTESTINAL: Abdomen: soft, bowel sounds active  HEMATOLOGIC: no pathological lymph nodes palpated  MUSCULOSKELETAL: Extremities: no edema, pulse 1+   INTEGUMENT: No unusual rashes or suspicious skin lesions noted. Nails appear normal.  NEUROLOGIC: non-focal exam   MENTAL STATUS: alert and oriented, appropriate affect           ASSESSMENT:  1. Medicare annual wellness visit, subsequent    2. Type 2 diabetes

## 2024-07-01 NOTE — PATIENT INSTRUCTIONS
from this test. If dilating drops are used for a vision test, they may make the eyes sting and cause a medicine taste in the mouth.  Follow-up care is a key part of your treatment and safety. Be sure to make and go to all appointments, and call your doctor if you are having problems. It's also a good idea to know your test results and keep a list of the medicines you take.  Where can you learn more?  Go to https://www.[employer].net/patientEd and enter G551 to learn more about \"Learning About Vision Tests.\"  Current as of: June 5, 2023  Content Version: 14.1  © 0496-9450 [employer].   Care instructions adapted under license by [employer]. If you have questions about a medical condition or this instruction, always ask your healthcare professional. [employer] disclaims any warranty or liability for your use of this information.           Advance Directives: Care Instructions  Overview  An advance directive is a legal way to state your wishes at the end of your life. It tells your family and your doctor what to do if you can't say what you want.  There are two main types of advance directives. You can change them any time your wishes change.  Living will.  This form tells your family and your doctor your wishes about life support and other treatment. The form is also called a declaration.  Medical power of .  This form lets you name a person to make treatment decisions for you when you can't speak for yourself. This person is called a health care agent (health care proxy, health care surrogate). The form is also called a durable power of  for health care.  If you do not have an advance directive, decisions about your medical care may be made by a family member, or by a doctor or a  who doesn't know you.  It may help to think of an advance directive as a gift to the people who care for you. If you have one, they won't have to make tough decisions by themselves.  For

## 2024-07-02 DIAGNOSIS — E11.65 TYPE 2 DIABETES MELLITUS WITH HYPERGLYCEMIA, WITHOUT LONG-TERM CURRENT USE OF INSULIN (HCC): Primary | ICD-10-CM

## 2024-07-02 LAB — HBA1C MFR BLD: 8.4 % (ref 4.8–5.6)

## 2024-07-16 ENCOUNTER — OFFICE VISIT (OUTPATIENT)
Facility: CLINIC | Age: 74
End: 2024-07-16
Payer: MEDICARE

## 2024-07-16 VITALS
OXYGEN SATURATION: 95 % | DIASTOLIC BLOOD PRESSURE: 92 MMHG | BODY MASS INDEX: 29.41 KG/M2 | HEIGHT: 59 IN | RESPIRATION RATE: 18 BRPM | HEART RATE: 87 BPM | SYSTOLIC BLOOD PRESSURE: 182 MMHG | TEMPERATURE: 97.9 F | WEIGHT: 145.9 LBS

## 2024-07-16 DIAGNOSIS — K76.0 NAFL (NONALCOHOLIC FATTY LIVER): ICD-10-CM

## 2024-07-16 DIAGNOSIS — E78.00 HYPERCHOLESTEROLEMIA: ICD-10-CM

## 2024-07-16 DIAGNOSIS — Z12.11 SCREEN FOR COLON CANCER: ICD-10-CM

## 2024-07-16 DIAGNOSIS — E11.65 TYPE 2 DIABETES MELLITUS WITH HYPERGLYCEMIA, WITHOUT LONG-TERM CURRENT USE OF INSULIN (HCC): Primary | ICD-10-CM

## 2024-07-16 DIAGNOSIS — I10 PRIMARY HYPERTENSION: ICD-10-CM

## 2024-07-16 PROCEDURE — 3077F SYST BP >= 140 MM HG: CPT | Performed by: INTERNAL MEDICINE

## 2024-07-16 PROCEDURE — 1123F ACP DISCUSS/DSCN MKR DOCD: CPT | Performed by: INTERNAL MEDICINE

## 2024-07-16 PROCEDURE — 3052F HG A1C>EQUAL 8.0%<EQUAL 9.0%: CPT | Performed by: INTERNAL MEDICINE

## 2024-07-16 PROCEDURE — 3080F DIAST BP >= 90 MM HG: CPT | Performed by: INTERNAL MEDICINE

## 2024-07-16 PROCEDURE — 99214 OFFICE O/P EST MOD 30 MIN: CPT | Performed by: INTERNAL MEDICINE

## 2024-07-16 RX ORDER — AMLODIPINE BESYLATE 5 MG/1
5 TABLET ORAL DAILY
Qty: 90 TABLET | Refills: 3
Start: 2024-07-16 | End: 2024-07-17 | Stop reason: SDUPTHER

## 2024-07-16 RX ORDER — CLONIDINE HYDROCHLORIDE 0.2 MG/1
0.4 TABLET ORAL
Qty: 180 TABLET | Refills: 3
Start: 2024-07-16 | End: 2024-07-17 | Stop reason: SDUPTHER

## 2024-07-16 RX ORDER — HYDRALAZINE HYDROCHLORIDE 50 MG/1
50 TABLET, FILM COATED ORAL 3 TIMES DAILY
Qty: 180 TABLET | Refills: 3 | Status: SHIPPED | OUTPATIENT
Start: 2024-07-16 | End: 2024-07-17 | Stop reason: SDUPTHER

## 2024-07-16 RX ORDER — ROSUVASTATIN CALCIUM 5 MG/1
5 TABLET, COATED ORAL NIGHTLY
Qty: 90 TABLET | Refills: 3
Start: 2024-07-16 | End: 2024-07-17 | Stop reason: SDUPTHER

## 2024-07-16 NOTE — PROGRESS NOTES
Chief Complaint   Patient presents with    Follow-up    Diabetes       \"Have you been to the ER, urgent care clinic since your last visit?  Hospitalized since your last visit?\"    NO    “Have you seen or consulted any other health care providers outside of [facility] since your last visit?”    NO            Click Here for Release of Records Request

## 2024-07-16 NOTE — PROGRESS NOTES
[facility]  Alejandra Hand MD, FACP, CMD  [address] [address]  Phone:  [phone]  Fax: [phone]       Chief Complaint   Patient presents with    Follow-up    Diabetes   .      SUBJECTIVE:    Helen Gillette is a 73 y.o. female  Dictation on: 2024 10:58 AM by: ALEJANDRA HAND [[id number]]          Current Outpatient Medications   Medication Sig Dispense Refill    amLODIPine (NORVASC) 5 MG tablet Take 1 tablet by mouth daily 90 tablet 3    rosuvastatin (CRESTOR) 5 MG tablet Take 1 tablet by mouth nightly 90 tablet 3    hydrALAZINE (APRESOLINE) 50 MG tablet Take 1 tablet by mouth 3 times daily 180 tablet 3    cloNIDine (CATAPRES) 0.2 MG tablet Take 2 tablets by mouth nightly 180 tablet 3    olmesartan (BENICAR) 40 MG tablet Take 1 tablet by mouth daily 90 tablet 3    empagliflozin (JARDIANCE) 10 MG tablet Take 1 tablet by mouth daily 90 tablet 1     No current facility-administered medications for this visit.     Past Medical History:   Diagnosis Date    Arm pain 2021    Baker's cyst 2016    r    Bereavement 2015    59yo  sister     Contusion of knee and lower leg, right, subsequent encounter 11/10/2017    Contusion of knee, right     Cough 2017    Depression with anxiety     Diverticulosis 2015    colonoscopy    DM2 (diabetes mellitus, type 2) (HCC)     Edema     Encounter related to worker's compensation claim 11/10/2017    fall t work    Fatigue     Hemangioma of liver 2018    mri    Hemangioma of liver 2019    Hypercholesterolemia     Hypertension     Liver disease     hepatitis 30 years ago - Liver mass consistent with hemangioma in the right lobe    Menopause     LMP-unknown    NAFL (nonalcoholic fatty liver)     Obesity     Onychomycosis     Other headache syndrome 2022    Panniculitis 10/08/2019    S/P SUDARSHAN (total abdominal hysterectomy) 1985    Thrombocytopenia (HCC) 2014    Thumb pain, right 2022

## 2024-07-17 RX ORDER — CLONIDINE HYDROCHLORIDE 0.2 MG/1
0.4 TABLET ORAL
Qty: 180 TABLET | Refills: 3 | Status: SHIPPED | OUTPATIENT
Start: 2024-07-17

## 2024-07-17 RX ORDER — ROSUVASTATIN CALCIUM 5 MG/1
5 TABLET, COATED ORAL NIGHTLY
Qty: 90 TABLET | Refills: 3 | Status: SHIPPED | OUTPATIENT
Start: 2024-07-17

## 2024-07-17 RX ORDER — AMLODIPINE BESYLATE 5 MG/1
5 TABLET ORAL DAILY
Qty: 90 TABLET | Refills: 3 | Status: SHIPPED | OUTPATIENT
Start: 2024-07-17

## 2024-07-17 RX ORDER — OLMESARTAN MEDOXOMIL 40 MG/1
40 TABLET ORAL DAILY
Qty: 90 TABLET | Refills: 3 | Status: SHIPPED | OUTPATIENT
Start: 2024-07-17 | End: 2025-07-17

## 2024-07-17 RX ORDER — HYDRALAZINE HYDROCHLORIDE 50 MG/1
50 TABLET, FILM COATED ORAL 3 TIMES DAILY
Qty: 180 TABLET | Refills: 3 | Status: SHIPPED | OUTPATIENT
Start: 2024-07-17

## 2024-07-23 ENCOUNTER — OFFICE VISIT (OUTPATIENT)
Facility: CLINIC | Age: 74
End: 2024-07-23
Payer: MEDICARE

## 2024-07-23 VITALS
HEIGHT: 59 IN | RESPIRATION RATE: 20 BRPM | TEMPERATURE: 97.7 F | WEIGHT: 143 LBS | BODY MASS INDEX: 28.83 KG/M2 | OXYGEN SATURATION: 96 % | DIASTOLIC BLOOD PRESSURE: 76 MMHG | SYSTOLIC BLOOD PRESSURE: 118 MMHG | HEART RATE: 94 BPM

## 2024-07-23 DIAGNOSIS — E11.65 TYPE 2 DIABETES MELLITUS WITH HYPERGLYCEMIA, WITHOUT LONG-TERM CURRENT USE OF INSULIN (HCC): Primary | ICD-10-CM

## 2024-07-23 DIAGNOSIS — I10 PRIMARY HYPERTENSION: ICD-10-CM

## 2024-07-23 DIAGNOSIS — K76.0 NAFL (NONALCOHOLIC FATTY LIVER): ICD-10-CM

## 2024-07-23 DIAGNOSIS — E78.00 HYPERCHOLESTEROLEMIA: ICD-10-CM

## 2024-07-23 LAB — GLUCOSE, POC: 232 MG/DL

## 2024-07-23 PROCEDURE — 3052F HG A1C>EQUAL 8.0%<EQUAL 9.0%: CPT | Performed by: INTERNAL MEDICINE

## 2024-07-23 PROCEDURE — 3078F DIAST BP <80 MM HG: CPT | Performed by: INTERNAL MEDICINE

## 2024-07-23 PROCEDURE — 3074F SYST BP LT 130 MM HG: CPT | Performed by: INTERNAL MEDICINE

## 2024-07-23 PROCEDURE — 82962 GLUCOSE BLOOD TEST: CPT | Performed by: INTERNAL MEDICINE

## 2024-07-23 PROCEDURE — 1123F ACP DISCUSS/DSCN MKR DOCD: CPT | Performed by: INTERNAL MEDICINE

## 2024-07-23 PROCEDURE — 99214 OFFICE O/P EST MOD 30 MIN: CPT | Performed by: INTERNAL MEDICINE

## 2024-07-23 NOTE — PROGRESS NOTES
Chief Complaint   Patient presents with    Diabetes     \"Have you been to the ER, urgent care clinic since your last visit?  Hospitalized since your last visit?\"    NO    “Have you seen or consulted any other health care providers outside of [facility] since your last visit?”    NO            Click Here for Release of Records Request  
morning and read low in the office and her blood sugar is 232.  We give her a new machine to use until she tells us the insurance authorizes a machine name and we will send that in to her pharmacy.    Our greatest concern however is related to blood pressure.  Repeat blood pressure is listed above and is completely acceptable. She will continue with her current dose of medications.  They include Amlodipine 5 mg daily, Clonidine 0.2 mg, two tablets q.h.s., Hydralazine 50 mg t.i.d.   She is going to restart Olmesartan.  I suspect we will be able to decrease Hydralazine when she starts the Olmesartan to twice a day.  She will at least take it twice a day.    History of dyslipidemia, for which she is on Rosuvastatin.    She will be back to see me in three months, sooner if she has any problems.        I have discussed the diagnosis with the patient and the intended plan as seen in the  Orders.  The patient understands and agees with the plan.  The patient has   received an after visit summary and questions were answered concerning  future plans  Patient labs and/or xrays were reviewed  Past records were reviewed.    PLAN:  Orders Placed This Encounter   Procedures    AMB POC GLUCOSE BLOOD, BY GLUCOSE MONITORING DEVICE        Follow-up and Dispositions    Return in about 3 months (around 10/23/2024).                ATTENTION:   This medical record was transcribed using an electronic medical records system.  Although proofread, it may and can contain electronic and spelling errors.  Other human spelling and other errors may be present.  Corrections may be executed at a later time.  Please feel free to contact us for any clarifications as needed.

## 2024-10-18 RX ORDER — LOSARTAN POTASSIUM 100 MG/1
100 TABLET ORAL DAILY
Qty: 90 TABLET | Refills: 3 | Status: SHIPPED | OUTPATIENT
Start: 2024-10-18

## 2024-11-05 ENCOUNTER — OFFICE VISIT (OUTPATIENT)
Facility: CLINIC | Age: 74
End: 2024-11-05

## 2024-11-05 VITALS
DIASTOLIC BLOOD PRESSURE: 79 MMHG | HEIGHT: 59 IN | RESPIRATION RATE: 18 BRPM | TEMPERATURE: 97.9 F | OXYGEN SATURATION: 98 % | HEART RATE: 74 BPM | BODY MASS INDEX: 29.61 KG/M2 | WEIGHT: 146.9 LBS | SYSTOLIC BLOOD PRESSURE: 163 MMHG

## 2024-11-05 DIAGNOSIS — K76.0 NAFL (NONALCOHOLIC FATTY LIVER): ICD-10-CM

## 2024-11-05 DIAGNOSIS — E11.65 TYPE 2 DIABETES MELLITUS WITH HYPERGLYCEMIA, WITHOUT LONG-TERM CURRENT USE OF INSULIN (HCC): Primary | ICD-10-CM

## 2024-11-05 DIAGNOSIS — E78.00 HYPERCHOLESTEROLEMIA: ICD-10-CM

## 2024-11-05 DIAGNOSIS — I10 PRIMARY HYPERTENSION: ICD-10-CM

## 2024-11-05 DIAGNOSIS — M79.89 MASS OF SOFT TISSUE OF LOWER LEG: ICD-10-CM

## 2024-11-05 DIAGNOSIS — E55.9 VITAMIN D DEFICIENCY: ICD-10-CM

## 2024-11-05 NOTE — PROGRESS NOTES
Chief Complaint   Patient presents with    Follow-up    Diabetes    Hypertension     \"Have you been to the ER, urgent care clinic since your last visit?  Hospitalized since your last visit?\"    NO    “Have you seen or consulted any other health care providers outside our system since your last visit?”    NO             
30 min   Stress: No Stress Concern Present (12/18/2023)    [place] [employer]ational [employer] - Occupational Stress Questionnaire     Feeling of Stress : Not at all   Social Connections: Moderately Isolated (12/18/2023)    Social Connection and Isolation Panel [NHANES]     Frequency of Communication with Friends and Family: Never     Frequency of Social Gatherings with Friends and Family: Never     Attends Jew Services: More than 4 times per year     Active Member of Clubs or Organizations: No     Attends Club or Organization Meetings: Never     Marital Status:    Intimate Partner Violence: Not At Risk (12/18/2023)    Humiliation, Afraid, Rape, and Kick questionnaire     Fear of Current or Ex-Partner: No     Emotionally Abused: No     Physically Abused: No     Sexually Abused: No   Housing Stability: Low Risk  (2/16/2024)    Housing Stability Vital Sign     Unable to Pay for Housing in the Last Year: No     Number of Places Lived in the Last Year: 1     Unstable Housing in the Last Year: No     Family History   Problem Relation Age of Onset    No Known Problems Father        OBJECTIVE:    BP (!) 163/79 (Site: Left Upper Arm, Position: Sitting, Cuff Size: Medium Adult)   Pulse 74   Temp 97.9 °F (36.6 °C) (Oral)   Resp 18   Ht 1.499 m (4' 11\")   Wt 66.6 kg (146 lb 14.4 oz)   LMP  (LMP Unknown)   SpO2 98%   BMI 29.67 kg/m²   CONSTITUTIONAL: well , well nourished, appears age appropriate  EYES: perrla, eom intact  ENMT:moist mucous membranes, pharynx clear  NECK: supple. Thyroid normal  RESPIRATORY: Chest: clear bilaterally   CARDIOVASCULAR: Heart: regular rate and rhythm  GASTROINTESTINAL: Abdomen: soft, bowel sounds active  HEMATOLOGIC: no pathological lymph nodes palpated  MUSCULOSKELETAL: Extremities: no edema, pulse 1+   INTEGUMENT: No unusual rashes or suspicious skin lesions noted. Nails appear normal.  NEUROLOGIC: non-focal exam   MENTAL STATUS: alert and oriented, appropriate

## 2024-11-06 LAB
25(OH)D3+25(OH)D2 SERPL-MCNC: 17.9 NG/ML (ref 30–100)
APPEARANCE UR: ABNORMAL
BACTERIA #/AREA URNS HPF: ABNORMAL /[HPF]
BASOPHILS # BLD AUTO: 0.1 X10E3/UL (ref 0–0.2)
BASOPHILS NFR BLD AUTO: 1 %
BILIRUB UR QL STRIP: NEGATIVE
CASTS URNS QL MICRO: ABNORMAL /LPF
CHOLEST SERPL-MCNC: 182 MG/DL (ref 100–199)
COLOR UR: YELLOW
EOSINOPHIL # BLD AUTO: 0.1 X10E3/UL (ref 0–0.4)
EOSINOPHIL NFR BLD AUTO: 2 %
EPI CELLS #/AREA URNS HPF: >10 /HPF (ref 0–10)
ERYTHROCYTE [DISTWIDTH] IN BLOOD BY AUTOMATED COUNT: 13.4 % (ref 11.7–15.4)
GLUCOSE UR QL STRIP: ABNORMAL
HBA1C MFR BLD: 8.4 % (ref 4.8–5.6)
HCT VFR BLD AUTO: 40.5 % (ref 34–46.6)
HDLC SERPL-MCNC: 67 MG/DL
HGB BLD-MCNC: 12.6 G/DL (ref 11.1–15.9)
HGB UR QL STRIP: NEGATIVE
IMM GRANULOCYTES # BLD AUTO: 0 X10E3/UL (ref 0–0.1)
IMM GRANULOCYTES NFR BLD AUTO: 0 %
KETONES UR QL STRIP: NEGATIVE
LDLC SERPL CALC-MCNC: 105 MG/DL (ref 0–99)
LEUKOCYTE ESTERASE UR QL STRIP: ABNORMAL
LYMPHOCYTES # BLD AUTO: 1.3 X10E3/UL (ref 0.7–3.1)
LYMPHOCYTES NFR BLD AUTO: 24 %
MCH RBC QN AUTO: 29.6 PG (ref 26.6–33)
MCHC RBC AUTO-ENTMCNC: 31.1 G/DL (ref 31.5–35.7)
MCV RBC AUTO: 95 FL (ref 79–97)
MICRO URNS: ABNORMAL
MONOCYTES # BLD AUTO: 0.3 X10E3/UL (ref 0.1–0.9)
MONOCYTES NFR BLD AUTO: 5 %
NEUTROPHILS # BLD AUTO: 3.8 X10E3/UL (ref 1.4–7)
NEUTROPHILS NFR BLD AUTO: 68 %
NITRITE UR QL STRIP: NEGATIVE
PH UR STRIP: 5 [PH] (ref 5–7.5)
PLATELET # BLD AUTO: 170 X10E3/UL (ref 150–450)
PROT UR QL STRIP: NEGATIVE
RBC # BLD AUTO: 4.25 X10E6/UL (ref 3.77–5.28)
RBC #/AREA URNS HPF: ABNORMAL /HPF (ref 0–2)
SP GR UR STRIP: 1.02 (ref 1–1.03)
SPECIMEN STATUS REPORT: NORMAL
TRIGL SERPL-MCNC: 53 MG/DL (ref 0–149)
TSH SERPL DL<=0.005 MIU/L-ACNC: 1.19 UIU/ML (ref 0.45–4.5)
UROBILINOGEN UR STRIP-MCNC: 0.2 MG/DL (ref 0.2–1)
VLDLC SERPL CALC-MCNC: 10 MG/DL (ref 5–40)
WBC # BLD AUTO: 5.6 X10E3/UL (ref 3.4–10.8)
WBC #/AREA URNS HPF: ABNORMAL /HPF (ref 0–5)
YEAST #/AREA URNS HPF: PRESENT /[HPF]

## 2024-11-06 RX ORDER — ERGOCALCIFEROL 1.25 MG/1
50000 CAPSULE, LIQUID FILLED ORAL WEEKLY
Qty: 12 CAPSULE | Refills: 1 | Status: SHIPPED | OUTPATIENT
Start: 2024-11-06

## 2024-11-07 LAB
ALBUMIN SERPL-MCNC: 3.9 G/DL (ref 3.8–4.8)
ALP SERPL-CCNC: 93 IU/L (ref 44–121)
ALT SERPL-CCNC: 14 IU/L (ref 0–32)
AST SERPL-CCNC: 19 IU/L (ref 0–40)
BILIRUB SERPL-MCNC: 0.4 MG/DL (ref 0–1.2)
BUN SERPL-MCNC: 19 MG/DL (ref 8–27)
BUN/CREAT SERPL: 23 (ref 12–28)
CALCIUM SERPL-MCNC: 9 MG/DL (ref 8.7–10.3)
CHLORIDE SERPL-SCNC: 105 MMOL/L (ref 96–106)
CO2 SERPL-SCNC: 20 MMOL/L (ref 20–29)
CREAT SERPL-MCNC: 0.84 MG/DL (ref 0.57–1)
EGFRCR SERPLBLD CKD-EPI 2021: 72 ML/MIN/1.73
GLOBULIN SER CALC-MCNC: 3.1 G/DL (ref 1.5–4.5)
GLUCOSE SERPL-MCNC: 202 MG/DL (ref 70–99)
POTASSIUM SERPL-SCNC: 4.2 MMOL/L (ref 3.5–5.2)
PROT SERPL-MCNC: 7 G/DL (ref 6–8.5)
SODIUM SERPL-SCNC: 142 MMOL/L (ref 134–144)

## 2024-11-08 ENCOUNTER — APPOINTMENT (OUTPATIENT)
Facility: HOSPITAL | Age: 74
End: 2024-11-08
Payer: MEDICARE

## 2024-11-08 ENCOUNTER — HOSPITAL ENCOUNTER (EMERGENCY)
Facility: HOSPITAL | Age: 74
Discharge: HOME | End: 2024-11-08
Attending: EMERGENCY MEDICINE
Payer: MEDICARE

## 2024-11-08 VITALS
TEMPERATURE: 97.7 F | SYSTOLIC BLOOD PRESSURE: 134 MMHG | BODY MASS INDEX: 29.02 KG/M2 | HEIGHT: 59 IN | OXYGEN SATURATION: 97 % | RESPIRATION RATE: 18 BRPM | WEIGHT: 143.96 LBS | HEART RATE: 86 BPM | DIASTOLIC BLOOD PRESSURE: 98 MMHG

## 2024-11-08 DIAGNOSIS — M79.605 PAIN OF LEFT LOWER EXTREMITY: Primary | ICD-10-CM

## 2024-11-08 LAB
ALBUMIN SERPL-MCNC: 3.9 G/DL (ref 3.5–5)
ALBUMIN/GLOB SERPL: 0.9 (ref 1.1–2.2)
ALP SERPL-CCNC: 99 U/L (ref 45–117)
ALT SERPL-CCNC: 18 U/L (ref 12–78)
ANION GAP SERPL CALC-SCNC: 5 MMOL/L (ref 2–12)
AST SERPL-CCNC: 17 U/L (ref 15–37)
BASOPHILS # BLD: 0.1 K/UL (ref 0–0.1)
BASOPHILS NFR BLD: 1 % (ref 0–1)
BILIRUB SERPL-MCNC: 0.9 MG/DL (ref 0.2–1)
BUN SERPL-MCNC: 17 MG/DL (ref 6–20)
BUN/CREAT SERPL: 18 (ref 12–20)
CALCIUM SERPL-MCNC: 9.3 MG/DL (ref 8.5–10.1)
CHLORIDE SERPL-SCNC: 109 MMOL/L (ref 97–108)
CO2 SERPL-SCNC: 27 MMOL/L (ref 21–32)
CREAT SERPL-MCNC: 0.92 MG/DL (ref 0.55–1.02)
CRP SERPL-MCNC: <0.29 MG/DL (ref 0–0.3)
DIFFERENTIAL METHOD BLD: NORMAL
EOSINOPHIL # BLD: 0.1 K/UL (ref 0–0.4)
EOSINOPHIL NFR BLD: 2 % (ref 0–7)
ERYTHROCYTE [DISTWIDTH] IN BLOOD BY AUTOMATED COUNT: 13.9 % (ref 11.5–14.5)
ERYTHROCYTE [SEDIMENTATION RATE] IN BLOOD: 30 MM/HR (ref 0–30)
GLOBULIN SER CALC-MCNC: 4.4 G/DL (ref 2–4)
GLUCOSE SERPL-MCNC: 159 MG/DL (ref 65–100)
HCT VFR BLD AUTO: 41.1 % (ref 35–47)
HGB BLD-MCNC: 13.3 G/DL (ref 11.5–16)
IMM GRANULOCYTES # BLD AUTO: 0 K/UL (ref 0–0.04)
IMM GRANULOCYTES NFR BLD AUTO: 0 % (ref 0–0.5)
LYMPHOCYTES # BLD: 1.6 K/UL (ref 0.8–3.5)
LYMPHOCYTES NFR BLD: 25 % (ref 12–49)
MCH RBC QN AUTO: 30 PG (ref 26–34)
MCHC RBC AUTO-ENTMCNC: 32.4 G/DL (ref 30–36.5)
MCV RBC AUTO: 92.6 FL (ref 80–99)
MONOCYTES # BLD: 0.3 K/UL (ref 0–1)
MONOCYTES NFR BLD: 5 % (ref 5–13)
NEUTS SEG # BLD: 4.3 K/UL (ref 1.8–8)
NEUTS SEG NFR BLD: 67 % (ref 32–75)
NRBC # BLD: 0 K/UL (ref 0–0.01)
NRBC BLD-RTO: 0 PER 100 WBC
PLATELET # BLD AUTO: 200 K/UL (ref 150–400)
PMV BLD AUTO: 11 FL (ref 8.9–12.9)
POTASSIUM SERPL-SCNC: 4.4 MMOL/L (ref 3.5–5.1)
PROT SERPL-MCNC: 8.3 G/DL (ref 6.4–8.2)
RBC # BLD AUTO: 4.44 M/UL (ref 3.8–5.2)
SODIUM SERPL-SCNC: 141 MMOL/L (ref 136–145)
WBC # BLD AUTO: 6.4 K/UL (ref 3.6–11)

## 2024-11-08 PROCEDURE — 85652 RBC SED RATE AUTOMATED: CPT

## 2024-11-08 PROCEDURE — 85025 COMPLETE CBC W/AUTO DIFF WBC: CPT

## 2024-11-08 PROCEDURE — 93971 EXTREMITY STUDY: CPT

## 2024-11-08 PROCEDURE — 6360000002 HC RX W HCPCS: Performed by: PHYSICIAN ASSISTANT

## 2024-11-08 PROCEDURE — 99284 EMERGENCY DEPT VISIT MOD MDM: CPT

## 2024-11-08 PROCEDURE — 73590 X-RAY EXAM OF LOWER LEG: CPT

## 2024-11-08 PROCEDURE — 80053 COMPREHEN METABOLIC PANEL: CPT

## 2024-11-08 PROCEDURE — 96374 THER/PROPH/DIAG INJ IV PUSH: CPT

## 2024-11-08 PROCEDURE — 36415 COLL VENOUS BLD VENIPUNCTURE: CPT

## 2024-11-08 PROCEDURE — 86140 C-REACTIVE PROTEIN: CPT

## 2024-11-08 RX ORDER — KETOROLAC TROMETHAMINE 30 MG/ML
15 INJECTION, SOLUTION INTRAMUSCULAR; INTRAVENOUS ONCE
Status: COMPLETED | OUTPATIENT
Start: 2024-11-08 | End: 2024-11-08

## 2024-11-08 RX ADMIN — KETOROLAC TROMETHAMINE 15 MG: 30 INJECTION, SOLUTION INTRAMUSCULAR at 12:07

## 2024-11-08 ASSESSMENT — PAIN SCALES - GENERAL: PAINLEVEL_OUTOF10: 10

## 2024-11-08 ASSESSMENT — PAIN DESCRIPTION - PAIN TYPE: TYPE: ACUTE PAIN

## 2024-11-08 ASSESSMENT — PAIN DESCRIPTION - LOCATION: LOCATION: LEG

## 2024-11-08 ASSESSMENT — PAIN DESCRIPTION - FREQUENCY: FREQUENCY: CONTINUOUS

## 2024-11-08 ASSESSMENT — PAIN - FUNCTIONAL ASSESSMENT: PAIN_FUNCTIONAL_ASSESSMENT: PREVENTS OR INTERFERES WITH MANY ACTIVE NOT PASSIVE ACTIVITIES

## 2024-11-08 ASSESSMENT — PAIN DESCRIPTION - DESCRIPTORS: DESCRIPTORS: ACHING

## 2024-11-08 ASSESSMENT — PAIN DESCRIPTION - ORIENTATION: ORIENTATION: LEFT;LOWER

## 2024-11-08 ASSESSMENT — PAIN DESCRIPTION - ONSET: ONSET: ON-GOING

## 2024-11-08 NOTE — ED PROVIDER NOTES
[facility] EMERGENCY DEP  EMERGENCY DEPARTMENT ENCOUNTER      Pt Name: Helen Gillette  MRN: [id number]  Birthdate 1950  Date of evaluation: 2024  Provider: Pasquale Lobato MD    CHIEF COMPLAINT       Chief Complaint   Patient presents with    Leg Swelling         HISTORY OF PRESENT ILLNESS   (Location/Symptom, Timing/Onset, Context/Setting, Quality, Duration, Modifying Factors, Severity)  Note limiting factors.   Ms. Gillette is a 73yo female who presents to the ER with complaints of left leg pain she reports that she has had this pain for several weeks.  The pain is located at the left anterior leg.  She reports she has history of cellulitis previously at the same site.  She saw her primary care doctor 2 days ago and he had ordered an MRI of her leg.  She is having some difficulty getting that scheduled so, she came to the ER.  She said that she also was having some slightly worsening pain in this area.  No fevers or chills.  No nausea or vomiting.  She denies any injury or trauma to the area.  She denies any other complaints.  She said that she is having some pain when she tries to ambulate.            Review of External Medical Records:     Nursing Notes were reviewed.    REVIEW OF SYSTEMS    (2-9 systems for level 4, 10 or more for level 5)     Review of Systems   Musculoskeletal:         Leg pain       Except as noted above the remainder of the review of systems was reviewed and negative.       PAST MEDICAL HISTORY     Past Medical History:   Diagnosis Date    Arm pain 2021    Baker's cyst 2016    r    Bereavement 2015    61yo  sister     Contusion of knee and lower leg, right, subsequent encounter 11/10/2017    Contusion of knee, right     Cough 2017    Depression with anxiety     Diverticulosis 2015    colonoscopy    DM2 (diabetes mellitus, type 2) ([facility])     Edema     Encounter related to worker's compensation claim 11/10/2017    fall t work    Fatigue     Hemangioma of

## 2024-11-08 NOTE — ED TRIAGE NOTES
Pt ambulatory to triage with unsteady gait for c/o left lower leg pain, swelling and discoloration for at least 2 weeks. Area is warm to touch.    Pt was supposed to get MRI ordered by Dr. Hand but was unable to get scheduled.

## 2024-11-08 NOTE — ED NOTES
10:42 AM   The patient is a 74-year-old female presents emergency department accompanied by her , she is having left lower leg pain for 2 weeks.  She was seen by her primary care provider yesterday, and advised to have an MRI.  The patient has been calling, but is unable to schedule it, and because she is having leg pain, presents to the emergency department.  She denies any fever, she has not taken any medication for the pain.  There is some discoloration, but this is baseline, she had osteomyelitis in the same area in February, and this is residual from that.  The swelling however is new.    I have evaluated the patient as the Provider in Rapid Medical Evaluation (RME). I have reviewed her vital signs and the triage nurse assessment. I have talked with the patient and any available family and advised that I am the provider in triage and have ordered the appropriate study to initiate their work up based on the clinical presentation during my assessment. I have advised that the patient will be accommodated in the Main ED as soon as possible. I have also requested to contact the triage nurse or myself immediately if the patient experiences any changes in their condition during this brief waiting period.  EDMUNDO Boudreaux Heidi J, PA-C  11/08/24 1043

## 2024-11-08 NOTE — DISCHARGE INSTR - COC
Continuity of Care Form    Patient Name: Helen Gillette   :  1950  MRN:  [id number]    Admit date:  2024  Discharge date:  ***    Code Status Order: Prior   Advance Directives:   Advance Care Flowsheet Documentation             Admitting Physician:  No admitting provider for patient encounter.  PCP: Afshin Hand MD    Discharging Nurse: ***  Discharging Hospital Unit/Room#: ER16/16  Discharging Unit Phone Number: ***    Emergency Contact:   Extended Emergency Contact Information  Primary Emergency Contact: Abhilash Casillas Jr.   [facility]  Home Phone: [phone]  Relation: Child  Secondary Emergency Contact: raina,Amira  Mobile Phone: [phone]  Relation: Friend    Past Surgical History:  Past Surgical History:   Procedure Laterality Date    GYN      partial hysterectomy       Immunization History:   Immunization History   Administered Date(s) Administered    COVID-19, PFIZER PURPLE top, DILUTE for use, (age 12 y+), 30mcg/0.3mL 03/15/2021, 2021       Active Problems:  Patient Active Problem List   Diagnosis Code    Diverticulosis K57.90    NAFL (nonalcoholic fatty liver) K76.0    Depression with anxiety F41.8    Diabetes mellitus (HCC) E11.9    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    Hypertension I10    Hypercholesterolemia E78.00    Hemangioma of liver D18.03    Baker's cyst M71.20    Hyperglycemia R73.9    Cellulitis of lower leg L03.119       Isolation/Infection:   Isolation            No Isolation          Patient Infection Status       None to display            Nurse Assessment:  Last Vital Signs: BP (!) 176/101   Pulse 84   Temp 97.7 °F (36.5 °C) (Oral)   Resp 20   Ht 1.499 m (4' 11\")   Wt 65.3 kg (143 lb 15.4 oz)   LMP  (LMP Unknown)   SpO2 98%   BMI 29.08 kg/m²     Last documented pain score (0-10 scale): Pain Level: 10  Last Weight:   Wt Readings from Last 1 Encounters:   24 65.3 kg (143 lb 15.4 oz)     Mental Status:  {IP PT MENTAL STATUS:43160}    IV  Facility/ Agency   Name:   Address:  Phone:  Fax:    Dialysis Facility (if applicable)   Name:  Address:  Dialysis Schedule:  Phone:  Fax:    / signature: {Esignature:162689914}    PHYSICIAN SECTION    Prognosis: {Prognosis:9008381738}    Condition at Discharge: {MH Patient Condition:559936568}    Rehab Potential (if transferring to Rehab): {Prognosis:7482484448}    Recommended Labs or Other Treatments After Discharge: ***    Physician Certification: I certify the above information and transfer of Helen Gillette  is necessary for the continuing treatment of the diagnosis listed and that she requires {Admit to Appropriate Level of Care:73192} for {GREATER/LESS:936936436} 30 days.     Update Admission H&P: {CHP DME Changes in HandP:593007180}    PHYSICIAN SIGNATURE:  {Esignature:976547884}

## 2024-11-09 LAB — ECHO BSA: 1.65 M2

## 2024-11-18 LAB — DIABETIC RETINOPATHY: NEGATIVE

## 2024-11-25 ENCOUNTER — TELEPHONE (OUTPATIENT)
Facility: CLINIC | Age: 74
End: 2024-11-25
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
## 2024-11-26 ENCOUNTER — HOSPITAL ENCOUNTER (EMERGENCY)
Facility: HOSPITAL | Age: 74
Discharge: HOME | End: 2024-11-26
Attending: STUDENT IN AN ORGANIZED HEALTH CARE EDUCATION/TRAINING PROGRAM
Payer: MEDICARE

## 2024-11-26 ENCOUNTER — APPOINTMENT (OUTPATIENT)
Facility: HOSPITAL | Age: 74
End: 2024-11-26
Payer: MEDICARE

## 2024-11-26 VITALS
OXYGEN SATURATION: 99 % | HEIGHT: 59 IN | WEIGHT: 144.62 LBS | HEART RATE: 73 BPM | RESPIRATION RATE: 18 BRPM | DIASTOLIC BLOOD PRESSURE: 74 MMHG | BODY MASS INDEX: 29.16 KG/M2 | TEMPERATURE: 98 F | SYSTOLIC BLOOD PRESSURE: 189 MMHG

## 2024-11-26 DIAGNOSIS — R10.32 ABDOMINAL PAIN, LEFT LOWER QUADRANT: ICD-10-CM
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
## 2024-11-26 DIAGNOSIS — E11.65 TYPE 2 DIABETES MELLITUS WITH HYPERGLYCEMIA, UNSPECIFIED WHETHER LONG TERM INSULIN USE (HCC): ICD-10-CM

## 2024-11-26 LAB
ALBUMIN SERPL-MCNC: 3.5 G/DL (ref 3.5–5)
ALBUMIN/GLOB SERPL: 0.8 (ref 1.1–2.2)
ALP SERPL-CCNC: 84 U/L (ref 45–117)
ALT SERPL-CCNC: 16 U/L (ref 12–78)
ANION GAP SERPL CALC-SCNC: 7 MMOL/L (ref 2–12)
APPEARANCE UR: CLEAR
AST SERPL-CCNC: 18 U/L (ref 15–37)
BACTERIA URNS QL MICRO: NEGATIVE /HPF
BASOPHILS # BLD: 0.1 K/UL (ref 0–0.1)
BASOPHILS NFR BLD: 1 % (ref 0–1)
BILIRUB SERPL-MCNC: 0.4 MG/DL (ref 0.2–1)
BILIRUB UR QL: NEGATIVE
BUN SERPL-MCNC: 20 MG/DL (ref 6–20)
BUN/CREAT SERPL: 23 (ref 12–20)
CALCIUM SERPL-MCNC: 9 MG/DL (ref 8.5–10.1)
CHLORIDE SERPL-SCNC: 112 MMOL/L (ref 97–108)
CO2 SERPL-SCNC: 23 MMOL/L (ref 21–32)
COLOR UR: ABNORMAL
CREAT SERPL-MCNC: 0.87 MG/DL (ref 0.55–1.02)
DIFFERENTIAL METHOD BLD: NORMAL
EOSINOPHIL # BLD: 0.2 K/UL (ref 0–0.4)
EOSINOPHIL NFR BLD: 3 % (ref 0–7)
EPITH CASTS URNS QL MICRO: ABNORMAL /LPF
ERYTHROCYTE [DISTWIDTH] IN BLOOD BY AUTOMATED COUNT: 13.6 % (ref 11.5–14.5)
GLOBULIN SER CALC-MCNC: 4.4 G/DL (ref 2–4)
GLUCOSE SERPL-MCNC: 152 MG/DL (ref 65–100)
GLUCOSE UR STRIP.AUTO-MCNC: >1000 MG/DL
HCT VFR BLD AUTO: 40.2 % (ref 35–47)
HGB BLD-MCNC: 13 G/DL (ref 11.5–16)
HGB UR QL STRIP: NEGATIVE
HYALINE CASTS URNS QL MICRO: ABNORMAL /LPF (ref 0–5)
IMM GRANULOCYTES # BLD AUTO: 0 K/UL (ref 0–0.04)
IMM GRANULOCYTES NFR BLD AUTO: 0 % (ref 0–0.5)
KETONES UR QL STRIP.AUTO: ABNORMAL MG/DL
LEUKOCYTE ESTERASE UR QL STRIP.AUTO: ABNORMAL
LIPASE SERPL-CCNC: 21 U/L (ref 13–75)
LYMPHOCYTES # BLD: 1.8 K/UL (ref 0.8–3.5)
LYMPHOCYTES NFR BLD: 29 % (ref 12–49)
MCH RBC QN AUTO: 29.7 PG (ref 26–34)
MCHC RBC AUTO-ENTMCNC: 32.3 G/DL (ref 30–36.5)
MCV RBC AUTO: 91.8 FL (ref 80–99)
MONOCYTES # BLD: 0.3 K/UL (ref 0–1)
MONOCYTES NFR BLD: 5 % (ref 5–13)
NEUTS SEG # BLD: 4 K/UL (ref 1.8–8)
NEUTS SEG NFR BLD: 62 % (ref 32–75)
NITRITE UR QL STRIP.AUTO: NEGATIVE
NRBC # BLD: 0 K/UL (ref 0–0.01)
NRBC BLD-RTO: 0 PER 100 WBC
PH UR STRIP: 5.5 (ref 5–8)
PLATELET # BLD AUTO: 189 K/UL (ref 150–400)
PMV BLD AUTO: 12.2 FL (ref 8.9–12.9)
POTASSIUM SERPL-SCNC: 3.8 MMOL/L (ref 3.5–5.1)
PROT SERPL-MCNC: 7.9 G/DL (ref 6.4–8.2)
PROT UR STRIP-MCNC: NEGATIVE MG/DL
RBC # BLD AUTO: 4.38 M/UL (ref 3.8–5.2)
RBC #/AREA URNS HPF: ABNORMAL /HPF (ref 0–5)
SODIUM SERPL-SCNC: 142 MMOL/L (ref 136–145)
SP GR UR REFRACTOMETRY: 1.02
SPECIMEN HOLD: NORMAL
UROBILINOGEN UR QL STRIP.AUTO: 0.2 EU/DL (ref 0.2–1)
WBC # BLD AUTO: 6.4 K/UL (ref 3.6–11)
WBC URNS QL MICRO: ABNORMAL /HPF (ref 0–4)

## 2024-11-26 PROCEDURE — 6370000000 HC RX 637 (ALT 250 FOR IP): Performed by: EMERGENCY MEDICINE

## 2024-11-26 PROCEDURE — 36415 COLL VENOUS BLD VENIPUNCTURE: CPT

## 2024-11-26 PROCEDURE — 81001 URINALYSIS AUTO W/SCOPE: CPT

## 2024-11-26 PROCEDURE — 80053 COMPREHEN METABOLIC PANEL: CPT

## 2024-11-26 PROCEDURE — 85025 COMPLETE CBC W/AUTO DIFF WBC: CPT

## 2024-11-26 PROCEDURE — 83690 ASSAY OF LIPASE: CPT

## 2024-11-26 PROCEDURE — 99283 EMERGENCY DEPT VISIT LOW MDM: CPT

## 2024-11-26 PROCEDURE — 87086 URINE CULTURE/COLONY COUNT: CPT

## 2024-11-26 RX ORDER — AMLODIPINE BESYLATE 5 MG/1
5 TABLET ORAL
Status: COMPLETED | OUTPATIENT
Start: 2024-11-26 | End: 2024-11-26

## 2024-11-26 RX ORDER — IOPAMIDOL 755 MG/ML
100 INJECTION, SOLUTION INTRAVASCULAR
Status: DISCONTINUED | OUTPATIENT
Start: 2024-11-26 | End: 2024-11-26 | Stop reason: HOSPADM

## 2024-11-26 RX ADMIN — AMLODIPINE BESYLATE 5 MG: 5 TABLET ORAL at 09:45

## 2024-11-26 ASSESSMENT — PAIN SCALES - GENERAL: PAINLEVEL_OUTOF10: 5

## 2024-11-26 ASSESSMENT — ENCOUNTER SYMPTOMS
ABDOMINAL PAIN: 0
BACK PAIN: 0
TROUBLE SWALLOWING: 0
VOMITING: 0
COUGH: 0
SHORTNESS OF BREATH: 0
DIARRHEA: 0
NAUSEA: 0

## 2024-11-26 ASSESSMENT — PAIN DESCRIPTION - ORIENTATION: ORIENTATION: LEFT;LOWER

## 2024-11-26 ASSESSMENT — PAIN DESCRIPTION - ONSET: ONSET: ON-GOING

## 2024-11-26 ASSESSMENT — PAIN DESCRIPTION - PAIN TYPE: TYPE: ACUTE PAIN

## 2024-11-26 ASSESSMENT — PAIN DESCRIPTION - FREQUENCY: FREQUENCY: CONTINUOUS

## 2024-11-26 ASSESSMENT — PAIN - FUNCTIONAL ASSESSMENT
PAIN_FUNCTIONAL_ASSESSMENT: 0-10
PAIN_FUNCTIONAL_ASSESSMENT: PREVENTS OR INTERFERES SOME ACTIVE ACTIVITIES AND ADLS

## 2024-11-26 ASSESSMENT — PAIN DESCRIPTION - LOCATION: LOCATION: ABDOMEN

## 2024-11-26 ASSESSMENT — PAIN DESCRIPTION - DESCRIPTORS: DESCRIPTORS: ACHING

## 2024-11-26 NOTE — ED NOTES
Patient and family reported having somewhere to be.  Patient's family stated the PCP is a [facility] and will get the results from them.  Patient left before being DC.
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
## 2024-11-27 ENCOUNTER — HOSPITAL ENCOUNTER (EMERGENCY)
Facility: HOSPITAL | Age: 74
Discharge: LEFT BEFORE DISCHARGE | End: 2024-11-27
Payer: MEDICARE

## 2024-11-27 VITALS
HEART RATE: 88 BPM | DIASTOLIC BLOOD PRESSURE: 93 MMHG | TEMPERATURE: 98.7 F | SYSTOLIC BLOOD PRESSURE: 130 MMHG | WEIGHT: 143.3 LBS | RESPIRATION RATE: 16 BRPM | OXYGEN SATURATION: 98 % | BODY MASS INDEX: 28.89 KG/M2 | HEIGHT: 59 IN

## 2024-11-27 DIAGNOSIS — R10.32 LEFT LOWER QUADRANT ABDOMINAL PAIN: Primary | ICD-10-CM

## 2024-11-27 LAB
BACTERIA SPEC CULT: NORMAL
SERVICE CMNT-IMP: NORMAL

## 2024-11-27 PROCEDURE — 99282 EMERGENCY DEPT VISIT SF MDM: CPT

## 2024-11-27 ASSESSMENT — PAIN SCALES - GENERAL: PAINLEVEL_OUTOF10: 10

## 2024-11-27 ASSESSMENT — PAIN DESCRIPTION - ONSET: ONSET: PROGRESSIVE

## 2024-11-27 ASSESSMENT — PAIN DESCRIPTION - PAIN TYPE: TYPE: ACUTE PAIN

## 2024-11-27 ASSESSMENT — PAIN DESCRIPTION - ORIENTATION: ORIENTATION: LEFT;LOWER

## 2024-11-27 ASSESSMENT — PAIN - FUNCTIONAL ASSESSMENT: PAIN_FUNCTIONAL_ASSESSMENT: PREVENTS OR INTERFERES SOME ACTIVE ACTIVITIES AND ADLS

## 2024-11-27 ASSESSMENT — PAIN DESCRIPTION - LOCATION: LOCATION: ABDOMEN

## 2024-11-27 ASSESSMENT — ENCOUNTER SYMPTOMS
ABDOMINAL PAIN: 1
NAUSEA: 0
VOMITING: 0
TROUBLE SWALLOWING: 0
SHORTNESS OF BREATH: 0

## 2024-11-27 ASSESSMENT — PAIN DESCRIPTION - DESCRIPTORS: DESCRIPTORS: ACHING;SORE

## 2024-11-27 ASSESSMENT — PAIN DESCRIPTION - FREQUENCY: FREQUENCY: CONTINUOUS
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
## 2024-12-02 ENCOUNTER — OFFICE VISIT (OUTPATIENT)
Facility: CLINIC | Age: 74
End: 2024-12-02
Payer: MEDICARE

## 2024-12-02 VITALS
OXYGEN SATURATION: 96 % | SYSTOLIC BLOOD PRESSURE: 138 MMHG | DIASTOLIC BLOOD PRESSURE: 80 MMHG | WEIGHT: 144.3 LBS | BODY MASS INDEX: 29.09 KG/M2 | TEMPERATURE: 97.5 F | HEIGHT: 59 IN | HEART RATE: 64 BPM | RESPIRATION RATE: 16 BRPM

## 2024-12-02 DIAGNOSIS — I10 PRIMARY HYPERTENSION: ICD-10-CM

## 2024-12-02 DIAGNOSIS — E78.00 HYPERCHOLESTEROLEMIA: ICD-10-CM

## 2024-12-02 DIAGNOSIS — E11.65 TYPE 2 DIABETES MELLITUS WITH HYPERGLYCEMIA, WITHOUT LONG-TERM CURRENT USE OF INSULIN (HCC): Primary | ICD-10-CM

## 2024-12-02 DIAGNOSIS — R10.32 LEFT LOWER QUADRANT ABDOMINAL PAIN: ICD-10-CM

## 2024-12-02 DIAGNOSIS — K76.0 NAFL (NONALCOHOLIC FATTY LIVER): ICD-10-CM

## 2024-12-02 PROCEDURE — 1126F AMNT PAIN NOTED NONE PRSNT: CPT | Performed by: INTERNAL MEDICINE

## 2024-12-02 PROCEDURE — 1159F MED LIST DOCD IN RCRD: CPT | Performed by: INTERNAL MEDICINE

## 2024-12-02 PROCEDURE — 3079F DIAST BP 80-89 MM HG: CPT | Performed by: INTERNAL MEDICINE

## 2024-12-02 PROCEDURE — 99214 OFFICE O/P EST MOD 30 MIN: CPT | Performed by: INTERNAL MEDICINE

## 2024-12-02 PROCEDURE — 3052F HG A1C>EQUAL 8.0%<EQUAL 9.0%: CPT | Performed by: INTERNAL MEDICINE

## 2024-12-02 PROCEDURE — 3075F SYST BP GE 130 - 139MM HG: CPT | Performed by: INTERNAL MEDICINE

## 2024-12-02 PROCEDURE — 1123F ACP DISCUSS/DSCN MKR DOCD: CPT | Performed by: INTERNAL MEDICINE

## 2024-12-02 NOTE — PROGRESS NOTES
Chief Complaint   Patient presents with    Follow-up    Diabetes     \"Have you been to the ER, urgent care clinic since your last visit?  Hospitalized since your last visit?\"    NO    “Have you seen or consulted any other health care providers outside our system since your last visit?”    NO
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
## 2025-02-07 ENCOUNTER — OFFICE VISIT (OUTPATIENT)
Facility: CLINIC | Age: 75
End: 2025-02-07
Payer: MEDICARE

## 2025-02-07 VITALS
WEIGHT: 144 LBS | BODY MASS INDEX: 29.03 KG/M2 | HEIGHT: 59 IN | DIASTOLIC BLOOD PRESSURE: 90 MMHG | TEMPERATURE: 98 F | HEART RATE: 80 BPM | RESPIRATION RATE: 16 BRPM | SYSTOLIC BLOOD PRESSURE: 150 MMHG | OXYGEN SATURATION: 96 %

## 2025-02-07 DIAGNOSIS — K76.0 NAFL (NONALCOHOLIC FATTY LIVER): ICD-10-CM
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
## 2025-02-07 DIAGNOSIS — Z00.00 MEDICARE ANNUAL WELLNESS VISIT, SUBSEQUENT: ICD-10-CM

## 2025-02-07 DIAGNOSIS — I10 PRIMARY HYPERTENSION: ICD-10-CM

## 2025-02-07 DIAGNOSIS — E78.00 HYPERCHOLESTEROLEMIA: ICD-10-CM

## 2025-02-07 DIAGNOSIS — E11.65 TYPE 2 DIABETES MELLITUS WITH HYPERGLYCEMIA, WITHOUT LONG-TERM CURRENT USE OF INSULIN (HCC): Primary | ICD-10-CM

## 2025-02-07 PROCEDURE — 1159F MED LIST DOCD IN RCRD: CPT | Performed by: INTERNAL MEDICINE

## 2025-02-07 PROCEDURE — 3080F DIAST BP >= 90 MM HG: CPT | Performed by: INTERNAL MEDICINE

## 2025-02-07 PROCEDURE — 1123F ACP DISCUSS/DSCN MKR DOCD: CPT | Performed by: INTERNAL MEDICINE

## 2025-02-07 PROCEDURE — 1126F AMNT PAIN NOTED NONE PRSNT: CPT | Performed by: INTERNAL MEDICINE

## 2025-02-07 PROCEDURE — 36415 COLL VENOUS BLD VENIPUNCTURE: CPT | Performed by: INTERNAL MEDICINE

## 2025-02-07 PROCEDURE — 3077F SYST BP >= 140 MM HG: CPT | Performed by: INTERNAL MEDICINE

## 2025-02-07 RX ORDER — HYDRALAZINE HYDROCHLORIDE 100 MG/1
100 TABLET, FILM COATED ORAL 2 TIMES DAILY
Qty: 18 TABLET | Refills: 3 | Status: SHIPPED | OUTPATIENT
Start: 2025-02-07

## 2025-02-07 RX ORDER — OLMESARTAN MEDOXOMIL 40 MG/1
40 TABLET ORAL DAILY
Qty: 90 TABLET | Refills: 3 | Status: SHIPPED | OUTPATIENT
Start: 2025-02-07 | End: 2026-02-07

## 2025-02-07 SDOH — ECONOMIC STABILITY: FOOD INSECURITY: WITHIN THE PAST 12 MONTHS, YOU WORRIED THAT YOUR FOOD WOULD RUN OUT BEFORE YOU GOT MONEY TO BUY MORE.: NEVER TRUE

## 2025-02-07 SDOH — ECONOMIC STABILITY: FOOD INSECURITY: WITHIN THE PAST 12 MONTHS, THE FOOD YOU BOUGHT JUST DIDN'T LAST AND YOU DIDN'T HAVE MONEY TO GET MORE.: NEVER TRUE

## 2025-02-07 ASSESSMENT — PATIENT HEALTH QUESTIONNAIRE - PHQ9
SUM OF ALL RESPONSES TO PHQ QUESTIONS 1-9: 0
5. POOR APPETITE OR OVEREATING: NOT AT ALL
3. TROUBLE FALLING OR STAYING ASLEEP: NOT AT ALL
1. LITTLE INTEREST OR PLEASURE IN DOING THINGS: NOT AT ALL
9. THOUGHTS THAT YOU WOULD BE BETTER OFF DEAD, OR OF HURTING YOURSELF: NOT AT ALL
SUM OF ALL RESPONSES TO PHQ QUESTIONS 1-9: 0
10. IF YOU CHECKED OFF ANY PROBLEMS, HOW DIFFICULT HAVE THESE PROBLEMS MADE IT FOR YOU TO DO YOUR WORK, TAKE CARE OF THINGS AT HOME, OR GET ALONG WITH OTHER PEOPLE: NOT DIFFICULT AT ALL
2. FEELING DOWN, DEPRESSED OR HOPELESS: NOT AT ALL
8. MOVING OR SPEAKING SO SLOWLY THAT OTHER PEOPLE COULD HAVE NOTICED. OR THE OPPOSITE, BEING SO FIGETY OR RESTLESS THAT YOU HAVE BEEN MOVING AROUND A LOT MORE THAN USUAL: NOT AT ALL
7. TROUBLE CONCENTRATING ON THINGS, SUCH AS READING THE NEWSPAPER OR WATCHING TELEVISION: NOT AT ALL
6. FEELING BAD ABOUT YOURSELF - OR THAT YOU ARE A FAILURE OR HAVE LET YOURSELF OR YOUR FAMILY DOWN: NOT AT ALL
SUM OF ALL RESPONSES TO PHQ9 QUESTIONS 1 & 2: 0
SUM OF ALL RESPONSES TO PHQ QUESTIONS 1-9: 0
4. FEELING TIRED OR HAVING LITTLE ENERGY: NOT AT ALL
SUM OF ALL RESPONSES TO PHQ QUESTIONS 1-9: 0

## 2025-02-07 ASSESSMENT — ANXIETY QUESTIONNAIRES
IF YOU CHECKED OFF ANY PROBLEMS ON THIS QUESTIONNAIRE, HOW DIFFICULT HAVE THESE PROBLEMS MADE IT FOR YOU TO DO YOUR WORK, TAKE CARE OF THINGS AT HOME, OR GET ALONG WITH OTHER PEOPLE: NOT DIFFICULT AT ALL
6. BECOMING EASILY ANNOYED OR IRRITABLE: NOT AT ALL
2. NOT BEING ABLE TO STOP OR CONTROL WORRYING: NOT AT ALL
7. FEELING AFRAID AS IF SOMETHING AWFUL MIGHT HAPPEN: NOT AT ALL
1. FEELING NERVOUS, ANXIOUS, OR ON EDGE: NOT AT ALL
4. TROUBLE RELAXING: NOT AT ALL
GAD7 TOTAL SCORE: 0
3. WORRYING TOO MUCH ABOUT DIFFERENT THINGS: NOT AT ALL
5. BEING SO RESTLESS THAT IT IS HARD TO SIT STILL: NOT AT ALL

## 2025-02-07 NOTE — PROGRESS NOTES
SPORTS MEDICINE AND PRIMARY CARE  Afshin Hand MD, FACP, CMD  [address] [place] [address]  Phone:  [phone]  Fax: [phone]       Chief Complaint   Patient presents with    Medicare AWV   .      SUBJECTIVE:  History of Present Illness         Helen Gillette is a 74 y.o. female     patient is a 74-year-old female who presents for evaluation of right leg pain, type 2 diabetes mellitus, hypertension, nonalcoholic fatty liver disease, and dyslipidemia.    She reports a recent fall from the second step of her staircase, resulting in a subsequent tumble into her yard. She did not seek immediate medical attention at the emergency room. She has been experiencing persistent leg pain, which she does not attribute to the fall as she did not land on her leg. Her dog assisted her in getting up after the fall. She reports no other injuries related to the fall. The pain in her leg has been constant, with an episode of increased discomfort during a recent cold night, prompting her to warm her leg by the heater. She expresses concern about the possibility of an infection in her leg, although she believes it has improved since her last visit. She is grateful for her ability to ambulate despite the pain.    She occasionally monitors her blood pressure at home but has not done so recently due to feeling well. She is currently on losartan for blood pressure management.    She has type 2 diabetes mellitus and is intolerant to metformin and declines insulin. She is taking Jardiance and Januvia.    She has nonalcoholic fatty liver disease.    She has dyslipidemia. She is on rosuvastatin.    ALLERGIES  The patient is intolerant to METFORMIN.    MEDICATIONS  Current: Jardiance, Januvia, amlodipine, clonidine, hydralazine, losartan, rosuvastatin    Current Outpatient Medications   Medication Sig Dispense Refill    olmesartan (BENICAR) 40 MG tablet Take 1 tablet by mouth daily 90 tablet 3    hydrALAZINE (APRESOLINE) 100 MG

## 2025-02-07 NOTE — PATIENT INSTRUCTIONS
include candy, desserts, and soda pop.   Heart-healthy lifestyle    If your doctor recommends it, get more exercise. For many people, walking is a good choice. Or you may want to swim, bike, or do other activities. Bit by bit, increase the time you're active every day. Try for at least 30 minutes on most days of the week.     Try to quit or cut back on using tobacco and other nicotine products. This includes smoking and vaping. If you need help quitting, talk to your doctor about stop-smoking programs and medicines. These can increase your chances of quitting for good. Quitting is one of the most important things you can do to protect your heart. It is never too late to quit. Try to avoid secondhand smoke too.     Stay at a weight that's healthy for you. Talk to your doctor if you need help losing weight.     Try to get 7 to 9 hours of sleep each night.     Limit alcohol to 2 drinks a day for men and 1 drink a day for women. Too much alcohol can cause health problems.     Manage other health problems such as diabetes, high blood pressure, and high cholesterol. If you think you may have a problem with alcohol or drug use, talk to your doctor.   Medicines    Take your medicines exactly as prescribed. Call your doctor if you think you are having a problem with your medicine.     If your doctor recommends aspirin, take the amount directed each day. Make sure you take aspirin and not another kind of pain reliever, such as acetaminophen (Tylenol).   When should you call for help?   Call 911 if you have symptoms of a heart attack. These may include:    Chest pain or pressure, or a strange feeling in the chest.     Sweating.     Shortness of breath.     Pain, pressure, or a strange feeling in the back, neck, jaw, or upper belly or in one or both shoulders or arms.     Lightheadedness or sudden weakness.     A fast or irregular heartbeat.   After you call 911, the  may tell you to chew 1 adult-strength or 2 to 4

## 2025-02-07 NOTE — ADDENDUM NOTE
Addended by: ALEJANDRA ROBLES on: 2/7/2025 03:27 PM     Modules accepted: Orders, Level of Service

## 2025-02-08 LAB
ANION GAP SERPL CALC-SCNC: 5 MMOL/L (ref 2–12)
BASOPHILS # BLD: 0.05 K/UL (ref 0–0.1)
BASOPHILS NFR BLD: 0.8 % (ref 0–1)
BUN SERPL-MCNC: 22 MG/DL (ref 6–20)
BUN/CREAT SERPL: 25 (ref 12–20)
CALCIUM SERPL-MCNC: 8.9 MG/DL (ref 8.5–10.1)
CHLORIDE SERPL-SCNC: 109 MMOL/L (ref 97–108)
CO2 SERPL-SCNC: 27 MMOL/L (ref 21–32)
CREAT SERPL-MCNC: 0.87 MG/DL (ref 0.55–1.02)
DIFFERENTIAL METHOD BLD: ABNORMAL
EOSINOPHIL # BLD: 0.17 K/UL (ref 0–0.4)
EOSINOPHIL NFR BLD: 2.6 % (ref 0–7)
ERYTHROCYTE [DISTWIDTH] IN BLOOD BY AUTOMATED COUNT: 13.2 % (ref 11.5–14.5)
ERYTHROCYTE [SEDIMENTATION RATE] IN BLOOD: 16 MM/HR (ref 0–30)
EST. AVERAGE GLUCOSE BLD GHB EST-MCNC: 148 MG/DL
GLUCOSE SERPL-MCNC: 156 MG/DL (ref 65–100)
HBA1C MFR BLD: 6.8 % (ref 4–5.6)
HCT VFR BLD AUTO: 41.2 % (ref 35–47)
HGB BLD-MCNC: 12.8 G/DL (ref 11.5–16)
IMM GRANULOCYTES # BLD AUTO: 0.02 K/UL (ref 0–0.04)
IMM GRANULOCYTES NFR BLD AUTO: 0.3 % (ref 0–0.5)
LYMPHOCYTES # BLD: 1.56 K/UL (ref 0.8–3.5)
LYMPHOCYTES NFR BLD: 24.3 % (ref 12–49)
MCH RBC QN AUTO: 29.5 PG (ref 26–34)
MCHC RBC AUTO-ENTMCNC: 31.1 G/DL (ref 30–36.5)
MCV RBC AUTO: 94.9 FL (ref 80–99)
MONOCYTES # BLD: 0.28 K/UL (ref 0–1)
MONOCYTES NFR BLD: 4.4 % (ref 5–13)
NEUTS SEG # BLD: 4.34 K/UL (ref 1.8–8)
NEUTS SEG NFR BLD: 67.6 % (ref 32–75)
NRBC # BLD: 0 K/UL (ref 0–0.01)
NRBC BLD-RTO: 0 PER 100 WBC
PLATELET # BLD AUTO: ABNORMAL K/UL (ref 150–400)
POTASSIUM SERPL-SCNC: 4 MMOL/L (ref 3.5–5.1)
RBC # BLD AUTO: 4.34 M/UL (ref 3.8–5.2)
SODIUM SERPL-SCNC: 141 MMOL/L (ref 136–145)
WBC # BLD AUTO: 6.4 K/UL (ref 3.6–11)
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
## 2025-04-30 ENCOUNTER — HOSPITAL ENCOUNTER (EMERGENCY)
Facility: HOSPITAL | Age: 75
Discharge: HOME | End: 2025-04-30
Attending: EMERGENCY MEDICINE
Payer: MEDICARE

## 2025-04-30 ENCOUNTER — APPOINTMENT (OUTPATIENT)
Facility: HOSPITAL | Age: 75
End: 2025-04-30
Payer: MEDICARE

## 2025-04-30 VITALS
DIASTOLIC BLOOD PRESSURE: 97 MMHG | SYSTOLIC BLOOD PRESSURE: 154 MMHG | BODY MASS INDEX: 28.23 KG/M2 | RESPIRATION RATE: 15 BRPM | OXYGEN SATURATION: 98 % | HEART RATE: 77 BPM | TEMPERATURE: 99.3 F | WEIGHT: 139.77 LBS

## 2025-04-30 DIAGNOSIS — L02.416 ABSCESS OF LEFT THIGH: ICD-10-CM

## 2025-04-30 DIAGNOSIS — R73.9 HYPERGLYCEMIA: ICD-10-CM

## 2025-04-30 DIAGNOSIS — R41.82 ALTERED MENTAL STATUS, UNSPECIFIED ALTERED MENTAL STATUS TYPE: ICD-10-CM

## 2025-04-30 DIAGNOSIS — N39.0 URINARY TRACT INFECTION WITHOUT HEMATURIA, SITE UNSPECIFIED: Primary | ICD-10-CM

## 2025-04-30 LAB
ALBUMIN SERPL-MCNC: 3.2 G/DL (ref 3.5–5)
ALBUMIN/GLOB SERPL: 0.7 (ref 1.1–2.2)
ALP SERPL-CCNC: 80 U/L (ref 45–117)
ALT SERPL-CCNC: 17 U/L (ref 12–78)
AMPHET UR QL SCN: NEGATIVE
ANION GAP SERPL CALC-SCNC: 5 MMOL/L (ref 2–12)
APPEARANCE UR: ABNORMAL
AST SERPL-CCNC: 12 U/L (ref 15–37)
BACTERIA URNS QL MICRO: NEGATIVE /HPF
BARBITURATES UR QL SCN: NEGATIVE
BENZODIAZ UR QL: NEGATIVE
BILIRUB SERPL-MCNC: 0.8 MG/DL (ref 0.2–1)
BILIRUB UR QL: NEGATIVE
BUN SERPL-MCNC: 11 MG/DL (ref 6–20)
BUN/CREAT SERPL: 11 (ref 12–20)
CALCIUM SERPL-MCNC: 9.3 MG/DL (ref 8.5–10.1)
CANNABINOIDS UR QL SCN: NEGATIVE
CHLORIDE SERPL-SCNC: 106 MMOL/L (ref 97–108)
CO2 SERPL-SCNC: 27 MMOL/L (ref 21–32)
COCAINE UR QL SCN: NEGATIVE
COLOR UR: ABNORMAL
COMMENT:: NORMAL
CREAT SERPL-MCNC: 1 MG/DL (ref 0.55–1.02)
EPITH CASTS URNS QL MICRO: ABNORMAL /LPF
ERYTHROCYTE [DISTWIDTH] IN BLOOD BY AUTOMATED COUNT: 12.7 % (ref 11.5–14.5)
ETHANOL SERPL-MCNC: <10 MG/DL (ref 0–0.08)
FOLATE SERPL-MCNC: 17 NG/ML (ref 5–21)
GLOBULIN SER CALC-MCNC: 4.9 G/DL (ref 2–4)
GLUCOSE BLD STRIP.AUTO-MCNC: 275 MG/DL (ref 65–117)
GLUCOSE SERPL-MCNC: 249 MG/DL (ref 65–100)
GLUCOSE UR STRIP.AUTO-MCNC: 250 MG/DL
HCT VFR BLD AUTO: 38.9 % (ref 35–47)
HGB BLD-MCNC: 12.2 G/DL (ref 11.5–16)
HGB UR QL STRIP: ABNORMAL
HYALINE CASTS URNS QL MICRO: ABNORMAL /LPF (ref 0–5)
KETONES UR QL STRIP.AUTO: NEGATIVE MG/DL
LACTATE BLD-SCNC: 1.7 MMOL/L (ref 0.4–2)
LEUKOCYTE ESTERASE UR QL STRIP.AUTO: ABNORMAL
Lab: NORMAL
MCH RBC QN AUTO: 29.3 PG (ref 26–34)
MCHC RBC AUTO-ENTMCNC: 31.4 G/DL (ref 30–36.5)
MCV RBC AUTO: 93.3 FL (ref 80–99)
METHADONE UR QL: NEGATIVE
NITRITE UR QL STRIP.AUTO: NEGATIVE
NRBC # BLD: 0 K/UL (ref 0–0.01)
NRBC BLD-RTO: 0 PER 100 WBC
OPIATES UR QL: NEGATIVE
PCP UR QL: NEGATIVE
PH UR STRIP: 5.5 (ref 5–8)
PLATELET # BLD AUTO: 259 K/UL (ref 150–400)
PMV BLD AUTO: 10.8 FL (ref 8.9–12.9)
POTASSIUM SERPL-SCNC: 4 MMOL/L (ref 3.5–5.1)
PROT SERPL-MCNC: 8.1 G/DL (ref 6.4–8.2)
PROT UR STRIP-MCNC: NEGATIVE MG/DL
RBC # BLD AUTO: 4.17 M/UL (ref 3.8–5.2)
RBC #/AREA URNS HPF: ABNORMAL /HPF (ref 0–5)
SERVICE CMNT-IMP: ABNORMAL
SODIUM SERPL-SCNC: 138 MMOL/L (ref 136–145)
SP GR UR REFRACTOMETRY: 1.01 (ref 1–1.03)
SPECIMEN HOLD: NORMAL
T4 FREE SERPL-MCNC: 1.2 NG/DL (ref 0.8–1.5)
TSH SERPL DL<=0.05 MIU/L-ACNC: 1.19 UIU/ML (ref 0.36–3.74)
URINE CULTURE IF INDICATED: ABNORMAL
UROBILINOGEN UR QL STRIP.AUTO: 2 EU/DL (ref 0.2–1)
VIT B12 SERPL-MCNC: 907 PG/ML (ref 193–986)
WBC # BLD AUTO: 9.6 K/UL (ref 3.6–11)
WBC URNS QL MICRO: ABNORMAL /HPF (ref 0–4)

## 2025-04-30 PROCEDURE — 84443 ASSAY THYROID STIM HORMONE: CPT

## 2025-04-30 PROCEDURE — 82607 VITAMIN B-12: CPT

## 2025-04-30 PROCEDURE — 82746 ASSAY OF FOLIC ACID SERUM: CPT

## 2025-04-30 PROCEDURE — 83605 ASSAY OF LACTIC ACID: CPT

## 2025-04-30 PROCEDURE — 99284 EMERGENCY DEPT VISIT MOD MDM: CPT

## 2025-04-30 PROCEDURE — 84439 ASSAY OF FREE THYROXINE: CPT

## 2025-04-30 PROCEDURE — 80053 COMPREHEN METABOLIC PANEL: CPT

## 2025-04-30 PROCEDURE — 80307 DRUG TEST PRSMV CHEM ANLYZR: CPT

## 2025-04-30 PROCEDURE — 6360000002 HC RX W HCPCS: Performed by: EMERGENCY MEDICINE

## 2025-04-30 PROCEDURE — 82962 GLUCOSE BLOOD TEST: CPT

## 2025-04-30 PROCEDURE — 87147 CULTURE TYPE IMMUNOLOGIC: CPT

## 2025-04-30 PROCEDURE — 81001 URINALYSIS AUTO W/SCOPE: CPT

## 2025-04-30 PROCEDURE — 70450 CT HEAD/BRAIN W/O DYE: CPT

## 2025-04-30 PROCEDURE — 87086 URINE CULTURE/COLONY COUNT: CPT

## 2025-04-30 PROCEDURE — 82077 ASSAY SPEC XCP UR&BREATH IA: CPT

## 2025-04-30 PROCEDURE — 85027 COMPLETE CBC AUTOMATED: CPT

## 2025-04-30 RX ORDER — SULFAMETHOXAZOLE AND TRIMETHOPRIM 800; 160 MG/1; MG/1
1 TABLET ORAL 2 TIMES DAILY
Qty: 14 TABLET | Refills: 0 | Status: SHIPPED | OUTPATIENT
Start: 2025-04-30 | End: 2025-05-07

## 2025-04-30 RX ORDER — CEPHALEXIN 500 MG/1
500 CAPSULE ORAL 3 TIMES DAILY
Qty: 21 CAPSULE | Refills: 0 | Status: SHIPPED | OUTPATIENT
Start: 2025-04-30 | End: 2025-05-07

## 2025-04-30 RX ORDER — LIDOCAINE HYDROCHLORIDE AND EPINEPHRINE BITARTRATE 20; .01 MG/ML; MG/ML
20 INJECTION, SOLUTION SUBCUTANEOUS
Status: COMPLETED | OUTPATIENT
Start: 2025-04-30 | End: 2025-04-30

## 2025-04-30 RX ADMIN — LIDOCAINE HYDROCHLORIDE,EPINEPHRINE BITARTRATE 20 ML: 20; .01 INJECTION, SOLUTION INFILTRATION; PERINEURAL at 14:47

## 2025-04-30 ASSESSMENT — PAIN - FUNCTIONAL ASSESSMENT
PAIN_FUNCTIONAL_ASSESSMENT: 0-10
PAIN_FUNCTIONAL_ASSESSMENT: PREVENTS OR INTERFERES SOME ACTIVE ACTIVITIES AND ADLS
PAIN_FUNCTIONAL_ASSESSMENT: NONE - DENIES PAIN
PAIN_FUNCTIONAL_ASSESSMENT: PREVENTS OR INTERFERES SOME ACTIVE ACTIVITIES AND ADLS
PAIN_FUNCTIONAL_ASSESSMENT: ACTIVITIES ARE NOT PREVENTED
PAIN_FUNCTIONAL_ASSESSMENT: 0-10

## 2025-04-30 ASSESSMENT — PAIN SCALES - GENERAL
PAINLEVEL_OUTOF10: 9
PAINLEVEL_OUTOF10: 6

## 2025-04-30 ASSESSMENT — PAIN DESCRIPTION - LOCATION
LOCATION: HIP
LOCATION: LEG
LOCATION: HIP

## 2025-04-30 ASSESSMENT — PAIN DESCRIPTION - ONSET: ONSET: ON-GOING

## 2025-04-30 ASSESSMENT — PAIN DESCRIPTION - FREQUENCY
FREQUENCY: CONTINUOUS
FREQUENCY: CONTINUOUS

## 2025-04-30 ASSESSMENT — PAIN DESCRIPTION - PAIN TYPE
TYPE: ACUTE PAIN
TYPE: ACUTE PAIN

## 2025-04-30 ASSESSMENT — LIFESTYLE VARIABLES: HOW OFTEN DO YOU HAVE A DRINK CONTAINING ALCOHOL: NEVER

## 2025-04-30 ASSESSMENT — PAIN DESCRIPTION - DESCRIPTORS
DESCRIPTORS: SHARP
DESCRIPTORS: ACHING
DESCRIPTORS: ACHING

## 2025-04-30 ASSESSMENT — PAIN DESCRIPTION - ORIENTATION
ORIENTATION: LEFT

## 2025-04-30 NOTE — ED TRIAGE NOTES
Patient ambulates from triage with AMS starting when she woke up about 45 min ago, not recognizing her dog. A& O 4 but intermittent confusion. . Patient also tremulous bilaterally. No weakness, slurred speech. . MD aware and assessing. Similar episode yesterday, recent diarrhea. Complains of wound on left hip. No code stroke called.

## 2025-04-30 NOTE — ED PROVIDER NOTES
[facility] EMERGENCY DEPARTMENT  EMERGENCY DEPARTMENT ENCOUNTER      Pt Name: Helen Gillette  MRN: [id number]  Birthdate 1950  Date of evaluation: 2025  Provider: Shan Collado MD    CHIEF COMPLAINT       Chief Complaint   Patient presents with    Altered Mental Status         HISTORY OF PRESENT ILLNESS   (Location/Symptom, Timing/Onset, Context/Setting, Quality, Duration, Modifying Factors, Severity)  Note limiting factors.   74F w/ hx HTN, NAFL, DM p/w 1d AMS. Pt brought to ED by  who reports symptoms started yesterday which he describes and confusion, forgetfulness and memory difficulty. No word finding difficulty or slurred/garbled speech.  states that her BS at home has been \"fluctuating.\" No new/changes to meds or drugs/etoh. Having loose nonbloody diarrhea. No F/C, cough, SOB or N/V. Not c/o pain anywhere. No recent falls or trauma. No hx of dementia per .            Review of External Medical Records:     Nursing Notes were reviewed.    REVIEW OF SYSTEMS    (2-9 systems for level 4, 10 or more for level 5)     Review of Systems   Unable to perform ROS: Mental status change       Except as noted above the remainder of the review of systems was reviewed and negative.       PAST MEDICAL HISTORY     Past Medical History:   Diagnosis Date    Arm pain 2021    Baker's cyst 2016    r    Bereavement 2015    61yo  sister     Contusion of knee and lower leg, right, subsequent encounter 11/10/2017    Contusion of knee, right     Cough 2017    Depression with anxiety     Diverticulosis 2015    colonoscopy    DM2 (diabetes mellitus, type 2) (HCC)     Edema     Encounter related to worker's compensation claim 11/10/2017    fall t work    Fatigue     Hemangioma of liver 2018    mri    Hemangioma of liver 2019    Hypercholesterolemia     Hypertension     Liver disease     hepatitis 30 years ago - Liver mass consistent with hemangioma in the right

## 2025-05-01 ENCOUNTER — RESULTS FOLLOW-UP (OUTPATIENT)
Facility: HOSPITAL | Age: 75
End: 2025-05-01

## 2025-05-01 LAB
BACTERIA SPEC CULT: ABNORMAL
CC UR VC: ABNORMAL
SERVICE CMNT-IMP: ABNORMAL

## 2025-05-04 LAB
EKG ATRIAL RATE: 64 BPM
EKG DIAGNOSIS: NORMAL
EKG P AXIS: 103 DEGREES
EKG P-R INTERVAL: 190 MS
EKG Q-T INTERVAL: 374 MS
EKG QRS DURATION: 82 MS
EKG QTC CALCULATION (BAZETT): 385 MS
EKG R AXIS: -29 DEGREES
EKG T AXIS: -3 DEGREES
EKG VENTRICULAR RATE: 64 BPM

## 2025-05-28 RX ORDER — AMLODIPINE BESYLATE 5 MG/1
5 TABLET ORAL DAILY
Qty: 90 TABLET | Refills: 3 | Status: SHIPPED | OUTPATIENT
Start: 2025-05-28

## 2025-06-09 ENCOUNTER — OFFICE VISIT (OUTPATIENT)
Facility: CLINIC | Age: 75
End: 2025-06-09
Payer: MEDICARE

## 2025-06-09 VITALS
OXYGEN SATURATION: 97 % | TEMPERATURE: 97.6 F | DIASTOLIC BLOOD PRESSURE: 70 MMHG | RESPIRATION RATE: 16 BRPM | HEART RATE: 77 BPM | BODY MASS INDEX: 28.02 KG/M2 | SYSTOLIC BLOOD PRESSURE: 125 MMHG | WEIGHT: 139 LBS | HEIGHT: 59 IN

## 2025-06-09 DIAGNOSIS — I10 PRIMARY HYPERTENSION: ICD-10-CM

## 2025-06-09 DIAGNOSIS — Z91.81 AT HIGH RISK FOR FALLS: ICD-10-CM

## 2025-06-09 DIAGNOSIS — K76.0 NAFL (NONALCOHOLIC FATTY LIVER): ICD-10-CM

## 2025-06-09 DIAGNOSIS — E78.00 HYPERCHOLESTEROLEMIA: ICD-10-CM

## 2025-06-09 DIAGNOSIS — E11.65 TYPE 2 DIABETES MELLITUS WITH HYPERGLYCEMIA, WITHOUT LONG-TERM CURRENT USE OF INSULIN (HCC): Primary | ICD-10-CM

## 2025-06-09 PROCEDURE — 1125F AMNT PAIN NOTED PAIN PRSNT: CPT | Performed by: INTERNAL MEDICINE

## 2025-06-09 PROCEDURE — 3079F DIAST BP 80-89 MM HG: CPT | Performed by: INTERNAL MEDICINE

## 2025-06-09 PROCEDURE — 1123F ACP DISCUSS/DSCN MKR DOCD: CPT | Performed by: INTERNAL MEDICINE

## 2025-06-09 PROCEDURE — 3044F HG A1C LEVEL LT 7.0%: CPT | Performed by: INTERNAL MEDICINE

## 2025-06-09 PROCEDURE — 1159F MED LIST DOCD IN RCRD: CPT | Performed by: INTERNAL MEDICINE

## 2025-06-09 PROCEDURE — 99214 OFFICE O/P EST MOD 30 MIN: CPT | Performed by: INTERNAL MEDICINE

## 2025-06-09 PROCEDURE — 3077F SYST BP >= 140 MM HG: CPT | Performed by: INTERNAL MEDICINE

## 2025-06-09 RX ORDER — ROSUVASTATIN CALCIUM 5 MG/1
5 TABLET, COATED ORAL NIGHTLY
Qty: 90 TABLET | Refills: 3 | Status: SHIPPED | OUTPATIENT
Start: 2025-06-09 | End: 2025-06-12 | Stop reason: ALTCHOICE

## 2025-06-09 RX ORDER — HYDRALAZINE HYDROCHLORIDE 100 MG/1
100 TABLET, FILM COATED ORAL 2 TIMES DAILY
Qty: 18 TABLET | Refills: 3 | Status: SHIPPED | OUTPATIENT
Start: 2025-06-09

## 2025-06-09 RX ORDER — CLONIDINE HYDROCHLORIDE 0.2 MG/1
0.4 TABLET ORAL
Qty: 180 TABLET | Refills: 3 | Status: SHIPPED | OUTPATIENT
Start: 2025-06-09

## 2025-06-09 RX ORDER — ERGOCALCIFEROL 1.25 MG/1
50000 CAPSULE, LIQUID FILLED ORAL WEEKLY
Qty: 12 CAPSULE | Refills: 1 | Status: CANCELLED | OUTPATIENT
Start: 2025-06-09

## 2025-06-09 RX ORDER — OLMESARTAN MEDOXOMIL 40 MG/1
40 TABLET ORAL DAILY
Qty: 90 TABLET | Refills: 3 | Status: SHIPPED | OUTPATIENT
Start: 2025-06-09 | End: 2026-06-09

## 2025-06-09 SDOH — ECONOMIC STABILITY: FOOD INSECURITY: WITHIN THE PAST 12 MONTHS, YOU WORRIED THAT YOUR FOOD WOULD RUN OUT BEFORE YOU GOT MONEY TO BUY MORE.: NEVER TRUE

## 2025-06-09 SDOH — ECONOMIC STABILITY: FOOD INSECURITY: WITHIN THE PAST 12 MONTHS, THE FOOD YOU BOUGHT JUST DIDN'T LAST AND YOU DIDN'T HAVE MONEY TO GET MORE.: NEVER TRUE

## 2025-06-09 ASSESSMENT — PATIENT HEALTH QUESTIONNAIRE - PHQ9
8. MOVING OR SPEAKING SO SLOWLY THAT OTHER PEOPLE COULD HAVE NOTICED. OR THE OPPOSITE, BEING SO FIGETY OR RESTLESS THAT YOU HAVE BEEN MOVING AROUND A LOT MORE THAN USUAL: NOT AT ALL
10. IF YOU CHECKED OFF ANY PROBLEMS, HOW DIFFICULT HAVE THESE PROBLEMS MADE IT FOR YOU TO DO YOUR WORK, TAKE CARE OF THINGS AT HOME, OR GET ALONG WITH OTHER PEOPLE: NOT DIFFICULT AT ALL
3. TROUBLE FALLING OR STAYING ASLEEP: NOT AT ALL
7. TROUBLE CONCENTRATING ON THINGS, SUCH AS READING THE NEWSPAPER OR WATCHING TELEVISION: NOT AT ALL
4. FEELING TIRED OR HAVING LITTLE ENERGY: NOT AT ALL
5. POOR APPETITE OR OVEREATING: NOT AT ALL
1. LITTLE INTEREST OR PLEASURE IN DOING THINGS: NOT AT ALL
2. FEELING DOWN, DEPRESSED OR HOPELESS: NOT AT ALL
SUM OF ALL RESPONSES TO PHQ QUESTIONS 1-9: 0
SUM OF ALL RESPONSES TO PHQ QUESTIONS 1-9: 0
6. FEELING BAD ABOUT YOURSELF - OR THAT YOU ARE A FAILURE OR HAVE LET YOURSELF OR YOUR FAMILY DOWN: NOT AT ALL
SUM OF ALL RESPONSES TO PHQ QUESTIONS 1-9: 0
SUM OF ALL RESPONSES TO PHQ QUESTIONS 1-9: 0
9. THOUGHTS THAT YOU WOULD BE BETTER OFF DEAD, OR OF HURTING YOURSELF: NOT AT ALL

## 2025-06-09 NOTE — PROGRESS NOTES
Chief Complaint   Patient presents with    Follow-up    Hypertension     Have you been to the ER, urgent care clinic since your last visit?  Hospitalized since your last visit?   NO    Have you seen or consulted any other health care providers outside our system since your last visit?   NO

## 2025-06-09 NOTE — PROGRESS NOTES
SPORTS MEDICINE AND PRIMARY CARE  Afshin Hand MD, FACP, CMD  [address] [facility] [facility] [address]  Phone:  [phone]  Fax: [phone]       Chief Complaint   Patient presents with    Follow-up    Hypertension   .      SUBJECTIVE:       History of Present Illness  The patient is a 74-year-old black female who was seen in the emergency room on 04/30/2025 by Dr. Shan Collado for altered mental status. She has a history of hypertension, non-alcoholic fatty liver disease (NAFLD), and diabetes. Symptoms started on 04/29/2025, including confusion, forgetfulness, and memory difficulty, but no word-finding difficulty. Blood sugar at home has been fluctuating. She has no history of dementia. By the time she left the emergency room, she was more clear-headed. Admission was offered, but the family declined. Altered mental status was likely secondary to a urinary tract infection (UTI) and skin and soft tissue infection (SSTI), with a possible component of dementia. There was low concern for sepsis. She was subsequently released with diagnoses of UTI, abscess on the left thigh, altered mental status, and hyperglycemia. She was placed on Keflex and Bactrim. She comes in today for follow-up.    She reports that her blood glucose levels have been inconsistent, with some days showing high readings and others low, despite maintaining her usual diet. She also mentions consuming pudding yesterday and this morning.    She has a history of hypertension.    She had an abscess on her leg, which required the insertion of 4 needles. This has since healed, and the lump has disappeared.    She has experienced some near-falls but has not actually fallen.    Current Outpatient Medications   Medication Sig Dispense Refill    SITagliptin (JANUVIA) 50 MG tablet Take 1 tablet by mouth daily 90 tablet 3    rosuvastatin (CRESTOR) 5 MG tablet Take 1 tablet by mouth nightly 90 tablet 3    olmesartan (BENICAR) 40 MG tablet Take 1 tablet by

## 2025-06-10 ENCOUNTER — RESULTS FOLLOW-UP (OUTPATIENT)
Facility: CLINIC | Age: 75
End: 2025-06-10

## 2025-06-10 LAB
ALBUMIN/CREAT UR: 5 MG/G CREAT (ref 0–29)
BUN SERPL-MCNC: 15 MG/DL (ref 8–27)
BUN/CREAT SERPL: 19 (ref 12–28)
CALCIUM SERPL-MCNC: 9.2 MG/DL (ref 8.7–10.3)
CHLORIDE SERPL-SCNC: 101 MMOL/L (ref 96–106)
CO2 SERPL-SCNC: 21 MMOL/L (ref 20–29)
CREAT SERPL-MCNC: 0.8 MG/DL (ref 0.57–1)
CREAT UR-MCNC: 121.2 MG/DL
EGFRCR SERPLBLD CKD-EPI 2021: 76 ML/MIN/1.73
GLUCOSE SERPL-MCNC: 151 MG/DL (ref 70–99)
HBA1C MFR BLD: 6.8 % (ref 4.8–5.6)
MICROALBUMIN UR-MCNC: 6.1 UG/ML
POTASSIUM SERPL-SCNC: 4 MMOL/L (ref 3.5–5.2)
SODIUM SERPL-SCNC: 141 MMOL/L (ref 134–144)

## 2025-06-12 ENCOUNTER — HOSPITAL ENCOUNTER (OUTPATIENT)
Facility: HOSPITAL | Age: 75
Setting detail: OBSERVATION
Discharge: HOME | End: 2025-06-13
Attending: STUDENT IN AN ORGANIZED HEALTH CARE EDUCATION/TRAINING PROGRAM | Admitting: INTERNAL MEDICINE
Payer: MEDICARE

## 2025-06-12 ENCOUNTER — APPOINTMENT (OUTPATIENT)
Facility: HOSPITAL | Age: 75
End: 2025-06-12
Payer: MEDICARE

## 2025-06-12 DIAGNOSIS — R47.01 APHASIA: Primary | ICD-10-CM

## 2025-06-12 PROBLEM — R47.81 SLURRED SPEECH: Status: ACTIVE | Noted: 2025-06-12

## 2025-06-12 LAB
ALBUMIN SERPL-MCNC: 3.6 G/DL (ref 3.5–5)
ALBUMIN/GLOB SERPL: 0.8 (ref 1.1–2.2)
ALP SERPL-CCNC: 107 U/L (ref 45–117)
ALT SERPL-CCNC: 16 U/L (ref 12–78)
ANION GAP SERPL CALC-SCNC: 7 MMOL/L (ref 2–12)
AST SERPL-CCNC: 18 U/L (ref 15–37)
BASOPHILS # BLD: 0.07 K/UL (ref 0–0.1)
BASOPHILS NFR BLD: 1 % (ref 0–1)
BILIRUB SERPL-MCNC: 0.3 MG/DL (ref 0.2–1)
BUN SERPL-MCNC: 29 MG/DL (ref 6–20)
BUN/CREAT SERPL: 24 (ref 12–20)
CALCIUM SERPL-MCNC: 9.6 MG/DL (ref 8.5–10.1)
CHLORIDE SERPL-SCNC: 108 MMOL/L (ref 97–108)
CO2 SERPL-SCNC: 25 MMOL/L (ref 21–32)
COMMENT:: NORMAL
CREAT SERPL-MCNC: 1.19 MG/DL (ref 0.55–1.02)
DIFFERENTIAL METHOD BLD: ABNORMAL
EOSINOPHIL # BLD: 0.21 K/UL (ref 0–0.4)
EOSINOPHIL NFR BLD: 3 % (ref 0–7)
ERYTHROCYTE [DISTWIDTH] IN BLOOD BY AUTOMATED COUNT: 13.4 % (ref 11.5–14.5)
GLOBULIN SER CALC-MCNC: 4.4 G/DL (ref 2–4)
GLUCOSE BLD STRIP.AUTO-MCNC: 177 MG/DL (ref 65–117)
GLUCOSE SERPL-MCNC: 171 MG/DL (ref 65–100)
HCT VFR BLD AUTO: 39.4 % (ref 35–47)
HGB BLD-MCNC: 12.3 G/DL (ref 11.5–16)
IMM GRANULOCYTES # BLD AUTO: 0.01 K/UL (ref 0–0.04)
IMM GRANULOCYTES NFR BLD AUTO: 0.1 % (ref 0–0.5)
INR PPP: 1 (ref 0.9–1.1)
LYMPHOCYTES # BLD: 2.02 K/UL (ref 0.8–3.5)
LYMPHOCYTES NFR BLD: 29.1 % (ref 12–49)
MCH RBC QN AUTO: 28.9 PG (ref 26–34)
MCHC RBC AUTO-ENTMCNC: 31.2 G/DL (ref 30–36.5)
MCV RBC AUTO: 92.7 FL (ref 80–99)
MONOCYTES # BLD: 0.34 K/UL (ref 0–1)
MONOCYTES NFR BLD: 4.9 % (ref 5–13)
NEUTS SEG # BLD: 4.29 K/UL (ref 1.8–8)
NEUTS SEG NFR BLD: 61.9 % (ref 32–75)
NRBC # BLD: 0 K/UL (ref 0–0.01)
NRBC BLD-RTO: 0 PER 100 WBC
PLATELET # BLD AUTO: 234 K/UL (ref 150–400)
PMV BLD AUTO: 10.7 FL (ref 8.9–12.9)
POTASSIUM SERPL-SCNC: 3.6 MMOL/L (ref 3.5–5.1)
PROT SERPL-MCNC: 8 G/DL (ref 6.4–8.2)
PROTHROMBIN TIME: 10.6 SEC (ref 9.2–11.2)
RBC # BLD AUTO: 4.25 M/UL (ref 3.8–5.2)
SERVICE CMNT-IMP: ABNORMAL
SODIUM SERPL-SCNC: 140 MMOL/L (ref 136–145)
SPECIMEN HOLD: NORMAL
TROPONIN I SERPL HS-MCNC: 4 NG/L (ref 0–51)
WBC # BLD AUTO: 6.9 K/UL (ref 3.6–11)

## 2025-06-12 PROCEDURE — 84484 ASSAY OF TROPONIN QUANT: CPT

## 2025-06-12 PROCEDURE — G0378 HOSPITAL OBSERVATION PER HR: HCPCS

## 2025-06-12 PROCEDURE — 6360000004 HC RX CONTRAST MEDICATION: Performed by: RADIOLOGY

## 2025-06-12 PROCEDURE — 70496 CT ANGIOGRAPHY HEAD: CPT

## 2025-06-12 PROCEDURE — 85610 PROTHROMBIN TIME: CPT

## 2025-06-12 PROCEDURE — 0042T CT BRAIN PERFUSION: CPT

## 2025-06-12 PROCEDURE — 2500000003 HC RX 250 WO HCPCS: Performed by: INTERNAL MEDICINE

## 2025-06-12 PROCEDURE — 80053 COMPREHEN METABOLIC PANEL: CPT

## 2025-06-12 PROCEDURE — 82962 GLUCOSE BLOOD TEST: CPT

## 2025-06-12 PROCEDURE — APPNB45 APP NON BILLABLE 31-45 MINUTES

## 2025-06-12 PROCEDURE — 93005 ELECTROCARDIOGRAM TRACING: CPT | Performed by: STUDENT IN AN ORGANIZED HEALTH CARE EDUCATION/TRAINING PROGRAM

## 2025-06-12 PROCEDURE — 70450 CT HEAD/BRAIN W/O DYE: CPT

## 2025-06-12 PROCEDURE — 99285 EMERGENCY DEPT VISIT HI MDM: CPT

## 2025-06-12 PROCEDURE — 85025 COMPLETE CBC W/AUTO DIFF WBC: CPT

## 2025-06-12 RX ORDER — AMLODIPINE BESYLATE 5 MG/1
5 TABLET ORAL DAILY
Status: DISCONTINUED | OUTPATIENT
Start: 2025-06-13 | End: 2025-06-13 | Stop reason: HOSPADM

## 2025-06-12 RX ORDER — ATORVASTATIN CALCIUM 40 MG/1
80 TABLET, FILM COATED ORAL NIGHTLY
Status: DISCONTINUED | OUTPATIENT
Start: 2025-06-12 | End: 2025-06-13 | Stop reason: HOSPADM

## 2025-06-12 RX ORDER — LABETALOL HYDROCHLORIDE 5 MG/ML
5 INJECTION, SOLUTION INTRAVENOUS EVERY 10 MIN PRN
Status: DISCONTINUED | OUTPATIENT
Start: 2025-06-12 | End: 2025-06-13 | Stop reason: HOSPADM

## 2025-06-12 RX ORDER — IOPAMIDOL 755 MG/ML
40 INJECTION, SOLUTION INTRAVASCULAR ONCE
Status: COMPLETED | OUTPATIENT
Start: 2025-06-12 | End: 2025-06-12

## 2025-06-12 RX ORDER — ONDANSETRON 4 MG/1
4 TABLET, ORALLY DISINTEGRATING ORAL EVERY 8 HOURS PRN
Status: DISCONTINUED | OUTPATIENT
Start: 2025-06-12 | End: 2025-06-13 | Stop reason: HOSPADM

## 2025-06-12 RX ORDER — HYDRALAZINE HYDROCHLORIDE 25 MG/1
100 TABLET, FILM COATED ORAL 2 TIMES DAILY
Status: DISCONTINUED | OUTPATIENT
Start: 2025-06-12 | End: 2025-06-13 | Stop reason: HOSPADM

## 2025-06-12 RX ORDER — ALOGLIPTIN 12.5 MG/1
12.5 TABLET, FILM COATED ORAL DAILY
Status: DISCONTINUED | OUTPATIENT
Start: 2025-06-13 | End: 2025-06-13 | Stop reason: HOSPADM

## 2025-06-12 RX ORDER — POLYETHYLENE GLYCOL 3350 17 G/17G
17 POWDER, FOR SOLUTION ORAL DAILY PRN
Status: DISCONTINUED | OUTPATIENT
Start: 2025-06-12 | End: 2025-06-13 | Stop reason: HOSPADM

## 2025-06-12 RX ORDER — LOSARTAN POTASSIUM 50 MG/1
50 TABLET ORAL DAILY
Status: DISCONTINUED | OUTPATIENT
Start: 2025-06-13 | End: 2025-06-13 | Stop reason: HOSPADM

## 2025-06-12 RX ORDER — ASPIRIN 300 MG/1
300 SUPPOSITORY RECTAL DAILY
Status: DISCONTINUED | OUTPATIENT
Start: 2025-06-13 | End: 2025-06-13 | Stop reason: HOSPADM

## 2025-06-12 RX ORDER — ENOXAPARIN SODIUM 100 MG/ML
40 INJECTION SUBCUTANEOUS DAILY
Status: DISCONTINUED | OUTPATIENT
Start: 2025-06-13 | End: 2025-06-13 | Stop reason: HOSPADM

## 2025-06-12 RX ORDER — SODIUM CHLORIDE 9 MG/ML
INJECTION, SOLUTION INTRAVENOUS PRN
Status: DISCONTINUED | OUTPATIENT
Start: 2025-06-12 | End: 2025-06-13 | Stop reason: HOSPADM

## 2025-06-12 RX ORDER — SODIUM CHLORIDE 0.9 % (FLUSH) 0.9 %
5-40 SYRINGE (ML) INJECTION EVERY 12 HOURS SCHEDULED
Status: DISCONTINUED | OUTPATIENT
Start: 2025-06-12 | End: 2025-06-13 | Stop reason: HOSPADM

## 2025-06-12 RX ORDER — ERGOCALCIFEROL 1.25 MG/1
50000 CAPSULE, LIQUID FILLED ORAL WEEKLY
Status: DISCONTINUED | OUTPATIENT
Start: 2025-06-16 | End: 2025-06-13 | Stop reason: HOSPADM

## 2025-06-12 RX ORDER — ONDANSETRON 2 MG/ML
4 INJECTION INTRAMUSCULAR; INTRAVENOUS EVERY 6 HOURS PRN
Status: DISCONTINUED | OUTPATIENT
Start: 2025-06-12 | End: 2025-06-13 | Stop reason: HOSPADM

## 2025-06-12 RX ORDER — CLONIDINE HYDROCHLORIDE 0.1 MG/1
0.4 TABLET ORAL
Status: DISCONTINUED | OUTPATIENT
Start: 2025-06-12 | End: 2025-06-13 | Stop reason: HOSPADM

## 2025-06-12 RX ORDER — IOPAMIDOL 755 MG/ML
80 INJECTION, SOLUTION INTRAVASCULAR ONCE
Status: COMPLETED | OUTPATIENT
Start: 2025-06-12 | End: 2025-06-12

## 2025-06-12 RX ORDER — SODIUM CHLORIDE 0.9 % (FLUSH) 0.9 %
5-40 SYRINGE (ML) INJECTION PRN
Status: DISCONTINUED | OUTPATIENT
Start: 2025-06-12 | End: 2025-06-13 | Stop reason: HOSPADM

## 2025-06-12 RX ORDER — ASPIRIN 81 MG/1
81 TABLET, CHEWABLE ORAL DAILY
Status: DISCONTINUED | OUTPATIENT
Start: 2025-06-13 | End: 2025-06-13 | Stop reason: HOSPADM

## 2025-06-12 RX ADMIN — IOPAMIDOL 40 ML: 755 INJECTION, SOLUTION INTRAVENOUS at 19:40

## 2025-06-12 RX ADMIN — Medication 10 ML: at 22:12

## 2025-06-12 RX ADMIN — IOPAMIDOL 80 ML: 755 INJECTION, SOLUTION INTRAVENOUS at 19:40

## 2025-06-12 NOTE — ED TRIAGE NOTES
Pt ambulatory to triage with c/o slurred speech. Pt started having it yesterday but it resolved and the pt went to work. Pt started having it again today. LKW 3:30pm. Blood sugar 177 (wouldn't populate in glucometer but it was taken during triage)      Ha NAIR assessing in triage    Code stroke level 2 Van +  called and pt taken to CT

## 2025-06-13 ENCOUNTER — APPOINTMENT (OUTPATIENT)
Facility: HOSPITAL | Age: 75
End: 2025-06-13
Payer: MEDICARE

## 2025-06-13 VITALS
DIASTOLIC BLOOD PRESSURE: 69 MMHG | TEMPERATURE: 98.2 F | BODY MASS INDEX: 30.1 KG/M2 | OXYGEN SATURATION: 93 % | HEART RATE: 102 BPM | SYSTOLIC BLOOD PRESSURE: 166 MMHG | WEIGHT: 149.03 LBS | RESPIRATION RATE: 14 BRPM

## 2025-06-13 LAB
CHOLEST SERPL-MCNC: 175 MG/DL
EKG ATRIAL RATE: 57 BPM
EKG DIAGNOSIS: NORMAL
EKG P AXIS: 37 DEGREES
EKG P-R INTERVAL: 216 MS
EKG Q-T INTERVAL: 408 MS
EKG QRS DURATION: 94 MS
EKG QTC CALCULATION (BAZETT): 397 MS
EKG R AXIS: -30 DEGREES
EKG T AXIS: -15 DEGREES
EKG VENTRICULAR RATE: 57 BPM
ERYTHROCYTE [DISTWIDTH] IN BLOOD BY AUTOMATED COUNT: 13.5 % (ref 11.5–14.5)
EST. AVERAGE GLUCOSE BLD GHB EST-MCNC: 146 MG/DL
GLUCOSE BLD STRIP.AUTO-MCNC: 127 MG/DL (ref 65–117)
HBA1C MFR BLD: 6.7 % (ref 4–5.6)
HCT VFR BLD AUTO: 36.9 % (ref 35–47)
HDLC SERPL-MCNC: 58 MG/DL
HDLC SERPL: 3 (ref 0–5)
HGB BLD-MCNC: 11.5 G/DL (ref 11.5–16)
LDLC SERPL CALC-MCNC: 106.8 MG/DL (ref 0–100)
MCH RBC QN AUTO: 29.4 PG (ref 26–34)
MCHC RBC AUTO-ENTMCNC: 31.2 G/DL (ref 30–36.5)
MCV RBC AUTO: 94.4 FL (ref 80–99)
NRBC # BLD: 0 K/UL (ref 0–0.01)
NRBC BLD-RTO: 0 PER 100 WBC
NT PRO BNP: 82 PG/ML
PLATELET # BLD AUTO: 201 K/UL (ref 150–400)
PMV BLD AUTO: 10.9 FL (ref 8.9–12.9)
RBC # BLD AUTO: 3.91 M/UL (ref 3.8–5.2)
SERVICE CMNT-IMP: ABNORMAL
TRIGL SERPL-MCNC: 51 MG/DL
TROPONIN I SERPL HS-MCNC: 6 NG/L (ref 0–51)
VLDLC SERPL CALC-MCNC: 10.2 MG/DL
WBC # BLD AUTO: 5.3 K/UL (ref 3.6–11)

## 2025-06-13 PROCEDURE — 80061 LIPID PANEL: CPT

## 2025-06-13 PROCEDURE — 82962 GLUCOSE BLOOD TEST: CPT

## 2025-06-13 PROCEDURE — 83036 HEMOGLOBIN GLYCOSYLATED A1C: CPT

## 2025-06-13 PROCEDURE — 99222 1ST HOSP IP/OBS MODERATE 55: CPT | Performed by: NURSE PRACTITIONER

## 2025-06-13 PROCEDURE — 6370000000 HC RX 637 (ALT 250 FOR IP): Performed by: INTERNAL MEDICINE

## 2025-06-13 PROCEDURE — G0378 HOSPITAL OBSERVATION PER HR: HCPCS

## 2025-06-13 PROCEDURE — 92522 EVALUATE SPEECH PRODUCTION: CPT

## 2025-06-13 PROCEDURE — 36415 COLL VENOUS BLD VENIPUNCTURE: CPT

## 2025-06-13 PROCEDURE — 97535 SELF CARE MNGMENT TRAINING: CPT

## 2025-06-13 PROCEDURE — 97530 THERAPEUTIC ACTIVITIES: CPT

## 2025-06-13 PROCEDURE — 92610 EVALUATE SWALLOWING FUNCTION: CPT

## 2025-06-13 PROCEDURE — 97161 PT EVAL LOW COMPLEX 20 MIN: CPT

## 2025-06-13 PROCEDURE — 97165 OT EVAL LOW COMPLEX 30 MIN: CPT

## 2025-06-13 PROCEDURE — 84484 ASSAY OF TROPONIN QUANT: CPT

## 2025-06-13 PROCEDURE — 85027 COMPLETE CBC AUTOMATED: CPT

## 2025-06-13 PROCEDURE — 70551 MRI BRAIN STEM W/O DYE: CPT

## 2025-06-13 PROCEDURE — 93010 ELECTROCARDIOGRAM REPORT: CPT | Performed by: INTERNAL MEDICINE

## 2025-06-13 PROCEDURE — 83880 ASSAY OF NATRIURETIC PEPTIDE: CPT

## 2025-06-13 RX ADMIN — AMLODIPINE BESYLATE 5 MG: 5 TABLET ORAL at 09:51

## 2025-06-13 RX ADMIN — ALOGLIPTIN 12.5 MG: 12.5 TABLET, FILM COATED ORAL at 10:33

## 2025-06-13 RX ADMIN — ASPIRIN 81 MG: 81 TABLET, CHEWABLE ORAL at 09:51

## 2025-06-13 RX ADMIN — HYDRALAZINE HYDROCHLORIDE 100 MG: 25 TABLET ORAL at 09:51

## 2025-06-13 RX ADMIN — LOSARTAN POTASSIUM 50 MG: 50 TABLET, FILM COATED ORAL at 09:51

## 2025-06-13 NOTE — ED PROVIDER NOTES
Cooperative and participatory with examination.    SUMMARY  74-year-old female with type 2 diabetes and hypertension presented with acute onset slurred speech and imbalance. Exam notable for severe dysarthria, right pronator drift, and right-sided imbalance. Emergent CT head, CTA, and CT perfusion showed no acute findings. Teleneurology consulted, impression was possible conversion disorder in context of significant psychosocial stressors. Admitted for further monitoring and evaluation.    IMAGING  CT head without contrast: I contemporaneously reviewed the CT head without contrast performed and interpreted by others; no evidence of large vessel occlusion or intracranial hemorrhage.  CT angiography: I contemporaneously reviewed the CT angiography performed and interpreted by others; no evidence of large vessel occlusion or intracranial hemorrhage.  CT perfusion: I contemporaneously reviewed the CT perfusion performed and interpreted by others; no evidence of large vessel occlusion or intracranial hemorrhage.    MEDICAL DECISION MAKING  The patient presented with acute onset of right-sided neurological deficits, including severe slurred speech, right facial droop, right pronator drift, and imbalance, with a similar episode the previous day that resolved. Initial evaluation included prompt glucose and blood pressure assessment, both within acceptable ranges (blood sugar 177 mg/dL, /62 mm Hg). Emergent imaging--CT head without contrast, CT angiography, and CT perfusion--showed no evidence of large vessel occlusion or intracranial hemorrhage, effectively ruling out acute ischemic or hemorrhagic stroke as the etiology.  Given the absence of radiographic findings and the patient’s history of significant psychosocial stressors, teleneurology was consulted and concurred with the impression of possible conversion disorder. The patient’s neurological exam findings, lack of anticoagulation, and stable vital signs further

## 2025-06-13 NOTE — DISCHARGE INSTRUCTIONS
Discharge Instructions       PATIENT ID: Helen Gillette  MRN: [id number]   YOB: 1950    DATE OF ADMISSION: 6/12/2025   DATE OF DISCHARGE: 6/13/2025    PRIMARY CARE PROVIDER: Afshin Hand     ATTENDING PHYSICIAN: Placido Simmons MD   DISCHARGING PROVIDER: ANTIONE Alicea NP    To contact this individual call [phone] and ask the  to page.   If unavailable ask to be transferred the Adult Hospitalist Department.    DISCHARGE DIAGNOSES     Speech changes     Your work up was NEGATIVE for any acute findings, including NO stroke.     CONSULTATIONS:      Neurology   Speech therapy   Physical therapy   Occupational Therapy   Hospitalist    PROCEDURES/SURGERIES: * No surgery found *    PENDING TEST RESULTS:   At the time of discharge the following test results are still pending: none    FOLLOW UP APPOINTMENTS:     PCP     ADDITIONAL CARE RECOMMENDATIONS:    Notes from your speech therapy consultation in the hospital:     ASSESSMENT:  Patient participated in clinical swallow and motor speech evaluations. Oral-motor evaluation unremarkable. With PO trials of thin liquids and solids, patient demonstrated mild lingual residue of solid and no overt s/s of aspiration with either trialed consistency. Noted patient to frequently talk prior to clearing oral cavity. Lingual residue effectively cleared with liquid wash. Recommend she continue current PO diet with general swallow precautions.      With structured speech tasks, AMRs and SMRs showed an inconsistent error pattern. Speech intelligibility was well-preserved despite mild articulatory imprecision which was not isolated to specific sound class or secondary to a clear oral-motor impairment. Resonance, fluency, rate, and prosody were functional. No verbal apraxia appreciated. Note MRI Brain negative for acute event. Clinical presentation more consistent with an articulation impairment than a dysarthria subtype; however, acute

## 2025-06-13 NOTE — ED NOTES
Pt awake and standing states needs restroom pt assisted to bedside commode. Pt tolerated well with no distress noted no further complaints of weakness or discomfort

## 2025-06-13 NOTE — CARE COORDINATION
Consult received to talk with pt/family about OP SLP services. Pt will receive paper script for SLP OP at [facility]. CM gave pt the following information verbally and left a vm for pt's son/    Advised pt and son to call pt's insurance member services to get list of in network OP SLP services. Recommended they find a convenient location based on in network options and to take paper script for first session appointment.     JOHNATHAN Castaneda    [facility]    or by [facility]

## 2025-06-13 NOTE — H&P
[facility] Adult  Hospitalist Group  History and Physical    Date of Service:  6/12/2025  Primary Care Provider: Afshin Hand MD  Source of information: The patient and Chart review    Chief Complaint: Aphasia      History of Presenting Illness:   Helen Gillette is a 74 y.o. female who presents slurred speech, recurrent episodes are, comes and goes as per patient.  Patient works in the .  When she went for work yesterday coworkers noticed that her speech is like a 6-year-old.  Today she went back to work and she had the same problem.  But there was no speech difficulties identified when I was conversing with the patient.  Patient states it comes and goes.  It has happened many times in the past.  She came to the emergency room.  She does not drive anymore.  Her  takes her to the work.  History of high blood pressure present.    She was evaluated by teleneurology.  A CT scan of the brain did not reveal any acute process.  CT angiogram and a CT perfusion scan no flow-limiting stenosis or obstructions identified.  She was not a TNK candidate or mechanical thrombectomy candidate.      Emergency room records suggest the patient is ambulatory with the complaints of slurred speech, onset since yesterday and her blood sugar was 177.  The patient denies any headache, blurry vision, sore throat, trouble swallowing, trouble with speech, chest pain, SOB, cough, fever, chills, N/V/D, abd pain, urinary symptoms, constipation, recent travels, sick contacts, focal or generalized neurological symptoms, falls, injuries, rashes, contact with COVID-19 diagnosed patients, hematemesis, melena, hemoptysis, hematuria, rashes, denies starting any new medications and denies any other concerns or problems besides as mentioned above.         REVIEW OF SYSTEMS:  A comprehensive review of systems was negative except for that written in the History of Present Illness.     Past Medical History:

## 2025-06-13 NOTE — PROGRESS NOTES
OCCUPATIONAL THERAPY EVALUATION/DISCHARGE  Patient: Helen Gillette (74 y.o. female)  Date: 2025  Primary Diagnosis: Slurred speech [R47.81]         Precautions: Fall Risk                  ASSESSMENT :  Based on the objective data below, the patient presents with mild BUE tremor and mildly slurred/pressured speech throughout evaluation following admission for CVA workup. CT negative at time of evaluation and MRI pending. Patient tolerated increased bedroom/hallway functional mobility as well as prolonged standing at sink to complete bathing and dressing tasks. Patient without difficulty or fatigue with ADLs, no focal deficits observed. Patient denied changes in sensation, vision or strength with assessment. She appears to be at or very close to her baseline LOF. Further skilled acute occupational therapy is not indicated at this time.Will sign off.      Functional Outcome Measure:  The patient scored 63/66 on the Fugl Cavanaugh outcome measure.         PLAN :  Recommend with staff: Ax1 for line/lead management,up to BSC vs bathroom    Recommendation for discharge: (in order for the patient to meet his/her long term goals):   No skilled occupational therapy    Other factors to consider for discharge: no additional factors    IF patient discharges home will need the following DME: none     SUBJECTIVE:   Patient stated, “I feel better now.”    OBJECTIVE DATA SUMMARY:     Past Medical History:   Diagnosis Date    Arm pain 2021    Baker's cyst 2016    r    Bereavement 2015    57yo  sister     Contusion of knee and lower leg, right, subsequent encounter 11/10/2017    Contusion of knee, right     Cough 2017    Depression with anxiety     Diverticulosis 2015    colonoscopy    DM2 (diabetes mellitus, type 2) (HCC)     Edema     Encounter related to worker's compensation claim 11/10/2017    fall t work    Fatigue     Hemangioma of liver 2018    mri    Hemangioma of liver 2019    
Occupational Therapy  6/13/2025    Orders received, chart reviewed and patient evaluated by occupational therapy. Recommend:    No skilled occupational therapy    Recommend with nursing patient to complete as able in order to maintain strength, endurance and independence: OOB to chair 3x/day, ADLs with SPV and performing toileting with SPV. Thank you for your assistance.     Full evaluation to follow.      Kirsten Hodges, OTCAITLYN, OTR/L    
SGLT2 Verification Procedure    SGLT2 Inhibitor ordered: Empagliflozin    Indication: Diabetes    eGFR : 35 mL/min    Action taken: Order discontinued per protocol: no cosign required    Progress note posted: Yes    Medication Indication Pharmacist Action   Empagliflozin     *Contraindicated if eGFR<20 mL/min (including IHD, PD, CRRT, PIRRT) HF or CKD     eGFR >= 20 mL/min: Verify; Automatically change dose to 10 mg daily    eGFR < 20 mL/min: Notify provider to recommend discontinuation    Diabetes only  Discontinue   Dapagliflozin  HF or CKD     eGFR >= 20 mL/min: Automatically substitute to empagliflozin 10 mg daily    eGFR < 20 mL/min: Notify provider to recommend discontinuation    Diabetes only Discontinue    Canagliflozin   Any  Discontinue   Ertugliflozin  Any  Discontinue       If SGLT2 inhibitor is discontinued based on criteria above, copy and paste the text below to a progress note:    The following SGLT2 inhibitor has been discontinued per [facility] approved policy:  Empagliflozin 10mg daily     Please reorder upon discharge if appropriate.    
Speech Therapy Contact Note    Order received and chart reviewed. Case discussed with RN. Patient off the floor at MRI. Will follow-up as able/appropriate.    Alesha Gomes, CCC-SLP      
acute process  CTA/CTP: No LVO or flow limiting stenosis     Plan: tNK Candidate: NO  Mechanical thrombectomy Candidate: NO    *Perform dysphagia screening prior to any PO intake*     Discussed with: Dr. Bonner - ED Physician, Primary nurse,  at bedside, Patient, Dr. Hunter - Teleneuro    Arrival time: 19:00 - Met Stephanie Serra NP     I have spent 45 of critical care time involved in lab review, consultations with specialist, family decision making and documentation. During this entire length of time I was immediately available to the patient.    ANTIONE Aguilar - NP  Neurovascular Nurse Practitioner  [phone]    
surgery                 Strength:    Strength: Within functional limits    Tone & Sensation:   Tone: Normal  Sensation: Intact    Coordination:  Coordination: Within functional limits    Range Of Motion:  AROM: Within functional limits       Functional Mobility:  Bed Mobility:     Bed Mobility Training  Bed Mobility Training: Yes  Overall Level of Assistance: Independent  Rolling: Independent  Supine to Sit: Independent  Sit to Supine: Independent  Scooting: Independent  Transfers:     Transfer Training  Transfer Training: Yes  Overall Level of Assistance: Supervision  Sit to Stand: Supervision  Stand to Sit: Supervision  Balance:               Balance  Sitting: Intact  Ambulation/Gait Training:                       Gait  Gait Training: Yes  Overall Level of Assistance: Independent  Distance (ft): 200 Feet  Assistive Device: None                                                                                                                                                                                                                                                      Intervention/Education specific to: \"Stroke diagnoses\"    Patient was educated regarding her deficit(s) of speech impairment as this relates to her CVA work up.  She demonstrated good  understanding as evidenced by discussions.    Patient and/or family was verbally and visually (handout) educated on the BE FAST acronym for signs/symptoms of CVA and TIA.  All questions answered with patient indicating good  understanding.     Bower Balance Test:    Unable to assess d/t transport arrived to take pt to MRI.  Pt demos ability to stand on one foot when donning/ doffing lower body clothing.

## 2025-06-13 NOTE — PLAN OF CARE
Speech LAnguage Pathology EVALUATION    Patient: Helen Gillette (74 y.o. female)  Date: 6/13/2025  Primary Diagnosis: Slurred speech [R47.81]    Precautions: NA    ASSESSMENT:  Patient participated in clinical swallow and motor speech evaluations. Oral-motor evaluation unremarkable. With PO trials of thin liquids and solids, patient demonstrated mild lingual residue of solid and no overt s/s of aspiration with either trialed consistency. Noted patient to frequently talk prior to clearing oral cavity. Lingual residue effectively cleared with liquid wash. Recommend she continue current PO diet with general swallow precautions.     With structured speech tasks, AMRs and SMRs showed an inconsistent error pattern. Speech intelligibility was well-preserved despite mild articulatory imprecision which was not isolated to specific sound class or secondary to a clear oral-motor impairment. Resonance, fluency, rate, and prosody were functional. No verbal apraxia appreciated. Note MRI Brain negative for acute event. Clinical presentation more consistent with an articulation impairment than a dysarthria subtype; however, acute onset of articulation impairment, which is typically neurodevelopmental, is not expected. Cannot rule out a functional or psychological component to current speech deficits. Will continue to follow for diagnostic motor speech therapy, as patient and family endorsed speech is primary symptom.     Patient will benefit from skilled intervention to address the above impairments.     PLAN :  Recommendations and Planned Interventions:  Diet: Regular and thin liquids  Oral medications whole with liquids/as tolerated  Upright for PO intake  Liquid wash to clear any oral residue    Speech: Slow down speech rate and/or over-articulate as needed  Will continue to follow for diagnostic motor speech therapy    Acute SLP Services: SLP Plan of Care: 2 times/week. Patient's rehabilitation potential is considered to be

## 2025-06-13 NOTE — ED NOTES
Per pt's sister and pt's son, pt is not to go home with anyone except pt's son Abhilash. Charge aware.

## 2025-06-13 NOTE — CONSULTS
2015    59yo  sister     Contusion of knee and lower leg, right, subsequent encounter 11/10/2017    Contusion of knee, right     Cough 2017    Depression with anxiety     Diverticulosis 2015    colonoscopy    DM2 (diabetes mellitus, type 2) (HCC)     Edema     Encounter related to worker's compensation claim 11/10/2017    fall t work    Fatigue     Hemangioma of liver 2018    mri    Hemangioma of liver 2019    Hypercholesterolemia     Hypertension     Liver disease     hepatitis 30 years ago - Liver mass consistent with hemangioma in the right lobe    Menopause     LMP-unknown    NAFL (nonalcoholic fatty liver)     Obesity     Onychomycosis     Other headache syndrome 2022    Panniculitis 10/08/2019    S/P SUDARSHAN (total abdominal hysterectomy) 1985    Thrombocytopenia 2014    Thumb pain, right 2022    Tingling in extremities 2022    Vaginal vault prolapse 2019    Derik tsang md - robotic abdominal sacrolpopexy,enterocele repain,r ureterolysis,extensive lysis of adhesions,transobturator suburethral sling,retocele repair,cystoscopy with calibration        Past Surgical History:   Procedure Laterality Date    GYN      partial hysterectomy        Social History     Tobacco Use    Smoking status: Never    Smokeless tobacco: Never   Substance Use Topics    Alcohol use: No     Alcohol/week: 0.0 standard drinks of alcohol        Family History   Problem Relation Age of Onset    No Known Problems Father           Review of Systems:   Comprehensive review of systems performed and negative except for as listed above.     Exam:     /64   Pulse 86   Temp 98.2 °F (36.8 °C) (Oral)   Resp 14   Wt 67.6 kg (149 lb 0.5 oz)   LMP  (LMP Unknown)   SpO2 95%   BMI 30.10 kg/m²      General: Well developed, well nourished. Patient in no apparent distress   Head: Normocephalic, atraumatic, anicteric sclera   Lungs:  No increased WOB   Cardiac: Regular, rate, rhythm

## 2025-06-16 ENCOUNTER — OFFICE VISIT (OUTPATIENT)
Facility: CLINIC | Age: 75
End: 2025-06-16

## 2025-06-16 ENCOUNTER — TELEPHONE (OUTPATIENT)
Facility: CLINIC | Age: 75
End: 2025-06-16

## 2025-06-16 VITALS
HEIGHT: 59 IN | BODY MASS INDEX: 29.03 KG/M2 | HEART RATE: 78 BPM | RESPIRATION RATE: 16 BRPM | SYSTOLIC BLOOD PRESSURE: 107 MMHG | OXYGEN SATURATION: 97 % | DIASTOLIC BLOOD PRESSURE: 66 MMHG | TEMPERATURE: 97.5 F | WEIGHT: 144 LBS

## 2025-06-16 DIAGNOSIS — E11.65 TYPE 2 DIABETES MELLITUS WITH HYPERGLYCEMIA, WITHOUT LONG-TERM CURRENT USE OF INSULIN (HCC): Primary | ICD-10-CM

## 2025-06-16 DIAGNOSIS — F80.0 IMPAIRED SPEECH ARTICULATION: ICD-10-CM

## 2025-06-16 DIAGNOSIS — F41.8 DEPRESSION WITH ANXIETY: ICD-10-CM

## 2025-06-16 DIAGNOSIS — I10 PRIMARY HYPERTENSION: ICD-10-CM

## 2025-06-16 DIAGNOSIS — K76.0 NAFL (NONALCOHOLIC FATTY LIVER): ICD-10-CM

## 2025-06-16 SDOH — ECONOMIC STABILITY: FOOD INSECURITY: WITHIN THE PAST 12 MONTHS, YOU WORRIED THAT YOUR FOOD WOULD RUN OUT BEFORE YOU GOT MONEY TO BUY MORE.: NEVER TRUE

## 2025-06-16 SDOH — ECONOMIC STABILITY: FOOD INSECURITY: WITHIN THE PAST 12 MONTHS, THE FOOD YOU BOUGHT JUST DIDN'T LAST AND YOU DIDN'T HAVE MONEY TO GET MORE.: NEVER TRUE

## 2025-06-16 ASSESSMENT — PATIENT HEALTH QUESTIONNAIRE - PHQ9
4. FEELING TIRED OR HAVING LITTLE ENERGY: NOT AT ALL
8. MOVING OR SPEAKING SO SLOWLY THAT OTHER PEOPLE COULD HAVE NOTICED. OR THE OPPOSITE, BEING SO FIGETY OR RESTLESS THAT YOU HAVE BEEN MOVING AROUND A LOT MORE THAN USUAL: NOT AT ALL
9. THOUGHTS THAT YOU WOULD BE BETTER OFF DEAD, OR OF HURTING YOURSELF: NOT AT ALL
SUM OF ALL RESPONSES TO PHQ QUESTIONS 1-9: 0
1. LITTLE INTEREST OR PLEASURE IN DOING THINGS: NOT AT ALL
SUM OF ALL RESPONSES TO PHQ QUESTIONS 1-9: 0
6. FEELING BAD ABOUT YOURSELF - OR THAT YOU ARE A FAILURE OR HAVE LET YOURSELF OR YOUR FAMILY DOWN: NOT AT ALL
5. POOR APPETITE OR OVEREATING: NOT AT ALL
3. TROUBLE FALLING OR STAYING ASLEEP: NOT AT ALL
7. TROUBLE CONCENTRATING ON THINGS, SUCH AS READING THE NEWSPAPER OR WATCHING TELEVISION: NOT AT ALL
SUM OF ALL RESPONSES TO PHQ QUESTIONS 1-9: 0
SUM OF ALL RESPONSES TO PHQ QUESTIONS 1-9: 0
10. IF YOU CHECKED OFF ANY PROBLEMS, HOW DIFFICULT HAVE THESE PROBLEMS MADE IT FOR YOU TO DO YOUR WORK, TAKE CARE OF THINGS AT HOME, OR GET ALONG WITH OTHER PEOPLE: NOT DIFFICULT AT ALL
2. FEELING DOWN, DEPRESSED OR HOPELESS: NOT AT ALL

## 2025-06-16 NOTE — PROGRESS NOTES
Chief Complaint   Patient presents with    Follow-Up from Hospital     Have you been to the ER, urgent care clinic since your last visit?  Hospitalized since your last visit?   YES - When: approximately 4 days ago.  Where and Why: [facility] .    Have you seen or consulted any other health care providers outside our system since your last visit?   NO             
patient has   received an after visit summary and questions were answered concerning  future plans  Patient labs and/or xrays were reviewed  Past records were reviewed.    PLAN:  Orders Placed This Encounter   Procedures    External Referral To Speech Therapy     Referral Priority:   Routine     Referral Type:   Eval and Treat     Referral Reason:   Specialty Services Required     Requested Specialty:   Speech Pathology     Number of Visits Requested:   1        Follow-up and Dispositions    Return in about 3 months (around 2025).      Care Transitions Initial Follow Up Call     Outreach made within 2 business days of discharge: Yes     Patient: Helen Gillette Patient : 1950   MRN: [id number]       Reason for Admission: Slurred speech  Discharge Date: 25                        Spoke with: Patient     Discharge department/facility: [facility] Interactive Patient Contact:  Was patient able to fill all prescriptions: Yes  Was patient instructed to bring all medications to the follow-up visit: Yes  Is patient taking all medications as directed in the discharge summary? Yes  Does patient understand their discharge instructions: Yes  Does patient have questions or concerns that need addressed prior to 7-14 day follow up office visit: no     Additional needs identified to be addressed with provider  No needs identified                 Scheduled appointment with PCP within 7-14 days     Follow Up  No future appointments.     Cherie Patel LPN           ATTENTION:   This medical record was transcribed using an electronic medical records system.  Although proofread, it may and can contain electronic and spelling errors.  Other human spelling and other errors may be present.  Corrections may be executed at a later time.  Please feel free to contact us for any clarifications as needed.

## 2025-06-16 NOTE — TELEPHONE ENCOUNTER
Care Transitions Initial Follow Up Call    Outreach made within 2 business days of discharge: Yes    Patient: Helen Gillette Patient : 1950   MRN: [id number]  Reason for Admission: Slurred speech  Discharge Date: 25       Spoke with: Patient    Discharge department/facility: [facility] Interactive Patient Contact:  Was patient able to fill all prescriptions: Yes  Was patient instructed to bring all medications to the follow-up visit: Yes  Is patient taking all medications as directed in the discharge summary? Yes  Does patient understand their discharge instructions: Yes  Does patient have questions or concerns that need addressed prior to 7-14 day follow up office visit: no    Additional needs identified to be addressed with provider  No needs identified             Scheduled appointment with PCP within 7-14 days    Follow Up  No future appointments.    Cherie Patel LPN